# Patient Record
Sex: MALE | Race: BLACK OR AFRICAN AMERICAN | Employment: OTHER | URBAN - METROPOLITAN AREA
[De-identification: names, ages, dates, MRNs, and addresses within clinical notes are randomized per-mention and may not be internally consistent; named-entity substitution may affect disease eponyms.]

---

## 2017-01-05 ENCOUNTER — HOME HEALTH ADMISSION (OUTPATIENT)
Dept: HOME HEALTH SERVICES | Facility: HOME HEALTH | Age: 72
End: 2017-01-05
Payer: MEDICARE

## 2017-01-13 ENCOUNTER — HOME CARE VISIT (OUTPATIENT)
Dept: SCHEDULING | Facility: HOME HEALTH | Age: 72
End: 2017-01-13
Payer: MEDICARE

## 2017-01-13 PROCEDURE — 400013 HH SOC

## 2017-01-13 PROCEDURE — 3331090001 HH PPS REVENUE CREDIT

## 2017-01-13 PROCEDURE — G0299 HHS/HOSPICE OF RN EA 15 MIN: HCPCS

## 2017-01-13 PROCEDURE — 3331090002 HH PPS REVENUE DEBIT

## 2017-01-14 PROCEDURE — 3331090002 HH PPS REVENUE DEBIT

## 2017-01-14 PROCEDURE — 3331090001 HH PPS REVENUE CREDIT

## 2017-01-15 VITALS
SYSTOLIC BLOOD PRESSURE: 130 MMHG | HEART RATE: 54 BPM | DIASTOLIC BLOOD PRESSURE: 62 MMHG | RESPIRATION RATE: 20 BRPM | TEMPERATURE: 98.2 F | OXYGEN SATURATION: 99 %

## 2017-01-15 PROCEDURE — 3331090002 HH PPS REVENUE DEBIT

## 2017-01-15 PROCEDURE — 3331090001 HH PPS REVENUE CREDIT

## 2017-01-16 ENCOUNTER — HOME CARE VISIT (OUTPATIENT)
Dept: SCHEDULING | Facility: HOME HEALTH | Age: 72
End: 2017-01-16
Payer: MEDICARE

## 2017-01-16 PROCEDURE — 3331090001 HH PPS REVENUE CREDIT

## 2017-01-16 PROCEDURE — G0151 HHCP-SERV OF PT,EA 15 MIN: HCPCS

## 2017-01-16 PROCEDURE — 3331090002 HH PPS REVENUE DEBIT

## 2017-01-17 ENCOUNTER — HOME CARE VISIT (OUTPATIENT)
Dept: SCHEDULING | Facility: HOME HEALTH | Age: 72
End: 2017-01-17
Payer: MEDICARE

## 2017-01-17 VITALS
HEART RATE: 67 BPM | DIASTOLIC BLOOD PRESSURE: 74 MMHG | TEMPERATURE: 97.1 F | SYSTOLIC BLOOD PRESSURE: 138 MMHG | OXYGEN SATURATION: 94 % | RESPIRATION RATE: 16 BRPM

## 2017-01-17 VITALS
RESPIRATION RATE: 18 BRPM | SYSTOLIC BLOOD PRESSURE: 140 MMHG | TEMPERATURE: 98.1 F | HEART RATE: 54 BPM | DIASTOLIC BLOOD PRESSURE: 78 MMHG | OXYGEN SATURATION: 99 %

## 2017-01-17 PROCEDURE — G0299 HHS/HOSPICE OF RN EA 15 MIN: HCPCS

## 2017-01-17 PROCEDURE — 3331090002 HH PPS REVENUE DEBIT

## 2017-01-17 PROCEDURE — 3331090001 HH PPS REVENUE CREDIT

## 2017-01-18 PROCEDURE — 3331090001 HH PPS REVENUE CREDIT

## 2017-01-18 PROCEDURE — 3331090002 HH PPS REVENUE DEBIT

## 2017-01-19 PROCEDURE — 3331090001 HH PPS REVENUE CREDIT

## 2017-01-19 PROCEDURE — 3331090002 HH PPS REVENUE DEBIT

## 2017-01-20 PROCEDURE — 3331090002 HH PPS REVENUE DEBIT

## 2017-01-20 PROCEDURE — 3331090001 HH PPS REVENUE CREDIT

## 2017-01-21 PROCEDURE — 3331090001 HH PPS REVENUE CREDIT

## 2017-01-21 PROCEDURE — 3331090002 HH PPS REVENUE DEBIT

## 2017-01-22 PROCEDURE — 3331090002 HH PPS REVENUE DEBIT

## 2017-01-22 PROCEDURE — 3331090001 HH PPS REVENUE CREDIT

## 2017-01-23 PROCEDURE — 3331090002 HH PPS REVENUE DEBIT

## 2017-01-23 PROCEDURE — 3331090001 HH PPS REVENUE CREDIT

## 2017-01-24 ENCOUNTER — HOME CARE VISIT (OUTPATIENT)
Dept: SCHEDULING | Facility: HOME HEALTH | Age: 72
End: 2017-01-24
Payer: MEDICARE

## 2017-01-24 VITALS
SYSTOLIC BLOOD PRESSURE: 130 MMHG | HEART RATE: 68 BPM | OXYGEN SATURATION: 91 % | RESPIRATION RATE: 16 BRPM | DIASTOLIC BLOOD PRESSURE: 90 MMHG

## 2017-01-24 PROCEDURE — G0152 HHCP-SERV OF OT,EA 15 MIN: HCPCS

## 2017-01-24 PROCEDURE — 3331090001 HH PPS REVENUE CREDIT

## 2017-01-24 PROCEDURE — 3331090002 HH PPS REVENUE DEBIT

## 2017-01-25 ENCOUNTER — HOME CARE VISIT (OUTPATIENT)
Dept: SCHEDULING | Facility: HOME HEALTH | Age: 72
End: 2017-01-25
Payer: MEDICARE

## 2017-01-25 PROCEDURE — 3331090003 HH PPS REVENUE ADJ

## 2017-01-25 PROCEDURE — 3331090001 HH PPS REVENUE CREDIT

## 2017-01-25 PROCEDURE — 3331090002 HH PPS REVENUE DEBIT

## 2017-01-25 PROCEDURE — G0299 HHS/HOSPICE OF RN EA 15 MIN: HCPCS

## 2017-01-26 VITALS
OXYGEN SATURATION: 95 % | RESPIRATION RATE: 18 BRPM | DIASTOLIC BLOOD PRESSURE: 64 MMHG | SYSTOLIC BLOOD PRESSURE: 136 MMHG | TEMPERATURE: 97.5 F | HEART RATE: 56 BPM

## 2017-04-16 ENCOUNTER — APPOINTMENT (OUTPATIENT)
Dept: GENERAL RADIOLOGY | Age: 72
DRG: 871 | End: 2017-04-16
Attending: EMERGENCY MEDICINE
Payer: MEDICARE

## 2017-04-16 ENCOUNTER — HOSPITAL ENCOUNTER (INPATIENT)
Age: 72
LOS: 6 days | Discharge: NURSING, REHAB OR LONG-TERM CARE | DRG: 871 | End: 2017-04-22
Attending: EMERGENCY MEDICINE | Admitting: INTERNAL MEDICINE
Payer: MEDICARE

## 2017-04-16 DIAGNOSIS — T68.XXXA HYPOTHERMIA, INITIAL ENCOUNTER: ICD-10-CM

## 2017-04-16 DIAGNOSIS — D64.9 ANEMIA, UNSPECIFIED TYPE: ICD-10-CM

## 2017-04-16 DIAGNOSIS — K27.4 GASTROINTESTINAL HEMORRHAGE ASSOCIATED WITH PEPTIC ULCER: ICD-10-CM

## 2017-04-16 DIAGNOSIS — E87.0 HYPERNATREMIA: ICD-10-CM

## 2017-04-16 DIAGNOSIS — N17.9 ACUTE RENAL FAILURE, UNSPECIFIED ACUTE RENAL FAILURE TYPE (HCC): ICD-10-CM

## 2017-04-16 DIAGNOSIS — F10.27 DEMENTIA ASSOCIATED WITH ALCOHOLISM WITHOUT BEHAVIORAL DISTURBANCE (HCC): ICD-10-CM

## 2017-04-16 DIAGNOSIS — D62 ACUTE BLOOD LOSS ANEMIA: ICD-10-CM

## 2017-04-16 DIAGNOSIS — R41.82 ALTERED MENTAL STATUS, UNSPECIFIED ALTERED MENTAL STATUS TYPE: Primary | ICD-10-CM

## 2017-04-16 PROBLEM — A41.9 SEVERE SEPSIS(995.92): Status: ACTIVE | Noted: 2017-04-16

## 2017-04-16 PROBLEM — R65.20 SEVERE SEPSIS(995.92): Status: ACTIVE | Noted: 2017-04-16

## 2017-04-16 LAB
ALBUMIN SERPL BCP-MCNC: 2.7 G/DL (ref 3.2–4.6)
ALBUMIN/GLOB SERPL: 0.5 {RATIO} (ref 1.2–3.5)
ALP SERPL-CCNC: 116 U/L (ref 50–136)
ALT SERPL-CCNC: 54 U/L (ref 12–65)
AMMONIA PLAS-SCNC: 46 UMOL/L (ref 11–32)
ANION GAP BLD CALC-SCNC: 10 MMOL/L (ref 7–16)
ANION GAP BLD CALC-SCNC: 11 MMOL/L (ref 7–16)
ANION GAP BLD CALC-SCNC: 14 MMOL/L (ref 7–16)
APPEARANCE UR: ABNORMAL
AST SERPL W P-5'-P-CCNC: 216 U/L (ref 15–37)
ATRIAL RATE: 70 BPM
BACTERIA SPEC CULT: NORMAL
BACTERIA URNS QL MICRO: 0 /HPF
BASOPHILS # BLD AUTO: 0 K/UL (ref 0–0.2)
BASOPHILS # BLD: 0 % (ref 0–2)
BILIRUB SERPL-MCNC: 0.4 MG/DL (ref 0.2–1.1)
BILIRUB UR QL: ABNORMAL
BUN SERPL-MCNC: 68 MG/DL (ref 8–23)
BUN SERPL-MCNC: 85 MG/DL (ref 8–23)
BUN SERPL-MCNC: 89 MG/DL (ref 8–23)
CALCIUM SERPL-MCNC: 7.1 MG/DL (ref 8.3–10.4)
CALCIUM SERPL-MCNC: 7.6 MG/DL (ref 8.3–10.4)
CALCIUM SERPL-MCNC: 8.4 MG/DL (ref 8.3–10.4)
CALCULATED P AXIS, ECG09: 76 DEGREES
CALCULATED R AXIS, ECG10: 81 DEGREES
CALCULATED T AXIS, ECG11: 40 DEGREES
CASTS URNS QL MICRO: ABNORMAL /LPF
CHLORIDE SERPL-SCNC: 139 MMOL/L (ref 98–107)
CHLORIDE SERPL-SCNC: 140 MMOL/L (ref 98–107)
CHLORIDE SERPL-SCNC: 142 MMOL/L (ref 98–107)
CK SERPL-CCNC: 6966 U/L (ref 21–215)
CO2 SERPL-SCNC: 16 MMOL/L (ref 21–32)
CO2 SERPL-SCNC: 18 MMOL/L (ref 21–32)
CO2 SERPL-SCNC: 20 MMOL/L (ref 21–32)
COLOR UR: YELLOW
CREAT SERPL-MCNC: 3.14 MG/DL (ref 0.8–1.5)
CREAT SERPL-MCNC: 3.77 MG/DL (ref 0.8–1.5)
CREAT SERPL-MCNC: 4.31 MG/DL (ref 0.8–1.5)
DIAGNOSIS, 93000: NORMAL
DIFFERENTIAL METHOD BLD: ABNORMAL
EOSINOPHIL # BLD: 0.1 K/UL (ref 0–0.8)
EOSINOPHIL NFR BLD: 1 % (ref 0.5–7.8)
EPI CELLS #/AREA URNS HPF: ABNORMAL /HPF
ERYTHROCYTE [DISTWIDTH] IN BLOOD BY AUTOMATED COUNT: 21.6 % (ref 11.9–14.6)
ETHANOL SERPL-MCNC: <3 MG/DL
GLOBULIN SER CALC-MCNC: 5 G/DL (ref 2.3–3.5)
GLUCOSE SERPL-MCNC: 103 MG/DL (ref 65–100)
GLUCOSE SERPL-MCNC: 122 MG/DL (ref 65–100)
GLUCOSE SERPL-MCNC: 98 MG/DL (ref 65–100)
GLUCOSE UR STRIP.AUTO-MCNC: NEGATIVE MG/DL
HCT VFR BLD AUTO: 15.6 % (ref 41.1–50.3)
HGB BLD-MCNC: 4.5 G/DL (ref 13.6–17.2)
HGB UR QL STRIP: ABNORMAL
IMM GRANULOCYTES # BLD: 0 K/UL (ref 0–0.5)
IMM GRANULOCYTES NFR BLD AUTO: 0.4 % (ref 0–5)
INR PPP: 1.1 (ref 0.9–1.2)
KETONES UR QL STRIP.AUTO: NEGATIVE MG/DL
LEUKOCYTE ESTERASE UR QL STRIP.AUTO: NEGATIVE
LYMPHOCYTES # BLD AUTO: 13 % (ref 13–44)
LYMPHOCYTES # BLD: 1.3 K/UL (ref 0.5–4.6)
MAGNESIUM SERPL-MCNC: 3.3 MG/DL (ref 1.8–2.4)
MCH RBC QN AUTO: 25.1 PG (ref 26.1–32.9)
MCHC RBC AUTO-ENTMCNC: 28.8 G/DL (ref 31.4–35)
MCV RBC AUTO: 87.2 FL (ref 79.6–97.8)
MONOCYTES # BLD: 0.7 K/UL (ref 0.1–1.3)
MONOCYTES NFR BLD AUTO: 7 % (ref 4–12)
NEUTS SEG # BLD: 7.6 K/UL (ref 1.7–8.2)
NEUTS SEG NFR BLD AUTO: 79 % (ref 43–78)
NITRITE UR QL STRIP.AUTO: NEGATIVE
P-R INTERVAL, ECG05: 154 MS
PH UR STRIP: 5 [PH] (ref 5–9)
PLATELET # BLD AUTO: 300 K/UL (ref 150–450)
PMV BLD AUTO: 11.6 FL (ref 10.8–14.1)
POTASSIUM SERPL-SCNC: 4.7 MMOL/L (ref 3.5–5.1)
POTASSIUM SERPL-SCNC: 5 MMOL/L (ref 3.5–5.1)
POTASSIUM SERPL-SCNC: 5.2 MMOL/L (ref 3.5–5.1)
PROCALCITONIN SERPL-MCNC: 0.3 NG/ML
PROT SERPL-MCNC: 7.7 G/DL (ref 6.3–8.2)
PROT UR STRIP-MCNC: ABNORMAL MG/DL
PROTHROMBIN TIME: 11.7 SEC (ref 9.6–12)
Q-T INTERVAL, ECG07: 478 MS
QRS DURATION, ECG06: 84 MS
QTC CALCULATION (BEZET), ECG08: 516 MS
RBC # BLD AUTO: 1.79 M/UL (ref 4.23–5.67)
RBC #/AREA URNS HPF: ABNORMAL /HPF
SERVICE CMNT-IMP: NORMAL
SODIUM SERPL-SCNC: 168 MMOL/L (ref 136–145)
SODIUM SERPL-SCNC: 170 MMOL/L (ref 136–145)
SODIUM SERPL-SCNC: 172 MMOL/L (ref 136–145)
SP GR UR REFRACTOMETRY: 1.02 (ref 1–1.02)
TROPONIN I SERPL-MCNC: 0.06 NG/ML (ref 0.02–0.05)
TROPONIN I SERPL-MCNC: 0.07 NG/ML (ref 0.02–0.05)
TSH SERPL DL<=0.005 MIU/L-ACNC: 0.35 UIU/ML (ref 0.36–3.74)
UROBILINOGEN UR QL STRIP.AUTO: 0.2 EU/DL (ref 0.2–1)
VALPROATE SERPL-MCNC: 9 UG/ML (ref 50–100)
VENTRICULAR RATE, ECG03: 70 BPM
WBC # BLD AUTO: 9.7 K/UL (ref 4.3–11.1)
WBC URNS QL MICRO: ABNORMAL /HPF

## 2017-04-16 PROCEDURE — 96361 HYDRATE IV INFUSION ADD-ON: CPT | Performed by: EMERGENCY MEDICINE

## 2017-04-16 PROCEDURE — 99223 1ST HOSP IP/OBS HIGH 75: CPT | Performed by: INTERNAL MEDICINE

## 2017-04-16 PROCEDURE — 96375 TX/PRO/DX INJ NEW DRUG ADDON: CPT | Performed by: EMERGENCY MEDICINE

## 2017-04-16 PROCEDURE — 74011000258 HC RX REV CODE- 258: Performed by: INTERNAL MEDICINE

## 2017-04-16 PROCEDURE — 96368 THER/DIAG CONCURRENT INF: CPT | Performed by: EMERGENCY MEDICINE

## 2017-04-16 PROCEDURE — 80074 ACUTE HEPATITIS PANEL: CPT | Performed by: INTERNAL MEDICINE

## 2017-04-16 PROCEDURE — 86923 COMPATIBILITY TEST ELECTRIC: CPT | Performed by: EMERGENCY MEDICINE

## 2017-04-16 PROCEDURE — 74011250636 HC RX REV CODE- 250/636: Performed by: EMERGENCY MEDICINE

## 2017-04-16 PROCEDURE — 84484 ASSAY OF TROPONIN QUANT: CPT | Performed by: INTERNAL MEDICINE

## 2017-04-16 PROCEDURE — 87040 BLOOD CULTURE FOR BACTERIA: CPT | Performed by: EMERGENCY MEDICINE

## 2017-04-16 PROCEDURE — 82140 ASSAY OF AMMONIA: CPT | Performed by: INTERNAL MEDICINE

## 2017-04-16 PROCEDURE — 77030032490 HC SLV COMPR SCD KNE COVD -B

## 2017-04-16 PROCEDURE — 80048 BASIC METABOLIC PNL TOTAL CA: CPT | Performed by: INTERNAL MEDICINE

## 2017-04-16 PROCEDURE — 74011250636 HC RX REV CODE- 250/636: Performed by: INTERNAL MEDICINE

## 2017-04-16 PROCEDURE — 99284 EMERGENCY DEPT VISIT MOD MDM: CPT | Performed by: EMERGENCY MEDICINE

## 2017-04-16 PROCEDURE — 30233N1 TRANSFUSION OF NONAUTOLOGOUS RED BLOOD CELLS INTO PERIPHERAL VEIN, PERCUTANEOUS APPROACH: ICD-10-PCS | Performed by: INTERNAL MEDICINE

## 2017-04-16 PROCEDURE — 74011000258 HC RX REV CODE- 258: Performed by: EMERGENCY MEDICINE

## 2017-04-16 PROCEDURE — 80307 DRUG TEST PRSMV CHEM ANLYZR: CPT | Performed by: INTERNAL MEDICINE

## 2017-04-16 PROCEDURE — 77030019605

## 2017-04-16 PROCEDURE — 71010 XR CHEST PORT: CPT

## 2017-04-16 PROCEDURE — 84145 PROCALCITONIN (PCT): CPT | Performed by: EMERGENCY MEDICINE

## 2017-04-16 PROCEDURE — 87641 MR-STAPH DNA AMP PROBE: CPT | Performed by: INTERNAL MEDICINE

## 2017-04-16 PROCEDURE — 96366 THER/PROPH/DIAG IV INF ADDON: CPT | Performed by: EMERGENCY MEDICINE

## 2017-04-16 PROCEDURE — 85610 PROTHROMBIN TIME: CPT | Performed by: NURSE PRACTITIONER

## 2017-04-16 PROCEDURE — 86900 BLOOD TYPING SEROLOGIC ABO: CPT | Performed by: EMERGENCY MEDICINE

## 2017-04-16 PROCEDURE — 96365 THER/PROPH/DIAG IV INF INIT: CPT | Performed by: EMERGENCY MEDICINE

## 2017-04-16 PROCEDURE — 82550 ASSAY OF CK (CPK): CPT | Performed by: NURSE PRACTITIONER

## 2017-04-16 PROCEDURE — 36415 COLL VENOUS BLD VENIPUNCTURE: CPT | Performed by: INTERNAL MEDICINE

## 2017-04-16 PROCEDURE — 81001 URINALYSIS AUTO W/SCOPE: CPT | Performed by: EMERGENCY MEDICINE

## 2017-04-16 PROCEDURE — 65620000000 HC RM CCU GENERAL

## 2017-04-16 PROCEDURE — 84443 ASSAY THYROID STIM HORMONE: CPT | Performed by: EMERGENCY MEDICINE

## 2017-04-16 PROCEDURE — BT40ZZZ ULTRASONOGRAPHY OF BLADDER: ICD-10-PCS | Performed by: INTERNAL MEDICINE

## 2017-04-16 PROCEDURE — 80164 ASSAY DIPROPYLACETIC ACD TOT: CPT | Performed by: EMERGENCY MEDICINE

## 2017-04-16 PROCEDURE — C9113 INJ PANTOPRAZOLE SODIUM, VIA: HCPCS | Performed by: INTERNAL MEDICINE

## 2017-04-16 PROCEDURE — 93005 ELECTROCARDIOGRAM TRACING: CPT | Performed by: EMERGENCY MEDICINE

## 2017-04-16 PROCEDURE — 83735 ASSAY OF MAGNESIUM: CPT | Performed by: INTERNAL MEDICINE

## 2017-04-16 PROCEDURE — P9016 RBC LEUKOCYTES REDUCED: HCPCS | Performed by: EMERGENCY MEDICINE

## 2017-04-16 PROCEDURE — 77030013131 HC IV BLD ST ICUM -A

## 2017-04-16 PROCEDURE — 80053 COMPREHEN METABOLIC PANEL: CPT | Performed by: EMERGENCY MEDICINE

## 2017-04-16 PROCEDURE — 85025 COMPLETE CBC W/AUTO DIFF WBC: CPT | Performed by: EMERGENCY MEDICINE

## 2017-04-16 PROCEDURE — 36430 TRANSFUSION BLD/BLD COMPNT: CPT

## 2017-04-16 PROCEDURE — 87086 URINE CULTURE/COLONY COUNT: CPT | Performed by: INTERNAL MEDICINE

## 2017-04-16 RX ORDER — SODIUM CHLORIDE 9 MG/ML
250 INJECTION, SOLUTION INTRAVENOUS AS NEEDED
Status: DISCONTINUED | OUTPATIENT
Start: 2017-04-16 | End: 2017-04-16

## 2017-04-16 RX ORDER — SODIUM CHLORIDE 9 MG/ML
250 INJECTION, SOLUTION INTRAVENOUS AS NEEDED
Status: DISCONTINUED | OUTPATIENT
Start: 2017-04-16 | End: 2017-04-22 | Stop reason: HOSPADM

## 2017-04-16 RX ORDER — SODIUM CHLORIDE 0.9 % (FLUSH) 0.9 %
5-10 SYRINGE (ML) INJECTION AS NEEDED
Status: DISCONTINUED | OUTPATIENT
Start: 2017-04-16 | End: 2017-04-22 | Stop reason: HOSPADM

## 2017-04-16 RX ORDER — DEXTROSE MONOHYDRATE 50 MG/ML
125 INJECTION, SOLUTION INTRAVENOUS CONTINUOUS
Status: DISCONTINUED | OUTPATIENT
Start: 2017-04-16 | End: 2017-04-22 | Stop reason: HOSPADM

## 2017-04-16 RX ORDER — SODIUM CHLORIDE 0.9 % (FLUSH) 0.9 %
5-10 SYRINGE (ML) INJECTION EVERY 8 HOURS
Status: DISCONTINUED | OUTPATIENT
Start: 2017-04-16 | End: 2017-04-22 | Stop reason: HOSPADM

## 2017-04-16 RX ORDER — HYDROMORPHONE HYDROCHLORIDE 1 MG/ML
0.5 INJECTION, SOLUTION INTRAMUSCULAR; INTRAVENOUS; SUBCUTANEOUS
Status: DISCONTINUED | OUTPATIENT
Start: 2017-04-16 | End: 2017-04-22 | Stop reason: HOSPADM

## 2017-04-16 RX ORDER — ONDANSETRON 2 MG/ML
4 INJECTION INTRAMUSCULAR; INTRAVENOUS
Status: DISCONTINUED | OUTPATIENT
Start: 2017-04-16 | End: 2017-04-22 | Stop reason: HOSPADM

## 2017-04-16 RX ADMIN — DEXTROSE MONOHYDRATE 150 ML/HR: 5 INJECTION, SOLUTION INTRAVENOUS at 16:30

## 2017-04-16 RX ADMIN — Medication 10 ML: at 22:00

## 2017-04-16 RX ADMIN — LEVETIRACETAM 500 MG: 100 INJECTION, SOLUTION INTRAVENOUS at 17:54

## 2017-04-16 RX ADMIN — SODIUM CHLORIDE 80 MG: 9 INJECTION, SOLUTION INTRAVENOUS at 17:52

## 2017-04-16 RX ADMIN — SODIUM CHLORIDE 80 MG: 900 INJECTION, SOLUTION INTRAVENOUS at 19:19

## 2017-04-16 RX ADMIN — HYDROMORPHONE HYDROCHLORIDE 0.5 MG: 1 INJECTION, SOLUTION INTRAMUSCULAR; INTRAVENOUS; SUBCUTANEOUS at 15:14

## 2017-04-16 RX ADMIN — SODIUM CHLORIDE 1000 ML: 900 INJECTION, SOLUTION INTRAVENOUS at 11:23

## 2017-04-16 RX ADMIN — VANCOMYCIN HYDROCHLORIDE 1000 MG: 1 INJECTION, POWDER, LYOPHILIZED, FOR SOLUTION INTRAVENOUS at 11:23

## 2017-04-16 RX ADMIN — Medication 10 ML: at 16:30

## 2017-04-16 RX ADMIN — PIPERACILLIN SODIUM,TAZOBACTAM SODIUM 4.5 G: 4; .5 INJECTION, POWDER, FOR SOLUTION INTRAVENOUS at 11:44

## 2017-04-16 NOTE — ED PROVIDER NOTES
HPI Comments: Family reports to EMS the patient has had increased confusion and weakness with some altered mental status over the past 3 days. His history of GI bleeding and has been having dark stools. States last time he was having confusion like this he was also having a lower GI bleed. EGD performed 11/28/16 revealing gastric and duodenal AVMs which were bicapped/endoclipped. Patient is a 70 y.o. male presenting with altered mental status. The history is provided by the EMS personnel. No  was used. Altered mental status    This is a new problem. The current episode started more than 2 days ago. The problem has been gradually worsening. Associated symptoms include confusion, unresponsiveness and weakness. Risk factors include dementia. His past medical history is significant for seizures, hypertension and COPD. Past Medical History:   Diagnosis Date    Alcohol abuse     QUIT IN 2014. LONG USE    Anemia     GI BLEED AND CHRONIC, BASELINE 9.  11/25/16:  HGB: 7.6    Angiodysplasia of stomach 2016    and duodenum    Bladder incontinence 05/28/2014    ALSO BOWEL    Chronic airway obstruction, not elsewhere classified ([address] 5/28/2014    CKD (chronic kidney disease)     COPD (chronic obstructive pulmonary disease) ([address]     Dementia 5/28/2014    Hyperlipidemia 5/28/2014    Hypernatremia 11/25/2016 11/25/16:  177, 11/18/16:  80    Hypertension     Hypothyroid     Seizure ([address] 2010    NO GRAND MAL. ON DILANTIN THE ENTIRE TIME    Tobacco abuse 5/28/2014    Tobacco abuse     ENTIRE ADULT LIFE. QUIT IN 2014, SNEAKS WHEN POSSIBLE       Past Surgical History:   Procedure Laterality Date    HX COLONOSCOPY  APPROX 2005    UNKNOWN EGD    HX ENDOSCOPY  2016    bicap and clip         No family history on file.     Social History     Social History    Marital status:      Spouse name: N/A    Number of children: N/A    Years of education: N/A     Occupational History  Not on file. Social History Main Topics    Smoking status: Former Smoker     Packs/day: 0.50     Years: 50.00     Types: Cigarettes    Smokeless tobacco: Never Used      Comment: QUIT IN 2014, SNEAKS WHEN POSSIBLE    Alcohol use No      Comment: HEAVILY IN PAST YEARS, NONE SINCE 2014    Drug use: No    Sexual activity: Not on file     Other Topics Concern    Not on file     Social History Narrative    11/25/16:  PATIENT IS , LIVES WITH WIFE AND ISABEL ROSA. DAUGHTER IS ALSO NEARBY. ALLERGIES: Review of patient's allergies indicates no known allergies. Review of Systems   Unable to perform ROS: Mental status change   Neurological: Positive for weakness. Psychiatric/Behavioral: Positive for confusion. There were no vitals filed for this visit. Physical Exam   Constitutional: He appears well-developed and well-nourished. He appears lethargic. HENT:   Head: Normocephalic and atraumatic. Eyes: Conjunctivae are normal. Pupils are equal, round, and reactive to light. Neck: Normal range of motion. Neck supple. Cardiovascular: Normal rate and regular rhythm. Pulmonary/Chest: Effort normal and breath sounds normal. He has no wheezes. Abdominal: Soft. Bowel sounds are normal. There is no tenderness. Genitourinary: Rectal exam shows guaiac positive stool. Musculoskeletal: He exhibits no edema or tenderness. Neurological: He appears lethargic. He is disoriented. Skin: Skin is dry. Cool to touch     Nursing note and vitals reviewed. MDM  Number of Diagnoses or Management Options  Diagnosis management comments: Pt is hypothermic. Has lower GI bleed with anemia and renal failure and hypernatremia.  Will admit to hospitalist.        Amount and/or Complexity of Data Reviewed  Clinical lab tests: ordered and reviewed  Tests in the radiology section of CPT®: ordered and reviewed  Tests in the medicine section of CPT®: ordered and reviewed    Patient Progress  Patient progress: stable    ED Course       Procedures         EKG: normal sinus rhythm, nonspecific ST and T waves changes. Rtae 70. Results Include:    Recent Results (from the past 24 hour(s))   METABOLIC PANEL, COMPREHENSIVE    Collection Time: 04/16/17 11:25 AM   Result Value Ref Range    Sodium 170 (HH) 136 - 145 mmol/L    Potassium 5.0 3.5 - 5.1 mmol/L    Chloride 140 (H) 98 - 107 mmol/L    CO2 16 (L) 21 - 32 mmol/L    Anion gap 14 7 - 16 mmol/L    Glucose 122 (H) 65 - 100 mg/dL    BUN 89 (H) 8 - 23 MG/DL    Creatinine 4.31 (H) 0.8 - 1.5 MG/DL    GFR est AA 18 (L) >60 ml/min/1.73m2    GFR est non-AA 15 (L) >60 ml/min/1.73m2    Calcium 8.4 8.3 - 10.4 MG/DL    Bilirubin, total 0.4 0.2 - 1.1 MG/DL    ALT (SGPT) 54 12 - 65 U/L    AST (SGOT) 216 (H) 15 - 37 U/L    Alk. phosphatase 116 50 - 136 U/L    Protein, total 7.7 6.3 - 8.2 g/dL    Albumin 2.7 (L) 3.2 - 4.6 g/dL    Globulin 5.0 (H) 2.3 - 3.5 g/dL    A-G Ratio 0.5 (L) 1.2 - 3.5     CBC WITH AUTOMATED DIFF    Collection Time: 04/16/17 11:25 AM   Result Value Ref Range    WBC 9.7 4.3 - 11.1 K/uL    RBC 1.79 (L) 4.23 - 5.67 M/uL    HGB 4.5 (LL) 13.6 - 17.2 g/dL    HCT 15.6 (LL) 41.1 - 50.3 %    MCV 87.2 79.6 - 97.8 FL    MCH 25.1 (L) 26.1 - 32.9 PG    MCHC 28.8 (L) 31.4 - 35.0 g/dL    RDW 21.6 (H) 11.9 - 14.6 %    PLATELET 099 654 - 232 K/uL    MPV 11.6 10.8 - 14.1 FL    DF AUTOMATED      NEUTROPHILS 79 (H) 43 - 78 %    LYMPHOCYTES 13 13 - 44 %    MONOCYTES 7 4.0 - 12.0 %    EOSINOPHILS 1 0.5 - 7.8 %    BASOPHILS 0 0.0 - 2.0 %    IMMATURE GRANULOCYTES 0.4 0.0 - 5.0 %    ABS. NEUTROPHILS 7.6 1.7 - 8.2 K/UL    ABS. LYMPHOCYTES 1.3 0.5 - 4.6 K/UL    ABS. MONOCYTES 0.7 0.1 - 1.3 K/UL    ABS. EOSINOPHILS 0.1 0.0 - 0.8 K/UL    ABS. BASOPHILS 0.0 0.0 - 0.2 K/UL    ABS. IMM.  GRANS. 0.0 0.0 - 0.5 K/UL   TYPE & SCREEN    Collection Time: 04/16/17 11:25 AM   Result Value Ref Range    Crossmatch Expiration 04/19/2017     ABO/Rh(D) O POSITIVE     Antibody screen NEG     Unit number [id number]     Blood component type RC LR AS5     Unit division 00     Status of unit ALLOCATED     Crossmatch result Compatible     Unit number [id number]     Blood component type  LR AS5     Unit division 00     Status of unit ALLOCATED     Crossmatch result Compatible    VALPROIC ACID    Collection Time: 04/16/17 11:25 AM   Result Value Ref Range    VALPROIC ACID 9 (L) 50 - 100 ug/mL

## 2017-04-16 NOTE — H&P
HOSPITALIST H&P/CONSULT  NAME:  Arun Jovel   Age:  70 y.o.  :   1945   MRN:   [id number]  PCP: Cathryn Brown MD  Consulting MD:  Treatment Team: Attending Provider: Marty Motta MD; Primary Nurse: Shannen Morillo; Hospitalist: Mariel Hernandez MD  HPI:   Patient brought in with AMS unable to give h/istory. History obtained by refiew of medical records. Complete ROS done and is as stated in HPI or otherwise negative  Past Medical History:   Diagnosis Date    Alcohol abuse     QUIT IN . LONG USE    Anemia     GI BLEED AND CHRONIC, BASELINE 9.  16:  HGB: 7.6    Angiodysplasia of stomach     and duodenum    Bladder incontinence 2014    ALSO BOWEL    Chronic airway obstruction, not elsewhere classified ([facility] 2014    CKD (chronic kidney disease)     COPD (chronic obstructive pulmonary disease) ([facility]     Dementia 2014    Hyperlipidemia 2014    Hypernatremia 2016:  177, 16:  80    Hypertension     Hypothyroid     Seizure ([address]     NO GRAND MAL. ON DILANTIN THE ENTIRE TIME    Tobacco abuse 2014    Tobacco abuse     ENTIRE ADULT LIFE. QUIT IN , SNEAKS WHEN POSSIBLE      Past Surgical History:   Procedure Laterality Date    HX COLONOSCOPY  APPROX     UNKNOWN EGD    HX ENDOSCOPY  2016    bicap and clip      Prior to Admission Medications   Prescriptions Last Dose Informant Patient Reported? Taking? amLODIPine (NORVASC) 10 mg tablet   No No   Sig: Take 1 Tab by mouth daily. atorvastatin (LIPITOR) 20 mg tablet   No No   Sig: Take 1 Tab by mouth daily. divalproex DR (DEPAKOTE) 250 mg tablet   No No   Sig: Take 1 Tab by mouth nightly. ferrous sulfate 220 mg (44 mg iron)/5 mL solution   No No   Sig: Take 5 mL by mouth two (2) times a day. folic acid (FOLVITE) 1 mg tablet   No No   Sig: Take 1 Tab by mouth daily.    levETIRAcetam (KEPPRA) 500 mg tablet   No No   Sig: Take 1 Tab by mouth two (2) times a day. levothyroxine (SYNTHROID) 125 mcg tablet   No No   Sig: Take 1 Tab by mouth Daily (before breakfast). losartan (COZAAR) 100 mg tablet   No No   Sig: Take 1 Tab by mouth daily. omeprazole (PRILOSEC) 20 mg capsule   No No   Sig: Take 1 Cap by mouth two (2) times a day. tamsulosin (FLOMAX) 0.4 mg capsule   No No   Sig: Take 1 Cap by mouth nightly. thiamine (B-1) 100 mg tablet   No No   Sig: Take 1 Tab by mouth daily. Facility-Administered Medications: None     No Known Allergies   Social History   Substance Use Topics    Smoking status: Former Smoker     Packs/day: 0.50     Years: 50.00     Types: Cigarettes    Smokeless tobacco: Never Used      Comment: QUIT IN , SNEAKS WHEN POSSIBLE    Alcohol use No      Comment: HEAVILY IN PAST YEARS, NONE SINCE       No family history on file. Objective:     Visit Vitals    /54    Pulse 76    Temp 95 °F (35 °C)    Resp 16    Ht 5' 8\" (1.727 m)    Wt 56.7 kg (125 lb)    SpO2 100%    BMI 19.01 kg/m2      Temp (24hrs), Av.9 °F (34.4 °C), Min:92.9 °F (33.8 °C), Max:95 °F (35 °C)    Oxygen Therapy  O2 Sat (%): 100 % (17 1320)  O2 Device: Room air (17 1131)  Physical Exam:  General:    Lethargic, Black male, no distress, appears stated age. Head:   Normocephalic, without obvious abnormality, atraumatic. Nose:  Nares normal. No drainage or sinus tenderness. Lungs:   Clear to auscultation bilaterally. No Wheezing or Rhonchi. No rales. Heart:   Regular rate and rhythm,  no murmur, rub or gallop. Abdomen:   Soft, non-tender. Not distended. Bowel sounds normal.   Extremities: No cyanosis. No edema. No clubbing  Skin:     Texture, turgor normal. No rashes or lesions.   Not Jaundiced  Neurologic: Alert and oriented x 3, no focal deficits   Data Review:   Recent Results (from the past 24 hour(s))   PROCALCITONIN    Collection Time: 17 11:25 AM   Result Value Ref Range    Procalcitonin 0.3 ng/mL METABOLIC PANEL, COMPREHENSIVE    Collection Time: 04/16/17 11:25 AM   Result Value Ref Range    Sodium 170 (HH) 136 - 145 mmol/L    Potassium 5.0 3.5 - 5.1 mmol/L    Chloride 140 (H) 98 - 107 mmol/L    CO2 16 (L) 21 - 32 mmol/L    Anion gap 14 7 - 16 mmol/L    Glucose 122 (H) 65 - 100 mg/dL    BUN 89 (H) 8 - 23 MG/DL    Creatinine 4.31 (H) 0.8 - 1.5 MG/DL    GFR est AA 18 (L) >60 ml/min/1.73m2    GFR est non-AA 15 (L) >60 ml/min/1.73m2    Calcium 8.4 8.3 - 10.4 MG/DL    Bilirubin, total 0.4 0.2 - 1.1 MG/DL    ALT (SGPT) 54 12 - 65 U/L    AST (SGOT) 216 (H) 15 - 37 U/L    Alk. phosphatase 116 50 - 136 U/L    Protein, total 7.7 6.3 - 8.2 g/dL    Albumin 2.7 (L) 3.2 - 4.6 g/dL    Globulin 5.0 (H) 2.3 - 3.5 g/dL    A-G Ratio 0.5 (L) 1.2 - 3.5     CBC WITH AUTOMATED DIFF    Collection Time: 04/16/17 11:25 AM   Result Value Ref Range    WBC 9.7 4.3 - 11.1 K/uL    RBC 1.79 (L) 4.23 - 5.67 M/uL    HGB 4.5 (LL) 13.6 - 17.2 g/dL    HCT 15.6 (LL) 41.1 - 50.3 %    MCV 87.2 79.6 - 97.8 FL    MCH 25.1 (L) 26.1 - 32.9 PG    MCHC 28.8 (L) 31.4 - 35.0 g/dL    RDW 21.6 (H) 11.9 - 14.6 %    PLATELET 075 990 - 404 K/uL    MPV 11.6 10.8 - 14.1 FL    DF AUTOMATED      NEUTROPHILS 79 (H) 43 - 78 %    LYMPHOCYTES 13 13 - 44 %    MONOCYTES 7 4.0 - 12.0 %    EOSINOPHILS 1 0.5 - 7.8 %    BASOPHILS 0 0.0 - 2.0 %    IMMATURE GRANULOCYTES 0.4 0.0 - 5.0 %    ABS. NEUTROPHILS 7.6 1.7 - 8.2 K/UL    ABS. LYMPHOCYTES 1.3 0.5 - 4.6 K/UL    ABS. MONOCYTES 0.7 0.1 - 1.3 K/UL    ABS. EOSINOPHILS 0.1 0.0 - 0.8 K/UL    ABS. BASOPHILS 0.0 0.0 - 0.2 K/UL    ABS. IMM.  GRANS. 0.0 0.0 - 0.5 K/UL   TYPE & SCREEN    Collection Time: 04/16/17 11:25 AM   Result Value Ref Range    Crossmatch Expiration 04/19/2017     ABO/Rh(D) O POSITIVE     Antibody screen NEG     Unit number [id number]     Blood component type [facility] AS5     Unit division 00     Status of unit ISSUED     Crossmatch result Compatible     Unit number [id number]     Blood component type [facility] AS5     Unit division 00     Status of unit ALLOCATED     Crossmatch result Compatible    TSH 3RD GENERATION    Collection Time: 04/16/17 11:25 AM   Result Value Ref Range    TSH 0.352 (L) 0.358 - 3.740 uIU/mL   VALPROIC ACID    Collection Time: 04/16/17 11:25 AM   Result Value Ref Range    VALPROIC ACID 9 (L) 50 - 100 ug/mL     Imaging /Procedures /Studies     Assessment and Plan: Active Hospital Problems    Diagnosis Date Noted    Severe sepsis ([facility]  Blood culture x2  Urine for UA and c&s  Vancomycin and zosyn   04/16/2017    Acute blood loss anemia  ---- stool for hemoccult x3  --- iron, TIBC, % sats ,Ferritin and reticulocyte count  --- transfuse 4 units of PRBC  --- H/h q6h   04/16/2017    Hypothermia  --- warming blanket/ bare hugger  --- tectal probe  Warmed iv fluid  --- Free T4  --- iv synthroid 1/2  Usual oral dose   04/16/2017    Acute renal failure (ARF) ([facility] [address]  Urine for Na, Creat, FENA  1/2 NS @ 100ml/h  BMP in am   04/16/2017    Dementia   11/25/2016    Anemia 11/25/2016     ADM 11/25/16:  HGB: 7.6. BASELINE 9      Hypernatremia  --- BMP q6h   11/25/2016    GI bleed  --- protonix 80mg iv push then 8 mg/hr  --- GI consult  --- if continue to bleed then would start treatment for esophageal varices   06/14/2014    History of alcohol abuse 06/13/2014     FORMERLY DRANK  1-2 12oz.  Beers daily    NONE 2014         PLAN  ·  CT scan of the head  · Ammonia lerel  ·     Code Status: full    Anticipated discharge: 4 days    Signed By: Rigo Briscoe MD     April 16, 2017

## 2017-04-16 NOTE — PROGRESS NOTES
TRANSFER - IN REPORT:    Verbal report received from [facility]) on Heena Berg  being received from ED(unit) for routine progression of care      Report consisted of patients Situation, Background, Assessment and   Recommendations(SBAR). Information from the following report(s) SBAR, Kardex, ED Summary, Intake/Output, MAR, Recent Results, Med Rec Status and Cardiac Rhythm SR was reviewed with the receiving nurse. Opportunity for questions and clarification was provided. Assessment completed upon patients arrival to unit and care assumed.

## 2017-04-16 NOTE — CONSULTS
CONSULT NOTE    Eladio Cornejo    4/16/2017    Date of Admission:  4/16/2017    The patient's chart is reviewed and the patient is discussed with the staff. Subjective:     Patient is a 70 y.o.  male seen and evaluated at the request of Dr. Osorio Johnson. The patient is unable to provide any history so all information is gathered from chart review. According to ER records the patient has had several days of confusion at home associated with very dark stools. He apparently has a history of GI AVM's in the past. Per the ER physician note the last time he had confusion like this was in the setting of GI bleed. He was noted to have a hgb of 4.5 and a Na of 170. The hospitalists have admitted him to the ICU and [facility]. The patient is on room air and hemodynamically stable but confused and non-verbal. He has already received 2u PRBC. His last known hgb was 9.8 and Na was 142 in January of this year. Review of Systems  Review of systems not obtained due to patient factors.     Patient Active Problem List   Diagnosis Code    Dementia F03.90    Tobacco abuse Z72.0    Chronic airway obstruction, not elsewhere classified J44.9    Weight loss R63.4    Hyperlipidemia E78.5    Bladder incontinence R32    HTN (hypertension) I10    COPD (chronic obstructive pulmonary disease) with emphysema (HCC) J43.9    GIB (gastrointestinal bleeding) K92.2    Seizure disorder ([facility] [address] G40.909    DEVORA (acute kidney injury) ([facility] [address] N17.9    Hypernatremia E87.0    Microcytic hypochromic anemia D50.9    History of alcohol abuse Z87.898    GI bleed K92.2    Anemia D64.9    AVM (arteriovenous malformation) of small bowel, acquired with hemorrhage (HCC) K55.8    Benign neoplasm of colon D12.6    Dementia associated with alcoholism without behavioral disturbance (HCC) F10.97    Acquired hypothyroidism E03.9    Abdominal pain R10.9    Anorexia R63.0    Debility R53.81    Acute encephalopathy G93.40    Lethargy R53.83    Acute on chronic renal failure (HCC) N17.9, N18.9    Failure to thrive in adult R62.7    Protein-calorie malnutrition, severe ([facility]) E43    Seizure ([facility]) R56.9    Acute metabolic encephalopathy S63.10    Urinary tract infection associated with indwelling urethral catheter ([facility]) T83.511A, N39.0    Bradycardia R00.1    Severe sepsis ([facility]) A41.9, R65.20    Acute blood loss anemia D62    Hypothermia T68. Maxwell Mikel    Acute renal failure (ARF) ([facility]) N17.9       Prior to Admission Medications   Prescriptions Last Dose Informant Patient Reported? Taking? amLODIPine (NORVASC) 10 mg tablet   No No   Sig: Take 1 Tab by mouth daily. atorvastatin (LIPITOR) 20 mg tablet   No No   Sig: Take 1 Tab by mouth daily. divalproex DR (DEPAKOTE) 250 mg tablet   No No   Sig: Take 1 Tab by mouth nightly. ferrous sulfate 220 mg (44 mg iron)/5 mL solution   No No   Sig: Take 5 mL by mouth two (2) times a day. folic acid (FOLVITE) 1 mg tablet   No No   Sig: Take 1 Tab by mouth daily. levETIRAcetam (KEPPRA) 500 mg tablet   No No   Sig: Take 1 Tab by mouth two (2) times a day. levothyroxine (SYNTHROID) 125 mcg tablet   No No   Sig: Take 1 Tab by mouth Daily (before breakfast). losartan (COZAAR) 100 mg tablet   No No   Sig: Take 1 Tab by mouth daily. omeprazole (PRILOSEC) 20 mg capsule   No No   Sig: Take 1 Cap by mouth two (2) times a day. tamsulosin (FLOMAX) 0.4 mg capsule   No No   Sig: Take 1 Cap by mouth nightly. thiamine (B-1) 100 mg tablet   No No   Sig: Take 1 Tab by mouth daily. Facility-Administered Medications: None       Past Medical History:   Diagnosis Date    Alcohol abuse     QUIT IN 2014.  LONG USE    Anemia     GI BLEED AND CHRONIC, BASELINE 9.  11/25/16:  HGB: 7.6    Angiodysplasia of stomach 2016    and duodenum    Bladder incontinence 05/28/2014    ALSO BOWEL    Chronic airway obstruction, not elsewhere classified ([facility] 5/28/2014    CKD (chronic kidney disease)     COPD (chronic obstructive pulmonary disease) ([facility]     Dementia 5/28/2014    Hyperlipidemia 5/28/2014    Hypernatremia 11/25/2016 11/25/16:  177, 11/18/16:  80    Hypertension     Hypothyroid     Seizure ([facility] 2010    NO GRAND MAL. ON DILANTIN THE ENTIRE TIME    Tobacco abuse 5/28/2014    Tobacco abuse     ENTIRE ADULT LIFE. QUIT IN 2014, SNEAKS WHEN POSSIBLE     Past Surgical History:   Procedure Laterality Date    HX COLONOSCOPY  APPROX 2005    UNKNOWN EGD    HX ENDOSCOPY  2016    bicap and clip     Social History     Social History    Marital status:      Spouse name: N/A    Number of children: N/A    Years of education: N/A     Occupational History    Not on file. Social History Main Topics    Smoking status: Former Smoker     Packs/day: 0.50     Years: 50.00     Types: Cigarettes    Smokeless tobacco: Never Used      Comment: QUIT IN 2014, SNEAKS WHEN POSSIBLE    Alcohol use No      Comment: HEAVILY IN PAST YEARS, NONE SINCE 2014    Drug use: No    Sexual activity: Not on file     Other Topics Concern    Not on file     Social History Narrative    11/25/16:  PATIENT IS , LIVES WITH WIFE AND ISABEL ROSA. DAUGHTER IS ALSO NEARBY. No family history on file.   No Known Allergies    Current Facility-Administered Medications   Medication Dose Route Frequency    sodium chloride (NS) flush 5-10 mL  5-10 mL IntraVENous PRN    levETIRAcetam (KEPPRA) 500 mg in 0.9% sodium chloride IVPB  500 mg IntraVENous Q12H    [START ON 4/21/2017] levothyroxine (SYNTHROID) injection 50 mcg  50 mcg IntraVENous Q48H    [START ON 0/51/5506] folic acid (FOLVITE) 1 mg, thiamine (B-1) 100 mg in 0.9% sodium chloride 50 mL ivpb   IntraVENous DAILY    sodium chloride (NS) flush 5-10 mL  5-10 mL IntraVENous Q8H    sodium chloride (NS) flush 5-10 mL  5-10 mL IntraVENous PRN    ondansetron (ZOFRAN) injection 4 mg  4 mg IntraVENous Q4H PRN    HYDROmorphone (PF) (DILAUDID) injection 0.5 mg  0.5 mg IntraVENous Q4H PRN    pantoprazole (PROTONIX) 80 mg in 0.9% sodium chloride 100 mL infusion  80 mg IntraVENous ONCE    pantoprazole (PROTONIX) 80 mg in 0.9% sodium chloride 250 mL infusion  80 mg IntraVENous CONTINUOUS    dextrose 5% infusion  150 mL/hr IntraVENous CONTINUOUS       Objective:     Vitals:    04/16/17 1459 04/16/17 1500 04/16/17 1515 04/16/17 1556   BP: 131/64 128/58 128/58    Pulse: 78 79 77    Resp: 16  16    Temp: 97.3 °F (36.3 °C)  97.6 °F (36.4 °C) 96 °F (35.6 °C)   SpO2: 100% 100% 100%    Weight:    126 lb 15.8 oz (57.6 kg)   Height:    5' 8\" (1.727 m)       PHYSICAL EXAM     Constitutional:  the patient is well developed and in no acute distress  EENMT:  Sclera clear, pupils equal, oral mucosa moist  Respiratory: CTA B, no w/r/r  Cardiovascular:  RRR without M,G,R  Gastrointestinal: soft and non-tender; with positive bowel sounds. Musculoskeletal: warm without cyanosis. There is no lower leg edema. Skin:  no jaundice or rashes, no wounds   Neurologic: no gross neuro deficits     Psychiatric:  alert and nods to some questions. Chest X-ray:    4/16/17  IMPRESSION: No active disease      Recent Labs      04/16/17   1125   WBC  9.7   HGB  4.5*   HCT  15.6*   PLT  300     Recent Labs      04/16/17   1125   NA  170*   K  5.0   CL  140*   GLU  122*   CO2  16*   BUN  89*   CREA  4.31*   CA  8.4   ALB  2.7*   TBILI  0.4   ALT  54   SGOT  216*     No results for input(s): PH, PCO2, PO2, HCO3 in the last 72 hours. No results for input(s): LCAD, LAC in the last 72 hours. Assessment:  (Medical Decision Making)     Hospital Problems  Date Reviewed: 2/10/2017          Codes Class Noted POA    * (Principal)Acute blood loss anemia ICD-10-CM: D62  ICD-9-CM: 285.1  4/16/2017 Yes        Hypothermia ICD-10-CM: T68. Serge Mouse  ICD-9-CM: 991.6  4/16/2017 Yes        Acute renal failure (ARF) (HCC) ICD-10-CM: N17.9  ICD-9-CM: 584.9 4/16/2017 Yes        Dementia (Chronic) ICD-10-CM: F03.90  ICD-9-CM: 294.20  11/25/2016 Yes        Hypernatremia (Chronic) ICD-10-CM: E87.0  ICD-9-CM: 276.0  11/25/2016 Yes        Anemia (Chronic) ICD-10-CM: D64.9  ICD-9-CM: 285.9  11/25/2016 Yes    Overview Signed 11/25/2016  4:12 PM by Wendy Menard NP     ADM 11/25/16:  HGB: 7.6. BASELINE 9             GI bleed ICD-10-CM: K92.2  ICD-9-CM: 578.9  6/14/2014 Yes        History of alcohol abuse (Chronic) ICD-10-CM: Y10.172  ICD-9-CM: 305.03  6/13/2014 Yes    Overview Addendum 11/25/2016  4:10 PM by Wendy Menard NP     FORMERLY DRANK  1-2 12oz. Beers daily    NONE 2014                 Patient with confusion found to have severe blood loss anemia from likely GI bleed, hyponatremia and DEVORA. Possibility of sepsis raised, however no suspicion for infection. All of his findings can be explained by his anemia and Na. Plan:  (Medical Decision Making)     --Will d/c abx.   -consult nephrology to manage DEVORA and Na  -consult GI for GI bleed  -on IV ppi  -npo  -repeat BMP now, check mag. Will start D5W and check BMP every 4 hours.   -hgb every 6 hours  -scd's. More than 50% of the time documented was spent in face-to-face contact with the patient and in the care of the patient on the floor/unit where the patient is located. Thank you very much for this referral.  We appreciate the opportunity to participate in this patient's care. Will follow along with above stated plan.     Lana Baker MD

## 2017-04-16 NOTE — CONSULTS
[facility] Consult Note       Primary GI Physician: Dr. Kina Benitez    Referring Physician:  Dr.D. Lew    Consult Date:  4/16/2017    Admit Date:  4/16/2017    Chief Complaint:  GIB    Subjective:     History of Present Illness:  Patient is a 70 y.o. male with PMH of (but not limited to) chronic anemia on iron therapy, CKD, dementia, HLD, HTN, Hypothyroid, seizure history, AVM with recurrent bleeding, COPD, and history of ETOH abuse formerly, who is seen in consultation at the request of Dr. Kaleb Adrian for evaluation of GIB. Mr. 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 He was admitted to [facility] for altered mental status. There is no family present, so the history was obtained from the chart. He was apparently having a some \"dark stools\" over the last couple of days. He takes iron regularly. On evaluation in the ED, he had a heme positive stool. He has no active melena or hematemesis. His Hgb on admission 4.5 (9.8 1/2017). His BUN is elevated at 89(28), creat 4.31 (1.69), (14) ALT 54(7) Tbili 0.4(0.2) (116). His sodium is elevated at 170. He is receiving his first unit of PRBC, PPI drip has been started and blood cultures are pending. CT of his head is pending. EGD with pediatric colonoscope to Jejunum 12/8/16 by Dr. Madeline Temple revealed 2mm nodule at GE junctions, clip along proximal greater curvature, Few AVM, not actively bleeding in the duodenum. Treated with APC; Multiple AVMs throughout the jejunum, not actively bleeding. Treated with APC. Terminated early due to poor O2 saturation and possible aspiration.    EGD by Dr. Donovan Foote on 11/28/16 revealed gastric AVM on lesser curvature with bicap and endoclip, duodenal bulb AVM  Colonoscopy on 6/17/14 by Dr. Samuel Iqbal revealed 3mm sigmoid polyp, 4mm pedunculated polyp in the descending colon and nonbleeding cecal AVM    PMH:  Past Medical History:   Diagnosis Date    Alcohol abuse     QUIT IN 2014. LONG USE    Anemia     GI BLEED AND CHRONIC, BASELINE 9.  11/25/16:  HGB: 7.6    Angiodysplasia of stomach 2016    and duodenum    Bladder incontinence 05/28/2014    ALSO BOWEL    Chronic airway obstruction, not elsewhere classified ([facility] 5/28/2014    CKD (chronic kidney disease)     [facility]     Dementia 5/28/2014    Hyperlipidemia 5/28/2014    Hypernatremia 11/25/2016 11/25/16:  177, 11/18/16:  80    Hypertension     Hypothyroid     Seizure ([facility] 2010    NO GRAND MAL. ON DILANTIN THE ENTIRE TIME    Tobacco abuse 5/28/2014    Tobacco abuse     ENTIRE ADULT LIFE. QUIT IN 2014, SNEAKS WHEN POSSIBLE       PSH:  Past Surgical History:   Procedure Laterality Date    HX COLONOSCOPY  APPROX 2005    UNKNOWN EGD    HX ENDOSCOPY  2016    bicap and clip       Allergies:  No Known Allergies    Home Medications:  Prior to Admission medications    Medication Sig Start Date End Date Taking? Authorizing Provider   ferrous sulfate 220 mg (44 mg iron)/5 mL solution Take 5 mL by mouth two (2) times a day. 2/10/17   Yolie Burns MD   divalproex DR (DEPAKOTE) 250 mg tablet Take 1 Tab by mouth nightly. 2/10/17   Yolie Burns MD   omeprazole (PRILOSEC) 20 mg capsule Take 1 Cap by mouth two (2) times a day. 1/20/17   Yolie Burns MD   atorvastatin (LIPITOR) 20 mg tablet Take 1 Tab by mouth daily. 1/20/17   Yolie Burns MD   folic acid (FOLVITE) 1 mg tablet Take 1 Tab by mouth daily. 1/20/17   Yolie Burns MD   levETIRAcetam (KEPPRA) 500 mg tablet Take 1 Tab by mouth two (2) times a day. 1/20/17   Yolie Burns MD   tamsulosin (FLOMAX) 0.4 mg capsule Take 1 Cap by mouth nightly. 1/20/17   Yolie Burns MD   levothyroxine (SYNTHROID) 125 mcg tablet Take 1 Tab by mouth Daily (before breakfast).  1/20/17   Yolie Burns MD   losartan (COZAAR) 100 mg tablet Take 1 Tab by mouth daily. 1/20/17   Erin Cerna MD   amLODIPine (NORVASC) 10 mg tablet Take 1 Tab by mouth daily. 1/20/17   Erin Cerna MD   thiamine (B-1) 100 mg tablet Take 1 Tab by mouth daily. 11/18/16   Erin Cerna MD       Hospital Medications:  Current Facility-Administered Medications   Medication Dose Route Frequency    sodium chloride (NS) flush 5-10 mL  5-10 mL IntraVENous PRN    levETIRAcetam (KEPPRA) 500 mg in 0.9% sodium chloride IVPB  500 mg IntraVENous Q12H    levothyroxine (SYNTHROID) injection 50 mcg  50 mcg IntraVENous Q48H    [START ON 9/65/4860] folic acid (FOLVITE) 1 mg, thiamine (B-1) 100 mg in 0.9% sodium chloride 50 mL ivpb   IntraVENous DAILY    sodium chloride (NS) flush 5-10 mL  5-10 mL IntraVENous Q8H    sodium chloride (NS) flush 5-10 mL  5-10 mL IntraVENous PRN    ondansetron (ZOFRAN) injection 4 mg  4 mg IntraVENous Q4H PRN    HYDROmorphone (PF) (DILAUDID) injection 0.5 mg  0.5 mg IntraVENous Q4H PRN    pantoprazole (PROTONIX) 80 mg in 0.9% sodium chloride 100 mL infusion  80 mg IntraVENous ONCE    pantoprazole (PROTONIX) 80 mg in 0.9% sodium chloride 250 mL infusion  80 mg IntraVENous CONTINUOUS       Social History:  Social History   Substance Use Topics    Smoking status: Former Smoker     Packs/day: 0.50     Years: 50.00     Types: Cigarettes    Smokeless tobacco: Never Used      Comment: QUIT IN 2014, SNEAKS WHEN POSSIBLE    Alcohol use No      Comment: HEAVILY IN PAST YEARS, NONE SINCE 2014         Family History:  No family history on file. Review of Systems:  A detailed 10 system ROS is obtained, with pertinent positives as listed above. All others are negative.     Diet:  NPO    Objective:     Physical Exam:  Vitals:  Visit Vitals    /58  Comment: 4 hour transfusion vitals    Pulse 77  Comment: 4 hour transfusion vitals    Temp 96 °F (35.6 °C)    Resp 16  Comment: 4 hour transfusion vitals    Ht 5' 8\" (1.727 m)    Wt 57.6 kg (126 lb 15.8 oz)    SpO2 100%    BMI 19.31 kg/m2     Gen:  Pt is alert, cooperative, no acute distress ALERT, BUT CONFUSED. Skin:  Extremities and face reveal no rashes. HEENT: Sclerae anicteric. Extra-occular muscles are intact. No oral ulcers. No abnormal pigmentation of the lips. The neck is supple. Cardiovascular: Regular rate and rhythm. No murmurs, gallops, or rubs. Respiratory:  Comfortable breathing with no accessory muscle use. Clear breath sounds anteriorly with no wheezes, rales, or rhonchi. GI:  Abdomen nondistended, soft, and nontender. Normal active bowel sounds. No enlargement of the liver or spleen. No masses palpable. Rectal:  Deferred  Musculoskeletal:  No pitting edema of the lower legs. Neurological:  Gross memory appears intact. Patient is alert and oriented. Psychiatric:  Mood appears appropriate with judgement intact. Lymphatic:  No cervical or supraclavicular adenopathy. Laboratory:    Recent Labs      04/16/17   1125   WBC  9.7   HGB  4.5*   HCT  15.6*   PLT  300   MCV  87.2   NA  170*   K  5.0   CL  140*   CO2  16*   BUN  89*   CREA  4.31*   CA  8.4   GLU  122*   AP  116   SGOT  216*   ALT  54   TBILI  0.4   ALB  2.7*   TP  7.7          Assessment:     Principal Problem:    Severe sepsis (HCC) (4/16/2017)    Active Problems:    Dementia (11/25/2016)      Hypernatremia (11/25/2016)      History of alcohol abuse (6/13/2014)      Overview: FORMERLY DRANK  1-2 12oz. Beers daily            NONE 2014      GI bleed (6/14/2014)      Anemia (11/25/2016)      Overview: ADM 11/25/16:  HGB: 7.6.   BASELINE 9      Acute blood loss anemia (4/16/2017)      Hypothermia (4/16/2017)      Acute renal failure (ARF) ([facility] (4/16/2017)    70 y.o. male with PMH of (but not limited to) chronic anemia on iron therapy, CKD, dementia, HLD, HTN, Hypothyroid, seizure history, AVM with recurrent bleeding, COPD, and history of ETOH abuse formerly, who is seen in consultation at the request of Dr. Kaleb Adrian for evaluation of GIB. Mr. 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 Most recent EGD with pediatric colonoscope to Jejunum 12/8/16 by Dr. Madeline Temple revealed 2mm nodule at GE junctions, clip along proximal greater curvature, Few AVM, not actively bleeding in the duodenum. Treated with APC; Multiple AVMs throughout the jejunum, not actively bleeding. Treated with APC. Terminated early due to poor O2 saturation and possible aspiration. On evaluation in the ED, he had a heme positive stool. He has no active melena or hematemesis. His Hgb on admission 4.5 (9.8 1/2017). His BUN is elevated at 89(28), creat 4.31 (1.69), (14) ALT 54(7) Tbili 0.4(0.2) (116). His sodium is elevated at 170. He is receiving his first unit of PRBC and PPI drip has been started. His drop in Hgb and heme positive stool are likely recurrent AVMs. Plan:     -Agree with PPI drip  -Agree with serial H&H and transfuse as needed  -Hepatitis panel  -ETOH level  -PT INR  -US of the liver  -Pt would benefit from EGD with push enteroscopy, but will wait until his Hgb is stable and his sodium is normal.   -Ultimately, will need hematology consult in the future to discuss possible Thalidomide for recurrent AVMs    Eladio Dewey. [address]  [facility]      Patient is seen and examined in collaboration with Dr. Madeline Temple. Assessment and plan as per Dr. Madeline Temple.

## 2017-04-16 NOTE — IP AVS SNAPSHOT
Olena Bello 
 
 
 [address] [facility] [address] [facility] 
[phone] Patient: Ritu Larsen MRN: [id number] :1945 You are allergic to the following No active allergies Immunizations Administered for This Admission Name Date  
 TB Skin Test (PPD) Intradermal 2017 Recent Documentation Height Weight BMI Smoking Status 1.727 m 59.6 kg 19.98 kg/m2 Former Smoker Unresulted Labs Order Current Status COLLECTION COMMENT Collected (17 0800) Emergency Contacts Name Discharge Info Relation Home Work Mobile Charlette Fothergill  Child [2] [phone] Ellie Grider [22] [phone] About your hospitalization You were admitted on:  2017 You last received care in the:  [facility] 6 MED SURG You were discharged on:  2017 Unit phone number:  [phone] Why you were hospitalized Your primary diagnosis was:  Acute Blood Loss Anemia Your diagnoses also included:  Hypothermia, Acute Renal Failure (Arf) (Hcc), Dementia, History Of Alcohol Abuse, Anemia, Hypernatremia, Gi Bleed, Rhabdomyolysis Providers Seen During Your Hospitalizations Provider Role Specialty Primary office phone Clovis Portillo MD Attending Provider Emergency Medicine [phone] Rigo Briscoe MD Attending Provider Internal Medicine [phone] Your Primary Care Physician (PCP) Primary Care Physician Office Phone Office Fax 032 Viviana Neal 224 [phone] Follow-up Information Follow up With Details Comments Contact Info [facility] On 2017 at 9:00 [address] [place] [facility] [address] Ket[address] Dunn 151 [id number] [phone] Julianne Mc MD   [facility] Internal Medicine [address] [place] [address] 
[phone] Current Discharge Medication List  
  
START taking these medications Dose & Instructions Dispensing Information Comments Morning Noon Evening Bedtime  
 acetaminophen 325 mg tablet Commonly known as:  TYLENOL Your last dose was: Your next dose is:    
   
   
 Dose:  500 mg Take 1.5 Tabs by mouth every six (6) hours as needed for Pain. Quantity:  30 Tab Refills:  0  
     
   
   
   
  
 pantoprazole 40 mg tablet Commonly known as:  PROTONIX Your last dose was: Your next dose is:    
   
   
 Dose:  40 mg Take 1 Tab by mouth two (2) times a day. Quantity:  60 Tab Refills:  0 CONTINUE these medications which have CHANGED Dose & Instructions Dispensing Information Comments Morning Noon Evening Bedtime  
 levothyroxine 75 mcg tablet Commonly known as:  SYNTHROID What changed:   
- medication strength 
- how much to take Your last dose was: Your next dose is:    
   
   
 Dose:  75 mcg Take 1 Tab by mouth Daily (before breakfast). Quantity:  30 Tab Refills:  0 CONTINUE these medications which have NOT CHANGED Dose & Instructions Dispensing Information Comments Morning Noon Evening Bedtime  
 ferrous sulfate 220 mg (44 mg iron)/5 mL solution Your last dose was: Your next dose is:    
   
   
 Dose:  220 mg Take 5 mL by mouth two (2) times a day. Quantity:  300 mL Refills:  5  
     
   
   
   
  
 folic acid 1 mg tablet Commonly known as:  [employer] Your last dose was: Your next dose is:    
   
   
 Dose:  1 mg Take 1 Tab by mouth daily. Quantity:  30 Tab Refills:  1  
     
   
   
   
  
 levETIRAcetam 500 mg tablet Commonly known as:  KEPPRA Your last dose was: Your next dose is:    
   
   
 Dose:  500 mg Take 1 Tab by mouth two (2) times a day. Quantity:  60 Tab Refills:  0 tamsulosin 0.4 mg capsule Commonly known as:  FLOMAX Your last dose was: Your next dose is:    
   
   
 Dose:  0.4 mg Take 1 Cap by mouth nightly. Quantity:  90 Cap Refills:  1  
     
   
   
   
  
 thiamine 100 mg tablet Commonly known as:  B-1 Your last dose was: Your next dose is:    
   
   
 Dose:  100 mg Take 1 Tab by mouth daily. Quantity:  90 Tab Refills:  1 STOP taking these medications   
 amLODIPine 10 mg tablet Commonly known as:  NORVASC  
   
  
 atorvastatin 20 mg tablet Commonly known as:  LIPITOR  
   
  
 divalproex  mg tablet Commonly known as:  DEPAKOTE  
   
  
 losartan 100 mg tablet Commonly known as:  COZAAR  
   
  
 omeprazole 20 mg capsule Commonly known as:  PRILOSEC Where to Get Your Medications Information on where to get these meds will be given to you by the nurse or doctor. ! Ask your nurse or doctor about these medications  
  acetaminophen 536 mg tablet  
 folic acid 1 mg tablet  
 levETIRAcetam 500 mg tablet  
 levothyroxine 75 mcg tablet  
 pantoprazole 40 mg tablet Discharge Instructions None Discharge Orders Procedure Order Date Status Priority Quantity Spec Type Associated Dx METABOLIC PANEL, BASIC 10/18/39 1156 Future Routine 1 Blood Comments:  Do labs next week 4/25 CBC WITH AUTOMATED DIFF 04/22/17 1156 Future Routine 1 Blood Comments:  Do it on 4/25 enercastChandler Announcement We are excited to announce that we are making your provider's discharge notes available to you in [employer]Stream. You will see these notes when they are completed and signed by the physician that discharged you from your recent hospital stay.   If you have any questions or concerns about any information you see in gloStream, please call the Health Information Department where you were seen or reach out to your Primary Care Provider for more information about your plan of care. Introducing [facility]! Diane Spencer introduces [employer] patient portal. Now you can access parts of your medical record, email your doctor's office, and request medication refills online. 1. In your internet browser, go to [email or web address] 2. Click on the First Time User? Click Here link in the Sign In box. You will see the New Member Sign Up page. 3. Enter your [employer] Access Code exactly as it appears below. You will not need to use this code after youve completed the sign-up process. If you do not sign up before the expiration date, you must request a new code. · [employer] Access Code: [email or web address] Expires: 7/15/2017 11:17 AM 
 
4. Enter the last four digits of your Social Security Number (xxxx) and Date of Birth (mm/dd/yyyy) as indicated and click Submit. You will be taken to the next sign-up page. 5. Create a [employer] ID. This will be your [employer] login ID and cannot be changed, so think of one that is secure and easy to remember. 6. Create a [employer] password. You can change your password at any time. 7. Enter your Password Reset Question and Answer. This can be used at a later time if you forget your password. 8. Enter your e-mail address. You will receive e-mail notification when new information is available in [address] 9. Click Sign Up. You can now view and download portions of your medical record. 10. Click the Download Summary menu link to download a portable copy of your medical information. If you have questions, please visit the Frequently Asked Questions section of the [employer] website. Remember, [employer] is NOT to be used for urgent needs. For medical emergencies, dial 911. Now available from your iPhone and Android! General Information Please provide this summary of care documentation to your next provider.  
  
  
    
    
 Patient Signature: ____________________________________________________________ Date:  ____________________________________________________________  
  
Arna Star Provider Signature:  ____________________________________________________________ Date:  ____________________________________________________________

## 2017-04-16 NOTE — ED TRIAGE NOTES
Pt arrives via EMS from home, family states that he has had ^ confusion over the past 2-3 days, states he is having extremely dark stools, has hx of this.

## 2017-04-17 ENCOUNTER — APPOINTMENT (OUTPATIENT)
Dept: ULTRASOUND IMAGING | Age: 72
DRG: 871 | End: 2017-04-17
Attending: NURSE PRACTITIONER
Payer: MEDICARE

## 2017-04-17 PROBLEM — M62.82 RHABDOMYOLYSIS: Status: ACTIVE | Noted: 2017-04-17

## 2017-04-17 LAB
ABO + RH BLD: NORMAL
ANION GAP BLD CALC-SCNC: 10 MMOL/L (ref 7–16)
ANION GAP BLD CALC-SCNC: 10 MMOL/L (ref 7–16)
ANION GAP BLD CALC-SCNC: 7 MMOL/L (ref 7–16)
ANION GAP BLD CALC-SCNC: 9 MMOL/L (ref 7–16)
BLD PROD TYP BPU: NORMAL
BLOOD GROUP ANTIBODIES SERPL: NORMAL
BPU ID: NORMAL
BUN SERPL-MCNC: 37 MG/DL (ref 8–23)
BUN SERPL-MCNC: 43 MG/DL (ref 8–23)
BUN SERPL-MCNC: 50 MG/DL (ref 8–23)
BUN SERPL-MCNC: 62 MG/DL (ref 8–23)
CALCIUM SERPL-MCNC: 7.4 MG/DL (ref 8.3–10.4)
CALCIUM SERPL-MCNC: 7.5 MG/DL (ref 8.3–10.4)
CALCIUM SERPL-MCNC: 7.5 MG/DL (ref 8.3–10.4)
CALCIUM SERPL-MCNC: 7.7 MG/DL (ref 8.3–10.4)
CHLORIDE SERPL-SCNC: 130 MMOL/L (ref 98–107)
CHLORIDE SERPL-SCNC: 132 MMOL/L (ref 98–107)
CHLORIDE SERPL-SCNC: 135 MMOL/L (ref 98–107)
CHLORIDE SERPL-SCNC: 137 MMOL/L (ref 98–107)
CK SERPL-CCNC: 4183 U/L (ref 21–215)
CO2 SERPL-SCNC: 19 MMOL/L (ref 21–32)
CO2 SERPL-SCNC: 20 MMOL/L (ref 21–32)
CO2 SERPL-SCNC: 20 MMOL/L (ref 21–32)
CO2 SERPL-SCNC: 21 MMOL/L (ref 21–32)
CREAT SERPL-MCNC: 2.14 MG/DL (ref 0.8–1.5)
CREAT SERPL-MCNC: 2.22 MG/DL (ref 0.8–1.5)
CREAT SERPL-MCNC: 2.47 MG/DL (ref 0.8–1.5)
CREAT SERPL-MCNC: 2.77 MG/DL (ref 0.8–1.5)
CROSSMATCH RESULT,%XM: NORMAL
GLUCOSE SERPL-MCNC: 86 MG/DL (ref 65–100)
GLUCOSE SERPL-MCNC: 87 MG/DL (ref 65–100)
GLUCOSE SERPL-MCNC: 92 MG/DL (ref 65–100)
GLUCOSE SERPL-MCNC: 95 MG/DL (ref 65–100)
HCT VFR BLD AUTO: 31.5 % (ref 41.1–50.3)
HEMOCCULT STL QL: POSITIVE
HGB BLD-MCNC: 10.2 G/DL (ref 13.6–17.2)
HGB BLD-MCNC: 11 G/DL (ref 13.6–17.2)
HGB BLD-MCNC: 9.7 G/DL (ref 13.6–17.2)
HGB BLD-MCNC: 9.7 G/DL (ref 13.6–17.2)
HGB BLD-MCNC: 9.8 G/DL (ref 13.6–17.2)
POTASSIUM SERPL-SCNC: 4.1 MMOL/L (ref 3.5–5.1)
POTASSIUM SERPL-SCNC: 4.2 MMOL/L (ref 3.5–5.1)
POTASSIUM SERPL-SCNC: 4.3 MMOL/L (ref 3.5–5.1)
POTASSIUM SERPL-SCNC: 4.4 MMOL/L (ref 3.5–5.1)
SODIUM SERPL-SCNC: 159 MMOL/L (ref 136–145)
SODIUM SERPL-SCNC: 161 MMOL/L (ref 136–145)
SODIUM SERPL-SCNC: 163 MMOL/L (ref 136–145)
SODIUM SERPL-SCNC: 167 MMOL/L (ref 136–145)
SPECIMEN EXP DATE BLD: NORMAL
STATUS OF UNIT,%ST: NORMAL
T4 FREE SERPL-MCNC: 1.2 NG/DL (ref 0.78–1.46)
UNIT DIVISION, %UDIV: 0

## 2017-04-17 PROCEDURE — 76705 ECHO EXAM OF ABDOMEN: CPT

## 2017-04-17 PROCEDURE — 84439 ASSAY OF FREE THYROXINE: CPT | Performed by: INTERNAL MEDICINE

## 2017-04-17 PROCEDURE — 82272 OCCULT BLD FECES 1-3 TESTS: CPT | Performed by: INTERNAL MEDICINE

## 2017-04-17 PROCEDURE — 99232 SBSQ HOSP IP/OBS MODERATE 35: CPT | Performed by: INTERNAL MEDICINE

## 2017-04-17 PROCEDURE — 82550 ASSAY OF CK (CPK): CPT | Performed by: INTERNAL MEDICINE

## 2017-04-17 PROCEDURE — 74011250636 HC RX REV CODE- 250/636: Performed by: INTERNAL MEDICINE

## 2017-04-17 PROCEDURE — 74011000250 HC RX REV CODE- 250: Performed by: INTERNAL MEDICINE

## 2017-04-17 PROCEDURE — 65620000000 HC RM CCU GENERAL

## 2017-04-17 PROCEDURE — 74011000258 HC RX REV CODE- 258: Performed by: INTERNAL MEDICINE

## 2017-04-17 PROCEDURE — 80048 BASIC METABOLIC PNL TOTAL CA: CPT | Performed by: INTERNAL MEDICINE

## 2017-04-17 PROCEDURE — 85018 HEMOGLOBIN: CPT | Performed by: INTERNAL MEDICINE

## 2017-04-17 PROCEDURE — 36415 COLL VENOUS BLD VENIPUNCTURE: CPT | Performed by: INTERNAL MEDICINE

## 2017-04-17 PROCEDURE — 85014 HEMATOCRIT: CPT | Performed by: INTERNAL MEDICINE

## 2017-04-17 PROCEDURE — C9113 INJ PANTOPRAZOLE SODIUM, VIA: HCPCS | Performed by: INTERNAL MEDICINE

## 2017-04-17 RX ADMIN — Medication 10 ML: at 14:42

## 2017-04-17 RX ADMIN — LEVETIRACETAM 500 MG: 100 INJECTION, SOLUTION INTRAVENOUS at 03:37

## 2017-04-17 RX ADMIN — LEVETIRACETAM 500 MG: 100 INJECTION, SOLUTION INTRAVENOUS at 15:03

## 2017-04-17 RX ADMIN — SODIUM CHLORIDE 80 MG: 900 INJECTION, SOLUTION INTRAVENOUS at 04:58

## 2017-04-17 RX ADMIN — Medication 10 ML: at 06:00

## 2017-04-17 RX ADMIN — SODIUM CHLORIDE 80 MG: 900 INJECTION, SOLUTION INTRAVENOUS at 16:39

## 2017-04-17 RX ADMIN — DEXTROSE MONOHYDRATE 150 ML/HR: 5 INJECTION, SOLUTION INTRAVENOUS at 08:40

## 2017-04-17 RX ADMIN — DEXTROSE MONOHYDRATE 150 ML/HR: 5 INJECTION, SOLUTION INTRAVENOUS at 16:16

## 2017-04-17 RX ADMIN — DEXTROSE MONOHYDRATE 150 ML/HR: 5 INJECTION, SOLUTION INTRAVENOUS at 22:53

## 2017-04-17 RX ADMIN — FOLIC ACID: 5 INJECTION, SOLUTION INTRAMUSCULAR; INTRAVENOUS; SUBCUTANEOUS at 10:04

## 2017-04-17 RX ADMIN — Medication 10 ML: at 22:07

## 2017-04-17 NOTE — PROGRESS NOTES
Spiritual Care Assessment/Progress Notes    Daniela Webber [id number]  [id number]    1945  70 y.o.  male    Patient Telephone Number: [phone] (home)   Orthodoxy Affiliation: Roman Catholic   Language: English   Extended Emergency Contact Information  Primary Emergency Contact: [address] [place] [address] Phone: [phone]  Mobile Phone: [phone]  Relation: Child  Secondary Emergency Contact: [address] Phone: [phone]  Relation: Son   Patient Active Problem List    Diagnosis Date Noted    Rhabdomyolysis 04/17/2017    Severe sepsis ([address] 04/16/2017    Acute blood loss anemia 04/16/2017    Hypothermia 04/16/2017    Acute renal failure (ARF) ([address] 04/16/2017    Bradycardia 12/13/2016    Urinary tract infection associated with indwelling urethral catheter ([address] 12/12/2016    Seizure ([address] 12/04/2016    Acute metabolic encephalopathy 28/37/5999    Dementia 11/25/2016    Tobacco abuse 11/25/2016    Bladder incontinence 11/25/2016    Seizure disorder ([address] 11/25/2016    Hypernatremia 11/25/2016    Anemia 11/25/2016    Abdominal pain 11/25/2016    Anorexia 11/25/2016    Debility 11/25/2016    Acute encephalopathy 11/25/2016    Lethargy 11/25/2016    Acute on chronic renal failure ([address] 11/25/2016    Failure to thrive in adult 11/25/2016    Protein-calorie malnutrition, severe ([address] 11/25/2016    Dementia associated with alcoholism without behavioral disturbance ([address] 05/17/2016    Acquired hypothyroidism 05/17/2016    AVM (arteriovenous malformation) of small bowel, acquired with hemorrhage ([address] 06/15/2014    Benign neoplasm of colon 06/15/2014    GI bleed 06/14/2014    GIB (gastrointestinal bleeding) 06/13/2014    DEVORA (acute kidney injury) ([address] 06/13/2014    Microcytic hypochromic anemia 06/13/2014    History of alcohol abuse 06/13/2014    Weight loss 06/02/2014    HTN (hypertension) 05/30/2014    COPD (chronic obstructive pulmonary disease) with emphysema ([facility] 05/30/2014    Chronic airway obstruction, not elsewhere classified 05/28/2014    Hyperlipidemia 05/28/2014        Date: 4/17/2017       Level of Confucianist/Spiritual Activity:  []         Involved in rocio tradition/spiritual practice    []         Not involved in rocio tradition/spiritual practice  []         Spiritually oriented    []         Claims no spiritual orientation    []         seeking spiritual identity  []         Feels alienated from Denominational practice/tradition  []         Feels angry about Denominational practice/tradition  []         Spirituality/Denominational tradition is a resource for coping at this time.   [x]         Not able to assess due to medical condition    Services Provided Today:  []         crisis intervention    []         reading Scriptures  [x]         spiritual assessment    []         prayer  []         empathic listening/emotional support  []         rites and rituals (cite in comments)  []         life review     []         Denominational support  []         theological development   []         advocacy  []         ethical dialog     []         blessing  []         bereavement support    []         support to family  []         anticipatory grief support   []         help with AMD  []         spiritual guidance    []         meditation      Spiritual Care Needs  []         Emotional Support  []         Spiritual/Confucianist Care  []         Loss/Adjustment  []         Advocacy/Referral                /Ethics  [x]         No needs expressed at               this time  []         Other: (note in               comments)  [address]  []         Follow up visits with               pt/family  []         Provide materials  []         Schedule sacraments  []         Contact Community               Clergy  [x]         Follow up as needed  []         Other: (note in               comments)     Comments: patient resting so assessment completed per conference with patient's RN and a review of the patient's chart  Patient's preferred Quaker tradition is Methodism  Two children are listed as emergency contacts for the patient but chart notes also mention a wife with whom the patient lives  Pastoral care remains available if desired and/or requested by patient or patient's family    Ramakrishna Cleveland.  Jay Leon

## 2017-04-17 NOTE — PROGRESS NOTES
Bladder scan preformed and it showed 0 ml. Pt had a large incontinence of urine. Pt bathed and is sleeping.

## 2017-04-17 NOTE — INTERDISCIPLINARY ROUNDS
Interdisciplinary team rounds were held 4/17/2017 with the following team members:Nursing, Nurse Practitioner, Nutrition, Pastoral Care, Pharmacy, Physician, Respiratory Therapy and Clinical Coordinator. Plan of care discussed. See clinical pathway and/or care plan for interventions and desired outcomes.

## 2017-04-17 NOTE — PROGRESS NOTES
Bedside and Verbal shift change report given to Drew Butler RN (oncoming nurse) by Ninfa Cross RN (offgoing nurse).  Report included the following information SBAR, Kardex, ED Summary, Intake/Output, MAR, Recent Results, Med Rec Status and Cardiac Rhythm SR.

## 2017-04-17 NOTE — CONSULTS
[address]       Name:  Chandrika Arriola   MR#:  [id number]   :  1945   Account #:  [de-identified]   Date of Adm:  2017       REASON FOR CONSULTATION: Acute kidney injury. HISTORY: This patient is a 19-year-old male with history of   alcoholism, dementia, COPD, mild chronic kidney disease stage   III, who was brought by his family here because of altered   mental status and dark stool. According to the son, the patient   has been confused for the last 2-3 days. His stool has been   darker. Here in the ER, the patient was found to have a blood   pressure in the 95 to 138 varying. He was very confused, and labs   showing creatinine at 4.3 with BUN 89, bicarbonate 19, sodium   170, and very low hemoglobin at 4.5. The patient has Mccurdy   catheter in and out and drawn only 12 mL of urine. His   hemoglobin was 9.8, that was in 2017, and his creatinine   was 1.69 at that time, and prior to that, it was 1.31 in   2016. The patient has a history of GI bleed believed to be   due to an AV malformation, treated in the past. The patient also   has history of alcoholism. According to son, the patient   continued to make urine until early this morning, and he has   been eating and drinking until yesterday. The urinalysis showed trace protein but no RBC and no WBC.  His   specific gravity at 1.023.    REVIEW OF SYSTEMS   Further review of systems could not be obtained because patient has   altered mental status, but no respiratory problems, and   according to the son, there is no complaint of abdominal pain,   fever, chills or difficulty passing urine or gross hematuria in   the recent past.    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    MEDICATIONS PRIOR TO THIS ADMISSION   1. Norvasc 10 mg a day. 2. Lipitor 20 mg a day. 4. Ferrous sulfate 220 mg/ 5 mL twice a day. 5. Folic acid 1 mg a day. 6. Keppra 500 mg twice a day. 7. Synthroid 125 mcg once a day. 8. Cozaar 100 mg a day. 9. Prilosec 20 mg twice a day. 10. Flomax 0.4 mg once a day. 11. Thiamine 1 tablet a day 100 mg. SOCIAL HISTORY: The patient has alcohol and tobacco abuse. PHYSICAL EXAMINATION   VITAL SIGNS: Temperature is 97.8, heart rate 80, respiratory   rate 15, blood pressure currently is 109/52. GENERAL: The patient is very confused and slightly agitated. He   does not answer to my questions clearly. NECK: No lymph nodes or thyromegaly. LUNGS: There is poor thoracic inspiration. I do not hear any   crackles or wheezing. HEART: Regular rhythm, systolic murmur 2/6 at left sternal   border. No gallop, no rub. ABDOMEN: Soft. There is no rebound, no organomegaly, no mass. EXTREMITIES: There is no edema. IMPRESSION    1. Acute kidney injury with hyperkalemia and maybe acidosis,   most likely related to volume depletion with low hemoglobin   also. Gastrointestinal bleed. 2. Hypernatremia. 3. Gastrointestinal bleed with acute drop of hemoglobin. 4. Altered mental status. RECOMMENDATION: We will continue with IV hydration with free   water.         MD FARAZ Dai / Rich Cedeno   D:  04/16/2017   17:47   T:  04/16/2017   18:34   Job #:  [id number]

## 2017-04-17 NOTE — PROGRESS NOTES
Renal Progress Note    Admission Date: 4/16/2017   Subjective:      awake    Objective:     Physical Exam:    Patient Vitals for the past 8 hrs:   BP Temp Pulse Resp SpO2 Weight   04/17/17 0448 - - - - - 61.5 kg (135 lb 9.3 oz)   04/17/17 0440 145/78 - 77 12 95 % -   04/17/17 0410 (!) 147/102 - 72 11 100 % -   04/17/17 0340 150/77 - 77 11 100 % -   04/17/17 0310 153/81 98.2 °F (36.8 °C) 81 19 97 % -   04/17/17 0240 133/75 - 80 14 91 % -   04/17/17 0210 150/76 - 78 12 100 % -      Gen: comfortable , NAD  HEENT: dry membranes  CV: S1, S2  Lungs: Clear bilaterally  Extem: no edema     Current Facility-Administered Medications   Medication Dose Route Frequency    sodium chloride (NS) flush 5-10 mL  5-10 mL IntraVENous PRN    levETIRAcetam (KEPPRA) 500 mg in 0.9% sodium chloride IVPB  500 mg IntraVENous Z25V    folic acid (FOLVITE) 1 mg, thiamine (B-1) 100 mg in 0.9% sodium chloride 50 mL ivpb   IntraVENous DAILY    sodium chloride (NS) flush 5-10 mL  5-10 mL IntraVENous Q8H    sodium chloride (NS) flush 5-10 mL  5-10 mL IntraVENous PRN    ondansetron (ZOFRAN) injection 4 mg  4 mg IntraVENous Q4H PRN    HYDROmorphone (PF) (DILAUDID) injection 0.5 mg  0.5 mg IntraVENous Q4H PRN    pantoprazole (PROTONIX) 80 mg in 0.9% sodium chloride 250 mL infusion  80 mg IntraVENous CONTINUOUS    dextrose 5% infusion  150 mL/hr IntraVENous CONTINUOUS    0.9% sodium chloride infusion 250 mL  250 mL IntraVENous PRN    0.9% sodium chloride infusion 250 mL  250 mL IntraVENous PRN            Data Review:     LABS:   Recent Results (from the past 12 hour(s))   TROPONIN I    Collection Time: 04/16/17 10:36 PM   Result Value Ref Range    Troponin-I, Qt. 0.07 (H) 0.02 - 5.71 NG/ML   METABOLIC PANEL, BASIC    Collection Time: 04/16/17 10:36 PM   Result Value Ref Range    Sodium 168 (HH) 136 - 145 mmol/L    Potassium 4.7 3.5 - 5.1 mmol/L    Chloride 139 (H) 98 - 107 mmol/L    CO2 18 (L) 21 - 32 mmol/L    Anion gap 11 7 - 16 mmol/L Glucose 103 (H) 65 - 100 mg/dL    BUN 68 (H) 8 - 23 MG/DL    Creatinine 3.14 (H) 0.8 - 1.5 MG/DL    GFR est AA 25 (L) >60 ml/min/1.73m2    GFR est non-AA 21 (L) >60 ml/min/1.73m2    Calcium 7.1 (L) 8.3 - 10.4 MG/DL   HGB & HCT    Collection Time: 04/17/17 12:55 AM   Result Value Ref Range    HGB 10.2 (L) 13.6 - 17.2 g/dL    HCT 31.5 (L) 41.1 - 11.8 %   METABOLIC PANEL, BASIC    Collection Time: 04/17/17  4:15 AM   Result Value Ref Range    Sodium 167 (HH) 136 - 145 mmol/L    Potassium 4.4 3.5 - 5.1 mmol/L    Chloride 137 (H) 98 - 107 mmol/L    CO2 20 (L) 21 - 32 mmol/L    Anion gap 10 7 - 16 mmol/L    Glucose 95 65 - 100 mg/dL    BUN 62 (H) 8 - 23 MG/DL    Creatinine 2.77 (H) 0.8 - 1.5 MG/DL    GFR est AA 29 (L) >60 ml/min/1.73m2    GFR est non-AA 24 (L) >60 ml/min/1.73m2    Calcium 7.5 (L) 8.3 - 10.4 MG/DL   HEMOGLOBIN    Collection Time: 04/17/17  4:15 AM   Result Value Ref Range    HGB 9.8 (L) 13.6 - 17.2 g/dL         Plan:     Principal Problem:    Acute blood loss anemia (4/16/2017)    Active Problems:    Dementia (11/25/2016)      Hypernatremia (11/25/2016)      History of alcohol abuse (6/13/2014)      Overview: FORMERLY DRANK  1-2 12oz. Beers daily            NONE 2014      GI bleed (6/14/2014)      Anemia (11/25/2016)      Overview: ADM 11/25/16:  HGB: 7.6. BASELINE 9      Hypothermia (4/16/2017)      Acute renal failure (ARF) ([address] (4/16/2017)      Rhabdomyolysis (4/17/2017)      Acute Kidney Injury with profound anemia/ rhabdo/volume depletion    Got 4 units of blood for volume resuscitation. Now on d5W and hyponatremia is improving.

## 2017-04-17 NOTE — PROGRESS NOTES
Hospitalist Progress Note    Patient: Isabel Quinn MRN: [id number]  SSN: [id number]    YOB: 1945  Age: 70 y.o. Sex: male      Admit Date: 4/16/2017    LOS: 1 day     Subjective:     70year old AAM with a PMH of chronic MEGAN, CKD, dementia, HLD, HTN, hypothyroidism, seizures, COPD, and h/o GI bleeds from AVMs presented to the ER with dark stools and metabolic encephalopathy. He was found to have hypothermia, profound anemia with Hgb 4.5, Sodium 170, Rhabdo with CK 6,966, and DEVORA on CKD. He was admitted to the ICU and transfused 4U PRBC, started on D5W, and started on IV Levothyroxine. 4/17 - This morning the patient is confused, but much improved from yesterday per nursing. He has no acute complaints, but is still drowsy. Review of systems negative except stated above. Objective:     Vitals:    04/17/17 0340 04/17/17 0410 04/17/17 0440 04/17/17 0448   BP: 150/77 (!) 147/102 145/78    Pulse: 77 72 77    Resp: 11 11 12    Temp:       SpO2: 100% 100% 95%    Weight:    61.5 kg (135 lb 9.3 oz)   Height:            Intake and Output: Not accurate    Physical Exam:   GENERAL: drowsy, cooperative, no distress, appears stated age  EYE: conjunctivae/corneas clear. PERRL. THROAT & NECK: normal and no erythema or exudates noted. LUNG: clear to auscultation bilaterally  HEART: regular rate and rhythm, S1S2, no murmur, no JVD  ABDOMEN: soft, non-tender, non-distended. Bowel sounds normal.   EXTREMITIES:  No edema, 2+ pedal/radial pulses bilaterally  SKIN: no rash or abnormalities  NEUROLOGIC: Drowsy, Ox1 (person). Cranial nerves 2-12 grossly intact.     Lab/Data Review:  BMP:   Lab Results   Component Value Date/Time     (HH) 04/17/2017 04:15 AM    K 4.4 04/17/2017 04:15 AM     (H) 04/17/2017 04:15 AM    CO2 20 (L) 04/17/2017 04:15 AM    AGAP 10 04/17/2017 04:15 AM    GLU 95 04/17/2017 04:15 AM    BUN 62 (H) 04/17/2017 04:15 AM    CREA 2.77 (H) 04/17/2017 04:15 AM    GFRAA 29 (L) 04/17/2017 04:15 AM    GFRNA 24 (L) 04/17/2017 04:15 AM     CBC:   Lab Results   Component Value Date/Time    WBC 9.7 04/16/2017 11:25 AM    HGB 9.8 (L) 04/17/2017 04:15 AM    HCT 31.5 (L) 04/17/2017 12:55 AM     04/16/2017 11:25 AM     All Cardiac Markers in the last 24 hours:   Lab Results   Component Value Date/Time    CPK 6966 (H) 04/16/2017 04:45 PM    TROIQ 0.07 (H) 04/16/2017 10:36 PM    TROIQ 0.06 (H) 04/16/2017 11:25 AM     Recent Glucose Results:   Lab Results   Component Value Date/Time    GLU 95 04/17/2017 04:15 AM     (H) 04/16/2017 10:36 PM    GLU 98 04/16/2017 04:45 PM     COAGS:   Lab Results   Component Value Date/Time    PTP 11.7 04/16/2017 06:34 PM    INR 1.1 04/16/2017 06:34 PM        Imaging:      Assessment:     Principal Problem:    Acute blood loss anemia (4/16/2017)    Active Problems:    Dementia (11/25/2016)      Hypernatremia (11/25/2016)      History of alcohol abuse (6/13/2014)      Overview: FORMERLY DRANK  1-2 12oz. Beers daily            NONE 2014      GI bleed (6/14/2014)      Anemia (11/25/2016)      Overview: ADM 11/25/16:  HGB: 7.6. BASELINE 9      Hypothermia (4/16/2017)      Acute renal failure (ARF) (HCC) (4/16/2017)      Rhabdomyolysis (4/17/2017)        Plan:     - Continue D5W - Sodium 170 --> 167  - Check CK now, then daily until rhabdo resolves  - Stop IV Levothyroxine, check Free T4  - Creatinine improving  - Hgb stable - no GI intervention at this time  - Hypothermia resolved - off Sommerdenise Pedro  - Repeat Liver Profile. Await abdominal U/S results.   - May need NGT/TF soon if doesn't wake up wake up to eat  - Appreciate ICU, GI, and nephrology's input and assistance    Signed By: Amaya Martinez DO     April 17, 2017

## 2017-04-17 NOTE — PROGRESS NOTES
Bedside and Verbal shift change report given to Susan GARCIA (oncoming nurse) by Leonides Morocho (offgoing nurse). Report included the following information SBAR, Kardex, ED Summary, Intake/Output, MAR, Recent Results, Med Rec Status and Cardiac Rhythm SR 80's.

## 2017-04-17 NOTE — PROGRESS NOTES
Critical Care Daily Progress Note: 4/17/2017    Rema Ledezma   Admission Date: 4/16/2017         The patient's chart is reviewed and the patient is discussed with the staff. Patient is a 70 y.o.  male seen and evaluated at the request of Dr. Mack Fuentes. The patient is unable to provide any history so all information is gathered from chart review. According to ER records the patient has had several days of confusion at home associated with very dark stools. He apparently has a history of GI AVM's in the past. Per the ER physician note the last time he had confusion like this was in the setting of GI bleed. PMH: COPD, alcoholism, dementia, mild CKD Stage III, chronic MEGAN, seizures  He was noted to have a hgb of 4.5 and a Na of 170. The hospitalists have admitted him to the ICU and pulmonary is consulted via [facility] protocol. The patient is on room air and hemodynamically stable but confused and non-verbal. He has already received 2u PRBC. His last known hgb was 9.8 and Na was 142 in January of this year. PRBC x 4 units         Subjective:     Comfortable on RA. Got 4 U PRBC yesterday. Awakens but not very interactive and in no distress.     Current Facility-Administered Medications   Medication Dose Route Frequency    sodium chloride (NS) flush 5-10 mL  5-10 mL IntraVENous PRN    levETIRAcetam (KEPPRA) 500 mg in 0.9% sodium chloride IVPB  500 mg IntraVENous D65N    folic acid (FOLVITE) 1 mg, thiamine (B-1) 100 mg in 0.9% sodium chloride 50 mL ivpb   IntraVENous DAILY    sodium chloride (NS) flush 5-10 mL  5-10 mL IntraVENous Q8H    sodium chloride (NS) flush 5-10 mL  5-10 mL IntraVENous PRN    ondansetron (ZOFRAN) injection 4 mg  4 mg IntraVENous Q4H PRN    HYDROmorphone (PF) (DILAUDID) injection 0.5 mg  0.5 mg IntraVENous Q4H PRN    pantoprazole (PROTONIX) 80 mg in 0.9% sodium chloride 250 mL infusion  80 mg IntraVENous CONTINUOUS    dextrose 5% infusion  150 mL/hr IntraVENous CONTINUOUS    0.9% sodium chloride infusion 250 mL  250 mL IntraVENous PRN    0.9% sodium chloride infusion 250 mL  250 mL IntraVENous PRN       Review of Systems    Unobtainable due to patient status. Objective:     Vitals:    04/17/17 0811 04/17/17 0841 04/17/17 0859 04/17/17 0959   BP: 108/57 127/58 122/54 120/82   Pulse: 72 68 65 72   Resp: 13 11 14 15   Temp:       SpO2: 100% (!) 71% 100% 100%   Weight:       Height:           Intake and Output:   04/15 1901 - 04/17 0700  In: 2834.1 [I.V.:2208.3]  Out: -        Physical Exam:          Constitutional:  the patient is well developed and in no acute distress  EENMT:  Sclera clear, pupils equal, oral mucosa moist  Respiratory: clear  Cardiovascular:  RRR without M,G,R  Gastrointestinal: soft and non-tender; with positive bowel sounds. Musculoskeletal: warm without cyanosis. There is no lower leg edema. Skin:  no jaundice or rashes  Neurologic: no gross neuro deficits     Psychiatric:  Awakens but not interactive    LINES:  Peripherals     DRIPS:   Dextrose 5%, protonix gtt    CXR:       LAB  No results for input(s): GLUCPOC in the last 72 hours.     No lab exists for component: [place]   Recent Labs      04/17/17   1035  04/17/17   0415  04/17/17   0055  04/16/17   1834  04/16/17   1125   WBC   --    --    --    --   9.7   HGB  9.7*  9.8*  10.2*   --   4.5*   HCT   --    --   31.5*   --   15.6*   PLT   --    --    --    --   300   INR   --    --    --   1.1   --      Recent Labs      04/17/17   1035  04/17/17   0415  04/16/17   2236  04/16/17   1645  04/16/17   1125   NA  163*  167*  168*  172*  170*   K  4.3  4.4  4.7  5.2*  5.0   CL  135*  137*  139*  142*  140*   CO2  21  20*  18*  20*  16*   GLU  87  95  103*  98  122*   BUN  50*  62*  68*  85*  89*   CREA  2.47*  2.77*  3.14*  3.77*  4.31*   MG   --    --    --   3.3*   --    CA  7.5*  7.5*  7.1*  7.6*  8.4   TROIQ   --    --   0.07*   --   0.06*   ALB   --    --    --    --   2.7* TBILI   --    --    --    --   0.4   ALT   --    --    --    --   54   SGOT   --    --    --    --   216*     No results for input(s): PH, PCO2, PO2, HCO3 in the last 72 hours. No results for input(s): LCAD, LAC in the last 72 hours. Assessment:  (Medical Decision Making)     Hospital Problems  Date Reviewed: 2/10/2017          Codes Class Noted POA    Rhabdomyolysis ICD-10-CM: M62.82  ICD-9-CM: 728.88  4/17/2017 Yes        * (Principal)Acute blood loss anemia ICD-10-CM: D62  ICD-9-CM: 285.1  4/16/2017 Yes    Better    Hypothermia ICD-10-CM: T68. XXXA  ICD-9-CM: 991.6  4/16/2017 Yes        Acute renal failure (ARF) ([facility] [address] ICD-10-CM: N17.9  ICD-9-CM: 584.9  4/16/2017 Yes    Cr down    Dementia (Chronic) ICD-10-CM: F03.90  ICD-9-CM: 294.20  11/25/2016 Yes    Advanced    Hypernatremia (Chronic) ICD-10-CM: E87.0  ICD-9-CM: 276.0  11/25/2016 Yes    Slow improvement with free water. Anemia (Chronic) ICD-10-CM: D64.9  ICD-9-CM: 285.9  11/25/2016 Yes    Overview Signed 11/25/2016  4:12 PM by Dominique Martínez NP     ADM 11/25/16:  HGB: 7.6. BASELINE 9             GI bleed ICD-10-CM: K92.2  ICD-9-CM: 578.9  6/14/2014 Yes    Per GI    History of alcohol abuse (Chronic) ICD-10-CM: R74.241  ICD-9-CM: 305.03  6/13/2014 Yes    Overview Addendum 11/25/2016  4:10 PM by Dominique Martínez NP     FORMERLY DRANK  1-2 12oz. Beers daily    NONE 2014                   Plan:  (Medical Decision Making)     --Nephrology following, continue IV hydration and free water. Creatinine 2.47 today   --GI consulted  --Continue D5, Na down to 163 from 172    Will sign off. Call if needed. More than 50% of the time documented was spent in face-to-face contact with the patient and in the care of the patient on the floor/unit where the patient is located.     Zuleyka Shell MD

## 2017-04-17 NOTE — PROGRESS NOTES
GI DAILY PROGRESS NOTE    Admit Date:  4/16/2017    Today's Date:  4/17/2017    CC:  Chronic GIB; AVMs    Subjective:     Patient somnolent, confused. Spoke to [place]. He had two \"smears\" of dark stool last night and one \"smear\" of dark stool today. Hgb 9.7 after 4 units PRBCs for hgb of 4.5. Medications:   Current Facility-Administered Medications   Medication Dose Route Frequency    sodium chloride (NS) flush 5-10 mL  5-10 mL IntraVENous PRN    levETIRAcetam (KEPPRA) 500 mg in 0.9% sodium chloride IVPB  500 mg IntraVENous P24W    folic acid (FOLVITE) 1 mg, thiamine (B-1) 100 mg in 0.9% sodium chloride 50 mL ivpb   IntraVENous DAILY    sodium chloride (NS) flush 5-10 mL  5-10 mL IntraVENous Q8H    sodium chloride (NS) flush 5-10 mL  5-10 mL IntraVENous PRN    ondansetron (ZOFRAN) injection 4 mg  4 mg IntraVENous Q4H PRN    HYDROmorphone (PF) (DILAUDID) injection 0.5 mg  0.5 mg IntraVENous Q4H PRN    pantoprazole (PROTONIX) 80 mg in 0.9% sodium chloride 250 mL infusion  80 mg IntraVENous CONTINUOUS    dextrose 5% infusion  150 mL/hr IntraVENous CONTINUOUS    0.9% sodium chloride infusion 250 mL  250 mL IntraVENous PRN    0.9% sodium chloride infusion 250 mL  250 mL IntraVENous PRN       Review of Systems:  Unobtainable given mental status    Diet:  npo    Objective:   Vitals:  Visit Vitals    /88 (BP 1 Location: Right arm)    Pulse 71    Temp 97.6 °F (36.4 °C)    Resp 14    Ht 5' 8\" (1.727 m)    Wt 61.5 kg (135 lb 9.3 oz)    SpO2 100%    BMI 20.62 kg/m2     Intake/Output:     04/15 1901 - 04/17 0700  In: 2834.1 [I.V.:2208.3]  Out: -   Exam:  General appearance: somnolent; opens eyes to voice  Lungs: clear to auscultation bilaterally anteriorly  Heart: regular rate and rhythm  Abdomen: soft, non-tender.  Bowel sounds normal. No masses, no organomegaly  Extremities: extremities normal, atraumatic, no cyanosis or edema      Data Review (Labs):    Recent Labs      04/17/17   1035  04/17/17 1072  04/17/17   0055  04/16/17   2236  04/16/17   1834  04/16/17   1645  04/16/17   1125   WBC   --    --    --    --    --    --   9.7   HGB  9.7*  9.8*  10.2*   --    --    --   4.5*   HCT   --    --   31.5*   --    --    --   15.6*   PLT   --    --    --    --    --    --   300   MCV   --    --    --    --    --    --   87.2   NA  163*  167*   --   168*   --   172*  170*   K  4.3  4.4   --   4.7   --   5.2*  5.0   CL  135*  137*   --   139*   --   142*  140*   CO2  21  20*   --   18*   --   20*  16*   BUN  50*  62*   --   68*   --   85*  89*   CREA  2.47*  2.77*   --   3.14*   --   3.77*  4.31*   CA  7.5*  7.5*   --   7.1*   --   7.6*  8.4   MG   --    --    --    --    --   3.3*   --    GLU  87  95   --   103*   --   98  122*   AP   --    --    --    --    --    --   116   SGOT   --    --    --    --    --    --   216*   ALT   --    --    --    --    --    --   54   TBILI   --    --    --    --    --    --   0.4   ALB   --    --    --    --    --    --   2.7*   TP   --    --    --    --    --    --   7.7   PTP   --    --    --    --   11.7   --    --    INR   --    --    --    --   1.1   --    --        Assessment:     Principal Problem:    Acute blood loss anemia (4/16/2017)    Active Problems:    Dementia (11/25/2016)      Hypernatremia (11/25/2016)      History of alcohol abuse (6/13/2014)      Overview: FORMERLY DRANK  1-2 12oz. Beers daily            NONE 2014      GI bleed (6/14/2014)      Anemia (11/25/2016)      Overview: ADM 11/25/16:  HGB: 7.6.   BASELINE 9      Hypothermia (4/16/2017)      Acute renal failure (ARF) (HCC) (4/16/2017)      Rhabdomyolysis (4/17/2017)      70 y.o. male with PMH of (but not limited to) chronic anemia on iron therapy, CKD, dementia, HLD, HTN, Hypothyroid, seizure history, AVM with recurrent bleeding, COPD, and history of ETOH abuse formerly, who is admitted with severe anemia with hgb 4.5 (9.8 in 1/2017), heme positive stool, rhabdomyolysis, hypernatremia (Na 170 on admission). Most recent EGD with pediatric colonoscope to Jejunum 12/8/16 by Dr. Melanie Montes revealed 2mm nodule at GE junctions, clip along proximal greater curvature, Few AVM, not actively bleeding in the duodenum. Treated with APC; Multiple AVMs throughout the jejunum, not actively bleeding. Treated with APC. Terminated early due to poor O2 saturation and possible aspiration. Hgb up to 9.7 s/p 4 units PRBCs this admission. Chronic recurrent anemia likely secondary to chronic, intermittent oozing from AVMs. Plan:   1) continue conservative management. Monitoring hgb, PRBCs prn, empiric PPI  2) may consider thalidamide    Patient is seen and examined in collaboration with Dr. Naye Patel. Assessment and plan as per Dr. Nitza Uribe.   GILBERT Ovalles

## 2017-04-17 NOTE — PROGRESS NOTES
Bedside and Verbal shift change report given to Linden Bingham RN (oncoming nurse) by Julian Garcia RN (offgoing nurse).  Report included the following information SBAR, Kardex, Intake/Output, MAR, Recent Results, Med Rec Status and Cardiac Rhythm SR.

## 2017-04-18 LAB
ANION GAP BLD CALC-SCNC: 13 MMOL/L (ref 7–16)
ANION GAP BLD CALC-SCNC: 8 MMOL/L (ref 7–16)
ANION GAP BLD CALC-SCNC: 9 MMOL/L (ref 7–16)
ANION GAP BLD CALC-SCNC: 9 MMOL/L (ref 7–16)
BUN SERPL-MCNC: 21 MG/DL (ref 8–23)
BUN SERPL-MCNC: 24 MG/DL (ref 8–23)
BUN SERPL-MCNC: 28 MG/DL (ref 8–23)
BUN SERPL-MCNC: 30 MG/DL (ref 8–23)
CALCIUM SERPL-MCNC: 7 MG/DL (ref 8.3–10.4)
CALCIUM SERPL-MCNC: 7.5 MG/DL (ref 8.3–10.4)
CALCIUM SERPL-MCNC: 7.5 MG/DL (ref 8.3–10.4)
CALCIUM SERPL-MCNC: 7.6 MG/DL (ref 8.3–10.4)
CHLORIDE SERPL-SCNC: 114 MMOL/L (ref 98–107)
CHLORIDE SERPL-SCNC: 123 MMOL/L (ref 98–107)
CHLORIDE SERPL-SCNC: 125 MMOL/L (ref 98–107)
CHLORIDE SERPL-SCNC: 126 MMOL/L (ref 98–107)
CK SERPL-CCNC: 2491 U/L (ref 21–215)
CO2 SERPL-SCNC: 16 MMOL/L (ref 21–32)
CO2 SERPL-SCNC: 19 MMOL/L (ref 21–32)
CO2 SERPL-SCNC: 20 MMOL/L (ref 21–32)
CO2 SERPL-SCNC: 21 MMOL/L (ref 21–32)
CREAT SERPL-MCNC: 1.74 MG/DL (ref 0.8–1.5)
CREAT SERPL-MCNC: 1.78 MG/DL (ref 0.8–1.5)
CREAT SERPL-MCNC: 1.88 MG/DL (ref 0.8–1.5)
CREAT SERPL-MCNC: 1.98 MG/DL (ref 0.8–1.5)
ERYTHROCYTE [DISTWIDTH] IN BLOOD BY AUTOMATED COUNT: 18 % (ref 11.9–14.6)
ERYTHROCYTE [DISTWIDTH] IN BLOOD BY AUTOMATED COUNT: 18 % (ref 11.9–14.6)
GLUCOSE BLD STRIP.AUTO-MCNC: 28 MG/DL (ref 65–100)
GLUCOSE BLD STRIP.AUTO-MCNC: 74 MG/DL (ref 65–100)
GLUCOSE SERPL-MCNC: 106 MG/DL (ref 65–100)
GLUCOSE SERPL-MCNC: 428 MG/DL (ref 65–100)
GLUCOSE SERPL-MCNC: 61 MG/DL (ref 65–100)
GLUCOSE SERPL-MCNC: 94 MG/DL (ref 65–100)
HAV IGM SERPL QL IA: NEGATIVE
HBV CORE IGM SERPL QL IA: NEGATIVE
HBV SURFACE AG SERPL QL IA: NEGATIVE
HCT VFR BLD AUTO: 30.1 % (ref 41.1–50.3)
HCT VFR BLD AUTO: 30.7 % (ref 41.1–50.3)
HCV AB S/CO SERPL IA: 0.2 S/CO RATIO (ref 0–0.9)
HGB BLD-MCNC: 9.5 G/DL (ref 13.6–17.2)
HGB BLD-MCNC: 9.8 G/DL (ref 13.6–17.2)
HGB BLD-MCNC: 9.9 G/DL (ref 13.6–17.2)
MCH RBC QN AUTO: 26.5 PG (ref 26.1–32.9)
MCH RBC QN AUTO: 26.7 PG (ref 26.1–32.9)
MCHC RBC AUTO-ENTMCNC: 31.6 G/DL (ref 31.4–35)
MCHC RBC AUTO-ENTMCNC: 32.2 G/DL (ref 31.4–35)
MCV RBC AUTO: 82.7 FL (ref 79.6–97.8)
MCV RBC AUTO: 84.1 FL (ref 79.6–97.8)
PLATELET # BLD AUTO: 193 K/UL (ref 150–450)
PLATELET # BLD AUTO: 237 K/UL (ref 150–450)
PMV BLD AUTO: 11.5 FL (ref 10.8–14.1)
PMV BLD AUTO: 11.9 FL (ref 10.8–14.1)
POTASSIUM SERPL-SCNC: 3.8 MMOL/L (ref 3.5–5.1)
POTASSIUM SERPL-SCNC: 4.1 MMOL/L (ref 3.5–5.1)
POTASSIUM SERPL-SCNC: 4.2 MMOL/L (ref 3.5–5.1)
POTASSIUM SERPL-SCNC: 4.2 MMOL/L (ref 3.5–5.1)
RBC # BLD AUTO: 3.58 M/UL (ref 4.23–5.67)
RBC # BLD AUTO: 3.71 M/UL (ref 4.23–5.67)
SODIUM SERPL-SCNC: 143 MMOL/L (ref 136–145)
SODIUM SERPL-SCNC: 153 MMOL/L (ref 136–145)
SODIUM SERPL-SCNC: 153 MMOL/L (ref 136–145)
SODIUM SERPL-SCNC: 154 MMOL/L (ref 136–145)
WBC # BLD AUTO: 6 K/UL (ref 4.3–11.1)
WBC # BLD AUTO: 6.1 K/UL (ref 4.3–11.1)

## 2017-04-18 PROCEDURE — 36415 COLL VENOUS BLD VENIPUNCTURE: CPT | Performed by: INTERNAL MEDICINE

## 2017-04-18 PROCEDURE — 74011250636 HC RX REV CODE- 250/636: Performed by: INTERNAL MEDICINE

## 2017-04-18 PROCEDURE — 74011000258 HC RX REV CODE- 258: Performed by: INTERNAL MEDICINE

## 2017-04-18 PROCEDURE — 82962 GLUCOSE BLOOD TEST: CPT

## 2017-04-18 PROCEDURE — C9113 INJ PANTOPRAZOLE SODIUM, VIA: HCPCS | Performed by: INTERNAL MEDICINE

## 2017-04-18 PROCEDURE — 82550 ASSAY OF CK (CPK): CPT | Performed by: INTERNAL MEDICINE

## 2017-04-18 PROCEDURE — 92610 EVALUATE SWALLOWING FUNCTION: CPT

## 2017-04-18 PROCEDURE — 85018 HEMOGLOBIN: CPT | Performed by: INTERNAL MEDICINE

## 2017-04-18 PROCEDURE — 80048 BASIC METABOLIC PNL TOTAL CA: CPT | Performed by: INTERNAL MEDICINE

## 2017-04-18 PROCEDURE — 65270000029 HC RM PRIVATE

## 2017-04-18 PROCEDURE — 74011000250 HC RX REV CODE- 250: Performed by: INTERNAL MEDICINE

## 2017-04-18 PROCEDURE — 85027 COMPLETE CBC AUTOMATED: CPT | Performed by: INTERNAL MEDICINE

## 2017-04-18 RX ORDER — FOLIC ACID 1 MG/1
1 TABLET ORAL DAILY
Status: DISCONTINUED | OUTPATIENT
Start: 2017-04-18 | End: 2017-04-22 | Stop reason: HOSPADM

## 2017-04-18 RX ORDER — LEVOTHYROXINE SODIUM 75 UG/1
75 TABLET ORAL
Status: DISCONTINUED | OUTPATIENT
Start: 2017-04-19 | End: 2017-04-22 | Stop reason: HOSPADM

## 2017-04-18 RX ADMIN — LEVETIRACETAM 500 MG: 100 INJECTION, SOLUTION INTRAVENOUS at 04:13

## 2017-04-18 RX ADMIN — DEXTROSE MONOHYDRATE 125 ML/HR: 5 INJECTION, SOLUTION INTRAVENOUS at 12:23

## 2017-04-18 RX ADMIN — Medication 10 ML: at 23:07

## 2017-04-18 RX ADMIN — HYDROMORPHONE HYDROCHLORIDE 0.5 MG: 1 INJECTION, SOLUTION INTRAMUSCULAR; INTRAVENOUS; SUBCUTANEOUS at 08:35

## 2017-04-18 RX ADMIN — SODIUM CHLORIDE 80 MG: 900 INJECTION, SOLUTION INTRAVENOUS at 14:05

## 2017-04-18 RX ADMIN — SODIUM CHLORIDE 80 MG: 900 INJECTION, SOLUTION INTRAVENOUS at 02:56

## 2017-04-18 RX ADMIN — LEVETIRACETAM 500 MG: 100 INJECTION, SOLUTION INTRAVENOUS at 15:11

## 2017-04-18 RX ADMIN — HYDROMORPHONE HYDROCHLORIDE 0.5 MG: 1 INJECTION, SOLUTION INTRAMUSCULAR; INTRAVENOUS; SUBCUTANEOUS at 17:47

## 2017-04-18 RX ADMIN — FOLIC ACID: 5 INJECTION, SOLUTION INTRAMUSCULAR; INTRAVENOUS; SUBCUTANEOUS at 10:00

## 2017-04-18 RX ADMIN — Medication 10 ML: at 15:12

## 2017-04-18 RX ADMIN — DEXTROSE MONOHYDRATE 150 ML/HR: 5 INJECTION, SOLUTION INTRAVENOUS at 05:48

## 2017-04-18 RX ADMIN — Medication 10 ML: at 08:36

## 2017-04-18 NOTE — PROGRESS NOTES
Pt. Ordered folic acid po. Pharmacy made the piggyback and sent it. Will give IV today and give po after swallow evaluation has been completed.

## 2017-04-18 NOTE — PROGRESS NOTES
[facility] progress note    Follow-Up on: 4/18/2017     Patient seen and examined. He is obtunded. ROS:  Unable due to AMS    Exam:  Vitals:    04/18/17 0559 04/18/17 0659 04/18/17 0759 04/18/17 0859   BP: 132/60 154/69 140/63 104/59   Pulse: 63 (!) 58 (!) 57 (!) 58   Resp: 12 13 20 (!) 6   Temp:   97.5 °F (36.4 °C)    SpO2: 100% 100% 100% 100%   Weight:       Height:             Intake/Output Summary (Last 24 hours) at 04/18/17 1049  Last data filed at 04/18/17 0507   Gross per 24 hour   Intake          3838.08 ml   Output                0 ml   Net          3838.08 ml       GEN - in no distress, obtunded  CV - S1, S2, no rub  Lung - clear bilaterally  Abd - soft, nontender  Ext - no edema    Recent Labs      04/18/17   0957  04/18/17   0225  04/17/17   2039   04/17/17   0055  04/16/17   1125   WBC  6.1   --    --    --    --   9.7   HGB  9.5*  9.8*  9.7*   < >  10.2*  4.5*   HCT  30.1*   --    --    --   31.5*  15.6*   PLT  193   --    --    --    --   300    < > = values in this interval not displayed. Recent Labs      04/18/17   0957  04/18/17   0410  04/18/17   0225   04/16/17   1645   NA  153*  154*  143   < >  172*   K  3.8  4.2  4.1   < >  5.2*   CL  125*  126*  114*   < >  142*   CO2  20*  19*  16*   < >  20*   BUN  24*  30*  28*   < >  85*   CREA  1.78*  1.88*  1.98*   < >  3.77*   CA  7.6*  7.5*  7.0*   < >  7.6*   GLU  94  106*  428*   < >  98   MG   --    --    --    --   3.3*    < > = values in this interval not displayed. Assessment / Plan:    1. Hypernatremia - remains on D5W - stable    2. DEVORA on CKD Stage III - near baseline    3. AMS - multifactorial    4. Anemia s/p PRBC    Maintain D5W.

## 2017-04-18 NOTE — PROGRESS NOTES
Spoke with Maynor Persaud via telephone to make her aware that patient is being moved to [id number]. Message left for daughter Jetty Loss as well.

## 2017-04-18 NOTE — PROGRESS NOTES
TRANSFER - OUT REPORT:    Verbal report given to John Denise RN on Raquel Pi  being transferred to 6th floor for routine progression of care       Report consisted of patients Situation, Background, Assessment and   Recommendations(SBAR). Information from the following report(s) SBAR, Kardex, Intake/Output, MAR, Recent Results and Med Rec Status was reviewed with the receiving nurse. Lines:   Peripheral IV 04/17/17 Right Hand (Active)   Site Assessment Clean, dry, & intact 4/17/2017  7:34 PM   Phlebitis Assessment 0 4/17/2017  7:34 PM   Infiltration Assessment 0 4/17/2017  7:34 PM   Dressing Status Clean, dry, & intact 4/17/2017  7:34 PM   Dressing Type Tape;Transparent 4/17/2017  7:34 PM   Hub Color/Line Status Blue;Patent; Flushed; Infusing 4/17/2017  7:34 PM   Alcohol Cap Used No 4/17/2017  7:34 PM       Peripheral IV 04/18/17 Left Forearm (Active)   Site Assessment Clean, dry, & intact 4/18/2017  2:00 AM   Phlebitis Assessment 0 4/18/2017  2:00 AM   Infiltration Assessment 0 4/18/2017  2:00 AM   Dressing Status New 4/18/2017  2:00 AM   Dressing Type Tape;Transparent 4/18/2017  2:00 AM   Hub Color/Line Status Blue;Patent; Flushed; Infusing 4/18/2017  2:00 AM   Alcohol Cap Used No 4/18/2017  2:00 AM        Opportunity for questions and clarification was provided.       Patient transported with:   [employer]

## 2017-04-18 NOTE — PROGRESS NOTES
Bedside shift change report given to Arpita (oncoming nurse) by Bashir Moeller (offgoing nurse). Report included the following information SBAR, Kardex, Intake/Output, MAR, Recent Results, Med Rec Status and Cardiac Rhythm NSR.

## 2017-04-18 NOTE — PROGRESS NOTES
TRANSFER - IN REPORT:    Verbal report received from [address] [facility], [facility]  being received from ICU for routine progression of care      Report consisted of patients Situation, Background, Assessment and   Recommendations(SBAR). Information from the following report(s) SBAR was reviewed with the receiving nurse. Opportunity for questions and clarification was provided. Assessment completed upon patients arrival to unit and care assumed.

## 2017-04-18 NOTE — PROGRESS NOTES
GI DAILY PROGRESS NOTE    Admit Date:  4/16/2017    Today's Date:  4/18/2017    CC:  Chronic GIB; AVMs    Subjective:     Patient somnolent, unresponsive. No BM recorded in last day. Hgb 9.8 after 4 units PRBCs (for presenting hgb of 4.5). No report of vomiting. Medications:   Current Facility-Administered Medications   Medication Dose Route Frequency    folic acid (FOLVITE) tablet 1 mg  1 mg Oral DAILY    sodium chloride (NS) flush 5-10 mL  5-10 mL IntraVENous PRN    levETIRAcetam (KEPPRA) 500 mg in 0.9% sodium chloride IVPB  500 mg IntraVENous Q12H    sodium chloride (NS) flush 5-10 mL  5-10 mL IntraVENous Q8H    sodium chloride (NS) flush 5-10 mL  5-10 mL IntraVENous PRN    ondansetron (ZOFRAN) injection 4 mg  4 mg IntraVENous Q4H PRN    HYDROmorphone (PF) (DILAUDID) injection 0.5 mg  0.5 mg IntraVENous Q4H PRN    pantoprazole (PROTONIX) 80 mg in 0.9% sodium chloride 250 mL infusion  80 mg IntraVENous CONTINUOUS    dextrose 5% infusion  125 mL/hr IntraVENous CONTINUOUS    0.9% sodium chloride infusion 250 mL  250 mL IntraVENous PRN    0.9% sodium chloride infusion 250 mL  250 mL IntraVENous PRN       Review of Systems:  Unobtainable given mental status    Diet:  npo    Objective:   Vitals:  Visit Vitals    /59    Pulse (!) 58    Temp 97.5 °F (36.4 °C)    Resp (!) 6    Ht 5' 8\" (1.727 m)    Wt 59.6 kg (131 lb 6.3 oz)    SpO2 100%    BMI 19.98 kg/m2     Intake/Output:     04/16 1901 - 04/18 0700  In: 5601.4 [I.V.:5601.4]  Out: -   Exam:  General appearance: somnolent; unresponsive  Lungs: clear to auscultation bilaterally anteriorly  Heart: regular rate and rhythm  Abdomen: soft, non-tender.  Bowel sounds normal. No masses, no organomegaly  Extremities: extremities normal, atraumatic, no cyanosis or edema      Data Review (Labs):    Recent Labs      04/18/17   0410  04/18/17   0225  04/17/17 2040 04/17/17 2039 04/17/17   1550  04/17/17   1035  04/17/17   0415  04/17/17   0055 04/16/17   2236  04/16/17   1834  04/16/17   1645  04/16/17   1125   WBC   --    --    --    --    --    --    --    --    --    --    --   9.7   HGB   --   9.8*   --   9.7*  11.0*  9.7*  9.8*  10.2*   --    --    --   4.5*   HCT   --    --    --    --    --    --    --   31.5*   --    --    --   15.6*   PLT   --    --    --    --    --    --    --    --    --    --    --   300   MCV   --    --    --    --    --    --    --    --    --    --    --   87.2   NA  154*  143  159*   --   161*  163*  167*   --   168*   --   172*  170*   K  4.2  4.1  4.1   --   4.2  4.3  4.4   --   4.7   --   5.2*  5.0   CL  126*  114*  130*   --   132*  135*  137*   --   139*   --   142*  140*   CO2  19*  16*  20*   --   19*  21  20*   --   18*   --   20*  16*   BUN  30*  28*  37*   --   43*  50*  62*   --   68*   --   85*  89*   CREA  1.88*  1.98*  2.14*   --   2.22*  2.47*  2.77*   --   3.14*   --   3.77*  4.31*   CA  7.5*  7.0*  7.4*   --   7.7*  7.5*  7.5*   --   7.1*   --   7.6*  8.4   MG   --    --    --    --    --    --    --    --    --    --   3.3*   --    GLU  106*  428*  92   --   86  87  95   --   103*   --   98  122*   AP   --    --    --    --    --    --    --    --    --    --    --   116   SGOT   --    --    --    --    --    --    --    --    --    --    --   216*   ALT   --    --    --    --    --    --    --    --    --    --    --   54   TBILI   --    --    --    --    --    --    --    --    --    --    --   0.4   ALB   --    --    --    --    --    --    --    --    --    --    --   2.7*   TP   --    --    --    --    --    --    --    --    --    --    --   7.7   PTP   --    --    --    --    --    --    --    --    --   11.7   --    --    INR   --    --    --    --    --    --    --    --    --   1.1   --    --        Assessment:     Principal Problem:    Acute blood loss anemia (4/16/2017)    Active Problems:    Dementia (11/25/2016)      Hypernatremia (11/25/2016)      History of alcohol abuse (6/13/2014) Overview: FORMERLY DRANK  1-2 12oz. Beers daily            NONE 2014      GI bleed (6/14/2014)      Anemia (11/25/2016)      Overview: ADM 11/25/16:  HGB: 7.6. BASELINE 9      Hypothermia (4/16/2017)      Acute renal failure (ARF) (HCC) (4/16/2017)      Rhabdomyolysis (4/17/2017)      70 y.o. male with PMH of (but not limited to) chronic anemia on iron therapy, CKD, dementia, HLD, HTN, Hypothyroid, seizure history, AVM with recurrent bleeding, COPD, and history of ETOH abuse formerly, who is admitted with severe anemia with hgb 4.5 (9.8 in 1/2017), heme positive stool, rhabdomyolysis, hypernatremia (Na 170 on admission). Most recent EGD with pediatric colonoscope to Jejunum 12/8/16 by Dr. Mikal Nath revealed 2mm nodule at GE junctions, clip along proximal greater curvature, Few AVM, not actively bleeding in the duodenum. Treated with APC; Multiple AVMs throughout the jejunum, not actively bleeding. Treated with APC. Terminated early due to poor O2 saturation and possible aspiration. Hgb up to 9.8 s/p 4 units PRBCs this admission. Chronic recurrent anemia likely secondary to chronic, intermittent oozing from AVMs. Plan:   1) continue conservative management. Monitoring hgb, PRBCs prn, empiric PPI  2) he is not a candidate for thalidomide given his noncompliance  3) we will sign off. Please call with questions/concerns    Patient is seen and examined in collaboration with Dr. Beryl Fisher. Assessment and plan as per Dr. Yasmany Garg. GILBERT Reynolds    I have seen and examined. No significant active bleeding at this time. Hb stable after transfusion. Likely a poor candidate for further endoscopic procedures unless emergency.

## 2017-04-18 NOTE — PROGRESS NOTES
Interdisciplinary team rounds were held 4/18/2017 with the following team members:Care Management, Nursing, Nutrition, Palliative Care, Pastoral Care, Pharmacy, Physician, Respiratory Therapy, Wound Care and Clinical Coordinator. Plan of care discussed. See clinical pathway and/or care plan for interventions and desired outcomes.

## 2017-04-18 NOTE — PROGRESS NOTES
Dual skin assessment complete with this nurse and JOSE fiore. Skin intact. Pt drowsy but arousable. No family at bedside.

## 2017-04-18 NOTE — PROGRESS NOTES
Hospitalist Progress Note    Patient: Jaye Nielsen MRN: [id number]  SSN: [id number]    YOB: 1945  Age: 70 y.o. Sex: male      Admit Date: 4/16/2017    LOS: 2 days     Subjective:     Seen at bedside. Mental status has improved. Will start him on diet. Will request speech and swallow to see him. Objective:     Vitals:    04/18/17 0259 04/18/17 0359 04/18/17 0459 04/18/17 0559   BP: 159/75 152/71 157/55 132/60   Pulse: 66 72 65 63   Resp: 20 14 13 12   Temp:       SpO2: 100% 100% 100% 100%   Weight:       Height:            Intake and Output: Not accurate    Physical Exam:   GENERAL: alert   EYE: conjunctivae/corneas clear. PERRL. THROAT & NECK: normal and no erythema or exudates noted. LUNG: clear to auscultation bilaterally  HEART: regular rate and rhythm, S1S2, no murmur, no JVD  ABDOMEN: soft, non-tender, non-distended. Bowel sounds normal.   EXTREMITIES:  No edema, 2+ pedal/radial pulses bilaterally  SKIN: no rash or abnormalities  NEUROLOGIC: Drowsy, Ox1 (person). Cranial nerves 2-12 grossly intact.     Lab/Data Review:  BMP:   Lab Results   Component Value Date/Time     (H) 04/18/2017 04:10 AM    K 4.2 04/18/2017 04:10 AM     (H) 04/18/2017 04:10 AM    CO2 19 (L) 04/18/2017 04:10 AM    AGAP 9 04/18/2017 04:10 AM     (H) 04/18/2017 04:10 AM    BUN 30 (H) 04/18/2017 04:10 AM    CREA 1.88 (H) 04/18/2017 04:10 AM    GFRAA 46 (L) 04/18/2017 04:10 AM    GFRNA 38 (L) 04/18/2017 04:10 AM     CBC:   Lab Results   Component Value Date/Time    HGB 9.8 (L) 04/18/2017 02:25 AM     All Cardiac Markers in the last 24 hours:   Lab Results   Component Value Date/Time    CPK 2491 (H) 04/18/2017 02:25 AM    CPK 4183 (H) 04/17/2017 10:35 AM     Recent Glucose Results:   Lab Results   Component Value Date/Time     (H) 04/18/2017 04:10 AM     (H) 04/18/2017 02:25 AM    GLU 92 04/17/2017 08:40 PM     COAGS:   No results found for: APTT, PTP, INR Imaging:      Assessment:     Principal Problem:    Acute blood loss anemia (4/16/2017)    Active Problems:    Dementia (11/25/2016)      Hypernatremia (11/25/2016)      History of alcohol abuse (6/13/2014)      Overview: FORMERLY DRANK  1-2 12oz. Beers daily            NONE 2014      GI bleed (6/14/2014)      Anemia (11/25/2016)      Overview: ADM 11/25/16:  HGB: 7.6.   BASELINE 9      Hypothermia (4/16/2017)      Acute renal failure (ARF) ([facility] [address] (4/16/2017)      Rhabdomyolysis (4/17/2017)        Plan:     - Continue D5W - Sodium level improving   - Check CK on a daily basis   - TSH was low, will reduce synthroid to 75 mcg per day   - Creatinine improving  - Hgb stable - no GI intervention at this time  - Hypothermia resolved - off [address]  Transferred to the medical floor today     Signed By: Osei Jacobson MD     April 18, 2017

## 2017-04-18 NOTE — PROGRESS NOTES
STG: Pt will tolerate clear liquids with no straws without signs/sx of aspiration with 100% accuracy  STG: Pt will participate with solid trials once appropriate for diet advancement from clear liquids  LTG: Pt will tolerate the least restrictive diet at discharge without respiratory compromise      Speech language pathology: bedside swallow note: Initial Assessment    NAME/AGE/GENDER: Rema Ledezma is a 70 y.o. male  DATE: 4/18/2017  PRIMARY DIAGNOSIS: Severe sepsis ([facility] [address]       ICD-10: Treatment Diagnosis: dysphagia; oropharyngeal R13.12  INTERDISCIPLINARY COLLABORATION: Registered Nurse  PRECAUTIONS/ALLERGIES: Review of patient's allergies indicates no known allergies. ASSESSMENT:Based on the objective data described below, Mr. Prasanna Musa presents with mild dysphagia. Patient has dementia and is unable to answer orientation questions. He is alert most of the session. No overt signs/sx of aspiration with 8oz of thin liquids from the cup or a container of jello. Prolonged oral prep and delayed AP transit with the jello but with adequate oral clearance. One cough elicited after drinking serial swallows of thin liquids via the straw only . The patient had a modified barium swallow study (MBS) completed in Dec 2016 with the following results: no laryngeal penetration or aspiration observed with any consistency assessed. Swallows of thin liquids by cup and straw triggered at pyriform sinuses but with no pharyngeal residue after swallow. Swallows of pudding triggered at valleculae with no pharyngeal residue after swallow. Slow oral prep time for pudding and therefore masticated solids deferred. Recommend clear liquids via single cup sip with 1:1 assistance. Patient will likely require pureed textures once appropriate for advancement. Will follow for diet tolerance. Patient will benefit from skilled intervention to address the below impairments. ?????? ? ? This section established at most recent assessment??????????  PROBLEM LIST (Impairments causing functional limitations):  1. dysphagia  REHABILITATION POTENTIAL FOR STATED GOALS: Good  PLAN OF CARE:   Patient will benefit from skilled intervention to address the following impairments. RECOMMENDATIONS AND PLANNED INTERVENTIONS (Benefits and precautions of therapy have been discussed with the patient.):  Liquids:  regular thin by cup sip  MEDICATIONS:  · Crushed in puree  COMPENSATORY STRATEGIES/MODIFICATIONS INCLUDING:  · Small sips and bites  OTHER RECOMMENDATIONS (including follow up treatment recommendations):   · diet tolerance  RECOMMENDED DIET MODIFICATIONS DISCUSSED WITH:  · Nursing  · Patient  FREQUENCY/DURATION: Continue to follow patient 2x a week or until short term goals are met to address above goals. RECOMMENDED REHABILITATION/EQUIPMENT: (at time of discharge pending progress):   None. SUBJECTIVE:   Confused but cooperative. History of Present Injury/Illness: Mr. Medhat Moulton  has a past medical history of Alcohol abuse; Anemia; Angiodysplasia of stomach (2016); Bladder incontinence (05/28/2014); Chronic airway obstruction, not elsewhere classified ([facility] [address] (5/28/2014); CKD (chronic kidney disease); COPD (chronic obstructive pulmonary disease) ([facility] [address] Dementia (5/28/2014); Hyperlipidemia (5/28/2014); Hypernatremia (11/25/2016); Hypertension; Hypothyroid; Seizure ([facility] [address] (2010); Tobacco abuse (5/28/2014); and Tobacco abuse. He also  has a past surgical history that includes colonoscopy (APPROX 2005) and endoscopy (2016). Present Symptoms:  Cough with serial swallows by straw  Pain Intensity 1: 0  Pain Location 1: Back  Pain Orientation 1: Lower  Pain Intervention(s) 1: Medication (see MAR)  Current Medications:   No current facility-administered medications on file prior to encounter.       Current Outpatient Prescriptions on File Prior to Encounter   Medication Sig Dispense Refill    ferrous sulfate 220 mg (44 mg iron)/5 mL solution Take 5 mL by mouth two (2) times a day. 300 mL 5    divalproex DR (DEPAKOTE) 250 mg tablet Take 1 Tab by mouth nightly. 90 Tab 2    omeprazole (PRILOSEC) 20 mg capsule Take 1 Cap by mouth two (2) times a day. 90 Cap 1    atorvastatin (LIPITOR) 20 mg tablet Take 1 Tab by mouth daily. 90 Tab 1    folic acid (FOLVITE) 1 mg tablet Take 1 Tab by mouth daily. 90 Tab 1    levETIRAcetam (KEPPRA) 500 mg tablet Take 1 Tab by mouth two (2) times a day. 180 Tab 1    tamsulosin (FLOMAX) 0.4 mg capsule Take 1 Cap by mouth nightly. 90 Cap 1    levothyroxine (SYNTHROID) 125 mcg tablet Take 1 Tab by mouth Daily (before breakfast). 90 Tab 1    losartan (COZAAR) 100 mg tablet Take 1 Tab by mouth daily. 90 Tab 1    amLODIPine (NORVASC) 10 mg tablet Take 1 Tab by mouth daily. 90 Tab 1    thiamine (B-1) 100 mg tablet Take 1 Tab by mouth daily. 90 Tab 1     Current Dietary Status:  Clear liquids nectar thick      Social History/Home Situation: home with family  Home Environment: Private residence  # Steps to Enter: 3  One/Two Story Residence: One story  Living Alone: No  Support Systems: Child(lissette)  Patient Expects to be Discharged to[de-identified] Private residence  Current DME Used/Available at Home: Shower chair, derrick Upton  OBJECTIVE:   Respiratory Status:        CXR Results: negative  MRI/CT Results: n/a  Oral Motor Structure/Speech:  Oral-Motor Structure/Motor Speech  Labial: Decreased seal  Dentition: Edentulous  Oral Hygiene: fair  Lingual: Decreased rate    Cognitive and Communication Status:  Neurologic State: Alert  Orientation Level: Unable to verbalize  Cognition: Decreased command following  Perception: Appears intact  Perseveration: Verbal cues provided  Safety/Judgement: Decreased awareness of environment;Decreased insight into deficits    BEDSIDE SWALLOW EVALUATION  Oral Assessment:  Oral Assessment  Labial: Decreased seal  Dentition: Edentulous  Oral Hygiene: fair  Lingual: Decreased rate  P.O.  Trials:  Patient Position: upright in bed    The patient was given tsp to straw amounts of the following:   Consistency Presented: Thin liquid  How Presented: Self-fed/presented;Cup/sip;Spoon;Straw    ORAL PHASE:     Bolus Formation/Control: No impairment  Propulsion: Delayed (# of seconds)     Oral Residue: None    PHARYNGEAL PHASE:  Initiation of Swallow: Delayed (# of seconds)  Laryngeal Elevation: Functional  Aspiration Signs/Symptoms: Strong cough  Vocal Quality: Low volume          OTHER OBSERVATIONS:  Rate/bite size: WNL   Endurance:  Questionable     Tool Used: Dysphagia Outcome and Severity Scale (NABILA)    Score Comments   Normal Diet  [] 7 With no strategies or extra time needed   Functional Swallow  [] 6 May have mild oral or pharyngeal delay       Mild Dysphagia    [x] 5 Which may require one diet consistency restricted (those who demonstrate penetration which is entirely cleared on MBS would be included)   Mild-Moderate Dysphagia  [] 4 With 1-2 diet consistencies restricted       Moderate Dysphagia  [] 3 With 2 or more diet consistencies restricted       Moderately Severe Dysphagia  [] 2 With partial PO strategies (trials with ST only)       Severe Dysphagia  [] 1 With inability to tolerate any PO safely          Score:  Initial: 5 Most Recent: X (Date: -- )   Interpretation of Tool: The Dysphagia Outcome and Severity Scale (NABILA) is a simple, easy-to-use, 7-point scale developed to systematically rate the functional severity of dysphagia based on objective assessment and make recommendations for diet level, independence level, and type of nutrition. Score 7 6 5 4 3 2 1   Modifier CH CI CJ CK CL CM CN   ?  Swallowing:     - CURRENT STATUS: CJ - 20%-39% impaired, limited or restricted    - GOAL STATUS:  CJ - 20%-39% impaired, limited or restricted    - D/C STATUS:  CJ - 20%-39% impaired, limited or restricted  Payor: Violetta Cover / Plan: [address] [place] [address] HMO / Product Type: Managed Care Medicare /     TREATMENT: (In addition to Assessment/Re-Assessment sessions the following treatments were rendered)  Assessment/Reassessment only, no treatment provided today    ORAL MOTOR  EXERCISES:                                                                                                                                                                      LARYNGEAL / PHARYNGEAL EXERCISES:                                                                                                                                     __________________________________________________________________________________________________  Safety:   After treatment position/precautions:  · RN notified  · Upright in Bed  Progression/Medical Necessity:   · Skilled intervention continues to be required due to patient still consuming a modified diet. Compliance with Program/Exercises: Will assess as treatment progresses. Reason for Continuation of Services/Other Comments:  · Patient continues to require skilled intervention due to patient unable to attend/participate in therapy as expected. Recommendations/Intent for next treatment session: \"Treatment next visit will focus on diet tolerance\".     Total Treatment Duration:  Time In: 1125  Time Out: [address] [facility],[address] [place], [place]

## 2017-04-19 LAB
ANION GAP BLD CALC-SCNC: 11 MMOL/L (ref 7–16)
BACTERIA SPEC CULT: NORMAL
BUN SERPL-MCNC: 20 MG/DL (ref 8–23)
CALCIUM SERPL-MCNC: 8 MG/DL (ref 8.3–10.4)
CHLORIDE SERPL-SCNC: 124 MMOL/L (ref 98–107)
CK SERPL-CCNC: 1669 U/L (ref 21–215)
CO2 SERPL-SCNC: 19 MMOL/L (ref 21–32)
CREAT SERPL-MCNC: 1.67 MG/DL (ref 0.8–1.5)
ERYTHROCYTE [DISTWIDTH] IN BLOOD BY AUTOMATED COUNT: 17.8 % (ref 11.9–14.6)
GLUCOSE SERPL-MCNC: 58 MG/DL (ref 65–100)
HCT VFR BLD AUTO: 31.5 % (ref 41.1–50.3)
HGB BLD-MCNC: 10.3 G/DL (ref 13.6–17.2)
MCH RBC QN AUTO: 27 PG (ref 26.1–32.9)
MCHC RBC AUTO-ENTMCNC: 32.7 G/DL (ref 31.4–35)
MCV RBC AUTO: 82.5 FL (ref 79.6–97.8)
PLATELET # BLD AUTO: 217 K/UL (ref 150–450)
PMV BLD AUTO: 11.5 FL (ref 10.8–14.1)
POTASSIUM SERPL-SCNC: 4.1 MMOL/L (ref 3.5–5.1)
RBC # BLD AUTO: 3.82 M/UL (ref 4.23–5.67)
SERVICE CMNT-IMP: NORMAL
SODIUM SERPL-SCNC: 154 MMOL/L (ref 136–145)
WBC # BLD AUTO: 7.7 K/UL (ref 4.3–11.1)

## 2017-04-19 PROCEDURE — 74011000302 HC RX REV CODE- 302: Performed by: INTERNAL MEDICINE

## 2017-04-19 PROCEDURE — 85027 COMPLETE CBC AUTOMATED: CPT | Performed by: INTERNAL MEDICINE

## 2017-04-19 PROCEDURE — 36415 COLL VENOUS BLD VENIPUNCTURE: CPT | Performed by: INTERNAL MEDICINE

## 2017-04-19 PROCEDURE — 82550 ASSAY OF CK (CPK): CPT | Performed by: INTERNAL MEDICINE

## 2017-04-19 PROCEDURE — 74011250637 HC RX REV CODE- 250/637: Performed by: INTERNAL MEDICINE

## 2017-04-19 PROCEDURE — 74011250636 HC RX REV CODE- 250/636: Performed by: INTERNAL MEDICINE

## 2017-04-19 PROCEDURE — 97535 SELF CARE MNGMENT TRAINING: CPT

## 2017-04-19 PROCEDURE — 86580 TB INTRADERMAL TEST: CPT | Performed by: INTERNAL MEDICINE

## 2017-04-19 PROCEDURE — 74011000258 HC RX REV CODE- 258: Performed by: INTERNAL MEDICINE

## 2017-04-19 PROCEDURE — C9113 INJ PANTOPRAZOLE SODIUM, VIA: HCPCS | Performed by: INTERNAL MEDICINE

## 2017-04-19 PROCEDURE — 80048 BASIC METABOLIC PNL TOTAL CA: CPT | Performed by: INTERNAL MEDICINE

## 2017-04-19 PROCEDURE — 97162 PT EVAL MOD COMPLEX 30 MIN: CPT

## 2017-04-19 PROCEDURE — 65270000029 HC RM PRIVATE

## 2017-04-19 PROCEDURE — 97166 OT EVAL MOD COMPLEX 45 MIN: CPT

## 2017-04-19 PROCEDURE — 92526 ORAL FUNCTION THERAPY: CPT

## 2017-04-19 RX ORDER — LEVETIRACETAM 500 MG/1
500 TABLET ORAL 2 TIMES DAILY
Status: DISCONTINUED | OUTPATIENT
Start: 2017-04-19 | End: 2017-04-22 | Stop reason: HOSPADM

## 2017-04-19 RX ORDER — PANTOPRAZOLE SODIUM 40 MG/1
40 TABLET, DELAYED RELEASE ORAL
Status: DISCONTINUED | OUTPATIENT
Start: 2017-04-20 | End: 2017-04-22 | Stop reason: HOSPADM

## 2017-04-19 RX ADMIN — LEVETIRACETAM 500 MG: 500 TABLET, FILM COATED ORAL at 18:00

## 2017-04-19 RX ADMIN — Medication 10 ML: at 06:00

## 2017-04-19 RX ADMIN — SODIUM CHLORIDE 80 MG: 900 INJECTION, SOLUTION INTRAVENOUS at 00:14

## 2017-04-19 RX ADMIN — TUBERCULIN PURIFIED PROTEIN DERIVATIVE 5 UNITS: 5 INJECTION, SOLUTION INTRADERMAL at 16:00

## 2017-04-19 RX ADMIN — DEXTROSE MONOHYDRATE 125 ML/HR: 5 INJECTION, SOLUTION INTRAVENOUS at 03:53

## 2017-04-19 RX ADMIN — FOLIC ACID 1 MG: 1 TABLET ORAL at 08:54

## 2017-04-19 RX ADMIN — LEVETIRACETAM 500 MG: 100 INJECTION, SOLUTION INTRAVENOUS at 03:02

## 2017-04-19 RX ADMIN — Medication 10 ML: at 22:00

## 2017-04-19 RX ADMIN — SODIUM CHLORIDE 80 MG: 900 INJECTION, SOLUTION INTRAVENOUS at 10:10

## 2017-04-19 NOTE — PROGRESS NOTES
Problem: Self Care Deficits Care Plan (Adult)  Goal: *Acute Goals and Plan of Care (Insert Text)  1. Patient will complete lower body bathing and dressing with minimal assistance and adaptive equipment as needed. 2. Patient will complete toileting with supervision. 3. Patient will tolerate 23 minutes of OT treatment with less than 3 rest breaks to increase activity tolerance for ADLs. 4. Patient will complete functional transfers with SBA and adaptive equipment as needed. Timeframe: 7 visits     Comments:       OCCUPATIONAL THERAPY: Initial Assessment and AM 4/19/2017  INPATIENT: Hospital Day: 4  Payor: Mary Beyertana / Plan: [address] [place] [address] HMO / Product Type: Managed Care Medicare /      NAME/AGE/GENDER: Ritu Larsen is a 70 y.o. male        PRIMARY DIAGNOSIS:  Severe sepsis ([facility] Acute blood loss anemia Acute blood loss anemia        ICD-10: Treatment Diagnosis:        · Pain in Left Shoulder (M25.512)  · Stiffness of Left Shoulder, Not elsewhere classified (M25.612)  · Pain in Right Shoulder (M25.511)  · Stiffness of Right Shoulder, Not elsewhere classified (M25.611)  · Generalized Muscle Weakness (M62.81)  · Other lack of cordination (R27.8)  · Repeated Falls (R29.6)  · History of falling (Z91.81)   Precautions/Allergies:        fall risk Review of patient's allergies indicates no known allergies. ASSESSMENT:      Mr. Allie Houser presents to hospital for above. Upon arrival of OT, pt was supine in bed with daughter, who is an RN, at bedside. He was in restraints and daughter states he had managed to pull B IVs loose. RN notified. Daughter reports that he requires assistance with ADLs at home, stating \"it just depends on the day. \" She also reports that he has had repetitive falls within the last 6 months. Today, he was AO to person and place; disoriented to situation and time. He perseverated over the dining menu throughout session. He sat EOB with CGA for safety and impulsivity.  He stood with RW and CGA but became impulsive and tried moving towards the restroom without walker and began urinating on the floor. The bedside commode was placed underneath him and he requires min/mod A to safely get onto commode, requiring maximal verbal and tactile cues to stay seated on bedside commode. He completed toilet to bed transfer with min A and was left in supine with restraints replaced, bed rails x4, posey on, and daughter at bedside. Mr. Rosa Hanna presents with impulsivity, decreased cognition, safety awareness, insight to deficits, awareness of need for assistance, strength, balance, ADL performance, and functional transfers and would benefit from continued skilled OT to improve safety and independence with ADLs. Will follow. This section established at most recent assessment   PROBLEM LIST (Impairments causing functional limitations):  1. Decreased Strength  2. Decreased ADL/Functional Activities  3. Decreased Transfer Abilities  4. Decreased Ambulation Ability/Technique  5. Decreased Balance  6. Increased Pain  7. Decreased Activity Tolerance  8. Decreased Flexibility/Joint Mobility  9. Decreased Knowledge of Precautions  10. Decreased [place] with Home Exercise Program  11. Decreased Cognition    INTERVENTIONS PLANNED: (Benefits and precautions of occupational therapy have been discussed with the patient.)  1. Activities of daily living training  2. Adaptive equipment training  3. Cognitive training  4. Group therapy  5. Theraputic activity  6. Theraputic exercise      TREATMENT PLAN: Frequency/Duration: Follow patient 3x/week to address above goals. Rehabilitation Potential For Stated Goals: FAIR      RECOMMENDED REHABILITATION/EQUIPMENT: (at time of discharge pending progress): Continue Skilled Therapy.                       OCCUPATIONAL PROFILE AND HISTORY:   History of Present Injury/Illness (Reason for Referral):  See H&P  Past Medical History/Comorbidities:   Mr. Rosa Hanna  has a past medical history of Alcohol abuse; Anemia; Angiodysplasia of stomach (2016); Bladder incontinence (05/28/2014); Chronic airway obstruction, not elsewhere classified ([facility] [address] (5/28/2014); CKD (chronic kidney disease); COPD [facility]ch[facility] obstructive [facility] disease) ([facility] Dementia (5/28/2014); Hyperlipidemia (5/28/2014); Hypernatremia (11/25/2016); Hypertension; Hypothyroid; Seizure ([facility] (2010); Tobacco abuse (5/28/2014); and Tobacco abuse. Mr. Errol Carlos  has a past surgical history that includes colonoscopy (APPROX 2005) and endoscopy (2016). Social History/Living Environment:   Home Environment: Private residence  # Steps to Enter: 3 (3 on back; 10 on front)  Rails to Enter: Yes  [employer] : Right  One/Two Story Residence: One story  Living Alone: No  Support Systems: Child(lissette), Spouse/Significant Other/Partner  Patient Expects to be Discharged to[de-identified] Unknown  Current DME Used/Available at Home: Shower chair, Cane, straight, Walker, rolling  Prior Level of Function/Work/Activity:  Lives in [place] [facility] [place] with wife. 10 KYM on front. 3 KYM on back. Requires assistance for ADLs. Doesn't drive. Personal Factors:          Sex:  male        Age:  70 y.o. Overall Behavior:  Confused; agitated; impulsive    Number of Personal Factors/Comorbidities that affect the Plan of Care: Expanded review of therapy/medical records (1-2):  MODERATE COMPLEXITY   ASSESSMENT OF OCCUPATIONAL PERFORMANCE[de-identified]   Activities of Daily Living:           Basic ADLs (From Assessment) Complex ADLs (From Assessment)   Basic ADL  Feeding: Setup  Oral Facial Hygiene/Grooming: Minimum assistance  Bathing: Maximum assistance  Upper Body Dressing: Minimum assistance  Lower Body Dressing: Maximum assistance  Toileting: Minimum assistance Instrumental ADL  Meal Preparation: Total assistance  Homemaking:  Total assistance  Medication Management: Total assistance  Financial Management: Total assistance   Grooming/Bathing/Dressing Activities of Daily Living Cognitive Retraining  Safety/Judgement: Decreased awareness of environment;Decreased awareness of need for assistance;Decreased awareness of need for safety;Decreased insight into deficits; Fall prevention;Home safety                 Functional Transfers  Toilet Transfer : Minimum assistance; Moderate assistance     Bed/Mat Mobility  Supine to Sit: Contact guard assistance  Sit to Supine: Contact guard assistance  Sit to Stand: Contact guard assistance  Scooting: Contact guard assistance          Most Recent Physical Functioning:   Gross Assessment:  AROM: Generally decreased, functional (B shoulders)  Strength: Generally decreased, functional (B  strength)               Posture:     Balance:  Sitting: Impaired  Sitting - Static: Good (unsupported)  Sitting - Dynamic: Prop sitting  Standing: Impaired  Standing - Static: Fair  Standing - Dynamic : Fair Bed Mobility:  Supine to Sit: Contact guard assistance  Sit to Supine: Contact guard assistance  Scooting: Contact guard assistance  Wheelchair Mobility:     Transfers:  Sit to Stand: Contact guard assistance  Stand to Sit: Contact guard assistance                    Patient Vitals for the past 6 hrs:       BP BP Patient Position SpO2 Pulse   04/19/17 0724 130/69 At rest 100 % 62        Mental Status  Neurologic State: Alert  Orientation Level: Oriented to person, Oriented to place, Disoriented to situation, Disoriented to time  Cognition: Impulsive, Decreased command following, Poor safety awareness, Impaired decision making, Decreased attention/concentration  Perception: Appears intact  Perseveration: Perseverates during conversation  Safety/Judgement: Decreased awareness of environment, Decreased awareness of need for assistance, Decreased awareness of need for safety, Decreased insight into deficits, Fall prevention, Home safety                               Physical Skills Involved:  1. Range of Motion  2. Balance  3. Mobility  4. Strength  5. Endurance  6.  Fine or Gross Motor Coordination  7. Dexterity Cognitive Skills Affected (resulting in the inability to perform in a timely and safe manner):  1. Attending  2. Perceiving  3. Thinking  4. Understanding  5. Problem Solving  6. Mental Sequencing  7. Learning  8. Remembering Psychosocial Skills Affected:  1. Interpersonal Interactions  2. Habits  3. Routines and Behaviors   Number of elements that affect the Plan of Care: 5+:  HIGH COMPLEXITY   CLINICAL DECISION MAKIN[address] [place] [address] AM-PAC 6 Clicks   Basic Mobility Inpatient Short Form  How much help from another person does the patient currently need. .. Total A Lot A Little None   1. Putting on and taking off regular lower body clothing?   [ ] 1   [X] 2   [ ] 3   [ ] 4   2. Bathing (including washing, rinsing, drying)? [ ] 1   [X] 2   [ ] 3   [ ] 4   3. Toileting, which includes using toilet, bedpan or urinal?   [ ] 1   [X] 2   [ ] 3   [ ] 4   4. Putting on and taking off regular upper body clothing?   [ ] 1   [ ] 2   [X] 3   [ ] 4   5. Taking care of personal grooming such as brushing teeth? [ ] 1   [ ] 2   [X] 3   [ ] 4   6. Eating meals? [ ] 1   [ ] 2   [X] 3   [ ] 4   © , Trustees of [address] [place] [address], under license to [employer]. All rights reserved    Score:  Initial: 15 Most Recent: X (Date: -- )     Interpretation of Tool:  Represents activities that are increasingly more difficult (i.e. Bed mobility, Transfers, Gait).        Score 24 23 22-20 19-15 14-10 9-7 6       Modifier CH CI CJ CK CL CM CN         · Self Care:               - CURRENT STATUS:    CK - 40%-59% impaired, limited or restricted               - GOAL STATUS:           CJ - 20%-39% impaired, limited or restricted               - D/C STATUS:                       ---------------To be determined---------------  Payor: Juan Daniel Valderrama / Plan: [address] [place] [address] HMO / Product Type: Managed Care Medicare /       Medical Necessity:     · Patient demonstrates fair rehab potential due to higher previous functional level. Reason for Services/Other Comments:  · Patient continues to require skilled intervention due to patient unable to attend/participate in therapy as expected. Use of outcome tool(s) and clinical judgement create a POC that gives a: MODERATE COMPLEXITY             TREATMENT:   (In addition to Assessment/Re-Assessment sessions the following treatments were rendered)      Pre-treatment Symptoms/Complaints:  B shoulder pain at times  Pain: Initial:   Pain Intensity 1: 0  Post Session:  same      Self Care: (10): Procedure(s) (per grid) utilized to improve and/or restore self-care/home management as related to toileting. Required moderate verbal and tactile cueing to facilitate activities of daily living skills and compensatory activities. Assessment/Reassessment only, no treatment provided today     Braces/Orthotics/Lines/Etc:   · IV  · O2 Device: Room air  Treatment/Session Assessment:    · Response to Treatment:  Impulsive and poor safety awareness   · Interdisciplinary Collaboration:  · Occupational Therapist  · Registered Nurse  · Certified Nursing Assistant/Patient Care Technician  · After treatment position/precautions:  · Supine in bed  · Bed alarm/tab alert on  · Bed/Chair-wheels locked  · Bed in low position  · Caregiver at bedside  · Call light within reach  · RN notified  · Restraints  · Compliance with Program/Exercises: Will assess as treatment progresses. · Recommendations/Intent for next treatment session: \"Next visit will focus on advancements to more challenging activities and reduction in assistance provided\".   Total Treatment Duration:  OT Patient Time In/Time Out  Time In: 0910  Time Out: Annika Nichols 136

## 2017-04-19 NOTE — PROGRESS NOTES
Renal Progress Note    Admission Date: 4/16/2017   Subjective:      confused    Objective:     Physical Exam:    Patient Vitals for the past 8 hrs:   BP Temp Pulse Resp SpO2   04/19/17 1129 137/69 - 61 16 99 %   04/19/17 0724 130/69 96.9 °F (36.1 °C) 62 16 100 %        HEENT: Dry membranes  CV: S1, S2  Lungs: Clear bilaterally  Extem: no edema      Current Facility-Administered Medications   Medication Dose Route Frequency    levETIRAcetam (KEPPRA) tablet 500 mg  500 mg Oral BID    [START ON 4/20/2017] pantoprazole (PROTONIX) tablet 40 mg  40 mg Oral ACB    folic acid (FOLVITE) tablet 1 mg  1 mg Oral DAILY    levothyroxine (SYNTHROID) tablet 75 mcg  75 mcg Oral ACB    sodium chloride (NS) flush 5-10 mL  5-10 mL IntraVENous PRN    sodium chloride (NS) flush 5-10 mL  5-10 mL IntraVENous Q8H    sodium chloride (NS) flush 5-10 mL  5-10 mL IntraVENous PRN    ondansetron (ZOFRAN) injection 4 mg  4 mg IntraVENous Q4H PRN    HYDROmorphone (PF) (DILAUDID) injection 0.5 mg  0.5 mg IntraVENous Q4H PRN    dextrose 5% infusion  125 mL/hr IntraVENous CONTINUOUS    0.9% sodium chloride infusion 250 mL  250 mL IntraVENous PRN    0.9% sodium chloride infusion 250 mL  250 mL IntraVENous PRN            Data Review:     LABS:   Recent Results (from the past 12 hour(s))   CK    Collection Time: 04/19/17  5:25 AM   Result Value Ref Range    CK 1669 (H) 21 - 215 U/L   CBC W/O DIFF    Collection Time: 04/19/17  5:25 AM   Result Value Ref Range    WBC 7.7 4.3 - 11.1 K/uL    RBC 3.82 (L) 4.23 - 5.67 M/uL    HGB 10.3 (L) 13.6 - 17.2 g/dL    HCT 31.5 (L) 41.1 - 50.3 %    MCV 82.5 79.6 - 97.8 FL    MCH 27.0 26.1 - 32.9 PG    MCHC 32.7 31.4 - 35.0 g/dL    RDW 17.8 (H) 11.9 - 14.6 %    PLATELET 005 332 - 193 K/uL    MPV 11.5 10.8 - 64.4 FL   METABOLIC PANEL, BASIC    Collection Time: 04/19/17  5:25 AM   Result Value Ref Range    Sodium 154 (H) 136 - 145 mmol/L    Potassium 4.1 3.5 - 5.1 mmol/L    Chloride 124 (H) 98 - 107 mmol/L CO2 19 (L) 21 - 32 mmol/L    Anion gap 11 7 - 16 mmol/L    Glucose 58 (L) 65 - 100 mg/dL    BUN 20 8 - 23 MG/DL    Creatinine 1.67 (H) 0.8 - 1.5 MG/DL    GFR est AA 52 (L) >60 ml/min/1.73m2    GFR est non-AA 43 (L) >60 ml/min/1.73m2    Calcium 8.0 (L) 8.3 - 10.4 MG/DL         Plan:     Principal Problem:    Acute blood loss anemia (4/16/2017)    Active Problems:    Dementia (11/25/2016)      Hypernatremia (11/25/2016)      History of alcohol abuse (6/13/2014)      Overview: FORMERLY DRANK  1-2 12oz. Beers daily            NONE 2014      GI bleed (6/14/2014)      Anemia (11/25/2016)      Overview: ADM 11/25/16:  HGB: 7.6. BASELINE 9      Hypothermia (4/16/2017)      Acute renal failure (ARF) ([facility] [address] (4/16/2017)      Rhabdomyolysis (4/17/2017)    1. Hypernatremia - remains on D5W, but currently lost his IV. Confused and pulled it out - stable. Continue when reestablished. Also encourage intake     2. DEVORA on CKD Stage III - near baseline     3.  AMS - multifactorial

## 2017-04-19 NOTE — PROGRESS NOTES
Date of Outreach Update:  Sahara Yi was seen and assessed. Patient is restrained, but able to move arms freely. 2 plus radial pulses noted. Patioent is on room air. Upon obtaining oxygen saturation, fingertips are noted to appear cold and cyanotic. Lungs are clear and oxygen saturation is 100% on earlobes. VSS per chart. Discussed patient's assessment and history with primary RN, Sumeet Harris. Will continue to monitor and see upcoming Hgb. MEWS Score: 1 (04/19/17 0724)  Vitals:    04/18/17 1953 04/19/17 0041 04/19/17 0407 04/19/17 0724   BP: 120/60 142/73 132/64 130/69   Pulse: 65 66 74 62   Resp: 17 18 16 16   Temp: 98.2 °F (36.8 °C) 97.8 °F (36.6 °C) 97.5 °F (36.4 °C) 96.9 °F (36.1 °C)   SpO2: 93% 95%  100%   Weight:       Height:             Pain Assessment  Patient denies pain and nonverbal score is zero. Previous Outreach assessment has been reviewed. Changes have been noted above. Will continue to follow up per outreach protocol.     Signed By:   García Olivier RN    April 19, 2017 8:25 AM

## 2017-04-19 NOTE — PROGRESS NOTES
Pt is very agitated, pulling at IVs, trying to crawl out of bed head first. I have stayed at bedside to keep pt safe. Pt is asking for a knife, thinks he is drinking beer. Lap belt applied for safety but not effective. Called MD to get an order for restraints: order received to apply soft wrist restraints.

## 2017-04-19 NOTE — PROGRESS NOTES
Problem: Mobility Impaired (Adult and Pediatric)  Goal: *Acute Goals and Plan of Care (Insert Text)  STG:  (1.)Mr. Luis Manuel Fernandez will move from supine to sit and sit to supine , scoot up and down and roll side to side with MODIFIED INDEPENDENCE within 3 day(s). (2.)Mr. Luis Manuel Fernandez will transfer from bed to chair and chair to bed with CONTACT GUARD ASSIST using the least restrictive device within 3 day(s). (3.)Mr. Luis Manuel Fernandez will ambulate with CONTACT GUARD ASSIST for 120 feet with the least restrictive device within 3 day(s). LTG:  (1.)Mr. Luis Manuel Fernandez will move from supine to sit and sit to supine , scoot up and down and roll side to side in bed with INDEPENDENT within 7 day(s). (2.)Mr. Luis Manuel Fernandez will transfer from bed to chair and chair to bed with MODIFIED INDEPENDENCE using the least restrictive device within 7 day(s). (3.)Mr. Luis Manuel Fernandez will ambulate with MODIFIED INDEPENDENCE for >150 feet with the least restrictive device within 7 day(s). ________________________________________________________________________________________________      PHYSICAL THERAPY: INITIAL ASSESSMENT, TREATMENT DAY: DAY OF ASSESSMENT, PM 4/19/2017  INPATIENT: Hospital Day: 4  Payor: Juan Daniel Valderrama / Plan: [address] [place] [address] HMO / Product Type: Managed Care Medicare /      NAME/AGE/GENDER: Aubrey Davis is a 70 y.o. male        PRIMARY DIAGNOSIS: Severe sepsis ([address] Acute blood loss anemia Acute blood loss anemia        ICD-10: Treatment Diagnosis:       · Generalized Muscle Weakness (M62.81)  · Difficulty in walking, Not elsewhere classified (R26.2)  · Other abnormalities of gait and mobility (R26.89)   Precaution/Allergies:  Review of patient's allergies indicates no known allergies. ASSESSMENT:      Mr. Luis Manuel Fernandez is a 70year old AAM with an admitting diagnosis of Acute blood loss anemia secondary to a GI bleed along with AMS. He presents today supine in bed with bilateral wrist restraints.   He is pleasant, restless and confused in conversation, but can follow basic commands and answers some questions appropriately. He perseverates on \"shots\" he is being given and on things that makes no real sense. No family was present during the PT assessment to validate any if the information he provided. The patient states he lives in a 4 room house that is 1 level with a couple of steps to enter. He says he lives with his wife. He was oriented only to his name this afternoon. His bed mobility is at [facility] once the wrist restraints are removed. His sitting balance is at supervision with verbal cues to no stand up on his own. Sit to stand requires CGA to minimal assist secondary to his impulsive manner and unpredictability. He ambulated with the r/walker with minimal to occasional moderate assist to manage his standing balance and impulsiveness and help guide the r/walker. He is at risk for falling since he is capable with his general mobility but no real regard for his need for assistance or safety awareness. He performed BLE AROM strength exercises in sitting and supine. I am uncertain of his prior level of function but he would benefit from continued PT to work on safety with mobility and steadiness with standing activities and gait. This section established at most recent assessment   PROBLEM LIST (Impairments causing functional limitations):  1. Decreased Strength  2. Decreased Transfer Abilities  3. Decreased Ambulation Ability/Technique  4. Decreased Balance  5. Decreased Cognition    INTERVENTIONS PLANNED: (Benefits and precautions of physical therapy have been discussed with the patient.)  1. Bed Mobility  2. Gait Training  3. Therapeutic Activites  4. Therapeutic Exercise/Strengthening  5.  Transfer Training      TREATMENT PLAN: Frequency/Duration: 3 times a week for duration of hospital stay  Rehabilitation Potential For Stated Goals: GOOD      RECOMMENDED REHABILITATION/EQUIPMENT: (at time of discharge pending progress): Continue Skilled Therapy and None. HISTORY:   History of Present Injury/Illness (Reason for Referral):  Patient is a 70 y.o.  male seen and evaluated at the request of Dr. Cata Fernandez. The patient is unable to provide any history so all information is gathered from chart review. According to ER records the patient has had several days of confusion at home associated with very dark stools. He apparently has a history of GI AVM's in the past. Per the ER physician note the last time he had confusion like this was in the setting of GI bleed. PMH: COPD, alcoholism, dementia, mild CKD Stage III, chronic MEGAN, seizures  He was noted to have a hgb of 4.5 and a Na of 170. The hospitalists have admitted him to the ICU and [facility]. The patient is on room air and hemodynamically stable but confused and non-verbal. He has already received 2u PRBC. His last known hgb was 9.8 and Na was 142 in January of this year. Past Medical History/Comorbidities:   Mr. Modesta Cameron  has a past medical history of Alcohol abuse; Anemia; Angiodysplasia of stomach (2016); Bladder incontinence (05/28/2014); Chronic airway obstruction, not elsewhere classified ([address] (5/28/2014); CKD (chronic kidney disease); COPD (chronic obstructive pulmonary disease) ([address] Dementia (5/28/2014); Hyperlipidemia (5/28/2014); Hypernatremia (11/25/2016); Hypertension; Hypothyroid; Seizure ([address] (2010); Tobacco abuse (5/28/2014); and Tobacco abuse. Mr. Modesta Cameron  has a past surgical history that includes colonoscopy (APPROX 2005) and endoscopy (2016).   Social History/Living Environment:   Home Environment: Private residence  # Steps to Enter: 3  Rails to Enter: Yes  Hand Rails : Right  One/Two Story Residence: One story  Living Alone: No  Support Systems: Child(lissette), Spouse/Significant Other/Partner  Patient Expects to be Discharged to[de-identified] Unknown  Current DME Used/Available at Home: Cane, straight, Walker, rolling, Shower chair  Prior Level of Function/Work/Activity:  Patient reports he was ambulating independently without an assistive device at his home prior to coming into the hospital.        Number of Personal Factors/Comorbidities that affect the Plan of Care: 1-2: MODERATE COMPLEXITY   EXAMINATION:   Most Recent Physical Functioning:   Gross Assessment:  AROM: Generally decreased, functional  PROM: Within functional limits  Strength: Generally decreased, functional  Coordination: Generally decreased, functional  Tone: Normal  Sensation: Intact               Posture:  Posture (WDL): Within defined limits  Balance:  Sitting: Impaired  Sitting - Static: Good (unsupported)  Sitting - Dynamic: Prop sitting  Standing: Impaired  Standing - Static: Fair  Standing - Dynamic : Fair Bed Mobility:  Supine to Sit: Contact guard assistance  Sit to Supine: Contact guard assistance  Scooting: Contact guard assistance  Wheelchair Mobility:     Transfers:  Sit to Stand: Contact guard assistance  Stand to Sit: Contact guard assistance  Bed to Chair: Minimum assistance  Gait:     Base of Support: Narrowed  Speed/Alejandra: Fluctuations  Step Length: Left shortened;Right shortened  Gait Abnormalities: Trunk sway increased;Decreased step clearance;Scissoring  Distance (ft): 25 Feet (ft)  Assistive Device: Walker, rolling  Ambulation - Level of Assistance: Minimal assistance; Moderate assistance  Interventions: Manual cues; Safety awareness training;Verbal cues       Body Structures Involved:  1. Metabolic  2. Endocrine  3. Cognitive Body Functions Affected:  1. Mental  2. Movement Related  3. Metobolic/Endocrine Activities and Participation Affected:  1. Learning and Applying Knowledge  2. General Tasks and Demands  3. Communication  4. Mobility  5.  Self Care   Number of elements that affect the Plan of Care: 3: MODERATE COMPLEXITY   CLINICAL PRESENTATION:   Presentation: Evolving clinical presentation with changing clinical characteristics: MODERATE COMPLEXITY   CLINICAL DECISION MAKIN[address] [place] [id number] AM-PAC 6 Clicks   Basic Mobility Inpatient Short Form  How much difficulty does the patient currently have. .. Unable A Lot A Little None   1. Turning over in bed (including adjusting bedclothes, sheets and blankets)? [ ] 1   [ ] 2   [X] 3   [ ] 4   2. Sitting down on and standing up from a chair with arms ( e.g., wheelchair, bedside commode, etc.)   [ ] 1   [ ] 2   [X] 3   [ ] 4   3. Moving from lying on back to sitting on the side of the bed? [ ] 1   [ ] 2   [X] 3   [ ] 4   How much help from another person does the patient currently need. .. Total A Lot A Little None   4. Moving to and from a bed to a chair (including a wheelchair)? [ ] 1   [ ] 2   [X] 3   [ ] 4   5. Need to walk in hospital room? [ ] 1   [X] 2   [ ] 3   [ ] 4   6. Climbing 3-5 steps with a railing? [ ] 1   [X] 2   [ ] 3   [ ] 4   © , Trustees of [address] [place] [address], under license to [employer]. All rights reserved    Score:  Initial: 16 Most Recent: X (Date: -- )     Interpretation of Tool:  Represents activities that are increasingly more difficult (i.e. Bed mobility, Transfers, Gait). Score 24 23 22-20 19-15 14-10 9-7 6       Modifier CH CI CJ CK CL CM CN         · Mobility - Walking and Moving Around:               - CURRENT STATUS:    CK - 40%-59% impaired, limited or restricted               - GOAL STATUS:           CJ - 20%-39% impaired, limited or restricted               - D/C STATUS:                       ---------------To be determined---------------  Payor: Myke Reyes / Plan: [address] [place] [address] HMO / Product Type: Managed Care Medicare /       Medical Necessity:     · Patient is expected to demonstrate progress in strength, balance and functional technique to increase independence with standing activities and gait and improve safety during standing activities and gait.   Reason for Services/Other Comments:  · Patient continues to require skilled intervention due to medical complications. Use of outcome tool(s) and clinical judgement create a POC that gives a: Questionable prediction of patient's progress: MODERATE COMPLEXITY                 TREATMENT:   (In addition to Assessment/Re-Assessment sessions the following treatments were rendered)   Pre-treatment Symptoms/Complaints:  Patient had no specific complaints of pain or discomfort. Pain: Initial:   Pain Intensity 1: 0  Post Session:  0/10      Assessment/Reassessment only, no treatment provided today     Braces/Orthotics/Lines/Etc:   · O2 Device: Room air  Treatment/Session Assessment:    · Response to Treatment:  Patient stated he was glad he got up but then he perseverates on other topics  · Interdisciplinary Collaboration:  · Physical Therapist  · Registered Nurse  · After treatment position/precautions:  · Supine in bed  · Bed alarm/tab alert on  · Bed/Chair-wheels locked  · Bed in low position  · Call light within reach  · RN notified  · Restraints  · bed alarm on  · Compliance with Program/Exercises: Will assess as treatment progresses. · Recommendations/Intent for next treatment session: \"Next visit will focus on reduction in assistance provided\".   Total Treatment Duration:  PT Patient Time In/Time Out  Time In: 1230  Time Out: [address] [facility] Kimberlee Tony

## 2017-04-19 NOTE — PROGRESS NOTES
STG: Pt will tolerate mechanical soft/thin liquids/no straws without signs/sx of aspiration with 100% accuracy (Goal updated 4/19/2017)  STG: Pt will participate with solid trials once appropriate for diet advancement from clear liquids  (Goal met 4/19/2017)  LTG: Pt will tolerate the least restrictive diet at discharge without respiratory compromise      Speech language pathology: bedside swallow note: Daily Note 1    NAME/AGE/GENDER: Brandee Waite is a 70 y.o. male  DATE: 4/19/2017  PRIMARY DIAGNOSIS: Severe sepsis ([facility]       ICD-10: Treatment Diagnosis: dysphagia; oropharyngeal R13.12  INTERDISCIPLINARY COLLABORATION: Registered Nurse  PRECAUTIONS/ALLERGIES: Review of patient's allergies indicates no known allergies. ASSESSMENT:Based on the objective data described below, Mr. Medhat Moulton presents with mild dysphagia. Spoke with nurse and Dr. Lamin Fox regarding current restrictions to clear liquids. Ok to advance per Dr. Lamin Fox as GI has signed off per their note. Patient tolerated thin liquids via cup sip and successive cup sip without difficulty. Straw not attempted due to prior history of risk for aspiration. Tolerated applesauce with adequate oral clearance, no overt s/sx. Patient tolerated mixed consistency fruit and cracker with mildly increased mastication time but adequate oral clearance. Given poor dental status, would recommend mechanical soft. Recommend mechanical soft/thin liquids via single cup sip with 1:1 assistance, only when alert. Will follow for diet tolerance. Patient will benefit from skilled intervention to address the below impairments. ?????? ? ? This section established at most recent assessment??????????  PROBLEM LIST (Impairments causing functional limitations):  1. dysphagia  REHABILITATION POTENTIAL FOR STATED GOALS: Good  PLAN OF CARE:   Patient will benefit from skilled intervention to address the following impairments.   RECOMMENDATIONS AND PLANNED INTERVENTIONS (Benefits and precautions of therapy have been discussed with the patient.):  Liquids:  regular thin by cup sip  Diet: Mechanical soft  MEDICATIONS:  · Crushed in puree  COMPENSATORY STRATEGIES/MODIFICATIONS INCLUDING:  · Small sips and bites  OTHER RECOMMENDATIONS (including follow up treatment recommendations):   · diet tolerance  RECOMMENDED DIET MODIFICATIONS DISCUSSED WITH:  · Nursing  · Patient  FREQUENCY/DURATION: Continue to follow patient 2x a week or until short term goals are met to address above goals. RECOMMENDED REHABILITATION/EQUIPMENT: (at time of discharge pending progress):   None. SUBJECTIVE:   Intermittent agitation, attempting to get out of bed. In bilateral wrist restraints. History of Present Injury/Illness: Mr. Carter Costa  has a past medical history of Alcohol abuse; Anemia; Angiodysplasia of stomach (2016); Bladder incontinence (05/28/2014); Chronic airway obstruction, not elsewhere classified ([facility] (5/28/2014); CKD (chronic kidney disease); COPD (chronic obstructive pulmonary disease) ([facility] Dementia (5/28/2014); Hyperlipidemia (5/28/2014); Hypernatremia (11/25/2016); Hypertension; Hypothyroid; Seizure ([facility] (2010); Tobacco abuse (5/28/2014); and Tobacco abuse. He also  has a past surgical history that includes colonoscopy (APPROX 2005) and endoscopy (2016). Present Symptoms:    Pain Intensity 1: 0  Pain Location 1: Back  Pain Orientation 1: Lower  Pain Intervention(s) 1: Medication (see MAR)  Current Medications:   No current facility-administered medications on file prior to encounter. Current Outpatient Prescriptions on File Prior to Encounter   Medication Sig Dispense Refill    ferrous sulfate 220 mg (44 mg iron)/5 mL solution Take 5 mL by mouth two (2) times a day. 300 mL 5    divalproex DR (DEPAKOTE) 250 mg tablet Take 1 Tab by mouth nightly. 90 Tab 2    omeprazole (PRILOSEC) 20 mg capsule Take 1 Cap by mouth two (2) times a day.  90 Cap 1    atorvastatin (LIPITOR) 20 mg tablet Take 1 Tab by mouth daily. 90 Tab 1    folic acid (FOLVITE) 1 mg tablet Take 1 Tab by mouth daily. 90 Tab 1    levETIRAcetam (KEPPRA) 500 mg tablet Take 1 Tab by mouth two (2) times a day. 180 Tab 1    tamsulosin (FLOMAX) 0.4 mg capsule Take 1 Cap by mouth nightly. 90 Cap 1    levothyroxine (SYNTHROID) 125 mcg tablet Take 1 Tab by mouth Daily (before breakfast). 90 Tab 1    losartan (COZAAR) 100 mg tablet Take 1 Tab by mouth daily. 90 Tab 1    amLODIPine (NORVASC) 10 mg tablet Take 1 Tab by mouth daily. 90 Tab 1    thiamine (B-1) 100 mg tablet Take 1 Tab by mouth daily. 90 Tab 1     Current Dietary Status:  Clear liquids      Social History/Home Situation: home with family  Home Environment: Private residence  # Steps to Enter: 3  Rails to Enter: Yes  Hand Rails : Right  One/Two Story Residence: One story  Living Alone: No  Support Systems: Child(lissette), Spouse/Significant Other/Partner  Patient Expects to be Discharged to[de-identified] Unknown  Current DME Used/Available at Home: Cane, straight, Walker, rolling, Shower chair  OBJECTIVE:   Respiratory Status:        CXR Results: negative  MRI/CT Results: n/a  Oral Motor Structure/Speech:  Oral-Motor Structure/Motor Speech  Labial: Decreased seal  Dentition: Limited, Natural  Oral Hygiene: fair  Lingual: Decreased rate    Cognitive and Communication Status:  Neurologic State: Alert  Orientation Level: Oriented to person  Cognition: Decreased command following;Decreased attention/concentration; Impulsive  Perception: Appears intact  Perseveration: Perseverates during conversation  Safety/Judgement: Decreased awareness of need for safety;Decreased insight into deficits; Decreased awareness of need for assistance;Decreased awareness of environment    BEDSIDE SWALLOW EVALUATION  Oral Assessment:  Oral Assessment  Labial: Decreased seal  Dentition: Limited;Natural  Oral Hygiene: fair  Lingual: Decreased rate  P.O.  Trials:  Patient Position: upright in bed    The patient was given tsp/sip amounts of the following:   Consistency Presented: Thin liquid;Puree; Solid;Mixed consistency; Ice chips  How Presented: Self-fed/presented;SLP-fed/presented;Cup/sip;Spoon    ORAL PHASE:  Bolus Acceptance: No impairment  Bolus Formation/Control: Impaired  Propulsion: Delayed (# of seconds)  Type of Impairment: Delayed;Mastication  Oral Residue: None    PHARYNGEAL PHASE:  Initiation of Swallow: Delayed (# of seconds)  Laryngeal Elevation: Functional  Aspiration Signs/Symptoms: None  Vocal Quality: No impairment          OTHER OBSERVATIONS:  Rate/bite size: WNL   Endurance: WNL     Tool Used: Dysphagia Outcome and Severity Scale (NABILA)    Score Comments   Normal Diet  [] 7 With no strategies or extra time needed   Functional Swallow  [] 6 May have mild oral or pharyngeal delay       Mild Dysphagia    [x] 5 Which may require one diet consistency restricted (those who demonstrate penetration which is entirely cleared on MBS would be included)   Mild-Moderate Dysphagia  [] 4 With 1-2 diet consistencies restricted       Moderate Dysphagia  [] 3 With 2 or more diet consistencies restricted       Moderately Severe Dysphagia  [] 2 With partial PO strategies (trials with ST only)       Severe Dysphagia  [] 1 With inability to tolerate any PO safely          Score:  Initial: 5 Most Recent: X (Date: -- )   Interpretation of Tool: The Dysphagia Outcome and Severity Scale (NABILA) is a simple, easy-to-use, 7-point scale developed to systematically rate the functional severity of dysphagia based on objective assessment and make recommendations for diet level, independence level, and type of nutrition. Score 7 6 5 4 3 2 1   Modifier CH CI CJ CK CL CM CN   ?  Swallowing:     - CURRENT STATUS: CJ - 20%-39% impaired, limited or restricted    - GOAL STATUS:  CJ - 20%-39% impaired, limited or restricted    - D/C STATUS:  ---------------To be determined---------------  Payor: Keenan Dixon / Plan: [address] [place] [address] HMO / Product Type: Managed Care Medicare /     TREATMENT:    (In addition to Assessment/Re-Assessment sessions the following treatments were rendered)  Assessment/Reassessment only, no treatment provided today    ORAL MOTOR  EXERCISES:                                                                                                                                                                      LARYNGEAL / PHARYNGEAL EXERCISES:                                                                                                                                     __________________________________________________________________________________________________  Safety:   After treatment position/precautions:  · RN notified  · Upright in Bed  Progression/Medical Necessity:   · Skilled intervention continues to be required due to patient still consuming a modified diet. Compliance with Program/Exercises: Will assess as treatment progresses. Reason for Continuation of Services/Other Comments:  · Patient continues to require skilled intervention due to patient unable to attend/participate in therapy as expected. Recommendations/Intent for next treatment session: \"Treatment next visit will focus on diet tolerance\". Total Treatment Duration:  Time In: 1401  Time Out: [address]  MS Dheeraj, CCC-SLP

## 2017-04-19 NOTE — INTERDISCIPLINARY ROUNDS
Interdisciplinary team rounds were held 4/19/2017 with the following team members:Care Management, Nursing, Pastoral Care, Pharmacy and Physician. Plan of care discussed. See clinical pathway and/or care plan for interventions and desired outcomes.

## 2017-04-19 NOTE — PROGRESS NOTES
Date of Outreach Update:  Rema Ledezma was seen and assessed. Restraints remain in place. Patient remains confused. Fingers continue to appear cyanotic, but oxygen saturation 100%. MEWS Score: 1 (04/19/17 0724)  Vitals:    04/19/17 0407 04/19/17 0724 04/19/17 1129 04/19/17 1425   BP: 132/64 130/69 137/69 130/68   Pulse: 74 62 61 75   Resp: 16 16 16 16   Temp: 97.5 °F (36.4 °C) 96.9 °F (36.1 °C)     SpO2:  100% 99% 98%   Weight:       Height:             Pain Assessment  Pain free; verbal and nonverbal.    Previous Outreach assessment has been reviewed. There have been no significant clinical changes since the completion of the last dated Outreach assessment. Will continue to follow up per outreach protocol.     Signed By:   Puja Grossman RN    April 19, 2017 2:40 PM

## 2017-04-19 NOTE — PROGRESS NOTES
SW met with patient to initiate DC planning. Pt is in restraints today. Pt is pleasant- oriented to person only. Pt is able to tell me his  and Address and stated he lives w his wife. Pt stated he does drive and uses a cane at home. No family in room. SW called daughter/ Margaret Perez [phone] who stated that pt does live w spouse/ Eulalio Corley. Daughter stated that pt does not drive and does not use a walker or cane at home - though he has a cane available. Calvin Doe was here earlier and stated that her mother will be up later today. Calvin Doe is requesting STR referral be made to [facility] on [address]. SW verified earlier that pt was at [facility] earlier this year and discharged home in January. [facility]. SW will follow. Request for PPD to be placed today. Plan- STR end of week/ beginning of next week if medically stable.

## 2017-04-20 LAB
ANION GAP BLD CALC-SCNC: 10 MMOL/L (ref 7–16)
BUN SERPL-MCNC: 18 MG/DL (ref 8–23)
CALCIUM SERPL-MCNC: 8.3 MG/DL (ref 8.3–10.4)
CHLORIDE SERPL-SCNC: 120 MMOL/L (ref 98–107)
CK SERPL-CCNC: 853 U/L (ref 21–215)
CO2 SERPL-SCNC: 21 MMOL/L (ref 21–32)
CREAT SERPL-MCNC: 1.41 MG/DL (ref 0.8–1.5)
ERYTHROCYTE [DISTWIDTH] IN BLOOD BY AUTOMATED COUNT: 17.2 % (ref 11.9–14.6)
GLUCOSE BLD STRIP.AUTO-MCNC: 98 MG/DL (ref 65–100)
GLUCOSE SERPL-MCNC: 70 MG/DL (ref 65–100)
HCT VFR BLD AUTO: 29.6 % (ref 41.1–50.3)
HGB BLD-MCNC: 9.6 G/DL (ref 13.6–17.2)
MCH RBC QN AUTO: 26.6 PG (ref 26.1–32.9)
MCHC RBC AUTO-ENTMCNC: 32.4 G/DL (ref 31.4–35)
MCV RBC AUTO: 82 FL (ref 79.6–97.8)
PLATELET # BLD AUTO: 208 K/UL (ref 150–450)
PMV BLD AUTO: 10.9 FL (ref 10.8–14.1)
POTASSIUM SERPL-SCNC: 3.7 MMOL/L (ref 3.5–5.1)
RBC # BLD AUTO: 3.61 M/UL (ref 4.23–5.67)
SODIUM SERPL-SCNC: 151 MMOL/L (ref 136–145)
WBC # BLD AUTO: 8.9 K/UL (ref 4.3–11.1)

## 2017-04-20 PROCEDURE — 74011250637 HC RX REV CODE- 250/637: Performed by: INTERNAL MEDICINE

## 2017-04-20 PROCEDURE — 82962 GLUCOSE BLOOD TEST: CPT

## 2017-04-20 PROCEDURE — 97530 THERAPEUTIC ACTIVITIES: CPT

## 2017-04-20 PROCEDURE — 82550 ASSAY OF CK (CPK): CPT | Performed by: INTERNAL MEDICINE

## 2017-04-20 PROCEDURE — 65270000029 HC RM PRIVATE

## 2017-04-20 PROCEDURE — 36415 COLL VENOUS BLD VENIPUNCTURE: CPT | Performed by: INTERNAL MEDICINE

## 2017-04-20 PROCEDURE — 85027 COMPLETE CBC AUTOMATED: CPT | Performed by: INTERNAL MEDICINE

## 2017-04-20 PROCEDURE — 92526 ORAL FUNCTION THERAPY: CPT

## 2017-04-20 PROCEDURE — 80048 BASIC METABOLIC PNL TOTAL CA: CPT | Performed by: INTERNAL MEDICINE

## 2017-04-20 RX ADMIN — PANTOPRAZOLE SODIUM 40 MG: 40 TABLET, DELAYED RELEASE ORAL at 09:53

## 2017-04-20 RX ADMIN — Medication 10 ML: at 14:00

## 2017-04-20 RX ADMIN — LEVETIRACETAM 500 MG: 500 TABLET, FILM COATED ORAL at 09:53

## 2017-04-20 RX ADMIN — FOLIC ACID 1 MG: 1 TABLET ORAL at 09:53

## 2017-04-20 RX ADMIN — LEVOTHYROXINE SODIUM 75 MCG: 75 TABLET ORAL at 09:53

## 2017-04-20 RX ADMIN — LEVETIRACETAM 500 MG: 500 TABLET, FILM COATED ORAL at 17:29

## 2017-04-20 RX ADMIN — Medication 10 ML: at 06:00

## 2017-04-20 RX ADMIN — Medication 10 ML: at 22:05

## 2017-04-20 NOTE — PROGRESS NOTES
Accepted to [address] [place] [address] for rehab. Pre-cert started with [employer]. Patient will be ready Friday or Sat depending on BS and NA/ labs. Jose Tapia

## 2017-04-20 NOTE — PROGRESS NOTES
[facility] progress note    Follow-Up on: 4/20/2017     Patient seen and examined. Clinically more alert. Eating and drinking lunch. ROS:  Denies pain and SOB/CP    Exam:  Vitals:    04/19/17 2324 04/20/17 0523 04/20/17 0659 04/20/17 1048   BP: 118/73 143/68 135/75 112/68   Pulse: 67 62 64 64   Resp: 18 17 16 16   Temp: 98.5 °F (36.9 °C) 98.1 °F (36.7 °C) 97.4 °F (36.3 °C) 97.7 °F (36.5 °C)   SpO2: 96% 94% 100% 100%   Weight:       Height:             Intake/Output Summary (Last 24 hours) at 04/20/17 1304  Last data filed at 04/20/17 0920   Gross per 24 hour   Intake              420 ml   Output                0 ml   Net              420 ml       GEN - in no distress, more alert  CV - S1, S2, no rub  Lung - clear bilaterally  Abd - soft, nontender  Ext - no edema    Recent Labs      04/20/17   0535  04/19/17   0525  04/18/17 2032   WBC  8.9  7.7  6.0   HGB  9.6*  10.3*  9.9*   HCT  29.6*  31.5*  30.7*   PLT  208  217  237        Recent Labs      04/20/17   0535  04/19/17   0525  04/18/17 2032   NA  151*  154*  153*   K  3.7  4.1  4.2   CL  120*  124*  123*   CO2  21  19*  21   BUN  18  20  21   CREA  1.41  1.67*  1.74*   CA  8.3  8.0*  7.5*   GLU  70  58*  61*       Assessment / Plan:    1. Hypernatremia - stable. Encourage po free water. 2.  CKD Stage III - near baseline    3. AMS - multifactorial and much better    4. Anemia s/p PRBC    Encourage po free water intake. Encephalopathy has improved tremendously.

## 2017-04-20 NOTE — PROGRESS NOTES
Problem: Mobility Impaired (Adult and Pediatric)  Goal: *Acute Goals and Plan of Care (Insert Text)  STG:  (1.)Mr. Coleen Miles will move from supine to sit and sit to supine , scoot up and down and roll side to side with MODIFIED INDEPENDENCE within 3 day(s). (2.)Mr. Coleen Miles will transfer from bed to chair and chair to bed with CONTACT GUARD ASSIST using the least restrictive device within 3 day(s). (3.)Mr. Coleen Miles will ambulate with CONTACT GUARD ASSIST for 120 feet with the least restrictive device within 3 day(s). LTG:  (1.)Mr. Coleen Miles will move from supine to sit and sit to supine , scoot up and down and roll side to side in bed with INDEPENDENT within 7 day(s). (2.)Mr. Coleen Miles will transfer from bed to chair and chair to bed with MODIFIED INDEPENDENCE using the least restrictive device within 7 day(s). (3.)Mr. Coleen Miles will ambulate with MODIFIED INDEPENDENCE for >150 feet with the least restrictive device within 7 day(s). ________________________________________________________________________________________________      PHYSICAL THERAPY: Daily Note, Treatment Day: 1st and AM 4/20/2017  INPATIENT: Hospital Day: 5  Payor: Jovita Starr / Plan: [address] [place] [address] HMO / Product Type: Managed Care Medicare /      NAME/AGE/GENDER: Jaswinder Cortes is a 70 y.o. male        PRIMARY DIAGNOSIS: Severe sepsis ([address] Acute blood loss anemia Acute blood loss anemia        ICD-10: Treatment Diagnosis:       · Generalized Muscle Weakness (M62.81)  · Difficulty in walking, Not elsewhere classified (R26.2)  · Other abnormalities of gait and mobility (R26.89)   Precaution/Allergies:  Review of patient's allergies indicates no known allergies. ASSESSMENT:      Mr. Coleen Miles is a 70year old AAM with an admitting diagnosis of Acute blood loss anemia secondary to a GI bleed along with AMS.   Sitting up in bed upon arrival with L wrist restraint and attempting to eat breakfast. Oriented to person and place and able to follow 1 step commands only. Strong lean to L affecting his ability to eat. Assisted pt to upright position with MOD A to improve feeding. Eating with pincer grasp and provided utensils which he used with set up assist and verbal cueing. Sat to EOB with CGA to finish eating. Stood from EOB with MIN A to RW (gait belt). Demonstrated posterior lean; required frequent verbal cues to maintain upright posture with forward weight shift and use RW for support. Seems less impulsive but is very distracted and perseverates on breakfast. Begins to fatigue and returns to sitting despite verbal/tactile cues to stand. Provided seated rest break. Returned to stand with MOD A to RW and took one step towards [facility] with decreased use of RW. Returned to supine with CGA. Scooted up in bed with SBA. Positioned comfortably, needs in reach, restraints reapplied, and Posey on. Limited progress today d/t cognition and attention. Will continue POC. This section established at most recent assessment   PROBLEM LIST (Impairments causing functional limitations):  1. Decreased Strength  2. Decreased Transfer Abilities  3. Decreased Ambulation Ability/Technique  4. Decreased Balance  5. Decreased Cognition    INTERVENTIONS PLANNED: (Benefits and precautions of physical therapy have been discussed with the patient.)  1. Bed Mobility  2. Gait Training  3. Therapeutic Activites  4. Therapeutic Exercise/Strengthening  5. Transfer Training      TREATMENT PLAN: Frequency/Duration: 3 times a week for duration of hospital stay  Rehabilitation Potential For Stated Goals: GOOD      RECOMMENDED REHABILITATION/EQUIPMENT: (at time of discharge pending progress): Continue Skilled Therapy and None. HISTORY:   History of Present Injury/Illness (Reason for Referral):  Patient is a 70 y.o.  male seen and evaluated at the request of Dr. Catie Williamson.  The patient is unable to provide any history so all information is gathered from chart review. According to ER records the patient has had several days of confusion at home associated with very dark stools. He apparently has a history of GI AVM's in the past. Per the ER physician note the last time he had confusion like this was in the setting of GI bleed. PMH: COPD, alcoholism, dementia, mild CKD Stage III, chronic MEGAN, seizures  He was noted to have a hgb of 4.5 and a Na of 170. The hospitalists have admitted him to the ICU and [facility]. The patient is on room air and hemodynamically stable but confused and non-verbal. He has already received 2u PRBC. His last known hgb was 9.8 and Na was 142 in January of this year. Past Medical History/Comorbidities:   Mr. Prasanna Muas  has a past medical history of Alcohol abuse; Anemia; Angiodysplasia of stomach (2016); Bladder incontinence (05/28/2014); Chronic airway obstruction, not elsewhere classified ([address] (5/28/2014); CKD (chronic kidney disease); COPD (chronic obstructive pulmonary disease) ([address] Dementia (5/28/2014); Hyperlipidemia (5/28/2014); Hypernatremia (11/25/2016); Hypertension; Hypothyroid; Seizure ([address] (2010); Tobacco abuse (5/28/2014); and Tobacco abuse. Mr. Prasanna Musa  has a past surgical history that includes colonoscopy (APPROX 2005) and endoscopy (2016).   Social History/Living Environment:   Home Environment: Private residence  # Steps to Enter: 3  Rails to Enter: Yes  Hand Rails : Right  One/Two Story Residence: One story  Living Alone: No  Support Systems: Child(lissette), Spouse/Significant Other/Partner  Patient Expects to be Discharged to[de-identified] Unknown  Current DME Used/Available at Home: Cane, straight, Walker, rolling, Shower chair  Prior Level of Function/Work/Activity:  Patient reports he was ambulating independently without an assistive device at his home prior to coming into the hospital.        Number of Personal Factors/Comorbidities that affect the Plan of Care: 1-2: MODERATE COMPLEXITY   EXAMINATION:   Most Recent Physical Functioning:   Gross Assessment:                  Posture:     Balance:  Sitting: Impaired  Sitting - Static: Good (unsupported)  Sitting - Dynamic: Fair (occasional)  Standing: Impaired  Standing - Static: Poor  Standing - Dynamic : Poor Bed Mobility:  Rolling: Contact guard assistance  Supine to Sit: Contact guard assistance  Sit to Supine: Contact guard assistance  Scooting: Minimum assistance  Level of Assistance: Minimum assistance  Interventions: Tactile cues; Verbal cues  Wheelchair Mobility:     Transfers:  Sit to Stand: Minimum assistance; Moderate assistance  Stand to Sit: Contact guard assistance  Level of Assistance: Moderate assistance  Interventions: Tactile cues; Verbal cues  Duration: 30 Minutes  Gait:             Body Structures Involved:  1. Metabolic  2. Endocrine  3. Cognitive Body Functions Affected:  1. Mental  2. Movement Related  3. Metobolic/Endocrine Activities and Participation Affected:  1. Learning and Applying Knowledge  2. General Tasks and Demands  3. Communication  4. Mobility  5. Self Care   Number of elements that affect the Plan of Care: 3: MODERATE COMPLEXITY   CLINICAL PRESENTATION:   Presentation: Evolving clinical presentation with changing clinical characteristics: MODERATE COMPLEXITY   CLINICAL DECISION MAKIN [facility] Short Form  How much difficulty does the patient currently have. .. Unable A Lot A Little None   1. Turning over in bed (including adjusting bedclothes, sheets and blankets)? [ ] 1   [ ] 2   [X] 3   [ ] 4   2. Sitting down on and standing up from a chair with arms ( e.g., wheelchair, bedside commode, etc.)   [ ] 1   [ ] 2   [X] 3   [ ] 4   3. Moving from lying on back to sitting on the side of the bed? [ ] 1   [ ] 2   [X] 3   [ ] 4   How much help from another person does the patient currently need. .. Total A Lot A Little None   4. Moving to and from a bed to a chair (including a wheelchair)? [ ] 1   [ ] 2   [X] 3   [ ] 4   5. Need to walk in hospital room? [ ] 1   [X] 2   [ ] 3   [ ] 4   6. Climbing 3-5 steps with a railing? [ ] 1   [X] 2   [ ] 3   [ ] 4   © 2007, Trustees of [address] [place] [address] [address] [id number], under license to [employer]. All rights reserved    Score:  Initial: 16 Most Recent: X (Date: -- )     Interpretation of Tool:  Represents activities that are increasingly more difficult (i.e. Bed mobility, Transfers, Gait). Score 24 23 22-20 19-15 14-10 9-7 6       Modifier CH CI CJ CK CL CM CN         · Mobility - Walking and Moving Around:               - CURRENT STATUS:    CK - 40%-59% impaired, limited or restricted               - GOAL STATUS:           CJ - 20%-39% impaired, limited or restricted               - D/C STATUS:                       ---------------To be determined---------------  Payor: Fazal Cisneros / Plan: [address] [place] [address] HMO / Product Type: Managed Care Medicare /       Medical Necessity:     · Patient is expected to demonstrate progress in strength, balance and functional technique to increase independence with standing activities and gait and improve safety during standing activities and gait. Reason for Services/Other Comments:  · Patient continues to require skilled intervention due to medical complications. Use of outcome tool(s) and clinical judgement create a POC that gives a: Questionable prediction of patient's progress: MODERATE COMPLEXITY                 TREATMENT:   (In addition to Assessment/Re-Assessment sessions the following treatments were rendered)   Pre-treatment Symptoms/Complaints:  Patient had no specific complaints of pain or discomfort.   Pain: Initial:   Pain Intensity 1: 0  Post Session:  0/10      Therapeutic Activity: (  30 Minutes ):  Therapeutic activities including Bed transfers, Chair transfers, ADLs, Ambulation on level ground and sitting/standing balance to improve mobility, strength, balance and activity tolerance. Required moderate   verbal/tactile cues to promote static and dynamic balance in standing. Braces/Orthotics/Lines/Etc:   · O2 Device: Room air  Treatment/Session Assessment:    · Response to Treatment:  States he wants to get up but perseverates on food/eating  · Interdisciplinary Collaboration:  · Physical Therapist  · Registered Nurse  · After treatment position/precautions:  · Supine in bed, Bed alarm/tab alert on, Bed/Chair-wheels locked, Bed in low position, Call light within reach, RN notified, Restraints and bed alarm on  · Compliance with Program/Exercises: Will assess as treatment progresses. · Recommendations/Intent for next treatment session: \"Next visit will focus on reduction in assistance provided\".   Total Treatment Duration:  PT Patient Time In/Time Out  Time In: 0914  Time Out: [facility]

## 2017-04-20 NOTE — PROGRESS NOTES
Hospitalist Progress Note    Patient: Aubrey Davis MRN: [id number]  SSN: [id number]    YOB: 1945  Age: 70 y.o. Sex: male      Admit Date: 4/16/2017    LOS: 4 days     Subjective:     Seen at bedside. Mental status is much better today. Has cyanosis of the fingertips. Raynaud's? Will increase room temp and will try to cover his hands with gloves     Objective:     Vitals:    04/19/17 2324 04/20/17 0523 04/20/17 0659 04/20/17 1048   BP: 118/73 143/68 135/75 112/68   Pulse: 67 62 64 64   Resp: 18 17 16 16   Temp: 98.5 °F (36.9 °C) 98.1 °F (36.7 °C) 97.4 °F (36.3 °C) 97.7 °F (36.5 °C)   SpO2: 96% 94% 100% 100%   Weight:       Height:            Intake and Output: Not accurate    Physical Exam:   GENERAL: alert   EYE: conjunctivae/corneas clear. PERRL. THROAT & NECK: normal and no erythema or exudates noted. LUNG: clear to auscultation bilaterally  HEART: regular rate and rhythm, S1S2, no murmur, no JVD  ABDOMEN: soft, non-tender, non-distended. Bowel sounds normal.   EXTREMITIES:  No edema, 2+ pedal/radial pulses bilaterally  SKIN: no rash or abnormalities  NEUROLOGIC: Drowsy, Ox1 (person). Cranial nerves 2-12 grossly intact.     Lab/Data Review:  BMP:   Lab Results   Component Value Date/Time     (H) 04/20/2017 05:35 AM    K 3.7 04/20/2017 05:35 AM     (H) 04/20/2017 05:35 AM    CO2 21 04/20/2017 05:35 AM    AGAP 10 04/20/2017 05:35 AM    GLU 70 04/20/2017 05:35 AM    BUN 18 04/20/2017 05:35 AM    CREA 1.41 04/20/2017 05:35 AM    GFRAA >60 04/20/2017 05:35 AM    GFRNA 53 (L) 04/20/2017 05:35 AM     CBC:   Lab Results   Component Value Date/Time    WBC 8.9 04/20/2017 05:35 AM    HGB 9.6 (L) 04/20/2017 05:35 AM    HCT 29.6 (L) 04/20/2017 05:35 AM     04/20/2017 05:35 AM     All Cardiac Markers in the last 24 hours:   Lab Results   Component Value Date/Time     (H) 04/20/2017 05:35 AM     Recent Glucose Results:   Lab Results   Component Value Date/Time    GLU 70 04/20/2017 05:35 AM     COAGS:   No results found for: APTT, PTP, INR     Imaging:      Assessment:     Principal Problem:    Acute blood loss anemia (4/16/2017)    Active Problems:    Dementia (11/25/2016)      Hypernatremia (11/25/2016)      History of alcohol abuse (6/13/2014)      Overview: FORMERLY DRANK  1-2 12oz. Beers daily            NONE 2014      GI bleed (6/14/2014)      Anemia (11/25/2016)      Overview: ADM 11/25/16:  HGB: 7.6.   BASELINE 9      Hypothermia (4/16/2017)      Acute renal failure (ARF) ([facility] [address] (4/16/2017)      Rhabdomyolysis (4/17/2017)        Plan:     - Continue D5W - Sodium level improving, patient is drinking a good amount of free water   - Check CK on a daily basis, improving   - TSH was low,  reduced synthroid to 75 mcg per day   - Creatinine improving  - Hgb stable - no GI intervention at this time  -possible raynaud's syndrome: increase room temp, will try to get wool gloves for him       Signed By: Alissa Soriano MD     April 20, 2017

## 2017-04-20 NOTE — PROGRESS NOTES
STG: Pt will tolerate mechanical soft/thin liquids/no straws without signs/sx of aspiration with 100% accuracy (Goal updated 4/19/2017)  STG: Pt will participate with solid trials once appropriate for diet advancement from clear liquids  (Goal met 4/19/2017)  LTG: Pt will tolerate the least restrictive diet at discharge without respiratory compromise      Speech language pathology: bedside swallow note: Daily Note 2    NAME/AGE/GENDER: Deonna Ann is a 70 y.o. male  DATE: 4/20/2017  PRIMARY DIAGNOSIS: Severe sepsis ([address]       ICD-10: Treatment Diagnosis: dysphagia; oropharyngeal R13.12  INTERDISCIPLINARY COLLABORATION: Registered Nurse  PRECAUTIONS/ALLERGIES: Review of patient's allergies indicates no known allergies. ASSESSMENT:Based on the objective data described below, Mr. Maura Sabillon presents with mild dysphagia. Found eating lunch. Tolerating well until taking a bite of crumbly cake, without any whipped topping or strawberries. Patient with strong coughing episode, taking a few minutes to effectively clear. Tolerated cake once mashed with whipped topping and strawberries without difficulty. Tolerated thin liquids without overt s/sx. Recommend mechanical soft/thin liquids via single cup sip with 1:1 assistance, only when alert. No dry/crumbly cakes/breads. Will follow for diet tolerance. Patient will benefit from skilled intervention to address the below impairments. ?????? ? ? This section established at most recent assessment??????????  PROBLEM LIST (Impairments causing functional limitations):  1. dysphagia  REHABILITATION POTENTIAL FOR STATED GOALS: Good  PLAN OF CARE:   Patient will benefit from skilled intervention to address the following impairments.   RECOMMENDATIONS AND PLANNED INTERVENTIONS (Benefits and precautions of therapy have been discussed with the patient.):  Liquids:  regular thin by cup sip  Diet: Mechanical soft, no dry/crumbly cakes/breads  MEDICATIONS:  · Crushed in puree  COMPENSATORY STRATEGIES/MODIFICATIONS INCLUDING:  · Small sips and bites  OTHER RECOMMENDATIONS (including follow up treatment recommendations):   · diet tolerance  RECOMMENDED DIET MODIFICATIONS DISCUSSED WITH:  · Nursing  · Patient  FREQUENCY/DURATION: Continue to follow patient 2x a week or until short term goals are met to address above goals. RECOMMENDED REHABILITATION/EQUIPMENT: (at time of discharge pending progress):   None. SUBJECTIVE:   More calm, eating noon meal.  History of Present Injury/Illness: Mr. Chandrika Ventura  has a past medical history of Alcohol abuse; Anemia; Angiodysplasia of stomach (2016); Bladder incontinence (05/28/2014); Chronic airway obstruction, not elsewhere classified ([facility] [address] (5/28/2014); CKD (chronic kidney disease); COPD [facility]ch[facility] obstructive pulmonary disease) ([facility] Dementia (5/28/2014); Hyperlipidemia (5/28/2014); Hypernatremia (11/25/2016); Hypertension; Hypothyroid; Seizure ([facility] [address] (2010); Tobacco abuse (5/28/2014); and Tobacco abuse. He also  has a past surgical history that includes colonoscopy (APPROX 2005) and endoscopy (2016). Present Symptoms:    Pain Intensity 1: 0  Pain Location 1: Back  Pain Orientation 1: Lower  Pain Intervention(s) 1: Medication (see MAR)  Current Medications:   No current facility-administered medications on file prior to encounter. Current Outpatient Prescriptions on File Prior to Encounter   Medication Sig Dispense Refill    ferrous sulfate 220 mg (44 mg iron)/5 mL solution Take 5 mL by mouth two (2) times a day. 300 mL 5    divalproex DR (DEPAKOTE) 250 mg tablet Take 1 Tab by mouth nightly. 90 Tab 2    omeprazole (PRILOSEC) 20 mg capsule Take 1 Cap by mouth two (2) times a day. 90 Cap 1    atorvastatin (LIPITOR) 20 mg tablet Take 1 Tab by mouth daily. 90 Tab 1    folic acid (FOLVITE) 1 mg tablet Take 1 Tab by mouth daily. 90 Tab 1    levETIRAcetam (KEPPRA) 500 mg tablet Take 1 Tab by mouth two (2) times a day.  180 Tab 1    tamsulosin (FLOMAX) 0.4 mg capsule Take 1 Cap by mouth nightly. 90 Cap 1    levothyroxine (SYNTHROID) 125 mcg tablet Take 1 Tab by mouth Daily (before breakfast). 90 Tab 1    losartan (COZAAR) 100 mg tablet Take 1 Tab by mouth daily. 90 Tab 1    amLODIPine (NORVASC) 10 mg tablet Take 1 Tab by mouth daily. 90 Tab 1    thiamine (B-1) 100 mg tablet Take 1 Tab by mouth daily. 90 Tab 1     Current Dietary Status:  Mechanical soft/thin liquids/no straws      Social History/Home Situation: home with family  Home Environment: Private residence  # Steps to Enter: 3  Rails to Enter: Yes  Hand Rails : Right  One/Two Story Residence: One story  Living Alone: No  Support Systems: Child(lissette), Spouse/Significant Other/Partner  Patient Expects to be Discharged to[de-identified] Unknown  Current DME Used/Available at Home: Cane, straight, Walker, rolling, Shower chair  OBJECTIVE:   Respiratory Status:        CXR Results: negative  MRI/CT Results: n/a  Oral Motor Structure/Speech:  Oral-Motor Structure/Motor Speech  Labial: No impairment  Dentition: Limited, Natural  Oral Hygiene: good  Lingual: No impairment    Cognitive and Communication Status:  Neurologic State: Alert  Orientation Level: Oriented to person  Cognition: Decreased attention/concentration;Decreased command following  Perception: Appears intact  Perseveration: Perseverates during conversation  Safety/Judgement: Decreased insight into deficits; Decreased awareness of need for safety;Decreased awareness of need for assistance;Decreased awareness of environment    BEDSIDE SWALLOW EVALUATION  Oral Assessment:  Oral Assessment  Labial: No impairment  Dentition: Limited;Natural  Oral Hygiene: good  Lingual: No impairment  P.O. Trials:  Patient Position: upright in bed    The patient was given tsp/sip amounts of the following:   Consistency Presented: Thin liquid; Solid;Puree;Mixed consistency; Ice chips  How Presented: Self-fed/presented;SLP-fed/presented;Cup/sip;Spoon    ORAL PHASE:  Bolus Acceptance: No impairment  Bolus Formation/Control: Impaired  Propulsion: Delayed (# of seconds); Discoordination  Type of Impairment: Delayed;Mastication  Oral Residue: None    PHARYNGEAL PHASE:  Initiation of Swallow: Delayed (# of seconds)  Laryngeal Elevation: Functional  Aspiration Signs/Symptoms: Strong cough  Vocal Quality: No impairment          OTHER OBSERVATIONS:  Rate/bite size: WNL   Endurance: WNL     Tool Used: Dysphagia Outcome and Severity Scale (NABILA)    Score Comments   Normal Diet  [] 7 With no strategies or extra time needed   Functional Swallow  [] 6 May have mild oral or pharyngeal delay       Mild Dysphagia    [x] 5 Which may require one diet consistency restricted (those who demonstrate penetration which is entirely cleared on MBS would be included)   Mild-Moderate Dysphagia  [] 4 With 1-2 diet consistencies restricted       Moderate Dysphagia  [] 3 With 2 or more diet consistencies restricted       Moderately Severe Dysphagia  [] 2 With partial PO strategies (trials with ST only)       Severe Dysphagia  [] 1 With inability to tolerate any PO safely          Score:  Initial: 5 Most Recent: X (Date: -- )   Interpretation of Tool: The Dysphagia Outcome and Severity Scale (NABILA) is a simple, easy-to-use, 7-point scale developed to systematically rate the functional severity of dysphagia based on objective assessment and make recommendations for diet level, independence level, and type of nutrition. Score 7 6 5 4 3 2 1   Modifier CH CI CJ CK CL CM CN   ?  Swallowing:     - CURRENT STATUS: CJ - 20%-39% impaired, limited or restricted    - GOAL STATUS:  CJ - 20%-39% impaired, limited or restricted    - D/C STATUS:  ---------------To be determined---------------  Payor: Keenan Dixon / Plan: [address] [place] [address] HMO / Product Type: Managed Care Medicare /     TREATMENT:    (In addition to Assessment/Re-Assessment sessions the following treatments were rendered)  Dysphagia Activities: Activities/Procedures listed utilized to improve progress in swallow strength, swallow timeliness, swallow function, diet tolerance and swallow safety. Required minimal cueing to improve swallow safety and decrease aspiration risk. ORAL MOTOR  EXERCISES:                                                                                                                                                                      LARYNGEAL / PHARYNGEAL EXERCISES:                                                                                                                                     __________________________________________________________________________________________________  Safety:   After treatment position/precautions:  · RN notified  · Upright in Bed  Progression/Medical Necessity:   · Skilled intervention continues to be required due to patient still consuming a modified diet. Compliance with Program/Exercises: Will assess as treatment progresses. Reason for Continuation of Services/Other Comments:  · Patient continues to require skilled intervention due to patient unable to attend/participate in therapy as expected. Recommendations/Intent for next treatment session: \"Treatment next visit will focus on diet tolerance\". Total Treatment Duration:  Time In: 1207  Time Out: Uri Esqueda MS, CCC-SLP

## 2017-04-21 LAB
ANION GAP BLD CALC-SCNC: 10 MMOL/L (ref 7–16)
BACTERIA SPEC CULT: NORMAL
BACTERIA SPEC CULT: NORMAL
BUN SERPL-MCNC: 17 MG/DL (ref 8–23)
CALCIUM SERPL-MCNC: 7.7 MG/DL (ref 8.3–10.4)
CHLORIDE SERPL-SCNC: 119 MMOL/L (ref 98–107)
CK SERPL-CCNC: 426 U/L (ref 21–215)
CK SERPL-CCNC: 476 U/L (ref 21–215)
CO2 SERPL-SCNC: 21 MMOL/L (ref 21–32)
CREAT SERPL-MCNC: 1.57 MG/DL (ref 0.8–1.5)
ERYTHROCYTE [DISTWIDTH] IN BLOOD BY AUTOMATED COUNT: 17.6 % (ref 11.9–14.6)
GLUCOSE SERPL-MCNC: 68 MG/DL (ref 65–100)
HCT VFR BLD AUTO: 29.3 % (ref 41.1–50.3)
HGB BLD-MCNC: 9.7 G/DL (ref 13.6–17.2)
MCH RBC QN AUTO: 26.9 PG (ref 26.1–32.9)
MCHC RBC AUTO-ENTMCNC: 33.1 G/DL (ref 31.4–35)
MCV RBC AUTO: 81.2 FL (ref 79.6–97.8)
MM INDURATION POC: 0 MM (ref 0–5)
PLATELET # BLD AUTO: 221 K/UL (ref 150–450)
PMV BLD AUTO: 11.1 FL (ref 10.8–14.1)
POTASSIUM SERPL-SCNC: 4.2 MMOL/L (ref 3.5–5.1)
PPD POC: 0 NEGATIVE
RBC # BLD AUTO: 3.61 M/UL (ref 4.23–5.67)
SERVICE CMNT-IMP: NORMAL
SERVICE CMNT-IMP: NORMAL
SODIUM SERPL-SCNC: 150 MMOL/L (ref 136–145)
WBC # BLD AUTO: 5.7 K/UL (ref 4.3–11.1)

## 2017-04-21 PROCEDURE — 80048 BASIC METABOLIC PNL TOTAL CA: CPT | Performed by: INTERNAL MEDICINE

## 2017-04-21 PROCEDURE — 74011250637 HC RX REV CODE- 250/637: Performed by: INTERNAL MEDICINE

## 2017-04-21 PROCEDURE — 97530 THERAPEUTIC ACTIVITIES: CPT

## 2017-04-21 PROCEDURE — 85027 COMPLETE CBC AUTOMATED: CPT | Performed by: INTERNAL MEDICINE

## 2017-04-21 PROCEDURE — 65270000029 HC RM PRIVATE

## 2017-04-21 PROCEDURE — 36415 COLL VENOUS BLD VENIPUNCTURE: CPT | Performed by: INTERNAL MEDICINE

## 2017-04-21 PROCEDURE — 82550 ASSAY OF CK (CPK): CPT | Performed by: INTERNAL MEDICINE

## 2017-04-21 RX ADMIN — Medication 10 ML: at 21:22

## 2017-04-21 RX ADMIN — LEVOTHYROXINE SODIUM 75 MCG: 75 TABLET ORAL at 05:46

## 2017-04-21 RX ADMIN — Medication 10 ML: at 14:00

## 2017-04-21 RX ADMIN — PANTOPRAZOLE SODIUM 40 MG: 40 TABLET, DELAYED RELEASE ORAL at 05:46

## 2017-04-21 RX ADMIN — FOLIC ACID 1 MG: 1 TABLET ORAL at 08:51

## 2017-04-21 RX ADMIN — LEVETIRACETAM 500 MG: 500 TABLET, FILM COATED ORAL at 17:50

## 2017-04-21 RX ADMIN — LEVETIRACETAM 500 MG: 500 TABLET, FILM COATED ORAL at 08:51

## 2017-04-21 RX ADMIN — Medication 10 ML: at 05:52

## 2017-04-21 NOTE — PROGRESS NOTES
Problem: Mobility Impaired (Adult and Pediatric)  Goal: *Acute Goals and Plan of Care (Insert Text)  STG:  (1.)Mr. Maura Sabillon will move from supine to sit and sit to supine , scoot up and down and roll side to side with MODIFIED INDEPENDENCE within 3 day(s). (2.)Mr. Maura Sabillon will transfer from bed to chair and chair to bed with CONTACT GUARD ASSIST using the least restrictive device within 3 day(s). (3.)Mr. Maura Sabillon will ambulate with CONTACT GUARD ASSIST for 120 feet with the least restrictive device within 3 day(s). LTG:  (1.)Mr. Maura Sabillon will move from supine to sit and sit to supine , scoot up and down and roll side to side in bed with INDEPENDENT within 7 day(s). (2.)Mr. Maura Sabillon will transfer from bed to chair and chair to bed with MODIFIED INDEPENDENCE using the least restrictive device within 7 day(s). (3.)Mr. Maura Sabillon will ambulate with MODIFIED INDEPENDENCE for >150 feet with the least restrictive device within 7 day(s). ________________________________________________________________________________________________      PHYSICAL THERAPY: Daily Note, Treatment Day: 2nd and PM 4/21/2017  INPATIENT: Hospital Day: 6  Payor: Ilia Harrington / Plan: [address] [place] [address] HMO / Product Type: Managed Care Medicare /      NAME/AGE/GENDER: Deonna Ann is a 70 y.o. male        PRIMARY DIAGNOSIS: Severe sepsis ([facility] Acute blood loss anemia Acute blood loss anemia        ICD-10: Treatment Diagnosis:       · Generalized Muscle Weakness (M62.81)  · Difficulty in walking, Not elsewhere classified (R26.2)  · Other abnormalities of gait and mobility (R26.89)   Precaution/Allergies:  Review of patient's allergies indicates no known allergies. ASSESSMENT:      Mr. Maura Sabillon presents in supine without complaints. Alert but confused. Found with soaked brief and gown so worked on bed mobility and seated/standing balance during functional tasks/ADLs.  Pt with slightly improved seated balance (dynamic) and improved standing balance however continues to require constant support in standing due to unsteady gait and impaired balance/safety awareness. Able to take steps over to chair with min-mod handheld assist then with much encouragement pt ambulate ~140 ft in hallway with RW and min-mod assist for safety. Demonstrates significant forward flexed posture and stands much too far from walker. Somewhat scissoring gait pattern noted at times. Returned to room and back to supine for safety due to altered mental status; left with posey alarm on and needs in reach. Good progress today with mobility, gait, and activity tolerance however continues to be very unsafe and unsteady with standing. Will continue with plan of care. This section established at most recent assessment   PROBLEM LIST (Impairments causing functional limitations):  1. Decreased Strength  2. Decreased Transfer Abilities  3. Decreased Ambulation Ability/Technique  4. Decreased Balance  5. Decreased Cognition    INTERVENTIONS PLANNED: (Benefits and precautions of physical therapy have been discussed with the patient.)  1. Bed Mobility  2. Gait Training  3. Therapeutic Activites  4. Therapeutic Exercise/Strengthening  5. Transfer Training      TREATMENT PLAN: Frequency/Duration: 3 times a week for duration of hospital stay  Rehabilitation Potential For Stated Goals: GOOD      RECOMMENDED REHABILITATION/EQUIPMENT: (at time of discharge pending progress): Continue Skilled Therapy and None. HISTORY:   History of Present Injury/Illness (Reason for Referral):  Patient is a 70 y.o.  male seen and evaluated at the request of Dr. David Frausto. The patient is unable to provide any history so all information is gathered from chart review. According to ER records the patient has had several days of confusion at home associated with very dark stools.  He apparently has a history of GI AVM's in the past. Per the ER physician note the last time he had confusion like this was in the setting of GI bleed. PMH: COPD, alcoholism, dementia, mild CKD Stage III, chronic MEGAN, seizures  He was noted to have a hgb of 4.5 and a Na of 170. The hospitalists have admitted him to the ICU and pulmonary is consulted via [facility] protocol. The patient is on room air and hemodynamically stable but confused and non-verbal. He has already received 2u PRBC. His last known hgb was 9.8 and Na was 142 in January of this year. Past Medical History/Comorbidities:   Mr. Modesta Cameron  has a past medical history of Alcohol abuse; Anemia; Angiodysplasia of stomach (2016); Bladder incontinence (05/28/2014); Chronic airway obstruction, not elsewhere classified ([facility] (5/28/2014); CKD (chronic kidney disease); COPD (chronic obstructive pulmonary disease) ([facility] [address] Dementia (5/28/2014); Hyperlipidemia (5/28/2014); Hypernatremia (11/25/2016); Hypertension; Hypothyroid; Seizure ([facility] [address] (2010); Tobacco abuse (5/28/2014); and Tobacco abuse. Mr. Modesta Cameron  has a past surgical history that includes colonoscopy (APPROX 2005) and endoscopy (2016).   Social History/Living Environment:   Home Environment: Private residence  # Steps to Enter: 3  Rails to Enter: Yes  Hand Rails : Right  One/Two Story Residence: One story  Living Alone: No  Support Systems: Child(lissette), Spouse/Significant Other/Partner  Patient Expects to be Discharged to[de-identified] Unknown  Current DME Used/Available at Home: [employer], straight, Walker, rolling, Shower chair  Prior Level of Function/Work/Activity:  Patient reports he was ambulating independently without an assistive device at his home prior to coming into the hospital.        Number of Personal Factors/Comorbidities that affect the Plan of Care: 1-2: MODERATE COMPLEXITY   EXAMINATION:   Most Recent Physical Functioning:   Gross Assessment:                  Posture:     Balance:  Sitting: Impaired  Sitting - Static: Fair (occasional)  Sitting - Dynamic: Fair (occasional)  Standing: Impaired  Standing - Static: Poor  Standing - Dynamic : Poor Bed Mobility:  Rolling: Contact guard assistance  Supine to Sit: Contact guard assistance  Sit to Supine: Contact guard assistance  Wheelchair Mobility:     Transfers:  Sit to Stand: Minimum assistance  Stand to Sit: Minimum assistance  Bed to Chair: Minimum assistance; Moderate assistance  Interventions: Verbal cues; Visual cues; Safety awareness training; Tactile cues;Manual cues  Duration: 16 Minutes  Gait:     Base of Support: Center of gravity altered  Speed/Alejandra: Accelerated; Fluctuations  Step Length: Left shortened;Right shortened  Gait Abnormalities: Trunk sway increased;Decreased step clearance;Shuffling gait; Path deviations  Distance (ft): 140 Feet (ft)  Assistive Device: Walker, rolling;Gait belt  Ambulation - Level of Assistance: Minimal assistance; Moderate assistance  Interventions: Verbal cues; Visual/Demos; Safety awareness training; Tactile cues;Manual cues       Body Structures Involved:  1. Metabolic  2. Endocrine  3. Cognitive Body Functions Affected:  1. Mental  2. Movement Related  3. Metobolic/Endocrine Activities and Participation Affected:  1. Learning and Applying Knowledge  2. General Tasks and Demands  3. Communication  4. Mobility  5. Self Care   Number of elements that affect the Plan of Care: 3: MODERATE COMPLEXITY   CLINICAL PRESENTATION:   Presentation: Evolving clinical presentation with changing clinical characteristics: MODERATE COMPLEXITY   CLINICAL DECISION MAKIN [facility] Mobility Inpatient Short Form  How much difficulty does the patient currently have. .. Unable A Lot A Little None   1. Turning over in bed (including adjusting bedclothes, sheets and blankets)? [ ] 1   [ ] 2   [X] 3   [ ] 4   2. Sitting down on and standing up from a chair with arms ( e.g., wheelchair, bedside commode, etc.)   [ ] 1   [ ] 2   [X] 3   [ ] 4   3.   Moving from lying on back to sitting on the side of the bed? [ ] 1   [ ] 2   [X] 3   [ ] 4   How much help from another person does the patient currently need. .. Total A Lot A Little None   4. Moving to and from a bed to a chair (including a wheelchair)? [ ] 1   [ ] 2   [X] 3   [ ] 4   5. Need to walk in hospital room? [ ] 1   [X] 2   [ ] 3   [ ] 4   6. Climbing 3-5 steps with a railing? [ ] 1   [X] 2   [ ] 3   [ ] 4   © 2007, Trustees of [address] [place] [address] [address] [id number], under license to [employer]. All rights reserved    Score:  Initial: 16 Most Recent: X (Date: -- )     Interpretation of Tool:  Represents activities that are increasingly more difficult (i.e. Bed mobility, Transfers, Gait). Score 24 23 22-20 19-15 14-10 9-7 6       Modifier CH CI CJ CK CL CM CN         · Mobility - Walking and Moving Around:               - CURRENT STATUS:    CK - 40%-59% impaired, limited or restricted               - GOAL STATUS:           CJ - 20%-39% impaired, limited or restricted               - D/C STATUS:                       ---------------To be determined---------------  Payor: Shabana Frazier / Plan: [address] [place] [address] HMO / Product Type: Managed Care Medicare /       Medical Necessity:     · Patient is expected to demonstrate progress in strength, balance and functional technique to increase independence with standing activities and gait and improve safety during standing activities and gait. Reason for Services/Other Comments:  · Patient continues to require skilled intervention due to medical complications.    Use of outcome tool(s) and clinical judgement create a POC that gives a: Questionable prediction of patient's progress: MODERATE COMPLEXITY                 TREATMENT:   (In addition to Assessment/Re-Assessment sessions the following treatments were rendered)   Pre-treatment Symptoms/Complaints:  \"I guess I'll walk\"  Pain: Initial:   Pain Intensity 1: 0  Post Session:  0/10 no complaints or pain      Therapeutic Activity: (  16 Minutes ):  Therapeutic activities including Bed mobility, Chair transfers, seated and standing balance during functional activities/ADLs, and Ambulation on level ground to improve mobility, strength, balance, coordination and activity tolerance. Required minimal to moderate Verbal cues; Visual/Demos; Safety awareness training; Tactile cues;Manual cues to promote static and dynamic balance in standing and promote motor control of bilateral, upper extremity(s), lower extremity(s). Braces/Orthotics/Lines/Etc:   · O2 Device: Room air  Treatment/Session Assessment:    · Response to Treatment:  Much improved mobility/activity tolerance but still unsafe and unsteady  · Interdisciplinary Collaboration:  · Physical Therapist, Registered Nurse and Certified Nursing Assistant/Patient Care Technician  · After treatment position/precautions:  · Supine in bed, Bed alarm/tab alert on, Bed/Chair-wheels locked, Bed in low position, Call light within reach and RN notified  · Compliance with Program/Exercises: Will assess as treatment progresses. · Recommendations/Intent for next treatment session: \"Next visit will focus on reduction in assistance provided\".   Total Treatment Duration:  PT Patient Time In/Time Out  Time In: 1019  Time Out: 3330 Harlan Hays DPT

## 2017-04-21 NOTE — PROGRESS NOTES
Renal Progress Note    Admission Date: 4/16/2017   Subjective:        No complaints  Objective:     Physical Exam:    Patient Vitals for the past 8 hrs:   BP Temp Pulse Resp SpO2   04/21/17 0711 126/63 98.6 °F (37 °C) 75 16 95 %   04/21/17 0314 159/75 98.1 °F (36.7 °C) 75 16 99 %      Gen: comfortable , NAD  HEENT: Dry membranes  CV: S1, S2  Lungs: Clear bilaterally  Extem: no edema      Current Facility-Administered Medications   Medication Dose Route Frequency    levETIRAcetam (KEPPRA) tablet 500 mg  500 mg Oral BID    pantoprazole (PROTONIX) tablet 40 mg  40 mg Oral ACB    folic acid (FOLVITE) tablet 1 mg  1 mg Oral DAILY    levothyroxine (SYNTHROID) tablet 75 mcg  75 mcg Oral ACB    sodium chloride (NS) flush 5-10 mL  5-10 mL IntraVENous PRN    sodium chloride (NS) flush 5-10 mL  5-10 mL IntraVENous Q8H    sodium chloride (NS) flush 5-10 mL  5-10 mL IntraVENous PRN    ondansetron (ZOFRAN) injection 4 mg  4 mg IntraVENous Q4H PRN    HYDROmorphone (PF) (DILAUDID) injection 0.5 mg  0.5 mg IntraVENous Q4H PRN    dextrose 5% infusion  125 mL/hr IntraVENous CONTINUOUS    0.9% sodium chloride infusion 250 mL  250 mL IntraVENous PRN    0.9% sodium chloride infusion 250 mL  250 mL IntraVENous PRN            Data Review:     LABS:   Recent Results (from the past 12 hour(s))   CK    Collection Time: 04/21/17  5:58 AM   Result Value Ref Range     (H) 21 - 215 U/L   CBC W/O DIFF    Collection Time: 04/21/17  5:58 AM   Result Value Ref Range    WBC 5.7 4.3 - 11.1 K/uL    RBC 3.61 (L) 4.23 - 5.67 M/uL    HGB 9.7 (L) 13.6 - 17.2 g/dL    HCT 29.3 (L) 41.1 - 50.3 %    MCV 81.2 79.6 - 97.8 FL    MCH 26.9 26.1 - 32.9 PG    MCHC 33.1 31.4 - 35.0 g/dL    RDW 17.6 (H) 11.9 - 14.6 %    PLATELET 751 981 - 635 K/uL    MPV 11.1 10.8 - 65.0 FL   METABOLIC PANEL, BASIC    Collection Time: 04/21/17  5:58 AM   Result Value Ref Range    Sodium 150 (H) 136 - 145 mmol/L    Potassium 4.2 3.5 - 5.1 mmol/L    Chloride 119 (H) 98 - 107 mmol/L    CO2 21 21 - 32 mmol/L    Anion gap 10 7 - 16 mmol/L    Glucose 68 65 - 100 mg/dL    BUN 17 8 - 23 MG/DL    Creatinine 1.57 (H) 0.8 - 1.5 MG/DL    GFR est AA 56 (L) >60 ml/min/1.73m2    GFR est non-AA 47 (L) >60 ml/min/1.73m2    Calcium 7.7 (L) 8.3 - 10.4 MG/DL         Plan:     Principal Problem:    Acute blood loss anemia (4/16/2017)    Active Problems:    Dementia (11/25/2016)      Hypernatremia (11/25/2016)      History of alcohol abuse (6/13/2014)      Overview: FORMERLY DRANK  1-2 12oz. Beers daily            NONE 2014      GI bleed (6/14/2014)      Anemia (11/25/2016)      Overview: ADM 11/25/16:  HGB: 7.6. BASELINE 9      Hypothermia (4/16/2017)      Acute renal failure (ARF) ([facility] (4/16/2017)      Rhabdomyolysis (4/17/2017)      1. Hypernatremia - stable. Encourage po free water. Was eating and drinking as I walked in the room     2. CKD Stage III - near baseline    Planning discharge. Will be available.  If has free access to water, his hypernatremia will improve.

## 2017-04-21 NOTE — PROGRESS NOTES
Hospitalist Progress Note    Patient: Miguel Lua MRN: [id number]  SSN: [id number]    YOB: 1945  Age: 70 y.o. Sex: male      Admit Date: 4/16/2017    LOS: 5 days     Subjective:     Seen at bedside. Mental status is much better today. Na level still above 150. Patient encouraged to drink more water. Objective:     Vitals:    04/20/17 2336 04/21/17 0314 04/21/17 0711 04/21/17 1018   BP: 118/73 159/75 126/63 111/64   Pulse: 66 75 75 69   Resp: 16 16 16 16   Temp: 98 °F (36.7 °C) 98.1 °F (36.7 °C) 98.6 °F (37 °C) 98.6 °F (37 °C)   SpO2:  99% 95% 93%   Weight:       Height:            Intake and Output: Not accurate    Physical Exam:   GENERAL: alert   EYE: conjunctivae/corneas clear. PERRL. THROAT & NECK: normal and no erythema or exudates noted. LUNG: clear to auscultation bilaterally  HEART: regular rate and rhythm, S1S2, no murmur, no JVD  ABDOMEN: soft, non-tender, non-distended. Bowel sounds normal.   EXTREMITIES:  No edema, 2+ pedal/radial pulses bilaterally  SKIN: no rash or abnormalities  NEUROLOGIC: Drowsy, Ox1 (person). Cranial nerves 2-12 grossly intact.     Lab/Data Review:  BMP:   Lab Results   Component Value Date/Time     (H) 04/21/2017 05:58 AM    K 4.2 04/21/2017 05:58 AM     (H) 04/21/2017 05:58 AM    CO2 21 04/21/2017 05:58 AM    AGAP 10 04/21/2017 05:58 AM    GLU 68 04/21/2017 05:58 AM    BUN 17 04/21/2017 05:58 AM    CREA 1.57 (H) 04/21/2017 05:58 AM    GFRAA 56 (L) 04/21/2017 05:58 AM    GFRNA 47 (L) 04/21/2017 05:58 AM     CBC:   Lab Results   Component Value Date/Time    WBC 5.7 04/21/2017 05:58 AM    HGB 9.7 (L) 04/21/2017 05:58 AM    HCT 29.3 (L) 04/21/2017 05:58 AM     04/21/2017 05:58 AM     All Cardiac Markers in the last 24 hours:   Lab Results   Component Value Date/Time     (H) 04/21/2017 05:58 AM     Recent Glucose Results:   Lab Results   Component Value Date/Time    GLU 68 04/21/2017 05:58 AM     COAGS:   No results found for: APTT, PTP, INR     Imaging:      Assessment:     Principal Problem:    Acute blood loss anemia (4/16/2017)    Active Problems:    Dementia (11/25/2016)      Hypernatremia (11/25/2016)      History of alcohol abuse (6/13/2014)      Overview: FORMERLY DRANK  1-2 12oz. Beers daily            NONE 2014      GI bleed (6/14/2014)      Anemia (11/25/2016)      Overview: ADM 11/25/16:  HGB: 7.6. BASELINE 9      Hypothermia (4/16/2017)      Acute renal failure (ARF) (HCC) (4/16/2017)      Rhabdomyolysis (4/17/2017)        Plan:     - Continue D5W - Sodium level improving, patient is drinking a good amount of free water   - Check CK on a daily basis, improving   - TSH was low,  reduced synthroid to 75 mcg per day   - Creatinine improving  - Hgb stable - no GI intervention at this time  -possible raynaud's syndrome: increased room temp.  Slightly better today       Signed By: Marlene Walters MD     April 21, 2017

## 2017-04-21 NOTE — PROGRESS NOTES
OT Note:    OT attempted to see patient this afternoon for therapy. Pt was asleep in room with wife present. Pt worked with PT earlier this afternoon. OT will re-attempt to see patient at a later date/time.     Thanks,  Taylor Bianchi

## 2017-04-22 VITALS
HEART RATE: 68 BPM | OXYGEN SATURATION: 93 % | WEIGHT: 131.39 LBS | SYSTOLIC BLOOD PRESSURE: 137 MMHG | DIASTOLIC BLOOD PRESSURE: 58 MMHG | TEMPERATURE: 98.1 F | HEIGHT: 68 IN | BODY MASS INDEX: 19.91 KG/M2 | RESPIRATION RATE: 18 BRPM

## 2017-04-22 LAB
ANION GAP BLD CALC-SCNC: 10 MMOL/L (ref 7–16)
BUN SERPL-MCNC: 18 MG/DL (ref 8–23)
CALCIUM SERPL-MCNC: 7.8 MG/DL (ref 8.3–10.4)
CHLORIDE SERPL-SCNC: 115 MMOL/L (ref 98–107)
CK SERPL-CCNC: 344 U/L (ref 21–215)
CO2 SERPL-SCNC: 22 MMOL/L (ref 21–32)
CREAT SERPL-MCNC: 1.59 MG/DL (ref 0.8–1.5)
GLUCOSE BLD STRIP.AUTO-MCNC: 75 MG/DL (ref 65–100)
GLUCOSE BLD STRIP.AUTO-MCNC: 97 MG/DL (ref 65–100)
GLUCOSE SERPL-MCNC: 68 MG/DL (ref 65–100)
MM INDURATION POC: 0 MM (ref 0–5)
POTASSIUM SERPL-SCNC: 4 MMOL/L (ref 3.5–5.1)
PPD POC: NORMAL NEGATIVE
SODIUM SERPL-SCNC: 147 MMOL/L (ref 136–145)

## 2017-04-22 PROCEDURE — 82550 ASSAY OF CK (CPK): CPT | Performed by: INTERNAL MEDICINE

## 2017-04-22 PROCEDURE — 36415 COLL VENOUS BLD VENIPUNCTURE: CPT | Performed by: INTERNAL MEDICINE

## 2017-04-22 PROCEDURE — 82962 GLUCOSE BLOOD TEST: CPT

## 2017-04-22 PROCEDURE — 74011250637 HC RX REV CODE- 250/637: Performed by: INTERNAL MEDICINE

## 2017-04-22 PROCEDURE — 80048 BASIC METABOLIC PNL TOTAL CA: CPT | Performed by: INTERNAL MEDICINE

## 2017-04-22 RX ORDER — PANTOPRAZOLE SODIUM 40 MG/1
40 TABLET, DELAYED RELEASE ORAL 2 TIMES DAILY
Qty: 60 TAB | Refills: 0 | Status: SHIPPED | OUTPATIENT
Start: 2017-04-22 | End: 2017-06-02 | Stop reason: SDUPTHER

## 2017-04-22 RX ORDER — ACETAMINOPHEN 325 MG/1
500 TABLET ORAL
Qty: 30 TAB | Refills: 0 | Status: SHIPPED | OUTPATIENT
Start: 2017-04-22 | End: 2017-06-02

## 2017-04-22 RX ORDER — LEVOTHYROXINE SODIUM 75 UG/1
75 TABLET ORAL
Qty: 30 TAB | Refills: 0 | Status: SHIPPED | OUTPATIENT
Start: 2017-04-22 | End: 2017-06-02 | Stop reason: DRUGHIGH

## 2017-04-22 RX ORDER — FOLIC ACID 1 MG/1
1 TABLET ORAL DAILY
Qty: 30 TAB | Refills: 1 | Status: SHIPPED | OUTPATIENT
Start: 2017-04-22 | End: 2017-06-02 | Stop reason: SDUPTHER

## 2017-04-22 RX ORDER — LEVETIRACETAM 500 MG/1
500 TABLET ORAL 2 TIMES DAILY
Qty: 60 TAB | Refills: 0 | Status: SHIPPED | OUTPATIENT
Start: 2017-04-22 | End: 2017-06-02 | Stop reason: SDUPTHER

## 2017-04-22 RX ADMIN — FOLIC ACID 1 MG: 1 TABLET ORAL at 08:07

## 2017-04-22 RX ADMIN — PANTOPRAZOLE SODIUM 40 MG: 40 TABLET, DELAYED RELEASE ORAL at 05:37

## 2017-04-22 RX ADMIN — Medication 10 ML: at 05:31

## 2017-04-22 RX ADMIN — LEVETIRACETAM 500 MG: 500 TABLET, FILM COATED ORAL at 08:07

## 2017-04-22 RX ADMIN — LEVOTHYROXINE SODIUM 75 MCG: 75 TABLET ORAL at 05:37

## 2017-04-22 NOTE — DISCHARGE SUMMARY
Discharge Summary     Patient: Yee Malcolm MRN: [id number]  SSN: [id number]    YOB: 1945  Age: 70 y.o. Sex: male       Admit Date: 4/16/2017    Discharge Date: 4/22/2017      Admission Diagnoses: Severe sepsis [facility])    Discharge Diagnoses:   Problem List as of 4/22/2017  Date Reviewed: 2/10/2017          Codes Class Noted - Resolved    Rhabdomyolysis ICD-10-CM: M62.82  ICD-9-CM: 728.88  4/17/2017 - Present        Severe sepsis ([facility] ICD-10-CM: A41.9, R65.20  ICD-9-CM: 995.92  4/16/2017 - Present        * (Principal)Acute blood loss anemia ICD-10-CM: D62  ICD-9-CM: 285.1  4/16/2017 - Present        Hypothermia ICD-10-CM: T68. Oakman Lin  ICD-9-CM: 991.6  4/16/2017 - Present        Acute renal failure (ARF) ([facility] ICD-10-CM: N17.9  ICD-9-CM: 584.9  4/16/2017 - Present        Bradycardia ICD-10-CM: R00.1  ICD-9-CM: 427.89  12/13/2016 - Present        Urinary tract infection associated with indwelling urethral catheter ([facility] ICD-10-CM: T83.511A, N39.0  ICD-9-CM: 996.64, 599.0  12/12/2016 - Present        Seizure ([facility] ICD-10-CM: R56.9  ICD-9-CM: 780.39  12/4/2016 - Present        Acute metabolic encephalopathy NNQ-04-FU: G93.41  ICD-9-CM: 348.31  12/4/2016 - Present        Dementia (Chronic) ICD-10-CM: F03.90  ICD-9-CM: 294.20  11/25/2016 - Present        Tobacco abuse (Chronic) ICD-10-CM: Z72.0  ICD-9-CM: 305.1  11/25/2016 - Present        Bladder incontinence (Chronic) ICD-10-CM: R32  ICD-9-CM: 788.30  11/25/2016 - Present    Overview Signed 11/25/2016  4:10 PM by Danita Oconnell NP     AND BOWEL             Seizure disorder ([facility] (Chronic) ICD-10-CM: G40.909  ICD-9-CM: 345.90  11/25/2016 - Present    Overview Addendum 11/25/2016  4:08 PM by Danita Oconnell NP     PETIT VS FOCAL.  NO GRAND MAL    On Phenytoin for past 4-5 yrs, since intial dx ~2009             Hypernatremia (Chronic) ICD-10-CM: E87.0  ICD-9-CM: 276.0  11/25/2016 - Present        Anemia (Chronic) ICD-10-CM: D64.9  ICD-9-CM: 285.9  11/25/2016 - Present    Overview Signed 11/25/2016  4:12 PM by Francia Fortune NP     ADM 11/25/16:  HGB: 7.6. BASELINE 9             Abdominal pain ICD-10-CM: R10.9  ICD-9-CM: 789.00  11/25/2016 - Present        Anorexia ICD-10-CM: R63.0  ICD-9-CM: 783.0  11/25/2016 - Present        Debility ICD-10-CM: R53.81  ICD-9-CM: 799.3  11/25/2016 - Present        Acute encephalopathy ICD-10-CM: G93.40  ICD-9-CM: 348.30  11/25/2016 - Present        Lethargy ICD-10-CM: R53.83  ICD-9-CM: 780.79  11/25/2016 - Present        Acute on chronic renal failure (HCC) (Chronic) ICD-10-CM: N17.9, N18.9  ICD-9-CM: 584.9, 585.9  11/25/2016 - Present        Failure to thrive in adult ICD-10-CM: R62.7  ICD-9-CM: 783.7  11/25/2016 - Present        Protein-calorie malnutrition, severe ([facility] ICD-10-CM: E43  ICD-9-CM: 302  11/25/2016 - Present        Dementia associated with alcoholism without behavioral disturbance ([facility] ICD-10-CM: F10.97  ICD-9-CM: 294.20, 291.2  5/17/2016 - Present        Acquired hypothyroidism ICD-10-CM: E03.9  ICD-9-CM: 244.9  5/17/2016 - Present        AVM (arteriovenous malformation) of small bowel, acquired with hemorrhage (HCC) (Chronic) ICD-10-CM: K55.8  ICD-9-CM: 569.85  6/15/2014 - Present        Benign neoplasm of colon ICD-10-CM: D12.6  ICD-9-CM: 211.3  6/15/2014 - Present        GI bleed ICD-10-CM: K92.2  ICD-9-CM: 578.9  6/14/2014 - Present        GIB (gastrointestinal bleeding) ICD-10-CM: K92.2  ICD-9-CM: 578.9  6/13/2014 - Present        DEVORA (acute kidney injury) ([facility] ICD-10-CM: N17.9  ICD-9-CM: 584.9  6/13/2014 - Present        Microcytic hypochromic anemia ICD-10-CM: D50.9  ICD-9-CM: 280.9  6/13/2014 - Present        History of alcohol abuse (Chronic) ICD-10-CM: P04.408  ICD-9-CM: 305.03  6/13/2014 - Present    Overview Addendum 11/25/2016  4:10 PM by Francia Fortune NP     FORMERLY DRANK  1-2 12oz.  Beers daily    NONE 2014             Weight loss (Chronic) ICD-10-CM: R63.4  ICD-9-CM: 783.21  6/2/2014 - Present    Overview Signed 11/25/2016  4:07 PM by Benson Marsh NP     UNINTENTIONAL             HTN (hypertension) (Chronic) ICD-10-CM: I10  ICD-9-CM: 401.9  5/30/2014 - Present        COPD (chronic obstructive pulmonary disease) with emphysema (HCC) (Chronic) ICD-10-CM: J43.9  ICD-9-CM: 492.8  5/30/2014 - Present        Chronic airway obstruction, not elsewhere classified ICD-10-CM: J44.9  ICD-9-CM: 624  5/28/2014 - Present        Hyperlipidemia ICD-10-CM: E78.5  ICD-9-CM: 272.4  5/28/2014 - Present               Discharge Condition: [facility] Course:   70year old AAM with a PMH of chronic iron deficiency anemai, CKD, dementia, HLD, HTN, hypothyroidism, seizures, COPD, and h/o GI bleeds from AVMs presented to the ER  On 4/16 with dark stools and metabolic encephalopathy. He was found to have hypothermia, profound anemia with Hgb 4.5, Sodium 170, Rhabdo with CK 6,966, and DEVORA on CKD. He was admitted to the ICU and transfused 4U PRBC, started on D5W, and initially started on IV Levothyroxine. He was considered to have severe sepsis at admission and was treated with IV Vancomycin and zosyn. He was seen by GI. He had a recent EGD with pediatric colonoscope to jejunum on 12/8 by Dr Myron Lynch, that procedure on December revealed 2mm nodule at GE junctions, clip along proximal greater curvature, Few AVM, not actively bleeding in the duodenum. Treated with APC; Multiple AVMs throughout the jejunum, not actively  bleeding. Treated with APC at that time and procedure was terminated early due to poor O2 saturation and possible aspiration. THis time GI preferred to manage him conservatively, his Hg went up to 9.5-9.7 after being transfused and remained stable. No EGD was performed during this admission. His Hypernatremia  Improved and his Na level is down to 147 today. His Rhabdo also improved and CK today was only 344. His mental status also improved, but patient has a chronic dementia.  His cultures remained negative and antibiotics were stopped. He was seen by PT, he is regaining his strength but remains very week and needs constant supervision and help. He developed what seems to be Raynaud's during this admission with blue fingers, this improved after the temp of his room was increased. He is being discharged today. We would advise his Hgb level and BMP  to be checked again by the beginning of the next week. His BP has remained stable during this admission and he is not being discharged on ANY BP meds, if his BP starts to increase, some of his previous meds can be resumed progressively. Due to recent rhabdo, we would not advise the use of statins, at least for now. He is being discharged on keppra for his seizure disorder. Valproic acid has not been used during this admission.      GENERAL: alert   EYE: conjunctivae/corneas clear. PERRL. THROAT & NECK: normal and no erythema or exudates noted. LUNG: clear to auscultation bilaterally  HEART: regular rate and rhythm, S1S2, no murmur, no JVD  ABDOMEN: soft, non-tender, non-distended. Bowel sounds normal.   EXTREMITIES: No edema, 2+ pedal/radial pulses bilaterally  SKIN: no rash or abnormalities  NEUROLOGIC: Drowsy, Ox1 (person). Cranial nerves 2-12 grossly intact. Consults: Gastroenterology    Significant Diagnostic Studies: see hospital course     Disposition: long term care facility    Discharge Medications:   Current Discharge Medication List      START taking these medications    Details   pantoprazole (PROTONIX) 40 mg tablet Take 1 Tab by mouth two (2) times a day. Qty: 60 Tab, Refills: 0      acetaminophen (TYLENOL) 325 mg tablet Take 1.5 Tabs by mouth every six (6) hours as needed for Pain. Qty: 30 Tab, Refills: 0         CONTINUE these medications which have CHANGED    Details   folic acid (FOLVITE) 1 mg tablet Take 1 Tab by mouth daily.   Qty: 30 Tab, Refills: 1      levETIRAcetam (KEPPRA) 500 mg tablet Take 1 Tab by mouth two (2) times a day. Qty: 60 Tab, Refills: 0      levothyroxine (SYNTHROID) 75 mcg tablet Take 1 Tab by mouth Daily (before breakfast). Qty: 30 Tab, Refills: 0         CONTINUE these medications which have NOT CHANGED    Details   ferrous sulfate 220 mg (44 mg iron)/5 mL solution Take 5 mL by mouth two (2) times a day. Qty: 300 mL, Refills: 5    Associated Diagnoses: Microcytic hypochromic anemia      tamsulosin (FLOMAX) 0.4 mg capsule Take 1 Cap by mouth nightly. Qty: 90 Cap, Refills: 1    Associated Diagnoses: Benign non-nodular prostatic hyperplasia without lower urinary tract symptoms      thiamine (B-1) 100 mg tablet Take 1 Tab by mouth daily. Qty: 90 Tab, Refills: 1    Associated Diagnoses: ETOH abuse         STOP taking these medications       divalproex DR (DEPAKOTE) 250 mg tablet Comments:   Reason for Stopping:         omeprazole (PRILOSEC) 20 mg capsule Comments:   Reason for Stopping:         atorvastatin (LIPITOR) 20 mg tablet Comments:   Reason for Stopping:         losartan (COZAAR) 100 mg tablet Comments:   Reason for Stopping:         amLODIPine (NORVASC) 10 mg tablet Comments:   Reason for Stopping:               Activity: Activity as tolerated  Diet: Regular Diet  Wound Care: None needed    Follow-up Appointments   Procedures    FOLLOW UP VISIT Appointment in: One Week pcp     pcp     Standing Status:   Standing     Number of Occurrences:   1     Order Specific Question:   Appointment in     Answer: One Week    FOLLOW UP VISIT Appointment in: Two Weeks gi     gi     Standing Status:   Standing     Number of Occurrences:   1     Standing Expiration Date:   4/23/2017     Order Specific Question:   Appointment in     Answer:    Two Weeks       Signed By: Lester Chaudhary MD     April 22, 2017

## 2017-04-22 NOTE — PROGRESS NOTES
TRANSFER - OUT REPORT:    Verbal report given to Yoselin(name) on Brandee Waite  being transferred to [facility]) for routine progression of care       Report consisted of patients Situation, Background, Assessment and   Recommendations(SBAR). Information from the following report(s) SBAR, Kardex, MAR, Recent Results and Med Rec Status was reviewed with the receiving nurse. Lines:       Opportunity for questions and clarification was provided.       Patient transported with:   Medical transport

## 2017-06-02 PROBLEM — E46 PROTEIN MALNUTRITION (HCC): Status: ACTIVE | Noted: 2017-06-02

## 2017-09-28 ENCOUNTER — HOSPITAL ENCOUNTER (INPATIENT)
Age: 72
LOS: 3 days | Discharge: HOME | DRG: 378 | End: 2017-10-01
Attending: EMERGENCY MEDICINE | Admitting: INTERNAL MEDICINE
Payer: MEDICARE

## 2017-09-28 DIAGNOSIS — N18.9 ACUTE ON CHRONIC RENAL INSUFFICIENCY: ICD-10-CM

## 2017-09-28 DIAGNOSIS — N28.9 ACUTE ON CHRONIC RENAL INSUFFICIENCY: ICD-10-CM

## 2017-09-28 DIAGNOSIS — D64.9 ANEMIA, UNSPECIFIED TYPE: Primary | ICD-10-CM

## 2017-09-28 DIAGNOSIS — E87.0 HYPERNATREMIA: ICD-10-CM

## 2017-09-28 DIAGNOSIS — R19.5 HEME POSITIVE STOOL: ICD-10-CM

## 2017-09-28 LAB
ALBUMIN SERPL-MCNC: 3 G/DL (ref 3.2–4.6)
ALBUMIN/GLOB SERPL: 0.5 {RATIO} (ref 1.2–3.5)
ALP SERPL-CCNC: 130 U/L (ref 50–136)
ALT SERPL-CCNC: 22 U/L (ref 12–65)
ANION GAP SERPL CALC-SCNC: 11 MMOL/L (ref 7–16)
APTT PPP: 27.6 SEC (ref 23.5–31.7)
AST SERPL-CCNC: 24 U/L (ref 15–37)
BASOPHILS # BLD: 0 K/UL (ref 0–0.2)
BASOPHILS NFR BLD: 1 % (ref 0–2)
BILIRUB SERPL-MCNC: 0.3 MG/DL (ref 0.2–1.1)
BUN SERPL-MCNC: 46 MG/DL (ref 8–23)
CALCIUM SERPL-MCNC: 8.2 MG/DL (ref 8.3–10.4)
CHLORIDE SERPL-SCNC: 133 MMOL/L (ref 98–107)
CO2 SERPL-SCNC: 20 MMOL/L (ref 21–32)
CREAT SERPL-MCNC: 2.24 MG/DL (ref 0.8–1.5)
DIFFERENTIAL METHOD BLD: ABNORMAL
EOSINOPHIL # BLD: 0.2 K/UL (ref 0–0.8)
EOSINOPHIL NFR BLD: 4 % (ref 0.5–7.8)
ERYTHROCYTE [DISTWIDTH] IN BLOOD BY AUTOMATED COUNT: 23.2 % (ref 11.9–14.6)
GLOBULIN SER CALC-MCNC: 5.8 G/DL (ref 2.3–3.5)
GLUCOSE SERPL-MCNC: 73 MG/DL (ref 65–100)
HCT VFR BLD AUTO: 23.6 % (ref 41.1–50.3)
HGB BLD-MCNC: 7 G/DL (ref 13.6–17.2)
IMM GRANULOCYTES # BLD: 0 K/UL (ref 0–0.5)
IMM GRANULOCYTES NFR BLD: 0.5 % (ref 0–5)
INR PPP: 1 (ref 0.9–1.2)
LYMPHOCYTES # BLD: 2 K/UL (ref 0.5–4.6)
LYMPHOCYTES NFR BLD: 32 % (ref 13–44)
MCH RBC QN AUTO: 23.8 PG (ref 26.1–32.9)
MCHC RBC AUTO-ENTMCNC: 29.7 G/DL (ref 31.4–35)
MCV RBC AUTO: 80.3 FL (ref 79.6–97.8)
MONOCYTES # BLD: 0.3 K/UL (ref 0.1–1.3)
MONOCYTES NFR BLD: 5 % (ref 4–12)
NEUTS SEG # BLD: 3.6 K/UL (ref 1.7–8.2)
NEUTS SEG NFR BLD: 58 % (ref 43–78)
PLATELET # BLD AUTO: 211 K/UL (ref 150–450)
PMV BLD AUTO: 11.1 FL (ref 10.8–14.1)
POTASSIUM SERPL-SCNC: 4.6 MMOL/L (ref 3.5–5.1)
PROT SERPL-MCNC: 8.8 G/DL (ref 6.3–8.2)
PROTHROMBIN TIME: 10.8 SEC (ref 9.6–12)
RBC # BLD AUTO: 2.94 M/UL (ref 4.23–5.67)
SODIUM SERPL-SCNC: 155 MMOL/L (ref 136–145)
SODIUM SERPL-SCNC: 164 MMOL/L (ref 136–145)
WBC # BLD AUTO: 6.3 K/UL (ref 4.3–11.1)

## 2017-09-28 PROCEDURE — 77030019605

## 2017-09-28 PROCEDURE — 30233N1 TRANSFUSION OF NONAUTOLOGOUS RED BLOOD CELLS INTO PERIPHERAL VEIN, PERCUTANEOUS APPROACH: ICD-10-PCS | Performed by: INTERNAL MEDICINE

## 2017-09-28 PROCEDURE — 77030027138 HC INCENT SPIROMETER -A

## 2017-09-28 PROCEDURE — 74011000250 HC RX REV CODE- 250: Performed by: INTERNAL MEDICINE

## 2017-09-28 PROCEDURE — 86923 COMPATIBILITY TEST ELECTRIC: CPT | Performed by: EMERGENCY MEDICINE

## 2017-09-28 PROCEDURE — 36430 TRANSFUSION BLD/BLD COMPNT: CPT

## 2017-09-28 PROCEDURE — 86900 BLOOD TYPING SEROLOGIC ABO: CPT | Performed by: EMERGENCY MEDICINE

## 2017-09-28 PROCEDURE — 85610 PROTHROMBIN TIME: CPT | Performed by: EMERGENCY MEDICINE

## 2017-09-28 PROCEDURE — 85730 THROMBOPLASTIN TIME PARTIAL: CPT | Performed by: EMERGENCY MEDICINE

## 2017-09-28 PROCEDURE — 74011250636 HC RX REV CODE- 250/636: Performed by: INTERNAL MEDICINE

## 2017-09-28 PROCEDURE — 85025 COMPLETE CBC W/AUTO DIFF WBC: CPT | Performed by: EMERGENCY MEDICINE

## 2017-09-28 PROCEDURE — 36415 COLL VENOUS BLD VENIPUNCTURE: CPT | Performed by: INTERNAL MEDICINE

## 2017-09-28 PROCEDURE — 84295 ASSAY OF SERUM SODIUM: CPT | Performed by: INTERNAL MEDICINE

## 2017-09-28 PROCEDURE — 77030013131 HC IV BLD ST ICUM -A

## 2017-09-28 PROCEDURE — 74011250636 HC RX REV CODE- 250/636: Performed by: EMERGENCY MEDICINE

## 2017-09-28 PROCEDURE — 96360 HYDRATION IV INFUSION INIT: CPT | Performed by: EMERGENCY MEDICINE

## 2017-09-28 PROCEDURE — 74011250637 HC RX REV CODE- 250/637: Performed by: INTERNAL MEDICINE

## 2017-09-28 PROCEDURE — P9016 RBC LEUKOCYTES REDUCED: HCPCS | Performed by: EMERGENCY MEDICINE

## 2017-09-28 PROCEDURE — C9113 INJ PANTOPRAZOLE SODIUM, VIA: HCPCS | Performed by: INTERNAL MEDICINE

## 2017-09-28 PROCEDURE — 99283 EMERGENCY DEPT VISIT LOW MDM: CPT | Performed by: EMERGENCY MEDICINE

## 2017-09-28 PROCEDURE — 80053 COMPREHEN METABOLIC PANEL: CPT | Performed by: EMERGENCY MEDICINE

## 2017-09-28 PROCEDURE — 65270000029 HC RM PRIVATE

## 2017-09-28 RX ORDER — DEXTROSE MONOHYDRATE 50 MG/ML
125 INJECTION, SOLUTION INTRAVENOUS CONTINUOUS
Status: DISCONTINUED | OUTPATIENT
Start: 2017-09-28 | End: 2017-10-01 | Stop reason: HOSPADM

## 2017-09-28 RX ORDER — SODIUM CHLORIDE 0.9 % (FLUSH) 0.9 %
5-10 SYRINGE (ML) INJECTION EVERY 8 HOURS
Status: DISCONTINUED | OUTPATIENT
Start: 2017-09-28 | End: 2017-10-01 | Stop reason: HOSPADM

## 2017-09-28 RX ORDER — ACETAMINOPHEN 325 MG/1
650 TABLET ORAL
Status: DISCONTINUED | OUTPATIENT
Start: 2017-09-28 | End: 2017-10-01 | Stop reason: HOSPADM

## 2017-09-28 RX ORDER — DIPHENHYDRAMINE HCL 25 MG
25 CAPSULE ORAL ONCE
Status: COMPLETED | OUTPATIENT
Start: 2017-09-28 | End: 2017-09-28

## 2017-09-28 RX ORDER — SODIUM CHLORIDE 0.9 % (FLUSH) 0.9 %
5-10 SYRINGE (ML) INJECTION AS NEEDED
Status: DISCONTINUED | OUTPATIENT
Start: 2017-09-28 | End: 2017-10-01 | Stop reason: HOSPADM

## 2017-09-28 RX ORDER — SODIUM CHLORIDE 9 MG/ML
250 INJECTION, SOLUTION INTRAVENOUS AS NEEDED
Status: DISCONTINUED | OUTPATIENT
Start: 2017-09-28 | End: 2017-10-01 | Stop reason: HOSPADM

## 2017-09-28 RX ADMIN — Medication 10 ML: at 16:43

## 2017-09-28 RX ADMIN — ACETAMINOPHEN 650 MG: 325 TABLET ORAL at 18:12

## 2017-09-28 RX ADMIN — SODIUM CHLORIDE 1000 ML: 900 INJECTION, SOLUTION INTRAVENOUS at 13:49

## 2017-09-28 RX ADMIN — SODIUM CHLORIDE 40 MG: 9 INJECTION INTRAMUSCULAR; INTRAVENOUS; SUBCUTANEOUS at 20:58

## 2017-09-28 RX ADMIN — DIPHENHYDRAMINE HYDROCHLORIDE 25 MG: 25 CAPSULE ORAL at 18:12

## 2017-09-28 RX ADMIN — DEXTROSE MONOHYDRATE 75 ML/HR: 5 INJECTION, SOLUTION INTRAVENOUS at 16:43

## 2017-09-28 NOTE — IP AVS SNAPSHOT
Giacomo Pollard 
 
 
 [address] 
[phone] Patient: Celestina Pedro MRN: [id number] :1945 You are allergic to the following No active allergies Immunizations Administered for This Admission Name Date  
 TB Skin Test (PPD) Intradermal  Deferred (),  Deferred (), 2017 Recent Documentation Height Weight BMI Smoking Status 1.727 m 60.3 kg 20.22 kg/m2 Former Smoker Unresulted Labs Order Current Status LEVETIRACETAM (KEPPRA) In process Emergency Contacts Name Discharge Info Relation Home Work Mobile Eladio Reina [2] [phone] Benja Dunlap [22] [phone] About your hospitalization You were admitted on:  2017 You last received care in the:  [facility] 2 SURGICAL You were discharged on:  2017 Unit phone number:  [phone] Why you were hospitalized Your primary diagnosis was:  Not on File Your diagnoses also included:  Gib (Gastrointestinal Bleeding), Acute Hypernatremia, Dementia, Hypernatremia, Avm (Arteriovenous Malformation) Of Small Bowel, Acquired With Hemorrhage (Hcc), Acute On Chronic Renal Failure (Hcc), Seizure Disorder (Hcc) Providers Seen During Your Hospitalizations Provider Role Specialty Primary office phone John Leach MD Attending Provider Emergency Medicine [phone] Mayra Govea MD Attending Provider Internal Medicine [phone] Your Primary Care Physician (PCP) Primary Care Physician Office Phone Office Fax [phone] [facility] Viviana Hearn [facility] [phone] Follow-up Information Follow up With Details Comments Contact Info Shabana Dorsey MD   [address] [address] [phone] Call please make appt with adilson primary care doctor for  19 days from discharge date Your Appointments Wednesday October 11, 2017 10:15 AM EDT  
CONSULT with Chayito Florence MD  
[facility] ([facility]) [address]  
[phone] Current Discharge Medication List  
  
START taking these medications Dose & Instructions Dispensing Information Comments Morning Noon Evening Bedtime  
 ferrous sulfate 325 mg (65 mg iron) tablet Your last dose was: Your next dose is:    
   
   
 Dose:  325 mg Take 1 Tab by mouth two (2) times daily (with meals). Quantity:  60 Tab Refills:  0 CONTINUE these medications which have NOT CHANGED Dose & Instructions Dispensing Information Comments Morning Noon Evening Bedtime  
 amLODIPine 10 mg tablet Commonly known as:  Ashly Mathias Your last dose was: Your next dose is:    
   
   
 Dose:  10 mg Take 1 Tab by mouth daily. Quantity:  90 Tab Refills:  3  
     
   
   
   
  
 divalproex  mg ER tablet Commonly known as:  DEPAKOTE ER Your last dose was: Your next dose is:    
   
   
 Dose:  250 mg Take 1 Tab by mouth nightly. Quantity:  90 Tab Refills:  2  
     
   
   
   
  
 folic acid 1 mg tablet Commonly known as:  [employer] Your last dose was: Your next dose is:    
   
   
 Dose:  1 mg Take 1 Tab by mouth daily. Quantity:  30 Tab Refills:  3  
     
   
   
   
  
 levothyroxine 100 mcg tablet Commonly known as:  SYNTHROID Your last dose was: Your next dose is:    
   
   
 Dose:  100 mcg Take 1 Tab by mouth Daily (before breakfast). Quantity:  90 Tab Refills:  3  
     
   
   
   
  
 losartan 100 mg tablet Commonly known as:  COZAAR Your last dose was: Your next dose is:    
   
   
 Dose:  100 mg Take 1 Tab by mouth daily. Quantity:  90 Tab Refills:  3 mirtazapine 15 mg tablet Commonly known as:  Louana Taj Your last dose was: Your next dose is:    
   
   
 Dose:  15 mg Take 1 Tab by mouth nightly. Quantity:  90 Tab Refills:  2  
     
   
   
   
  
 pantoprazole 40 mg tablet Commonly known as:  PROTONIX Your last dose was: Your next dose is:    
   
   
 Dose:  40 mg Take 1 Tab by mouth two (2) times a day. Quantity:  180 Tab Refills:  1  
     
   
   
   
  
 tamsulosin 0.4 mg capsule Commonly known as:  FLOMAX Your last dose was: Your next dose is:    
   
   
 Dose:  0.4 mg Take 1 Cap by mouth nightly. Quantity:  90 Cap Refills:  1 ASK your doctor about these medications Dose & Instructions Dispensing Information Comments Morning Noon Evening Bedtime  
 levETIRAcetam 500 mg tablet Commonly known as:  KEPPRA Your last dose was: Your next dose is:    
   
   
 Dose:  500 mg Take 1 Tab by mouth two (2) times a day. Quantity:  60 Tab Refills:  0 Where to Get Your Medications Information on where to get these meds will be given to you by the nurse or doctor. ! Ask your nurse or doctor about these medications  
  ferrous sulfate 325 mg (65 mg iron) tablet Discharge Instructions Gastrointestinal Bleeding: Care Instructions Your Care Instructions The digestive or gastrointestinal tract goes from the mouth to the anus. It is often called the GI tract. Bleeding can happen anywhere in the GI tract. It may be caused by an ulcer, an infection, or cancer. It may also be caused by medicines such as aspirin or ibuprofen. Light bleeding may not cause any symptoms at first. But if you continue to bleed for a while, you may feel very weak or tired. Sudden, heavy bleeding means you need to see a doctor right away.  This kind of bleeding can be very dangerous. But it can usually be cured or controlled. The doctor may do some tests to find the cause of your bleeding. Follow-up care is a key part of your treatment and safety. Be sure to make and go to all appointments, and call your doctor if you are having problems. It's also a good idea to know your test results and keep a list of the medicines you take. How can you care for yourself at home? · Be safe with medicines. Take your medicines exactly as prescribed. Call your doctor if you think you are having a problem with your medicine. You will get more details on the specific medicines your doctor prescribes. · Do not take aspirin or other anti-inflammatory medicines, such as naproxen (Aleve) or ibuprofen (Advil, Motrin), without talking to your doctor first. Ask your doctor if it is okay to use acetaminophen (Tylenol). · Do not drink alcohol. · The bleeding may make you lose iron. So it's important to eat foods that have a lot of iron. These include red meat, shellfish, poultry, and eggs. They also include beans, raisins, whole-grain breads, and leafy green vegetables. If you want help planning meals, you can make an appointment with a dietitian. When should you call for help? Call 911 anytime you think you may need emergency care. For example, call if: 
· You have sudden, severe belly pain. · You vomit blood or what looks like coffee grounds. · You passed out (lost consciousness). · Your stools are maroon or very bloody. Call your doctor now or seek immediate medical care if: 
· You are dizzy or lightheaded, or you feel like you may faint. · Your stools are black and look like tar, or they have streaks of blood. · You have belly pain. · You vomit or have nausea. · You have trouble swallowing, or it hurts when you swallow. Watch closely for changes in your health, and be sure to contact your doctor if: 
· You do not get better as expected. Where can you learn more? Go to http://malgorzata-violeta.info/. Enter B371 in the search box to learn more about \"Gastrointestinal Bleeding: Care Instructions. \" Current as of: March 20, 2017 Content Version: 11.3 © 8974-1059 [employer]. Care instructions adapted under license by [employer] (which disclaims liability or warranty for this information). If you have questions about a medical condition or this instruction, always ask your healthcare professional. [address] any warranty or liability for your use of this information. Discharge Orders None [employer] Announcement We are excited to announce that we are making your provider's discharge notes available to you in [employer]. You will see these notes when they are completed and signed by the physician that discharged you from your recent hospital stay. If you have any questions or concerns about any information you see in [employer], please call the Health Information Department where you were seen or reach out to your Primary Care Provider for more information about your plan of care. Introducing [facility]! [employer] introduces [employer] patient portal. Now you can access parts of your medical record, email your doctor's office, and request medication refills online. 1. In your internet browser, go to [email or web address] 2. Click on the First Time User? Click Here link in the Sign In box. You will see the New Member Sign Up page. 3. Enter your [employer] Access Code exactly as it appears below. You will not need to use this code after youve completed the sign-up process. If you do not sign up before the expiration date, you must request a new code. · [employer] Access Code: [email or web address] Expires: 10/22/2017  8:01 AM 
 
4.  Enter the last four digits of your Social Security Number (xxxx) and Date of Birth (mm/dd/yyyy) as indicated and click Submit. You will be taken to the next sign-up page. 5. Create a [employer] ID. This will be your [employer] login ID and cannot be changed, so think of one that is secure and easy to remember. 6. Create a [employer] password. You can change your password at any time. 7. Enter your Password Reset Question and Answer. This can be used at a later time if you forget your password. 8. Enter your e-mail address. You will receive e-mail notification when new information is available in [address] 9. Click Sign Up. You can now view and download portions of your medical record. 10. Click the Download Summary menu link to download a portable copy of your medical information. If you have questions, please visit the Frequently Asked Questions section of the [employer] website. Remember, [employer] is NOT to be used for urgent needs. For medical emergencies, dial 911. Now available from your iPhone and Android! General Information Please provide this summary of care documentation to your next provider. Patient Signature:  ____________________________________________________________ Date:  ____________________________________________________________  
  
[employer] Sport Provider Signature:  ____________________________________________________________ Date:  ____________________________________________________________

## 2017-09-28 NOTE — ED PROVIDER NOTES
HPI Comments: 51-year-old -American male with history of dementia sent from primary care office due to suspected GI bleed. Patient has had a significant drop in hemoglobin over the past few months. Patient is able to report that he has seen blood in his bowel movements. Questionable lower abdominal discomfort although history from the patient is somewhat unreliable. He denies blood thinner use. Patient is a 67 y.o. male presenting with anal bleeding. The history is provided by the patient. Rectal Bleeding    Associated symptoms include abdominal pain. Pertinent negatives include no dysuria, no fever, no nausea, no back pain and no vomiting. Past Medical History:   Diagnosis Date    Alcohol abuse     QUIT IN 2014. LONG USE    Anemia     GI BLEED AND CHRONIC, BASELINE 9.  11/25/16:  HGB: 7.6    Angiodysplasia of stomach 2016    and duodenum    Bladder incontinence 05/28/2014    ALSO BOWEL    Chronic airway obstruction, not elsewhere classified 5/28/2014    CKD (chronic kidney disease)     [facility]ch[facility] obstructive [facility] disease) [facility] [address]     Dementia 5/28/2014    Hyperlipidemia 5/28/2014    Hypernatremia 11/25/2016 11/25/16:  177, 11/18/16:  80    Hypertension     Hypothyroid     Seizure ([facility] [address] 2010    NO GRAND MAL. ON DILANTIN THE ENTIRE TIME    Tobacco abuse 5/28/2014    Tobacco abuse     ENTIRE ADULT LIFE. QUIT IN 2014, SNEAKS WHEN POSSIBLE       Past Surgical History:   Procedure Laterality Date    HX COLONOSCOPY  APPROX 2005    UNKNOWN EGD    HX ENDOSCOPY  2016    bicap and clip         History reviewed. No pertinent family history. Social History     Social History    Marital status:      Spouse name: N/A    Number of children: N/A    Years of education: N/A     Occupational History    Not on file.      Social History Main Topics    Smoking status: Former Smoker     Packs/day: 0.50     Years: 50.00     Types: Cigarettes    Smokeless tobacco: Never Used Comment: QUIT IN 2014, SNEAKS WHEN POSSIBLE    Alcohol use No      Comment: HEAVILY IN PAST YEARS, NONE SINCE 2014    Drug use: No    Sexual activity: Not on file     Other Topics Concern    Not on file     Social History Narrative    11/25/16:  PATIENT IS , LIVES WITH WIFE AND ISABEL ROSA. DAUGHTER IS ALSO NEARBY. ALLERGIES: Review of patient's allergies indicates no known allergies. Review of Systems   Constitutional: Negative for fever. HENT: Negative for congestion. Respiratory: Negative for shortness of breath. Cardiovascular: Negative for chest pain. Gastrointestinal: Positive for abdominal pain and anal bleeding. Negative for nausea and vomiting. Genitourinary: Negative for dysuria. Musculoskeletal: Negative for back pain and neck pain. Skin: Negative for rash. Neurological: Negative for headaches. Vitals:    09/28/17 1254 09/28/17 1347   BP: 128/77    Pulse: 78    Resp: 16    Temp: 98.2 °F (36.8 °C)    SpO2: 100% 100%   Weight: 60.3 kg (133 lb)    Height: 5' 8\" (1.727 m)             Physical Exam   Constitutional: He appears well-developed and well-nourished. No distress. HENT:   Head: Normocephalic and atraumatic. Mouth/Throat: Oropharynx is clear and moist.   Eyes: Conjunctivae are normal. Pupils are equal, round, and reactive to light. Neck: Normal range of motion. Neck supple. Cardiovascular: Normal rate and regular rhythm. No murmur heard. Pulmonary/Chest: Effort normal and breath sounds normal.   Abdominal: Soft. He exhibits no distension. There is no tenderness. Genitourinary:   Genitourinary Comments: Rectal exam reveals no definite source of bleeding. Stool is brown but is heme positive. Neurological: He is alert. Oriented to person only. No cranial nerve deficits. Moves all extremities. Skin: Skin is warm and dry. Psychiatric: He has a normal mood and affect. Nursing note and vitals reviewed.        MDM  Number of Diagnoses or Management Options  Diagnosis management comments: Blood work reveals a hemoglobin 7.0 hematocrit 23.6  With normal white count and platelet count. Basic panel shows acute on chronic renal insufficiency with creatinine at 2.24 and BUN 46. Sodium elevated 164. Patient has been treated with IV fluids. Has been typed and crossed. Will admit to hospitalist for further supportive care and possible GI consultation.        Amount and/or Complexity of Data Reviewed  Clinical lab tests: ordered and reviewed  Discuss the patient with other providers: yes    Risk of Complications, Morbidity, and/or Mortality  Presenting problems: moderate  Diagnostic procedures: moderate  Management options: moderate      ED Course       Procedures

## 2017-09-28 NOTE — PROGRESS NOTES
Problem: Falls - Risk of  Goal: *Absence of Falls  Document Franklin Fall Risk and appropriate interventions in the flowsheet.    Outcome: Progressing Towards Goal  Fall Risk Interventions:  Mobility Interventions: Patient to call before getting OOB     Mentation Interventions: Door open when patient unattended, More frequent rounding, Reorient patient, Toileting rounds     Medication Interventions: Patient to call before getting OOB     Elimination Interventions: Call light in reach, Toileting schedule/hourly rounds

## 2017-09-28 NOTE — PROGRESS NOTES
Primary Nurse Lexis Syed and David Auguste RN performed a dual skin assessment on this patient. Small skin tear on R pinky finger, CDI.  All other skin CDI  Milad score is 19

## 2017-09-28 NOTE — H&P
HOSPITALIST HISTORY AND PHYSICAL  NAME:  Ghazala Davis   Age:  67 y.o.  :   1945   MRN:   [id number]  PCP: Dany Herron MD  Consulting MD:  Treatment Team: Attending Provider: Carola Mendoza MD; Primary Nurse: Felix Bowen    REASON FOR ADMISSION: gib     HPI:   Patient is 70year old AAM with a PMH of chronic iron deficiency anemia, CKD, dementia, HLD, HTN, hypothyroidism, seizures, COPD, and h/o GI bleeds from AVMs presented to the ER today after being sent from his pcp's office with concern for GIB with an acute drop in his hb. Also found to be very hypernatremic. Hospitalist asked to admit. Pt is a poor historian- denies belly pain- unclear if he had bloody stools or not but endorsed to the ER doctor. No family member available at the time of H&P. Hs taken from er doc and his chart. Complete ROS done and is as stated in HPI or otherwise negative  Past Medical History:   Diagnosis Date    Alcohol abuse     QUIT IN . LONG USE    Anemia     GI BLEED AND CHRONIC, BASELINE 9.  16:  HGB: 7.6    Angiodysplasia of stomach     and duodenum    Bladder incontinence 2014    ALSO BOWEL    Chronic airway obstruction, not elsewhere classified 2014    CKD (chronic kidney disease)     [facility]) ([facility] [address]     Dementia 2014    Hyperlipidemia 2014    Hypernatremia 2016:  177, 16:  80    Hypertension     Hypothyroid     Seizure ([address]     NO GRAND MAL. ON DILANTIN THE ENTIRE TIME    Tobacco abuse 2014    Tobacco abuse     ENTIRE ADULT LIFE. QUIT IN , SNEAKS WHEN POSSIBLE      Past Surgical History:   Procedure Laterality Date    HX COLONOSCOPY  APPROX 2005    UNKNOWN EGD    HX ENDOSCOPY  2016    bicap and clip      Prior to Admission Medications   Prescriptions Last Dose Informant Patient Reported? Taking?    amLODIPine (NORVASC) 10 mg tablet   No No   Sig: Take 1 Tab by mouth daily. divalproex ER (DEPAKOTE ER) 250 mg ER tablet   No No   Sig: Take 1 Tab by mouth nightly. folic acid (FOLVITE) 1 mg tablet   No No   Sig: Take 1 Tab by mouth daily. levETIRAcetam (KEPPRA) 500 mg tablet   No No   Sig: Take 1 Tab by mouth two (2) times a day. levothyroxine (SYNTHROID) 100 mcg tablet   No No   Sig: Take 1 Tab by mouth Daily (before breakfast). losartan (COZAAR) 100 mg tablet   No No   Sig: Take 1 Tab by mouth daily. mirtazapine (REMERON) 15 mg tablet   No No   Sig: Take 1 Tab by mouth nightly. pantoprazole (PROTONIX) 40 mg tablet   No No   Sig: Take 1 Tab by mouth two (2) times a day. tamsulosin (FLOMAX) 0.4 mg capsule   No No   Sig: Take 1 Cap by mouth nightly. Facility-Administered Medications: None     No Known Allergies   Social History   Substance Use Topics    Smoking status: Former Smoker     Packs/day: 0.50     Years: 50.00     Types: Cigarettes    Smokeless tobacco: Never Used      Comment: QUIT IN 2014, SNEAKS WHEN POSSIBLE    Alcohol use No      Comment: HEAVILY IN PAST YEARS, NONE SINCE       History reviewed. No pertinent family history. Objective:     Visit Vitals    /77 (BP 1 Location: Right arm, BP Patient Position: At rest)    Pulse 78    Temp 98.2 °F (36.8 °C)    Resp 16    Ht 5' 8\" (1.727 m)    Wt 60.3 kg (133 lb)    SpO2 100%    BMI 20.22 kg/m2      Temp (24hrs), Av.2 °F (36.8 °C), Min:98.2 °F (36.8 °C), Max:98.2 °F (36.8 °C)    Oxygen Therapy  O2 Sat (%): 100 % (17)  O2 Device: Nasal cannula (17)  O2 Flow Rate (L/min): 4 l/min (17)  Physical Exam:  General:    Alert, cooperative, no distress, appears stated age. Head:   Normocephalic, without obvious abnormality, atraumatic. Nose:  Nares normal. No drainage or sinus tenderness. Lungs:   Clear to auscultation bilaterally. No Wheezing or Rhonchi. No rales. Heart:   Regular rate and rhythm,  no murmur, rub or gallop.   Abdomen: Soft, non-tender. Not distended. Bowel sounds normal.   Extremities: No cyanosis. No edema. No clubbing  Skin:     Texture, turgor normal. No rashes or lesions. Not Jaundiced  Neurologic: Alert and oriented x 3, no focal deficits   Data Review: personally by me   Recent Results (from the past 24 hour(s))   AMB POC COMPLETE CBC,AUTOMATED ENTER    Collection Time: 09/28/17 11:19 AM   Result Value Ref Range    WBC (POC) 6.8 4.5 - 10.5 10^3/ul    LYMPHOCYTES (POC) 30.6 20.5 - 51.1 %    MONOCYTES (POC) 7.8 1.7 - 9.3 %    GRANULOCYTES (POC) 61.6 42.2 - 75.2 %    ABS. LYMPHS (POC) 2.0 1.2 - 3.4 10^3/ul    ABS. MONOS (POC) 0.5 0.1 - 0.6 10^3/ul    ABS. GRANS (POC) 4.0 1.4 - 6.5 10^3/ul    RBC (POC) 2.94 (A) 4 - 6 10^6/ul    HGB (POC) 6.9 (A) 11 - 18 g/dL    HCT (POC) 22.9 (A) 35 - 60 %    MCV (POC) 77.9 (A) 80 - 99.9 fL    MCH (POC) 23.5 (A) 27 - 31 pg    MCHC (POC) 30.2 (A) 33 - 37 g/dL    RDW (POC) 23.4 (A) 11.6 - 13.7 %    PLATELET (POC) 543 292 - 450 10^3/ul    MPV (POC) 8.9 7.8 - 11 fL   CBC WITH AUTOMATED DIFF    Collection Time: 09/28/17  1:04 PM   Result Value Ref Range    WBC 6.3 4.3 - 11.1 K/uL    RBC 2.94 (L) 4.23 - 5.67 M/uL    HGB 7.0 (L) 13.6 - 17.2 g/dL    HCT 23.6 (L) 41.1 - 50.3 %    MCV 80.3 79.6 - 97.8 FL    MCH 23.8 (L) 26.1 - 32.9 PG    MCHC 29.7 (L) 31.4 - 35.0 g/dL    RDW 23.2 (H) 11.9 - 14.6 %    PLATELET 676 451 - 024 K/uL    MPV 11.1 10.8 - 14.1 FL    DF AUTOMATED      NEUTROPHILS 58 43 - 78 %    LYMPHOCYTES 32 13 - 44 %    MONOCYTES 5 4.0 - 12.0 %    EOSINOPHILS 4 0.5 - 7.8 %    BASOPHILS 1 0.0 - 2.0 %    IMMATURE GRANULOCYTES 0.5 0.0 - 5.0 %    ABS. NEUTROPHILS 3.6 1.7 - 8.2 K/UL    ABS. LYMPHOCYTES 2.0 0.5 - 4.6 K/UL    ABS. MONOCYTES 0.3 0.1 - 1.3 K/UL    ABS. EOSINOPHILS 0.2 0.0 - 0.8 K/UL    ABS. BASOPHILS 0.0 0.0 - 0.2 K/UL    ABS. IMM.  GRANS. 0.0 0.0 - 0.5 K/UL   METABOLIC PANEL, COMPREHENSIVE    Collection Time: 09/28/17  1:04 PM   Result Value Ref Range    Sodium 164 (HH) 136 - 145 mmol/L    Potassium 4.6 3.5 - 5.1 mmol/L    Chloride 133 (H) 98 - 107 mmol/L    CO2 20 (L) 21 - 32 mmol/L    Anion gap 11 7 - 16 mmol/L    Glucose 73 65 - 100 mg/dL    BUN 46 (H) 8 - 23 MG/DL    Creatinine 2.24 (H) 0.8 - 1.5 MG/DL    GFR est AA 37 (L) >60 ml/min/1.73m2    GFR est non-AA 31 (L) >60 ml/min/1.73m2    Calcium 8.2 (L) 8.3 - 10.4 MG/DL    Bilirubin, total 0.3 0.2 - 1.1 MG/DL    ALT (SGPT) 22 12 - 65 U/L    AST (SGOT) 24 15 - 37 U/L    Alk. phosphatase 130 50 - 136 U/L    Protein, total 8.8 (H) 6.3 - 8.2 g/dL    Albumin 3.0 (L) 3.2 - 4.6 g/dL    Globulin 5.8 (H) 2.3 - 3.5 g/dL    A-G Ratio 0.5 (L) 1.2 - 3.5     TYPE & SCREEN    Collection Time: 09/28/17  1:06 PM   Result Value Ref Range    Crossmatch Expiration 10/01/2017     ABO/Rh(D) [facility] POSITIVE     Antibody screen NEG      Imaging /Procedures /Studies reviewed personally by me  XR Results (most recent):    Results from [place] encounter on 04/16/17   XR CHEST PORT   Narrative Portable AP upright chest dated 4/16/2017 at 1257 hours    CLINICAL INFORMATION: Confusion for 2 days    Comparison chest x-ray 12/8/2016    Heart is within normal limits in size and mediastinum is unremarkable. Lungs are  clear and pulmonary vascularity normal. No pleural effusion. Impression IMPRESSION: No active disease        CT Scan    Results from Hospital Encounter encounter on 12/04/16   CT HEAD WO CONT   Narrative HEAD CT WITHOUT CONTRAST  12/4/2016     HISTORY:   altered mental status  ; unresponsiveness; code stroke; neck rigidity    TECHNIQUE: Noncontrast axial images were obtained through the brain. All CT  scans at this facility used dose modulation, interactive reconstruction and/or  weight based dosing when appropriate to reduce radiation dose to as low as  reasonably achievable.     COMPARISON: November 25, 2016    FINDINGS: There is no acute intracranial hemorrhage, significant mass effect or  CT evidence of acute large artery territorial infarction. Please note that a  hyperacute infarct or small vessel infarct may not be apparent on initial CT  imaging. Areas of low-attenuation throughout the supratentorial white matter are  unchanged and suggest chronic small vessel ischemic disease. 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 The ventricles remain symmetrically enlarged. This  pattern is likely commensurate with atrophy. There is an old left thalamic  lacunar infarct. There is no hydrocephalus , intra-axial mass or abnormal extra-axial fluid  collection. There are no displaced skull fractures. The mastoid air cells and  paranasal sinuses are clear where imaged. Impression IMPRESSION:     1. No acute intracranial hemorrhage. 2. 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 VAS/US Results (most recent):  No results found for this or any previous visit. Assessment and Plan: Active Hospital Problems    Diagnosis Date Noted    Acute hypernatremia 09/28/2017    GIB (gastrointestinal bleeding) 06/13/2014       PLAN  · Hypernatremia- severe- will give appropriate free water per protocol serial NA to avoid over correction  · GIB- transfuse 2units prbcs, monitor H&H , protonix, onsult GI  · Acute on chronic renal failure- ivfls avoid nephrotoxins  · dementia- supportive care. ·  Seizures- continue home meds- per last H&P he was discharged on keppra and not valproate but it is on his home med rec and appears to have been recently filled- will need to get accurate meds from family or pharmacy when available.    · Hypothyroidsim- cont synthroid  · Further workup and mg as his clinical course dictaes    Code Status:   Full  Anticipated discharge:   2-3 days  Signed By: Tony Barnhart MD     September 28, 2017

## 2017-09-28 NOTE — PROGRESS NOTES
Received consent to give patient blood per patients daughter, Alma Hickey, due to patient having Hx of dementia. Mara Gee RN secondary nurse listened to Ralph Hackett state OK to give blood. Will continue to monitor and assess.

## 2017-09-28 NOTE — PROGRESS NOTES
TRANSFER - IN REPORT:    Verbal report received from [facility] RN(name) on Arnie Severino  being received from ED for routine progression of care      Report consisted of patients Situation, Background, Assessment and   Recommendations(SBAR). Information from the following report(s) SBAR, ED Summary and Intake/Output was reviewed with the receiving nurse. Opportunity for questions and clarification was provided. Assessment completed upon patients arrival to unit and care assumed.

## 2017-09-28 NOTE — ED TRIAGE NOTES
Patient brought in via EMS for GI bleed diagnosed at PCP this afternoon. Per office, hemoglobin, 8.9 yesterday, 6.9 today.

## 2017-09-29 ENCOUNTER — APPOINTMENT (OUTPATIENT)
Dept: GENERAL RADIOLOGY | Age: 72
DRG: 378 | End: 2017-09-29
Attending: INTERNAL MEDICINE
Payer: MEDICARE

## 2017-09-29 ENCOUNTER — ANESTHESIA EVENT (OUTPATIENT)
Dept: ENDOSCOPY | Age: 72
DRG: 378 | End: 2017-09-29
Payer: MEDICARE

## 2017-09-29 ENCOUNTER — ANESTHESIA (OUTPATIENT)
Dept: ENDOSCOPY | Age: 72
DRG: 378 | End: 2017-09-29
Payer: MEDICARE

## 2017-09-29 LAB
ABO + RH BLD: NORMAL
ANION GAP SERPL CALC-SCNC: 11 MMOL/L (ref 7–16)
BASOPHILS # BLD: 0 K/UL (ref 0–0.2)
BASOPHILS NFR BLD: 1 % (ref 0–2)
BLD PROD TYP BPU: NORMAL
BLD PROD TYP BPU: NORMAL
BLOOD GROUP ANTIBODIES SERPL: NORMAL
BPU ID: NORMAL
BPU ID: NORMAL
BUN SERPL-MCNC: 32 MG/DL (ref 8–23)
CALCIUM SERPL-MCNC: 8 MG/DL (ref 8.3–10.4)
CHLORIDE SERPL-SCNC: 120 MMOL/L (ref 98–107)
CO2 SERPL-SCNC: 19 MMOL/L (ref 21–32)
CREAT SERPL-MCNC: 1.64 MG/DL (ref 0.8–1.5)
CROSSMATCH RESULT,%XM: NORMAL
CROSSMATCH RESULT,%XM: NORMAL
DIFFERENTIAL METHOD BLD: ABNORMAL
EOSINOPHIL # BLD: 0.2 K/UL (ref 0–0.8)
EOSINOPHIL NFR BLD: 3 % (ref 0.5–7.8)
ERYTHROCYTE [DISTWIDTH] IN BLOOD BY AUTOMATED COUNT: 19.9 % (ref 11.9–14.6)
GLUCOSE SERPL-MCNC: 56 MG/DL (ref 65–100)
HCT VFR BLD AUTO: 31.1 % (ref 41.1–50.3)
HGB BLD-MCNC: 9.9 G/DL (ref 13.6–17.2)
IMM GRANULOCYTES # BLD: 0 K/UL (ref 0–0.5)
IMM GRANULOCYTES NFR BLD: 0.2 % (ref 0–5)
LYMPHOCYTES # BLD: 1.5 K/UL (ref 0.5–4.6)
LYMPHOCYTES NFR BLD: 25 % (ref 13–44)
MCH RBC QN AUTO: 25.3 PG (ref 26.1–32.9)
MCHC RBC AUTO-ENTMCNC: 31.8 G/DL (ref 31.4–35)
MCV RBC AUTO: 79.3 FL (ref 79.6–97.8)
MONOCYTES # BLD: 0.5 K/UL (ref 0.1–1.3)
MONOCYTES NFR BLD: 8 % (ref 4–12)
NEUTS SEG # BLD: 3.9 K/UL (ref 1.7–8.2)
NEUTS SEG NFR BLD: 63 % (ref 43–78)
PLATELET # BLD AUTO: 179 K/UL (ref 150–450)
PMV BLD AUTO: ABNORMAL FL (ref 10.8–14.1)
POTASSIUM SERPL-SCNC: 4.4 MMOL/L (ref 3.5–5.1)
RBC # BLD AUTO: 3.92 M/UL (ref 4.23–5.67)
SODIUM SERPL-SCNC: 149 MMOL/L (ref 136–145)
SODIUM SERPL-SCNC: 149 MMOL/L (ref 136–145)
SODIUM SERPL-SCNC: 150 MMOL/L (ref 136–145)
SPECIMEN EXP DATE BLD: NORMAL
STATUS OF UNIT,%ST: NORMAL
STATUS OF UNIT,%ST: NORMAL
UNIT DIVISION, %UDIV: 0
UNIT DIVISION, %UDIV: 0
VALPROATE SERPL-MCNC: 5 UG/ML (ref 50–100)
WBC # BLD AUTO: 6.1 K/UL (ref 4.3–11.1)

## 2017-09-29 PROCEDURE — 74011250636 HC RX REV CODE- 250/636

## 2017-09-29 PROCEDURE — 74000 XR ABD (KUB): CPT

## 2017-09-29 PROCEDURE — 74011000302 HC RX REV CODE- 302: Performed by: INTERNAL MEDICINE

## 2017-09-29 PROCEDURE — 76040000025: Performed by: INTERNAL MEDICINE

## 2017-09-29 PROCEDURE — C9113 INJ PANTOPRAZOLE SODIUM, VIA: HCPCS | Performed by: INTERNAL MEDICINE

## 2017-09-29 PROCEDURE — 85025 COMPLETE CBC W/AUTO DIFF WBC: CPT | Performed by: INTERNAL MEDICINE

## 2017-09-29 PROCEDURE — 36415 COLL VENOUS BLD VENIPUNCTURE: CPT | Performed by: INTERNAL MEDICINE

## 2017-09-29 PROCEDURE — 65270000029 HC RM PRIVATE

## 2017-09-29 PROCEDURE — 74011250637 HC RX REV CODE- 250/637: Performed by: INTERNAL MEDICINE

## 2017-09-29 PROCEDURE — 86580 TB INTRADERMAL TEST: CPT | Performed by: INTERNAL MEDICINE

## 2017-09-29 PROCEDURE — 74011000250 HC RX REV CODE- 250: Performed by: INTERNAL MEDICINE

## 2017-09-29 PROCEDURE — 76060000032 HC ANESTHESIA 0.5 TO 1 HR: Performed by: INTERNAL MEDICINE

## 2017-09-29 PROCEDURE — 74011250636 HC RX REV CODE- 250/636: Performed by: INTERNAL MEDICINE

## 2017-09-29 PROCEDURE — 77030022875 HC PRB AR PLSM COAG ERBE -C: Performed by: INTERNAL MEDICINE

## 2017-09-29 PROCEDURE — 80164 ASSAY DIPROPYLACETIC ACD TOT: CPT | Performed by: INTERNAL MEDICINE

## 2017-09-29 PROCEDURE — 80048 BASIC METABOLIC PNL TOTAL CA: CPT | Performed by: INTERNAL MEDICINE

## 2017-09-29 PROCEDURE — 77030011640 HC PAD GRND REM COVD -A: Performed by: INTERNAL MEDICINE

## 2017-09-29 PROCEDURE — 0W3P8ZZ CONTROL BLEEDING IN GASTROINTESTINAL TRACT, VIA NATURAL OR ARTIFICIAL OPENING ENDOSCOPIC: ICD-10-PCS | Performed by: INTERNAL MEDICINE

## 2017-09-29 PROCEDURE — 84295 ASSAY OF SERUM SODIUM: CPT | Performed by: INTERNAL MEDICINE

## 2017-09-29 PROCEDURE — 80177 DRUG SCRN QUAN LEVETIRACETAM: CPT | Performed by: INTERNAL MEDICINE

## 2017-09-29 PROCEDURE — 74011000250 HC RX REV CODE- 250

## 2017-09-29 RX ORDER — LEVETIRACETAM 500 MG/1
500 TABLET ORAL 2 TIMES DAILY
Status: DISCONTINUED | OUTPATIENT
Start: 2017-09-29 | End: 2017-10-01 | Stop reason: HOSPADM

## 2017-09-29 RX ORDER — DIVALPROEX SODIUM 250 MG/1
250 TABLET, EXTENDED RELEASE ORAL
Status: DISCONTINUED | OUTPATIENT
Start: 2017-09-29 | End: 2017-10-01 | Stop reason: HOSPADM

## 2017-09-29 RX ORDER — FOLIC ACID 1 MG/1
1 TABLET ORAL DAILY
Status: DISCONTINUED | OUTPATIENT
Start: 2017-09-30 | End: 2017-10-01 | Stop reason: HOSPADM

## 2017-09-29 RX ORDER — AMLODIPINE BESYLATE 10 MG/1
10 TABLET ORAL DAILY
Status: DISCONTINUED | OUTPATIENT
Start: 2017-09-30 | End: 2017-10-01 | Stop reason: HOSPADM

## 2017-09-29 RX ORDER — LEVOTHYROXINE SODIUM 100 UG/1
100 TABLET ORAL
Status: DISCONTINUED | OUTPATIENT
Start: 2017-09-30 | End: 2017-10-01 | Stop reason: HOSPADM

## 2017-09-29 RX ORDER — PROPOFOL 10 MG/ML
INJECTION, EMULSION INTRAVENOUS
Status: DISCONTINUED | OUTPATIENT
Start: 2017-09-29 | End: 2017-09-29 | Stop reason: HOSPADM

## 2017-09-29 RX ORDER — HALOPERIDOL 5 MG/ML
2 INJECTION INTRAMUSCULAR ONCE
Status: COMPLETED | OUTPATIENT
Start: 2017-09-29 | End: 2017-09-29

## 2017-09-29 RX ORDER — MIRTAZAPINE 15 MG/1
15 TABLET, FILM COATED ORAL
Status: DISCONTINUED | OUTPATIENT
Start: 2017-09-29 | End: 2017-09-30

## 2017-09-29 RX ORDER — TAMSULOSIN HYDROCHLORIDE 0.4 MG/1
0.4 CAPSULE ORAL
Status: DISCONTINUED | OUTPATIENT
Start: 2017-09-29 | End: 2017-10-01 | Stop reason: HOSPADM

## 2017-09-29 RX ORDER — SODIUM CHLORIDE, SODIUM LACTATE, POTASSIUM CHLORIDE, CALCIUM CHLORIDE 600; 310; 30; 20 MG/100ML; MG/100ML; MG/100ML; MG/100ML
INJECTION, SOLUTION INTRAVENOUS
Status: DISCONTINUED | OUTPATIENT
Start: 2017-09-29 | End: 2017-09-29 | Stop reason: HOSPADM

## 2017-09-29 RX ADMIN — LEVETIRACETAM 500 MG: 500 TABLET, FILM COATED ORAL at 17:57

## 2017-09-29 RX ADMIN — DEXTROSE MONOHYDRATE 75 ML/HR: 5 INJECTION, SOLUTION INTRAVENOUS at 19:02

## 2017-09-29 RX ADMIN — HALOPERIDOL LACTATE 2 MG: 5 INJECTION, SOLUTION INTRAMUSCULAR at 22:14

## 2017-09-29 RX ADMIN — PROPOFOL 140 MCG/KG/MIN: 10 INJECTION, EMULSION INTRAVENOUS at 11:29

## 2017-09-29 RX ADMIN — TAMSULOSIN HYDROCHLORIDE 0.4 MG: 0.4 CAPSULE ORAL at 22:15

## 2017-09-29 RX ADMIN — Medication 5 ML: at 14:45

## 2017-09-29 RX ADMIN — SODIUM CHLORIDE, SODIUM LACTATE, POTASSIUM CHLORIDE, CALCIUM CHLORIDE: 600; 310; 30; 20 INJECTION, SOLUTION INTRAVENOUS at 11:16

## 2017-09-29 RX ADMIN — LEVETIRACETAM 500 MG: 500 TABLET, FILM COATED ORAL at 14:44

## 2017-09-29 RX ADMIN — MIRTAZAPINE 15 MG: 15 TABLET, FILM COATED ORAL at 22:15

## 2017-09-29 RX ADMIN — SODIUM CHLORIDE 40 MG: 9 INJECTION INTRAMUSCULAR; INTRAVENOUS; SUBCUTANEOUS at 14:44

## 2017-09-29 RX ADMIN — TUBERCULIN PURIFIED PROTEIN DERIVATIVE 5 UNITS: 5 INJECTION, SOLUTION INTRADERMAL at 15:36

## 2017-09-29 RX ADMIN — DIVALPROEX SODIUM 250 MG: 250 TABLET, EXTENDED RELEASE ORAL at 22:15

## 2017-09-29 NOTE — PROGRESS NOTES
TRANSFER - OUT REPORT:    Verbal report given to Homer Clancy on Dann Scott  being transferred to [facility] for routine progression of care       Report consisted of patients Situation, Background, Assessment and   Recommendations(SBAR). Information from the following report(s) Procedure Summary was reviewed with the receiving nurse. Lines:   Peripheral IV 09/29/17 Right Forearm (Active)   Site Assessment Clean, dry, & intact 9/29/2017  7:25 AM   Phlebitis Assessment 0 9/29/2017  7:25 AM   Infiltration Assessment 0 9/29/2017  7:25 AM   Dressing Status Clean, dry, & intact 9/29/2017  7:25 AM   Dressing Type Transparent 9/29/2017  7:25 AM   Hub Color/Line Status Flushed; Infusing 9/29/2017  7:25 AM        Opportunity for questions and clarification was provided.       Patient transported with:

## 2017-09-29 NOTE — PROGRESS NOTES
Received telephone consent from patient's son - Breezy Hardy. States OK to have EGD. Andreina black RN listened to consent. Will continue to monitor.

## 2017-09-29 NOTE — ANESTHESIA POSTPROCEDURE EVALUATION
Post-Anesthesia Evaluation and Assessment    Patient: Vinnie Barraza MRN: [id number]  SSN: [id number]    YOB: 1945  Age: 67 y.o. Sex: male       Cardiovascular Function/Vital Signs  Visit Vitals    /61    Pulse 75    Temp 36 °C (96.8 °F)    Resp 25    Ht 5' 8\" (1.727 m)    Wt 60.3 kg (133 lb)    SpO2 96%    BMI 20.22 kg/m2       Patient is status post total IV anesthesia anesthesia for Procedure(s):  ESOPHAGOGASTRODUODENOSCOPY (EGD) WITH PUSH ENTEROSCOPY  ENDOSCOPIC ARGON PLASMA COAGULATION. Nausea/Vomiting: None    Postoperative hydration reviewed and adequate. Pain:  Pain Scale 1: Numeric (0 - 10) (09/29/17 1225)  Pain Intensity 1: 0 (09/29/17 1225)   Managed    Neurological Status:   Neuro  Neurologic State: Alert;Eyes open spontaneously (09/29/17 0725)  Orientation Level: Oriented to person;Oriented to place; Disoriented to situation;Disoriented to time (09/29/17 0725)  Cognition: Impaired decision making;Memory loss (09/29/17 0725)  Speech: Other (comment) (mumbles) (09/28/17 1545)   At baseline    Mental Status and Level of Consciousness: Arousable    Pulmonary Status:   O2 Device: Room air (09/29/17 1206)   Adequate oxygenation and airway patent    Complications related to anesthesia: None    Post-anesthesia assessment completed.  No concerns    Signed By: Lilli Hidalgo MD     September 29, 2017

## 2017-09-29 NOTE — PROGRESS NOTES
END OF SHIFT NOTE:    INTAKE/OUTPUT  09/28 0701 - 09/29 0700  In: 1141.7 [P.O.:500]  Out: -   Voiding: YES  Catheter: NO  Drain:              Flatus: Patient does have flatus present. Stool:  0 occurrences. Characteristics:  Stool Assessment  Stool Color: Brown  Stool Appearance: Soft  Stool Amount: Medium  Stool Source/Status: Rectum    Emesis: 0 occurrences. Characteristics:        VITAL SIGNS  Patient Vitals for the past 12 hrs:   Temp Pulse Resp BP SpO2   09/29/17 0100 97.9 °F (36.6 °C) 77 16 138/71 100 %   09/29/17 0039 97.9 °F (36.6 °C) 64 16 151/81 100 %   09/28/17 2355 98 °F (36.7 °C) 62 16 150/88 100 %   09/28/17 2226 98 °F (36.7 °C) 66 16 (!) 157/96 100 %   09/28/17 2126 98 °F (36.7 °C) 67 16 (!) 131/91 100 %   09/28/17 2110 97.6 °F (36.4 °C) 64 16 135/77 100 %   09/28/17 2046 97.8 °F (36.6 °C) 75 16 123/70 100 %   09/1945 97.6 °F (36.4 °C) 70 15 137/82 100 %   09/28/17 1845 97.2 °F (36.2 °C) 76 15 142/79 100 %       Pain Assessment  Pain Intensity 1: 0 (09/29/17 0513)        Patient Stated Pain Goal: 0    Ambulating  Yes    Shift report given to oncoming nurse at the bedside.     Anthony Jane RN

## 2017-09-29 NOTE — ANESTHESIA PREPROCEDURE EVALUATION
Anesthetic History   No history of anesthetic complications            Review of Systems / Medical History  Patient summary reviewed and pertinent labs reviewed    Pulmonary    COPD: moderate      Smoker      Comments: occ still smokes cigs   Neuro/Psych     seizures    Dementia    Comments: Seizure years ago, ETOH induced dementia Cardiovascular    Hypertension              Exercise tolerance: <4 METS     GI/Hepatic/Renal         Renal disease: CRI  Liver disease    Comments: Increased LFT's in past, nl coags this admission, CRI; Cr increase since last admission, appears some of the increase is pr-renal, multiple GI AVM's noted in the past Endo/Other      Hypothyroidism  Anemia     Other Findings   Comments: S/p 2 units PRC's since admission         Physical Exam    Airway  Mallampati: II  TM Distance: > 6 cm    Mouth opening: Normal     Cardiovascular    Rhythm: regular           Dental    Dentition: Poor dentition     Pulmonary                 Abdominal  GI exam deferred       Other Findings            Anesthetic Plan    ASA: 4  Anesthesia type: total IV anesthesia            Anesthetic plan and risks discussed with: Patient

## 2017-09-29 NOTE — CONSULTS
[facility] Consult Note       Primary GI Physician: DR. Olivia Villa    Referring Physician:  Dr. Camila Lopes Date:  9/29/2017    Admit Date:  9/28/2017    Chief Complaint:  GIB    Subjective:     History of Present Illness:  Patient is a 67 y.o. male with PMH of (but not limited to) chronic anemia on iron therapy, AVM with chronic recurrent bleeding, CKD, dementia, HLD, HTN, Hypothyroid, seizure history, COPD, and history of ETOH abuse formerly, who is admitted with severe anemia with hgb 6.9 from his PCP office. He is seen in consultation at the request of Dr. Michael Esparza for severe anemia. He is also found to be hypernatremic with Na 166 on admission and 150 now. Pt is a poor historian and family is not in the room. Pt denies abdominal pain or changes in bowel habits. He reports occasional N/v but is unsure if he has seen hematemesis. He denies dark stools. hgb is 9.9 after transfusion of 2 units. Home medications do not include blood thinners. Hemodynamically, pt is stable. Creatinine 2.5 on admission now 1.6.               EGD with Dr Mahin Youssef 11/28/16 that revealed gastric AVM on lesser curvature and duodenal bulb AVM, both were clipped. Colonoscopy on 6/17/14 with Dr Lori Neil that revealed tubular adenoma polyps and a nonbleeding cecal AVM. Recommendation was made for repeat colonoscopy in 6/2017. EGD with pediatric colonoscope to Jejunum 12/8/16 by Dr. Memory Mohs revealed 2mm nodule at GE junctions, clip along proximal greater curvature, Few AVM, not actively bleeding in the duodenum. Treated with APC; Multiple AVMs throughout the jejunum, not actively bleeding. Treated with APC. Terminated early due to poor O2 saturation and possible aspiration. PMH:  Past Medical History:   Diagnosis Date    Alcohol abuse     QUIT IN 2014.  LONG USE    Anemia     GI BLEED AND CHRONIC, BASELINE 9.  11/25/16:  HGB: 7.6    Angiodysplasia of stomach 2016    and duodenum    Bladder incontinence 05/28/2014    ALSO BOWEL    Chronic airway obstruction, not elsewhere classified 5/28/2014    CKD (chronic kidney disease)     [facility]) [facility]     Dementia 5/28/2014    Hyperlipidemia 5/28/2014    Hypernatremia 11/25/2016 11/25/16:  177, 11/18/16:  80    Hypertension     Hypothyroid     Seizure ([facility] 2010    NO GRAND MAL. ON DILANTIN THE ENTIRE TIME    Tobacco abuse 5/28/2014    Tobacco abuse     ENTIRE ADULT LIFE. QUIT IN 2014, SNEAKS WHEN POSSIBLE       PSH:  Past Surgical History:   Procedure Laterality Date    HX COLONOSCOPY  APPROX 2005    UNKNOWN EGD    HX ENDOSCOPY  2016    bicap and clip       Allergies:  No Known Allergies    Home Medications:  Prior to Admission medications    Medication Sig Start Date End Date Taking? Authorizing Provider   pantoprazole (PROTONIX) 40 mg tablet Take 1 Tab by mouth two (2) times a day. 9/27/17  Yes Mickey Burkett MD   tamsulosin (FLOMAX) 0.4 mg capsule Take 1 Cap by mouth nightly. 9/27/17  Yes Mickey Burkett MD   divalproex ER (DEPAKOTE ER) 250 mg ER tablet Take 1 Tab by mouth nightly. 9/27/17  Yes Mickey Burkett MD   mirtazapine (REMERON) 15 mg tablet Take 1 Tab by mouth nightly. 9/27/17  Yes Mickey Burkett MD   levETIRAcetam (KEPPRA) 500 mg tablet Take 1 Tab by mouth two (2) times a day. 6/2/17  Yes Mickey Burkett MD   folic acid (FOLVITE) 1 mg tablet Take 1 Tab by mouth daily. 6/2/17  Yes Mickey Burkett MD   amLODIPine (NORVASC) 10 mg tablet Take 1 Tab by mouth daily. 6/2/17  Yes Mickey Burkett MD   losartan (COZAAR) 100 mg tablet Take 1 Tab by mouth daily. 6/2/17  Yes Mickey Burkett MD   levothyroxine (SYNTHROID) 100 mcg tablet Take 1 Tab by mouth Daily (before breakfast).  6/2/17  Yes Mickey Burkett MD       Hospital Medications:  Current Facility-Administered Medications   Medication Dose Route Frequency    sodium chloride (NS) flush 5-10 mL  5-10 mL IntraVENous Q8H    sodium chloride (NS) flush 5-10 mL  5-10 mL IntraVENous PRN    acetaminophen (TYLENOL) tablet 650 mg  650 mg Oral Q4H PRN    0.9% sodium chloride infusion 250 mL  250 mL IntraVENous PRN    dextrose 5% infusion  75 mL/hr IntraVENous CONTINUOUS    pantoprazole (PROTONIX) 40 mg in sodium chloride 0.9 % 10 mL injection  40 mg IntraVENous Q12H       Social History:  Social History   Substance Use Topics    Smoking status: Former Smoker     Packs/day: 0.50     Years: 50.00     Types: Cigarettes    Smokeless tobacco: Never Used      Comment: QUIT IN 2014, SNEAKS WHEN POSSIBLE    Alcohol use No      Comment: HEAVILY IN PAST YEARS, NONE SINCE 2014       Pt denies any history of drug use, blood transfusions, or tattoos. Family History:  History reviewed. No pertinent family history. Review of Systems:  A detailed 10 system ROS is obtained, with pertinent positives as listed above. All others are negative. Diet:      Objective:     Physical Exam:  Vitals:  Visit Vitals    /85    Pulse 74    Temp 97.8 °F (36.6 °C)    Resp 16    Ht 5' 8\" (1.727 m)    Wt 60.3 kg (133 lb)    SpO2 100%    BMI 20.22 kg/m2     Gen:  Pt is alert, cooperative, no acute distress  Skin:  Extremities and face reveal no rashes. HEENT: Sclerae anicteric. Extra-occular muscles are intact. No oral ulcers. No abnormal pigmentation of the lips. The neck is supple. Cardiovascular: Regular rate and rhythm. No murmurs, gallops, or rubs. Respiratory:  Comfortable breathing with no accessory muscle use. Clear breath sounds anteriorly with no wheezes, rales, or rhonchi. GI:  Abdomen nondistended, soft, and nontender. Normal active bowel sounds. No enlargement of the liver or spleen. No masses palpable. Rectal:  Deferred  Musculoskeletal:  No pitting edema of the lower legs. Neurological:  Pt alert and oriented to person and place.  Seems confused  Psychiatric:  Mood appears appropriate with judgement intact. Laboratory:    Recent Labs      09/29/17   0708  09/28/17   2104  09/28/17   1457  09/28/17   1304  09/27/17   1125   WBC  6.1   --    --   6.3   --    HGB  9.9*   --    --   7.0*   --    HCT  31.1*   --    --   23.6*   --    PLT  179   --    --   211   --    MCV  79.3*   --    --   80.3   --    NA  150*  155*   --   164*  166*   K  4.4   --    --   4.6  4.7   CL  120*   --    --   133*  131*   CO2  19*   --    --   20*  16*   BUN  32*   --    --   46*  43*   CREA  1.64*   --    --   2.24*  2.50*   CA  8.0*   --    --   8.2*  9.0   GLU  56*   --    --   73  83   AP   --    --    --   130  121*   SGOT   --    --    --   24  16   ALT   --    --    --   22  13   TBILI   --    --    --   0.3  0.3   ALB   --    --    --   3.0*  4.0   TP   --    --    --   8.8*  8.7*   PTP   --    --   10.8   --    --    INR   --    --   1.0   --    --    APTT   --    --   27.6   --    --           Assessment:     Active Problems:    GIB (gastrointestinal bleeding) (6/13/2014)      Acute hypernatremia (9/28/2017)      67 y.o. male with PMH of (but not limited to) chronic anemia on iron therapy, AVM with chronic recurrent bleeding, CKD, dementia, HLD, HTN, Hypothyroid, seizure history, COPD, and history of ETOH abuse formerly, who is admitted with severe anemia with hgb 6.9 from his PCP office. He is seen in consultation at the request of Dr. rAchie Dorsey for severe anemia likely related to chronic AVMs seen in the duodenum, jejunum, and cecum historically. He is also found to be hypernatremic with Na 166 on admission and 150 now.  hgb is 9.9 after transfusion of 2 units. Home medications do not include blood thinners. Hemodynamically, pt is stable. Creatinine 2.5 on admission now 1.6. Plan:     - EGD today with possible APC  - NPO  - monitor H/H and transfuse prn. Sudarshan Bai      Patient is seen and examined in collaboration with Dr. Molinda Peabody.   Assessment and plan as per  Brent Andrews.  ATTENDING NOTE:  I have seen and examined the patient along with my NP/PA. The assessment and plan above is my own.       Danna Cabezas MD

## 2017-09-29 NOTE — PROGRESS NOTES
Dr. Sid Rivera notified of BP being 76 systolic, pt being hypotensive. Pt placed in Trendelenburg position and bolus of LR given. Dr. Sid Rivera came. Dr. Jeremy Tesfaye also notified. Both physician's assessed and placed orders.

## 2017-09-29 NOTE — PROGRESS NOTES
Hospitalist Progress Note    2017  Admit Date: 2017 12:46 PM   NAME: Jimi Bello   :  1945   MRN:  [id number]   Attending: Rosmery Plaza MD  PCP:  Renetta Devine MD    SUBJECTIVE:   Patient 70 AAM with pmhx of MEGAN, CKD, dementia, HLP, HTN, hypothyroidism, seizures, copd and hx of GI bleed from AVM's presents to ED from pcp's office for concern of drop in hgb. ED workup notable for Na 166, Hg 6.9 (baseline 9.7). - seen s/p procedure. Denies complaints. Review of Systems negative with exception of pertinent positives noted above  PHYSICAL EXAM     Visit Vitals    /88    Pulse 63    Temp 97.9 °F (36.6 °C)    Resp 14    Ht 5' 8\" (1.727 m)    Wt 60.3 kg (133 lb)    SpO2 97%    BMI 20.22 kg/m2      Temp (24hrs), Av.6 °F (36.4 °C), Min:96.6 °F (35.9 °C), Max:98 °F (36.7 °C)    Oxygen Therapy  O2 Sat (%): 97 % (17 1539)  Pulse via Oximetry: 67 beats per minute (End GI) (17 1320)  O2 Device: Room air (17 1206)  O2 Flow Rate (L/min): 2 l/min (17 1158)    Intake/Output Summary (Last 24 hours) at 17 1741  Last data filed at 17 1539   Gross per 24 hour   Intake           2968.7 ml   Output                0 ml   Net           2968.7 ml      General: Thin appearing   Lungs:  CTA Bilaterally. Heart:  Regular rate and rhythm,  No murmur, rub, or gallop  Abdomen: Soft, Non distended, Non tender, Positive bowel sounds  Extremities: No cyanosis, clubbing or edema  Neurologic:  No focal deficits    ASSESSMENT      Active Hospital Problems    Diagnosis Date Noted    Acute hypernatremia 2017    GIB (gastrointestinal bleeding) 2014     A/P  - GIB - s/p push enteroscopy  with finding of 10 AVM's duodenum/jejunem s/p APC cautery. Continue serial h/h. - Acute hypernatremia - correcting with D5 ivf.   - hx of seizures - cont keppra/depakote. Check drug levels.     DVT Prophylaxis: scds'    Disposition - avelina w/ PT. Concerned he may need STR. CM consulted.      Signed By: Lucho Jonas,      September 29, 2017

## 2017-09-29 NOTE — PROCEDURES
Push Enteroscopy Procedure Note    Indications: Anemia, PMH AVM    Anesthesia/Sedation: MAC IV     Pre-Procedure Physical:    Current Facility-Administered Medications   Medication Dose Route Frequency    sodium chloride (NS) flush 5-10 mL  5-10 mL IntraVENous Q8H    sodium chloride (NS) flush 5-10 mL  5-10 mL IntraVENous PRN    acetaminophen (TYLENOL) tablet 650 mg  650 mg Oral Q4H PRN    0.9% sodium chloride infusion 250 mL  250 mL IntraVENous PRN    dextrose 5% infusion  75 mL/hr IntraVENous CONTINUOUS    pantoprazole (PROTONIX) 40 mg in sodium chloride 0.9 % 10 mL injection  40 mg IntraVENous Q12H     Facility-Administered Medications Ordered in Other Encounters   Medication Dose Route Frequency    propofol (DIPRIVAN) 10 mg/mL injection    CONTINUOUS      Review of patient's allergies indicates no known allergies. Patient Vitals for the past 8 hrs:   BP Temp Pulse Resp SpO2   09/29/17 1001 155/76 - 68 16 97 %   09/29/17 0741 133/85 97.8 °F (36.6 °C) 74 16 100 %       Exam      Airway: clear   Heart: normal S1and S2    Lungs: clear bilateral  Abdomen: soft, nontender, bowel sounds present and normal in all quads   Mental Status: awake, alert and oriented to person, place and time          Procedure Details     Informed consent was obtained for the procedure, including conscious sedation. Risks of pancreatitis, infection, perforation, hemorrhage, adverse drug reaction and aspiration were discussed. The patient was placed in the left lateral decubitus position. Based on the pre-procedure assessment, including review of the patient's medical history, medications, allergies, and review of systems, he had been deemed to be an appropriate candidate for conscious sedation; he was therefore sedated with the medications listed below. He was monitored continuously with ECG tracing, pulse oximetry, blood pressure monitoring, and direct observation.       The peds colonoscope was inserted into the mouth and advanced under direct vision to the second portion of the duodenum. A careful inspection was made as the gastroscope was withdrawn, including a retroflexed view of the proximal stomach; findings and interventions are described below. Appropriate photodocumentation was obtained. Findings:   Esophagus- Normal.  Stomach- Normal.  Duodenum- Ten small nonbleeding AVM's in the duodenum and jejunum were cauterized by APC. Jejunum- Ten small nonbleeding AVM's in the duodenum and jejunum were cauterized by APC. Therapies: APC    Specimens: None    Estimated Blood Loss: 0 cc           Complications:   None; patient tolerated the procedure well. Attending Attestation:  I performed the procedure. Impression:    Ten small nonbleeding AVM's in the duodenum and jejunum were cauterized by APC. Recommendations:  Repeat APC as needed.

## 2017-09-29 NOTE — PROGRESS NOTES
TRANSFER - IN REPORT:    Verbal report received from [facility] (name) on Lisa Lawrence  being received from room 237  (unit) for ordered procedure. Report consisted of patients Situation, Background, Assessment and   Recommendations(SBAR). Information from the following report(s) SBAR was reviewed with the receiving nurse. Opportunity for questions and clarification was provided. Awaiting transport to bring pt to the GI Lab at this time.

## 2017-09-29 NOTE — PROGRESS NOTES
TRANSFER - OUT REPORT:    Verbal report given to Consuelo Buerger RN on [employer]  being transferred to [facility] for ordered procedure       Report consisted of patients Situation, Background, Assessment and   Recommendations(SBAR). Information from the following report(s) SBAR, Kard[employer], [facility] and Recent Results was reviewed with the receiving nurse. Lines:   Peripheral IV 09/29/17 Right Forearm (Active)   Site Assessment Clean, dry, & intact 9/29/2017  7:25 AM   Phlebitis Assessment 0 9/29/2017  7:25 AM   Infiltration Assessment 0 9/29/2017  7:25 AM   Dressing Status Clean, dry, & intact 9/29/2017  7:25 AM   Dressing Type Transparent 9/29/2017  7:25 AM   Hub Color/Line Status Flushed; Infusing 9/29/2017  7:25 AM        Opportunity for questions and clarification was provided.       Patient transported with:   [employer]

## 2017-09-30 LAB
ANION GAP SERPL CALC-SCNC: 10 MMOL/L (ref 7–16)
BUN SERPL-MCNC: 29 MG/DL (ref 8–23)
CALCIUM SERPL-MCNC: 8.2 MG/DL (ref 8.3–10.4)
CHLORIDE SERPL-SCNC: 117 MMOL/L (ref 98–107)
CO2 SERPL-SCNC: 21 MMOL/L (ref 21–32)
CREAT SERPL-MCNC: 1.78 MG/DL (ref 0.8–1.5)
ERYTHROCYTE [DISTWIDTH] IN BLOOD BY AUTOMATED COUNT: 20 % (ref 11.9–14.6)
GLUCOSE SERPL-MCNC: 93 MG/DL (ref 65–100)
HCT VFR BLD AUTO: 29.2 % (ref 41.1–50.3)
HGB BLD-MCNC: 9.5 G/DL (ref 13.6–17.2)
MCH RBC QN AUTO: 25.1 PG (ref 26.1–32.9)
MCHC RBC AUTO-ENTMCNC: 32.5 G/DL (ref 31.4–35)
MCV RBC AUTO: 77.2 FL (ref 79.6–97.8)
MM INDURATION POC: NORMAL MM (ref 0–5)
PLATELET # BLD AUTO: 210 K/UL (ref 150–450)
PMV BLD AUTO: ABNORMAL FL (ref 10.8–14.1)
POTASSIUM SERPL-SCNC: 3.8 MMOL/L (ref 3.5–5.1)
PPD POC: NORMAL NEGATIVE
RBC # BLD AUTO: 3.78 M/UL (ref 4.23–5.67)
SODIUM SERPL-SCNC: 146 MMOL/L (ref 136–145)
SODIUM SERPL-SCNC: 147 MMOL/L (ref 136–145)
SODIUM SERPL-SCNC: 148 MMOL/L (ref 136–145)
WBC # BLD AUTO: 11.1 K/UL (ref 4.3–11.1)

## 2017-09-30 PROCEDURE — 74011250637 HC RX REV CODE- 250/637: Performed by: INTERNAL MEDICINE

## 2017-09-30 PROCEDURE — 74011250636 HC RX REV CODE- 250/636: Performed by: INTERNAL MEDICINE

## 2017-09-30 PROCEDURE — 85027 COMPLETE CBC AUTOMATED: CPT | Performed by: INTERNAL MEDICINE

## 2017-09-30 PROCEDURE — 65270000029 HC RM PRIVATE

## 2017-09-30 PROCEDURE — 77030032490 HC SLV COMPR SCD KNE COVD -B

## 2017-09-30 PROCEDURE — 97162 PT EVAL MOD COMPLEX 30 MIN: CPT

## 2017-09-30 PROCEDURE — 74011000250 HC RX REV CODE- 250: Performed by: INTERNAL MEDICINE

## 2017-09-30 PROCEDURE — 36415 COLL VENOUS BLD VENIPUNCTURE: CPT | Performed by: INTERNAL MEDICINE

## 2017-09-30 PROCEDURE — 80048 BASIC METABOLIC PNL TOTAL CA: CPT | Performed by: INTERNAL MEDICINE

## 2017-09-30 PROCEDURE — 84295 ASSAY OF SERUM SODIUM: CPT | Performed by: INTERNAL MEDICINE

## 2017-09-30 PROCEDURE — C9113 INJ PANTOPRAZOLE SODIUM, VIA: HCPCS | Performed by: INTERNAL MEDICINE

## 2017-09-30 RX ORDER — LANOLIN ALCOHOL/MO/W.PET/CERES
1 CREAM (GRAM) TOPICAL 2 TIMES DAILY WITH MEALS
Status: DISCONTINUED | OUTPATIENT
Start: 2017-09-30 | End: 2017-10-01 | Stop reason: HOSPADM

## 2017-09-30 RX ADMIN — Medication 10 ML: at 13:36

## 2017-09-30 RX ADMIN — LEVETIRACETAM 500 MG: 500 TABLET, FILM COATED ORAL at 17:01

## 2017-09-30 RX ADMIN — FERROUS SULFATE TAB 325 MG (65 MG ELEMENTAL FE) 325 MG: 325 (65 FE) TAB at 16:53

## 2017-09-30 RX ADMIN — FOLIC ACID 1 MG: 1 TABLET ORAL at 08:59

## 2017-09-30 RX ADMIN — DIVALPROEX SODIUM 250 MG: 250 TABLET, EXTENDED RELEASE ORAL at 22:08

## 2017-09-30 RX ADMIN — SODIUM CHLORIDE 40 MG: 9 INJECTION INTRAMUSCULAR; INTRAVENOUS; SUBCUTANEOUS at 22:07

## 2017-09-30 RX ADMIN — LEVOTHYROXINE SODIUM 100 MCG: 100 TABLET ORAL at 08:59

## 2017-09-30 RX ADMIN — SODIUM CHLORIDE 40 MG: 9 INJECTION INTRAMUSCULAR; INTRAVENOUS; SUBCUTANEOUS at 03:05

## 2017-09-30 RX ADMIN — Medication 10 ML: at 03:05

## 2017-09-30 RX ADMIN — Medication 10 ML: at 22:08

## 2017-09-30 RX ADMIN — SODIUM CHLORIDE 40 MG: 9 INJECTION INTRAMUSCULAR; INTRAVENOUS; SUBCUTANEOUS at 08:58

## 2017-09-30 RX ADMIN — LEVETIRACETAM 500 MG: 500 TABLET, FILM COATED ORAL at 08:59

## 2017-09-30 RX ADMIN — DEXTROSE MONOHYDRATE 75 ML/HR: 5 INJECTION, SOLUTION INTRAVENOUS at 13:36

## 2017-09-30 RX ADMIN — AMLODIPINE BESYLATE 10 MG: 10 TABLET ORAL at 08:59

## 2017-09-30 RX ADMIN — DEXTROSE MONOHYDRATE 125 ML/HR: 5 INJECTION, SOLUTION INTRAVENOUS at 22:11

## 2017-09-30 RX ADMIN — TAMSULOSIN HYDROCHLORIDE 0.4 MG: 0.4 CAPSULE ORAL at 22:07

## 2017-09-30 RX ADMIN — Medication 10 ML: at 05:53

## 2017-09-30 NOTE — PROGRESS NOTES
END OF SHIFT NOTE:    INTAKE/OUTPUT  09/29 0701 - 09/30 0700  In: 2149 [P.O.:120; I.V.:2029]  Out: -   Voiding: YES  Catheter: NO  Drain:      INCONTINENT OF BOWEL AND BLADDER        Flatus: Patient does have flatus present. Stool:  1 occurrences. Characteristics:  Stool Assessment  Stool Color: Brown  Stool Appearance: Soft  Stool Amount: Medium  Stool Source/Status: Rectum    Emesis: 0 occurrences. Characteristics:        VITAL SIGNS  Patient Vitals for the past 12 hrs:   Temp Pulse Resp BP SpO2   09/30/17 0305 97.9 °F (36.6 °C) 72 16 143/72 95 %   09/29/17 1858 97.6 °F (36.4 °C) 82 14 132/67 94 %       Pain Assessment  Pain Intensity 1: 0 (09/30/17 0100)        Patient Stated Pain Goal: 0    Ambulating  Yes    Shift report given to oncoming nurse at the bedside.     Vicki Randolph RN

## 2017-09-30 NOTE — PROGRESS NOTES
Hospitalist Progress Note    2017  Admit Date: 2017 12:46 PM   NAME: Ester Plaza   :  1945   MRN:  [id number]   Attending: Radha Rudolph MD  PCP:  Charito Obrien MD    SUBJECTIVE:   Patient 70 AAM with pmhx of MEGAN, CKD, dementia, HLP, HTN, hypothyroidism, seizures, copd and hx of GI bleed from AVM's presents to ED from pcp's office for concern of drop in hgb. ED workup notable for Na 166, Hg 6.9 (baseline 9.7). - seen s/p procedure. Denies complaints.  - no further report of GI bleeding. Pt is very somnolent but does answer \"Im doing ok\". Review of Systems negative with exception of pertinent positives noted above  PHYSICAL EXAM     Visit Vitals    /75    Pulse 67    Temp 98 °F (36.7 °C)    Resp 18    Ht 5' 8\" (1.727 m)    Wt 60.3 kg (133 lb)    SpO2 96%    BMI 20.22 kg/m2      Temp (24hrs), Av.9 °F (36.6 °C), Min:97.6 °F (36.4 °C), Max:98 °F (36.7 °C)    Oxygen Therapy  O2 Sat (%): 96 % (17 0738)  Pulse via Oximetry: 67 beats per minute (End GI) (17 1320)  O2 Device: Room air (17 1858)  O2 Flow Rate (L/min): 2 l/min (17 1158)    Intake/Output Summary (Last 24 hours) at 17 1509  Last data filed at 17 1043   Gross per 24 hour   Intake              387 ml   Output                0 ml   Net              387 ml      General: Thin appearing, lethargic  Lungs:  CTA Bilaterally. Heart:  Regular rate and rhythm,  No murmur, rub, or gallop  Abdomen: Soft, Non distended, Non tender, Positive bowel sounds  Extremities: No cyanosis, clubbing or edema  Neurologic:  No focal deficits    ASSESSMENT      Active Hospital Problems    Diagnosis Date Noted    Acute hypernatremia 2017    Dementia 2016    Hypernatremia 2016    Acute on chronic renal failure ([address] 2016    Seizure disorder ([facility] 2016     PETIT VS FOCAL.  NO GRAND MAL    On Phenytoin for past 4-5 yrs, since intial dx ~  AVM (arteriovenous malformation) of small bowel, acquired with hemorrhage ([facility] 06/15/2014    GIB (gastrointestinal bleeding) 06/13/2014     A/P  - GIB - s/p push enteroscopy 9/29 with finding of 10 AVM's duodenum/jejunem s/p APC cautery. Continue serial h/h. Will start daily iron replacement. - Acute hypernatremia - correcting with D5 ivf. cont  - hx of seizures - cont keppra/depakote. Check drug levels. - Dementia - seemingly advanced. Uncertain of baseline. Tried calling daughter but no answer. Is excessively lethargic today. Will discontinue remeron. Not eating/drinking much. Need to discuss with family how much he has been taking in at home.  Uncertain if he would be a PEG candidate vs maybe more appropriately home hospice    DVT Prophylaxis: scds      Signed By: Honorio Medina DO     September 30, 2017

## 2017-09-30 NOTE — PROGRESS NOTES
GI DAILY PROGRESS NOTE    Admit Date:  9/28/2017    Today's Date:  9/30/2017    CC:  gib s/p EGD with APC of AVM    Subjective:     Patient resting comfortably without reports from staff of GIB    Medications:   Current Facility-Administered Medications   Medication Dose Route Frequency    levETIRAcetam (KEPPRA) tablet 500 mg  500 mg Oral BID    levothyroxine (SYNTHROID) tablet 100 mcg  100 mcg Oral ACB    tamsulosin (FLOMAX) capsule 0.4 mg  0.4 mg Oral QHS    divalproex ER (DEPAKOTE ER) 24 hour tablet 250 mg  250 mg Oral QHS    amLODIPine (NORVASC) tablet 10 mg  10 mg Oral DAILY    folic acid (FOLVITE) tablet 1 mg  1 mg Oral DAILY    mirtazapine (REMERON) tablet 15 mg  15 mg Oral QHS    tuberculin injection 5 Units  5 Units IntraDERMal ONCE    sodium chloride (NS) flush 5-10 mL  5-10 mL IntraVENous Q8H    sodium chloride (NS) flush 5-10 mL  5-10 mL IntraVENous PRN    acetaminophen (TYLENOL) tablet 650 mg  650 mg Oral Q4H PRN    0.9% sodium chloride infusion 250 mL  250 mL IntraVENous PRN    dextrose 5% infusion  75 mL/hr IntraVENous CONTINUOUS    pantoprazole (PROTONIX) 40 mg in sodium chloride 0.9 % 10 mL injection  40 mg IntraVENous Q12H       Review of Systems:  A review of system was obtained, with pertinent positives as listed above. All others are negative. Objective:   Vitals:  Visit Vitals    /75    Pulse 67    Temp 98 °F (36.7 °C)    Resp 18    Ht 5' 8\" (1.727 m)    Wt 60.3 kg (133 lb)    SpO2 96%    BMI 20.22 kg/m2     Intake/Output:     09/28 1901 - 09/30 0700  In: 2790.7 [P.O.:120; I.V.:2029]  Out: -   Exam:  General appearance:asleept.     Data Review (Labs):    Recent Labs      09/30/17   0707  09/29/17   2034  09/29/17   1610  09/29/17   0708  09/28/17   2104  09/28/17   1457  09/28/17   1304  09/27/17   1125   WBC  11.1   --    --   6.1   --    --   6.3   --    HGB  9.5*   --    --   9.9*   --    --   7.0*   --    HCT  29.2*   --    --   31.1*   --    --   23.6*   -- PLT  210   --    --   179   --    --   211   --    MCV  77.2*   --    --   79.3*   --    --   80.3   --    NA  148*  149*  149*  150*  155*   --   164*  166*   K  3.8   --    --   4.4   --    --   4.6  4.7   CL  117*   --    --   120*   --    --   133*  131*   CO2  21   --    --   19*   --    --   20*  16*   BUN  29*   --    --   32*   --    --   46*  43*   CREA  1.78*   --    --   1.64*   --    --   2.24*  2.50*   CA  8.2*   --    --   8.0*   --    --   8.2*  9.0   GLU  93   --    --   56*   --    --   73  83   AP   --    --    --    --    --    --   130  121*   SGOT   --    --    --    --    --    --   24  16   ALT   --    --    --    --    --    --   22  13   TBILI   --    --    --    --    --    --   0.3  0.3   PTP   --    --    --    --    --   10.8   --    --    INR   --    --    --    --    --   1.0   --    --    APTT   --    --    --    --    --   27.6   --    --        Assessment:     Active Problems:    GIB (gastrointestinal bleeding) (6/13/2014) s/p EGD yesterday with Dr [facility] Kayser with APC of AVMs to the jejunum      Acute hypernatremia (9/28/2017)        Plan:     MEGAN needs to be addressed by primary or hematology with enteral or parenteral replacement which should be ONGOING due to likelihood of AVM's beyond our endoscopic reach. PPI for post egd ulcerations for a couple weeks.   F/u with me outpt and we will discuss when to consider repeat colonoscopy for hx of polyps, (last 2014)  Shahla Rodríguez MD

## 2017-09-30 NOTE — PROGRESS NOTES
OT NOTE:    OT order received, chart reviewed. Pt sleeping upon arrival, requests therapist to come back. Encouraged pt, pt remained in fetal position and politely refused. OT will attempt evaluation at a later time/date as schedule permits.  Thank you,    Brianna Schrader MS, OTR/L  9/30/2017

## 2017-09-30 NOTE — PROGRESS NOTES
Patient pulled out IV and is trying to hit at phlebotomist.  New orders received from Dr. Holly Hummel.

## 2017-09-30 NOTE — PROGRESS NOTES
Problem: Mobility Impaired (Adult and Pediatric)  Goal: *Acute Goals and Plan of Care (Insert Text)  STG:  (1.)Mr. Sage Galvin will move from supine to sit and sit to supine , scoot up and down and roll side to side with CONTACT GUARD ASSIST within 3 day(s). (2.)Mr. Sage Galvin will transfer from bed to chair and chair to bed with MODERATE ASSIST using the least restrictive device within 3 day(s). (3.)Mr. Sage Galvin will ambulate with MODERATE ASSIST for 10 feet with the least restrictive device within 3 day(s). (4.)Mr. Sage Galvin will follow commands >/= 50% of the time throughout mobility to improve safety with functional mobility within 3 days. LTG:  (1.)Mr. Sage Galvin will move from supine to sit and sit to supine , scoot up and down and roll side to side in bed with STAND BY ASSIST within 7 day(s). (2.)Mr. Sage Galvin will transfer from bed to chair and chair to bed with CONTACT GUARD ASSIST using the least restrictive device within 7 day(s). (3.)Mr. Sage Galvin will ambulate with CONTACT GUARD ASSIST for 25 feet with the least restrictive device within 7 day(s). (4.)Mr. Sage Galvin will follow commands >/= 75% of the time throughout mobility to improve safety with functional mobility within 7 days.   ________________________________________________________________________________________________      PHYSICAL THERAPY: INITIAL ASSESSMENT, AM 9/30/2017  INPATIENT: Hospital Day: 3  Payor: Miguel Simpson / Plan: [address] [place] [address] HMO / Product Type: [employer] /      NAME/AGE/GENDER: Ghazala Davis is a 67 y.o. male        PRIMARY DIAGNOSIS: Gastrointestinal hemorrhage, unspecified gastrointestinal hemorrhage type [K92.2] <principal problem not specified> <principal problem not specified>  Procedure(s) (LRB):  ESOPHAGOGASTRODUODENOSCOPY (EGD) WITH PUSH ENTEROSCOPY (N/A)  ENDOSCOPIC ARGON PLASMA COAGULATION (N/A)  1 Day Post-Op  ICD-10: Treatment Diagnosis:       · Difficulty in walking, Not elsewhere classified (R26.2)   Precaution/Allergies:  Review of patient's allergies indicates no known allergies. ASSESSMENT:      Mr. Kayla Otero is a 67 y.o. male with GI hemorrhage. Patient very confused on contact, so unable to obtain history. He does report he typically ambulates. He exhibits decreased understanding of his deficits and poor safety awareness. Minimal assistance to transfer to sitting. Moderate assist to stand and significant posterior lean. Patient attempting to step away from bed despite poor standing balance and was able to be redirected. Decreased command following noted throughout session. Required frequent redirection to prevent patient from pulling IV. He returned to supine with minimal assist and fell asleep upon laying down. Unsure of patient's baseline mobility, however he does present at an increased risk of falling. Per chart, he has history of dementia. Jonna Nayak is currently functioning below his baseline and would benefit from skilled PT during acute care stay to maximize safety and independence with functional mobility. This section established at most recent assessment   PROBLEM LIST (Impairments causing functional limitations):  1. Decreased Strength  2. Decreased ADL/Functional Activities  3. Decreased Transfer Abilities  4. Decreased Ambulation Ability/Technique  5. Decreased Balance  6. Decreased Knowledge of Precautions  7. Decreased [place] with Home Exercise Program  8. Decreased Cognition    INTERVENTIONS PLANNED: (Benefits and precautions of physical therapy have been discussed with the patient.)  1. Balance Exercise  2. Bed Mobility  3. Family Education  4. Gait Training  5. Home Exercise Program (HEP)  6. Therapeutic Activites  7. Therapeutic Exercise/Strengthening  8. Transfer Training  9. Patient Education  10.  Group Therapy      TREATMENT PLAN: Frequency/Duration: 3 times a week for duration of hospital stay  Rehabilitation Potential For Stated Goals: GUARDED      RECOMMENDED REHABILITATION/EQUIPMENT: (at time of discharge pending progress): Due to the probability of continued deficits (see above) this patient will likely need continued skilled physical therapy after discharge. Equipment:   · None at this time                   HISTORY:   History of Present Injury/Illness (Reason for Referral):  Per MD Note: \"Patient is 70year old AAM with a PMH of chronic iron deficiency anemia, CKD, dementia, HLD, HTN, hypothyroidism, seizures, COPD, and h/o GI bleeds from AVMs presented to the ER today after being sent from his pcp's office with concern for GIB with an acute drop in his hb. Also found to be very hypernatremic. Hospitalist asked to admit. Pt is a poor historian- denies belly pain- unclear if he had bloody stools or not but endorsed to the ER doctor. No family member available at the time of H&P. Hs taken from er doc and his chart. \"  Past Medical History/Comorbidities:   Mr. Cesar Lamar  has a past medical history of Alcohol abuse; Anemia; Angiodysplasia of stomach (2016); Bladder incontinence (05/28/2014); Chronic airway obstruction, not elsewhere classified (5/28/2014); CKD (chronic kidney disease); COPD (chronic obstructive pulmonary disease) ([facility] [address] Dementia (5/28/2014); Hyperlipidemia (5/28/2014); Hypernatremia (11/25/2016); Hypertension; Hypothyroid; Seizure ([address] (2010); Tobacco abuse (5/28/2014); and Tobacco abuse. Mr. Cesar Lamar  has a past surgical history that includes colonoscopy (APPROX 2005) and endoscopy (2016). Social History/Living Environment:   Home Environment: Private residence  One/Two Story Residence: One story  Living Alone: No  Support Systems: Therapist, Child(lissette)  Patient Expects to be Discharged to[de-identified] Private residence  Current DME Used/Available at Home: Ty Pandey  Prior Level of Function/Work/Activity:  Unknown.       Number of Personal Factors/Comorbidities that affect the Plan of Care: 3+: HIGH COMPLEXITY   EXAMINATION:   Most Recent Physical Functioning:   Gross Assessment:  Strength: Generally decreased, functional  Coordination: Generally decreased, functional               Posture:  Posture (WDL): Exceptions to WDL  Posture Assessment: Forward head, Rounded shoulders  Balance:  Sitting: Impaired  Sitting - Static: Fair (occasional)  Sitting - Dynamic: Poor (constant support)  Standing: Impaired  Standing - Static: Poor Bed Mobility:  Supine to Sit: Minimum assistance  Sit to Supine: Minimum assistance  Scooting: Contact guard assistance  Wheelchair Mobility:     Transfers:  Sit to Stand: Moderate assistance  Stand to Sit: Moderate assistance  Gait:             Body Structures Involved:  1. Digestive Structures  2. Muscles Body Functions Affected:  1. Mental  2. Sensory/Pain  3. Neuromusculoskeletal  4. Movement Related  5. Digestive Activities and Participation Affected:  1. Mobility  2. Self Care  3. Domestic Life  4. Interpersonal Interactions and Relationships  5. Community, Social and [place]   Number of elements that affect the Plan of Care: 4+: HIGH COMPLEXITY   CLINICAL PRESENTATION:   Presentation: Evolving clinical presentation with changing clinical characteristics: MODERATE COMPLEXITY   CLINICAL DECISION MAKIN [facility] Inpatient Short Form  How much difficulty does the patient currently have. .. Unable A Lot A Little None   1. Turning over in bed (including adjusting bedclothes, sheets and blankets)? [ ] 1   [X] 2   [ ] 3   [ ] 4   2. Sitting down on and standing up from a chair with arms ( e.g., wheelchair, bedside commode, etc.)   [ ] 1   [X] 2   [ ] 3   [ ] 4   3. Moving from lying on back to sitting on the side of the bed? [ ] 1   [X] 2   [ ] 3   [ ] 4   How much help from another person does the patient currently need. .. Total A Lot A Little None   4. Moving to and from a bed to a chair (including a wheelchair)? [X] 1   [ ] 2   [ ] 3   [ ] 4   5.   Need to walk in hospital room? [X] 1   [ ] 2   [ ] 3   [ ] 4   6. Climbing 3-5 steps with a railing? [X] 1   [ ] 2   [ ] 3   [ ] 4   © 2007, Trustees of [address] [place] [address] [address] [address], under license to [employer] All rights reserved    Score:  Initial: 9 Most Recent: X (Date: -- )     Interpretation of Tool:  Represents activities that are increasingly more difficult (i.e. Bed mobility, Transfers, Gait). Score 24 23 22-20 19-15 14-10 9-7 6       Modifier CH CI CJ CK CL CM CN         · Mobility - Walking and Moving Around:               - CURRENT STATUS:    CM - 80%-99% impaired, limited or restricted               - GOAL STATUS:           CK - 40%-59% impaired, limited or restricted               - D/C STATUS:                       ---------------To be determined---------------  Payor: [employer] MEDICARE / Plan: [address] [place] [address] HMO / Product Type: Managed Care Medicare /       Medical Necessity:     · Patient demonstrates guarded rehab potential due to higher previous functional level. Reason for Services/Other Comments:  · Patient continues to require modification of therapeutic interventions to increase complexity of exercises. Use of outcome tool(s) and clinical judgement create a POC that gives a: Questionable prediction of patient's progress: MODERATE COMPLEXITY                 TREATMENT:   (In addition to Assessment/Re-Assessment sessions the following treatments were rendered)   Pre-treatment Symptoms/Complaints:  none  Pain: Initial:   Pain Intensity 1: 0  Post Session:  0/10      Assessment/Reassessment only, no treatment provided today     Braces/Orthotics/Lines/Etc:   · IV  · O2 Device: Room air  Treatment/Session Assessment:    · Response to Treatment:  Patient tolerated treatment well.   · Interdisciplinary Collaboration:  · Physical Therapist  · Registered Nurse  · After treatment position/precautions:  · Supine in bed  · Bed alarm/tab alert on  · Bed/Chair-wheels locked  · Bed in low position  · Call light within reach  · RN notified  · Compliance with Program/Exercises: Will assess as treatment progresses. · Recommendations/Intent for next treatment session: \"Next visit will focus on advancements to more challenging activities and reduction in assistance provided\".   Total Treatment Duration:  PT Patient Time In/Time Out  Time In: 1129  Time Out: Harlan Caraballo DPT

## 2017-09-30 NOTE — PROGRESS NOTES
Patient is incontinet of bowel and bladder. Periods of confusion. Alert to person only. Patient has pulled out another peripheral IV. Lying in bed and call light in reach. Nurse at bedside.

## 2017-10-01 VITALS
SYSTOLIC BLOOD PRESSURE: 114 MMHG | OXYGEN SATURATION: 92 % | TEMPERATURE: 99 F | WEIGHT: 133 LBS | HEIGHT: 68 IN | DIASTOLIC BLOOD PRESSURE: 64 MMHG | BODY MASS INDEX: 20.16 KG/M2 | RESPIRATION RATE: 16 BRPM | HEART RATE: 91 BPM

## 2017-10-01 LAB
ANION GAP SERPL CALC-SCNC: 9 MMOL/L (ref 7–16)
BUN SERPL-MCNC: 21 MG/DL (ref 8–23)
CALCIUM SERPL-MCNC: 8.2 MG/DL (ref 8.3–10.4)
CHLORIDE SERPL-SCNC: 113 MMOL/L (ref 98–107)
CO2 SERPL-SCNC: 23 MMOL/L (ref 21–32)
CREAT SERPL-MCNC: 1.55 MG/DL (ref 0.8–1.5)
ERYTHROCYTE [DISTWIDTH] IN BLOOD BY AUTOMATED COUNT: 20.2 % (ref 11.9–14.6)
GLUCOSE SERPL-MCNC: 77 MG/DL (ref 65–100)
HCT VFR BLD AUTO: 29.3 % (ref 41.1–50.3)
HGB BLD-MCNC: 9.4 G/DL (ref 13.6–17.2)
MCH RBC QN AUTO: 24.9 PG (ref 26.1–32.9)
MCHC RBC AUTO-ENTMCNC: 32.1 G/DL (ref 31.4–35)
MCV RBC AUTO: 77.7 FL (ref 79.6–97.8)
MM INDURATION POC: NORMAL MM (ref 0–5)
PLATELET # BLD AUTO: 172 K/UL (ref 150–450)
PMV BLD AUTO: 10.1 FL (ref 10.8–14.1)
POTASSIUM SERPL-SCNC: 3.6 MMOL/L (ref 3.5–5.1)
PPD POC: NORMAL NEGATIVE
RBC # BLD AUTO: 3.77 M/UL (ref 4.23–5.67)
SODIUM SERPL-SCNC: 144 MMOL/L (ref 136–145)
SODIUM SERPL-SCNC: 145 MMOL/L (ref 136–145)
SODIUM SERPL-SCNC: 146 MMOL/L (ref 136–145)
WBC # BLD AUTO: 5.5 K/UL (ref 4.3–11.1)

## 2017-10-01 PROCEDURE — 97166 OT EVAL MOD COMPLEX 45 MIN: CPT

## 2017-10-01 PROCEDURE — 74011250636 HC RX REV CODE- 250/636: Performed by: INTERNAL MEDICINE

## 2017-10-01 PROCEDURE — 84295 ASSAY OF SERUM SODIUM: CPT | Performed by: INTERNAL MEDICINE

## 2017-10-01 PROCEDURE — 74011000250 HC RX REV CODE- 250: Performed by: INTERNAL MEDICINE

## 2017-10-01 PROCEDURE — 36415 COLL VENOUS BLD VENIPUNCTURE: CPT | Performed by: INTERNAL MEDICINE

## 2017-10-01 PROCEDURE — C9113 INJ PANTOPRAZOLE SODIUM, VIA: HCPCS | Performed by: INTERNAL MEDICINE

## 2017-10-01 PROCEDURE — 74011250637 HC RX REV CODE- 250/637: Performed by: INTERNAL MEDICINE

## 2017-10-01 PROCEDURE — 80048 BASIC METABOLIC PNL TOTAL CA: CPT | Performed by: INTERNAL MEDICINE

## 2017-10-01 PROCEDURE — 85027 COMPLETE CBC AUTOMATED: CPT | Performed by: INTERNAL MEDICINE

## 2017-10-01 RX ORDER — LANOLIN ALCOHOL/MO/W.PET/CERES
325 CREAM (GRAM) TOPICAL 2 TIMES DAILY WITH MEALS
Qty: 60 TAB | Refills: 0 | Status: SHIPPED | OUTPATIENT
Start: 2017-10-01 | End: 2018-01-12 | Stop reason: SDUPTHER

## 2017-10-01 RX ADMIN — SODIUM CHLORIDE 40 MG: 9 INJECTION INTRAMUSCULAR; INTRAVENOUS; SUBCUTANEOUS at 10:27

## 2017-10-01 RX ADMIN — Medication 10 ML: at 05:45

## 2017-10-01 RX ADMIN — FERROUS SULFATE TAB 325 MG (65 MG ELEMENTAL FE) 325 MG: 325 (65 FE) TAB at 07:35

## 2017-10-01 RX ADMIN — LEVOTHYROXINE SODIUM 100 MCG: 100 TABLET ORAL at 07:35

## 2017-10-01 RX ADMIN — FOLIC ACID 1 MG: 1 TABLET ORAL at 10:27

## 2017-10-01 RX ADMIN — LEVETIRACETAM 500 MG: 500 TABLET, FILM COATED ORAL at 10:27

## 2017-10-01 RX ADMIN — Medication 10 ML: at 14:25

## 2017-10-01 RX ADMIN — AMLODIPINE BESYLATE 10 MG: 10 TABLET ORAL at 10:27

## 2017-10-01 NOTE — PROGRESS NOTES
END OF SHIFT NOTE:    INTAKE/OUTPUT  09/29 0701 - 09/30 0700  In: 2149 [P.O.:120; I.V.:2029]  Out: -   Voiding: YES  Catheter: NO  Drain:              Flatus: Patient does have flatus present. Stool:  0 occurrences. Characteristics:  Stool Assessment  Stool Color: Brown  Stool Appearance: Soft  Stool Amount: Medium  Stool Source/Status: Rectum    Emesis: 0 occurrences. Characteristics:        VITAL SIGNS  Patient Vitals for the past 12 hrs:   Temp Pulse Resp BP SpO2   09/30/17 1917 97.9 °F (36.6 °C) 78 16 141/71 97 %   09/30/17 1550 98.1 °F (36.7 °C) 64 16 (!) 159/92 96 %       Pain Assessment  Pain Intensity 1: 0 (09/30/17 1917)        Patient Stated Pain Goal: 0    Ambulating  Yes, with unsteady gait, assist x 1    Shift report given to oncoming nurse at the bedside.     Nikita Hardy RN

## 2017-10-01 NOTE — DISCHARGE SUMMARY
Hospitalist Discharge Summary     Patient ID:  Arnie Severino  [id number]  1 y.o.  1945  Admit date: 9/28/2017 12:46 PM  Discharge date and time: 10/1/2017  Attending: Cathlean Kawasaki, MD  PCP:  Teresa Kirby MD  Treatment Team: Attending Provider: Cathlean Kawasaki, MD; Utilization Review: Pineda Feliciano RN; Care Manager: Alee Gonzalez RN    Principal Diagnosis <principal problem not specified>   Active Problems:    Dementia (11/25/2016)      GIB (gastrointestinal bleeding) (6/13/2014)      Seizure disorder ([facility] [address] (11/25/2016)      Overview: PETIT VS FOCAL. NO GRAND MAL            On Phenytoin for past 4-5 yrs, since intial dx ~2009      Hypernatremia (11/25/2016)      AVM (arteriovenous malformation) of small bowel, acquired with hemorrhage ([facility] [address] (6/15/2014)      Acute on chronic renal failure ([facility] [address] (11/25/2016)      Acute hypernatremia (9/28/2017)             Hospital Course:  Please refer to the admission H&P for details of presentation. In summary, patient 70 AAM with pmhx of MEGAN, CKD, dementia, HLP, HTN, hypothyroidism, seizures, copd and hx of GI bleed from AVM's presents to ED from pcp's office for concern of drop in hgb. ED workup notable for Na 166, Hg 6.9 (baseline 9.7). GI consulted. Underwent push enteroscopy 9/29 with finding of 10 AVM's s/p APC cautery. Placed on oral iron supplementation. Hypernatremia corrected with IVF. ON day of discharge pt is much improved - awake, alert but still disoriented at baseline w/ hx of dementia. He is eating and drinking well. Significant Diagnostic Studies:   Final result (Exam End: 9/29/2017 12:40 PM) Open        Study Result      KUB 9/29/2017     INDICATION: Hypotension after EGD     2 views display mild gaseous distention of the small and large bowel. Lung bases  are clear. There is no definite evidence of free air.  There are no abnormal  masses or calcifications and the bony structures are intact.     IMPRESSION  IMPRESSION: Ileus             Labs: Results:       Chemistry Recent Labs      10/01/17   0625  09/30/17   2135  09/30/17   1547  09/30/17 0707 09/29/17   0708 09/28/17   1304   GLU  77   --    --   93   --   56*   --   73   NA  145  144  146*  147*  148*   < >  150*   < >  164*   K  3.6   --    --   3.8   --   4.4   --   4.6   CL  113*   --    --   117*   --   120*   --   133*   CO2  23   --    --   21   --   19*   --   20*   BUN  21   --    --   29*   --   32*   --   46*   CREA  1.55*   --    --   1.78*   --   1.64*   --   2.24*   CA  8.2*   --    --   8.2*   --   8.0*   --   8.2*   AGAP  9   --    --   10   --   11   --   11   AP   --    --    --    --    --    --    --   130   TP   --    --    --    --    --    --    --   8.8*   ALB   --    --    --    --    --    --    --   3.0*   GLOB   --    --    --    --    --    --    --   5.8*   AGRAT   --    --    --    --    --    --    --   0.5*    < > = values in this interval not displayed. CBC w/Diff Recent Labs      10/01/17   0625  09/30/17   0707  09/29/17   0708  09/28/17   1304   WBC  5.5  11.1  6.1  6.3   RBC  3.77*  3.78*  3.92*  2.94*   HGB  9.4*  9.5*  9.9*  7.0*   HCT  29.3*  29.2*  31.1*  23.6*   PLT  172  210  179  211   GRANS   --    --   63  58   LYMPH   --    --   25  32   EOS   --    --   3  4      Cardiac Enzymes No results for input(s): CPK, CKND1, NADIA in the last 72 hours. No lab exists for component: CKRMB, TROIP   Coagulation Recent Labs      09/28/17   1457   PTP  10.8   INR  1.0   APTT  27.6       Lipid Panel Lab Results   Component Value Date/Time    Cholesterol, total 161 09/27/2017 11:25 AM    Cholesterol (POC) 168 05/30/2014 10:00 AM    HDL Cholesterol 47 09/27/2017 11:25 AM    LDL, calculated 101 09/27/2017 11:25 AM    VLDL, calculated 13 09/27/2017 11:25 AM    Triglyceride 64 09/27/2017 11:25 AM    Triglycerides (POC) 46 05/30/2014 10:00 AM      BNP No results for input(s): BNPP in the last 72 hours. Liver Enzymes Recent Labs      09/28/17   1304   TP  8.8*   ALB  3.0*   AP  130   SGOT  24      Thyroid Studies Lab Results   Component Value Date/Time    T4, Total 5.9 11/18/2016 12:12 PM    TSH 0.352 04/16/2017 11:25 AM            Discharge Exam:  Visit Vitals    /65    Pulse 68    Temp 98.1 °F (36.7 °C)    Resp 17    Ht 5' 8\" (1.727 m)    Wt 60.3 kg (133 lb)    SpO2 98%    BMI 20.22 kg/m2     General appearance: alert, cooperative, no distress, appears stated age  Lungs: clear to auscultation bilaterally  Heart: regular rate and rhythm, S1, S2 normal, no murmur, click, rub or gallop  Abdomen: soft, non-tender. Bowel sounds normal. No masses,  no organomegaly  Extremities: no cyanosis or edema  Neurologic: Grossly normal    Disposition: home  Discharge Condition: stable  Patient Instructions:   Current Discharge Medication List      START taking these medications    Details   ferrous sulfate 325 mg (65 mg iron) tablet Take 1 Tab by mouth two (2) times daily (with meals). Qty: 60 Tab, Refills: 0         CONTINUE these medications which have NOT CHANGED    Details   pantoprazole (PROTONIX) 40 mg tablet Take 1 Tab by mouth two (2) times a day. Qty: 180 Tab, Refills: 1    Associated Diagnoses: Gastroesophageal reflux disease without esophagitis      tamsulosin (FLOMAX) 0.4 mg capsule Take 1 Cap by mouth nightly. Qty: 90 Cap, Refills: 1    Associated Diagnoses: Benign non-nodular prostatic hyperplasia without lower urinary tract symptoms      divalproex ER (DEPAKOTE ER) 250 mg ER tablet Take 1 Tab by mouth nightly. Qty: 90 Tab, Refills: 2    Associated Diagnoses: Dementia associated with alcoholism without behavioral disturbance (HCC)      mirtazapine (REMERON) 15 mg tablet Take 1 Tab by mouth nightly. Qty: 90 Tab, Refills: 2    Associated Diagnoses: Primary insomnia      folic acid (FOLVITE) 1 mg tablet Take 1 Tab by mouth daily.   Qty: 30 Tab, Refills: 3      amLODIPine (NORVASC) 10 mg tablet Take 1 Tab by mouth daily. Qty: 90 Tab, Refills: 3    Associated Diagnoses: Essential hypertension      losartan (COZAAR) 100 mg tablet Take 1 Tab by mouth daily. Qty: 90 Tab, Refills: 3    Associated Diagnoses: Essential hypertension      levothyroxine (SYNTHROID) 100 mcg tablet Take 1 Tab by mouth Daily (before breakfast).   Qty: 90 Tab, Refills: 3    Associated Diagnoses: Acquired hypothyroidism             Activity: as tolerated  Diet: GI soft      Follow-up  ·   PCP in 1-2 weeks  Time spent to discharge patient 35 minutes  Signed:  Tracey King DO  10/1/2017  8:20 AM

## 2017-10-01 NOTE — DISCHARGE INSTRUCTIONS
Gastrointestinal Bleeding: Care Instructions  Your Care Instructions    The digestive or gastrointestinal tract goes from the mouth to the anus. It is often called the GI tract. Bleeding can happen anywhere in the GI tract. It may be caused by an ulcer, an infection, or cancer. It may also be caused by medicines such as aspirin or ibuprofen. Light bleeding may not cause any symptoms at first. But if you continue to bleed for a while, you may feel very weak or tired. Sudden, heavy bleeding means you need to see a doctor right away. This kind of bleeding can be very dangerous. But it can usually be cured or controlled. The doctor may do some tests to find the cause of your bleeding. Follow-up care is a key part of your treatment and safety. Be sure to make and go to all appointments, and call your doctor if you are having problems. It's also a good idea to know your test results and keep a list of the medicines you take. How can you care for yourself at home? · Be safe with medicines. Take your medicines exactly as prescribed. Call your doctor if you think you are having a problem with your medicine. You will get more details on the specific medicines your doctor prescribes. · Do not take aspirin or other anti-inflammatory medicines, such as naproxen (Aleve) or ibuprofen (Advil, Motrin), without talking to your doctor first. Ask your doctor if it is okay to use acetaminophen (Tylenol). · Do not drink alcohol. · The bleeding may make you lose iron. So it's important to eat foods that have a lot of iron. These include red meat, shellfish, poultry, and eggs. They also include beans, raisins, whole-grain breads, and leafy green vegetables. If you want help planning meals, you can make an appointment with a dietitian. When should you call for help? Call 911 anytime you think you may need emergency care. For example, call if:  · You have sudden, severe belly pain.   · You vomit blood or what looks like coffee grounds. · You passed out (lost consciousness). · Your stools are maroon or very bloody. Call your doctor now or seek immediate medical care if:  · You are dizzy or lightheaded, or you feel like you may faint. · Your stools are black and look like tar, or they have streaks of blood. · You have belly pain. · You vomit or have nausea. · You have trouble swallowing, or it hurts when you swallow. Watch closely for changes in your health, and be sure to contact your doctor if:  · You do not get better as expected. Where can you learn more? Go to http://malgorazta-violeta.info/. Enter D887 in the search box to learn more about \"Gastrointestinal Bleeding: Care Instructions. \"  Current as of: March 20, 2017  Content Version: 11.3  © 0185-8186 [employer]. Care instructions adapted under license by [employer] (which disclaims liability or warranty for this information). If you have questions about a medical condition or this instruction, always ask your healthcare professional. [place] [address] any warranty or liability for your use of this information.

## 2017-10-01 NOTE — PROGRESS NOTES
Speech Pathology Note:    Spoke with Dr. Vargas Lacy regarding bedside swallow evaluation order given pending discharge to determine need for completion. Per MD, swallowing evaluation no longer indicated secondary to noted improvement in mental status/swallow functions. Will discontinue orders at this time. Thank you for this consult. Lianne Ramsey.  MS Dheeraj, CCC-SLP

## 2017-10-01 NOTE — PROGRESS NOTES
Problem: Self Care Deficits Care Plan (Adult)  Goal: *Acute Goals and Plan of Care (Insert Text)  1. Patient will complete lower body bathing and dressing with minimal assist and adaptive equipment as needed. 2. Patient will complete toileting with minimal assist  3. Patient will tolerate 20 minutes of OT treatment with 1-2 rest breaks to increase activity tolerance for ADLs. 4. Patient will complete functional transfers with minimal assist and adaptive equipment as needed. Timeframe: 7 visits       OCCUPATIONAL THERAPY: Initial Assessment and PM 10/1/2017  INPATIENT: Hospital Day: 4  Payor: Alexandre Bell / Plan: [address] [place] [address] HMO / Product Type: [employer] /      NAME/AGE/GENDER: Favian Martínez is a 67 y.o. male        PRIMARY DIAGNOSIS:  Gastrointestinal hemorrhage, unspecified gastrointestinal hemorrhage type [K92.2] <principal problem not specified> <principal problem not specified>  Procedure(s) (LRB):  ESOPHAGOGASTRODUODENOSCOPY (EGD) WITH PUSH ENTEROSCOPY (N/A)  ENDOSCOPIC ARGON PLASMA COAGULATION (N/A)  2 Days Post-Op  ICD-10: Treatment Diagnosis:        · Generalized Muscle Weakness (M62.81)  · Other lack of cordination (R27.8)   Precautions/Allergies:        FALLS Review of patient's allergies indicates no known allergies. ASSESSMENT:      Mr. Aston Lyles presents with a GI hemorrhage and was seen by OT on hospital day 4. Pt with confusion and difficulty staying on task. Pt with decreased sitting and standing balance and was returned to bed with Quinton alarm for his safety this session. Pt is pleasant and cooperative answering some questions but there are no visitors or family present to verify information. Pt with decreased ADL function and decreased basic ADL mobility, limited at the bed at this time. Recommend further OT. This section established at most recent assessment   PROBLEM LIST (Impairments causing functional limitations):  1.  Decreased Strength  2. Decreased ADL/Functional Activities  3. Decreased Transfer Abilities  4. Decreased Ambulation Ability/Technique  5. Decreased Balance  6. Decreased Activity Tolerance  7. Decreased Pacing Skills  8. Decreased Work Simplification/Energy Conservation Techniques  9. Decreased Flexibility/Joint Mobility  10. Decreased Cognition    INTERVENTIONS PLANNED: (Benefits and precautions of occupational therapy have been discussed with the patient.)  1. Activities of daily living training  2. Adaptive equipment training  3. Balance training  4. Clothing management  5. Cognitive training  6. Donning&doffing training  7. Group therapy  8. Hygiene training  9. Neuromuscular re-eduation  10. Therapeutic activity  11. Therapeutic exercise      TREATMENT PLAN: Frequency/Duration: Follow patient 2 wks to address above goals. Rehabilitation Potential For Stated Goals: FAIR      RECOMMENDED REHABILITATION/EQUIPMENT: (at time of discharge pending progress): Due to the probability of continued deficits (see above) this patient will likely need continued skilled occupational therapy after discharge. Equipment:   · None at this time                      OCCUPATIONAL PROFILE AND HISTORY:   History of Present Injury/Illness (Reason for Referral):  See H & P  Past Medical History/Comorbidities:   Mr. Chai Reynolds  has a past medical history of Alcohol abuse; Anemia; Angiodysplasia of stomach (2016); Bladder incontinence (05/28/2014); Chronic airway obstruction, not elsewhere classified (5/28/2014); CKD (chronic kidney disease); COPD (chronic obstructive pulmonary disease) ([facility] [address] Dementia (5/28/2014); Hyperlipidemia (5/28/2014); Hypernatremia (11/25/2016); Hypertension; Hypothyroid; Seizure ([facility] [address] (2010); Tobacco abuse (5/28/2014); and Tobacco abuse. Mr. Chai Reynolds  has a past surgical history that includes colonoscopy (APPROX 2005) and endoscopy (2016).   Social History/Living Environment:   Home Environment: Private residence  One/Two Story Residence: One story  Living Alone: No  Support Systems: Therapist, Child(lissette)  Patient Expects to be Discharged to[de-identified] Private residence  Current DME Used/Available at Home: Shower chair (hand held shower head)  Tub or Shower Type: Shower  Prior Level of Function/Work/Activity:  Unknown; states that his wife works during day and he is home alone      Number of Personal Factors/Comorbidities that affect the Plan of Care: Expanded review of therapy/medical records (1-2):  MODERATE COMPLEXITY   ASSESSMENT OF OCCUPATIONAL PERFORMANCE[de-identified]   Activities of Daily Living:           Basic ADLs (From Assessment) Complex ADLs (From Assessment)   Basic ADL  Feeding: Minimum assistance, Additional time  Oral Facial Hygiene/Grooming: Minimum assistance, Additional time  Bathing: Moderate assistance, Additional time, Adaptive equipment  Upper Body Dressing: Moderate assistance, Additional time  Lower Body Dressing: Maximum assistance, Additional time  Toileting: Maximum assistance, Additional time (incontinent, wearing brief)     Grooming/Bathing/Dressing Activities of Daily Living     Cognitive Retraining  Safety/Judgement: Fall prevention;Decreased awareness of environment;Decreased awareness of need for assistance;Decreased awareness of need for safety;Decreased insight into deficits                       Bed/Mat Mobility  Sit to Supine: Minimum assistance; Additional time  Sit to Stand: Moderate assistance; Additional time  Bed to Chair:  (returned to bed with Tab alert on for safety)  Scooting: Minimum assistance; Additional time  Cues: Tactile cues provided  Adaptive Equipment: Bed rails          Most Recent Physical Functioning:   Gross Assessment:                  Posture:  Posture (WDL): Exceptions to WDL  Posture Assessment:  Forward head, Rounded shoulders  Balance:  Sitting: Impaired  Sitting - Static: Fair (occasional)  Sitting - Dynamic: Poor (constant support)  Standing: Impaired  Standing - Static: Poor Bed Mobility:  Sit to Supine: Minimum assistance; Additional time  Scooting: Minimum assistance; Additional time  Wheelchair Mobility:     Transfers:  Sit to Stand: Moderate assistance; Additional time  Stand to Sit: Moderate assistance  Bed to Chair:  (returned to bed with Tab alert on for safety)                 Patient Vitals for the past 6 hrs:       BP Pulse   10/01/17 0716 127/75 83        Mental Status  Neurologic State: Alert, Confused  Orientation Level: Oriented to person (oriented to hospital but not which one)  Cognition: Follows commands, Decreased attention/concentration  Perception: Cues to maintain midline in sitting  Perseveration: No perseveration noted  Safety/Judgement: Fall prevention, Decreased awareness of environment, Decreased awareness of need for assistance, Decreased awareness of need for safety, Decreased insight into deficits                               Physical Skills Involved:  1. Range of Motion  2. Balance  3. Strength  4. Activity Tolerance  5. Fine Motor Control  6. Gross Motor Control  7. Pain (Chronic)  8. Edema Cognitive Skills Affected (resulting in the inability to perform in a timely and safe manner):  1. Perception  2. Executive Function  3. Sustained Attention  4. Comprehension Psychosocial Skills Affected:  1. Habits/Routines  2. Environmental Adaptation  3. Self-Awareness   Number of elements that affect the Plan of Care: 3-5:  MODERATE COMPLEXITY   CLINICAL DECISION MAKIN [facility] [address] [id number] AM-PAC 6 Clicks   Daily Activity Inpatient Short Form  How much help from another person does the patient currently need. .. Total A Lot A Little None   1. Putting on and taking off regular lower body clothing?   [ ] 1   [X] 2   [ ] 3   [ ] 4   2. Bathing (including washing, rinsing, drying)? [ ] 1   [X] 2   [ ] 3   [ ] 4   3. Toileting, which includes using toilet, bedpan or urinal?   [ ] 1   [X] 2   [ ] 3   [ ] 4   4. Putting on and taking off regular upper body clothing?    [ ] 1   [ ] 2   [X] 3   [ ] 4   5. Taking care of personal grooming such as brushing teeth? [ ] 1   [ ] 2   [X] 3   [ ] 4   6. Eating meals? [ ] 1   [ ] 2   [X] 3   [ ] 4   © 2007, Trustees of [address] [place] [address] [address] [address], under license to [employer]. All rights reserved    Score:  Initial: 15, completed 10/1/17 Most Recent: X (Date: -- )     Interpretation of Tool:  Represents activities that are increasingly more difficult (i.e. Bed mobility, Transfers, Gait). Score 24 23 22-20 19-15 14-10 9-7 6       Modifier CH CI CJ CK CL CM CN         · Self Care:               - CURRENT STATUS:    CK - 40%-59% impaired, limited or restricted               - GOAL STATUS:           CJ - 20%-39% impaired, limited or restricted               - D/C STATUS:                       ---------------To be determined---------------  Payor: Jayme Coe / Plan: [address] [place] [address] HMO / Product Type: Managed [employer] Medicare /       Medical Necessity:     · Patient is expected to demonstrate progress in strength, range of motion, balance, coordination and functional technique to decrease assistance required with mobility and increase independence with ADLS. Reason for Services/Other Comments:  · Patient continues to require skilled intervention due to inability to care for basic ADLS and basic mobility and balance.    Use of outcome tool(s) and clinical judgement create a POC that gives a: MODERATE COMPLEXITY             TREATMENT:   (In addition to Assessment/Re-Assessment sessions the following treatments were rendered)      Pre-treatment Symptoms/Complaints:    Pain: Initial:   Pain Intensity 1: 0 0 Post Session:  0      Assessment/Reassessment only, no treatment provided today     Braces/Orthotics/Lines/Etc:   · IV  · O2 Device: Room air  Treatment/Session Assessment:    · Response to Treatment:  Interactive but confused  · Interdisciplinary Collaboration:  · Occupational Therapist  · After treatment position/precautions:  · Supine in bed  · Bed alarm/tab alert on  · Bed/Chair-wheels locked  · Bed in low position  · Call light within reach  · Side rails x 3  · Compliance with Program/Exercises: Will assess as treatment progresses. · Recommendations/Intent for next treatment session: \"Next visit will focus on advancements to more challenging activities and reduction in assistance provided\".   Total Treatment Duration:  OT Patient Time In/Time Out  Time In: 0922  Time Out: [address] [facility],

## 2017-10-02 LAB — LEVETIRACETAM SERPL-MCNC: 19.6 UG/ML (ref 10–40)

## 2017-10-02 NOTE — PROGRESS NOTES
Discharge instructions reviewed with pt's son. Pt's son verbalized/ signed understanding/ agreement. Pt taken to discharge area by wheelchair and assisted into son's car safely. Belongings with son.

## 2017-10-26 ENCOUNTER — PATIENT OUTREACH (OUTPATIENT)
Dept: CASE MANAGEMENT | Age: 72
End: 2017-10-26

## 2017-10-26 NOTE — PROGRESS NOTES
10/26/17 spoke with pts daughter. Pt is improving but slowly. Nurse encourage the continued use of walker to help reduce the risk for falls, understanding verbalized. encourage to assess for s/sx of Bleeding and notify dr immediately. No financial assistance needed with meds. will be seeing PCP in Jan. nurse encourage to call PCP with needs or concerns.

## 2017-11-03 ENCOUNTER — PATIENT OUTREACH (OUTPATIENT)
Dept: CASE MANAGEMENT | Age: 72
End: 2017-11-03

## 2017-11-08 ENCOUNTER — PATIENT OUTREACH (OUTPATIENT)
Dept: CASE MANAGEMENT | Age: 72
End: 2017-11-08

## 2017-12-26 ENCOUNTER — APPOINTMENT (OUTPATIENT)
Dept: CT IMAGING | Age: 72
DRG: 640 | End: 2017-12-26
Attending: INTERNAL MEDICINE
Payer: MEDICARE

## 2017-12-26 ENCOUNTER — APPOINTMENT (OUTPATIENT)
Dept: GENERAL RADIOLOGY | Age: 72
DRG: 640 | End: 2017-12-26
Attending: EMERGENCY MEDICINE
Payer: MEDICARE

## 2017-12-26 ENCOUNTER — HOSPITAL ENCOUNTER (INPATIENT)
Age: 72
LOS: 4 days | Discharge: HOME HEALTH | DRG: 640 | End: 2018-01-01
Attending: EMERGENCY MEDICINE | Admitting: INTERNAL MEDICINE
Payer: MEDICARE

## 2017-12-26 DIAGNOSIS — T68.XXXA HYPOTHERMIA, INITIAL ENCOUNTER: ICD-10-CM

## 2017-12-26 DIAGNOSIS — A41.9 SEPSIS, DUE TO UNSPECIFIED ORGANISM: Primary | ICD-10-CM

## 2017-12-26 DIAGNOSIS — G93.40 ACUTE ENCEPHALOPATHY: ICD-10-CM

## 2017-12-26 LAB
ALBUMIN SERPL-MCNC: 3.2 G/DL (ref 3.2–4.6)
ALBUMIN/GLOB SERPL: 0.5 {RATIO} (ref 1.2–3.5)
ALP SERPL-CCNC: 172 U/L (ref 50–136)
ALT SERPL-CCNC: 61 U/L (ref 12–65)
ANION GAP SERPL CALC-SCNC: 9 MMOL/L (ref 7–16)
APPEARANCE UR: CLEAR
AST SERPL-CCNC: 55 U/L (ref 15–37)
ATRIAL RATE: 47 BPM
BACTERIA URNS QL MICRO: 0 /HPF
BASOPHILS # BLD: 0 K/UL (ref 0–0.2)
BASOPHILS NFR BLD: 0 % (ref 0–2)
BILIRUB SERPL-MCNC: 0.3 MG/DL (ref 0.2–1.1)
BILIRUB UR QL: NEGATIVE
BUN SERPL-MCNC: 30 MG/DL (ref 8–23)
CALCIUM SERPL-MCNC: 8.6 MG/DL (ref 8.3–10.4)
CALCULATED R AXIS, ECG10: 84 DEGREES
CALCULATED T AXIS, ECG11: 31 DEGREES
CASTS URNS QL MICRO: 0 /LPF
CHLORIDE SERPL-SCNC: 116 MMOL/L (ref 98–107)
CK SERPL-CCNC: 169 U/L (ref 21–215)
CO2 SERPL-SCNC: 25 MMOL/L (ref 21–32)
COLOR UR: YELLOW
CREAT SERPL-MCNC: 1.51 MG/DL (ref 0.8–1.5)
DIAGNOSIS, 93000: NORMAL
DIFFERENTIAL METHOD BLD: ABNORMAL
EOSINOPHIL # BLD: 0.1 K/UL (ref 0–0.8)
EOSINOPHIL NFR BLD: 2 % (ref 0.5–7.8)
EPI CELLS #/AREA URNS HPF: 0 /HPF
ERYTHROCYTE [DISTWIDTH] IN BLOOD BY AUTOMATED COUNT: 21.9 % (ref 11.9–14.6)
FLUAV AG NPH QL IA: NEGATIVE
FLUBV AG NPH QL IA: NEGATIVE
GLOBULIN SER CALC-MCNC: 6.5 G/DL (ref 2.3–3.5)
GLUCOSE SERPL-MCNC: 111 MG/DL (ref 65–100)
GLUCOSE UR STRIP.AUTO-MCNC: NEGATIVE MG/DL
HCT VFR BLD AUTO: 28.6 % (ref 41.1–50.3)
HGB BLD-MCNC: 9 G/DL (ref 13.6–17.2)
HGB UR QL STRIP: NEGATIVE
IMM GRANULOCYTES # BLD: 0 K/UL (ref 0–0.5)
IMM GRANULOCYTES NFR BLD AUTO: 1 % (ref 0–5)
KETONES UR QL STRIP.AUTO: NEGATIVE MG/DL
LACTATE BLD-SCNC: 2.4 MMOL/L (ref 0.5–1.9)
LEUKOCYTE ESTERASE UR QL STRIP.AUTO: NEGATIVE
LYMPHOCYTES # BLD: 1.8 K/UL (ref 0.5–4.6)
LYMPHOCYTES NFR BLD: 43 % (ref 13–44)
MAGNESIUM SERPL-MCNC: 2.3 MG/DL (ref 1.8–2.4)
MCH RBC QN AUTO: 25.1 PG (ref 26.1–32.9)
MCHC RBC AUTO-ENTMCNC: 31.5 G/DL (ref 31.4–35)
MCV RBC AUTO: 79.9 FL (ref 79.6–97.8)
MONOCYTES # BLD: 0.4 K/UL (ref 0.1–1.3)
MONOCYTES NFR BLD: 11 % (ref 4–12)
NEUTS SEG # BLD: 1.8 K/UL (ref 1.7–8.2)
NEUTS SEG NFR BLD: 43 % (ref 43–78)
NITRITE UR QL STRIP.AUTO: NEGATIVE
PH UR STRIP: 6.5 [PH] (ref 5–9)
PLATELET # BLD AUTO: 326 K/UL (ref 150–450)
PMV BLD AUTO: 10.9 FL (ref 10.8–14.1)
POTASSIUM SERPL-SCNC: 4.7 MMOL/L (ref 3.5–5.1)
PROCALCITONIN SERPL-MCNC: 0.1 NG/ML
PROT SERPL-MCNC: 9.7 G/DL (ref 6.3–8.2)
PROT UR STRIP-MCNC: 100 MG/DL
Q-T INTERVAL, ECG07: 522 MS
QRS DURATION, ECG06: 90 MS
QTC CALCULATION (BEZET), ECG08: 441 MS
RBC # BLD AUTO: 3.58 M/UL (ref 4.23–5.67)
RBC #/AREA URNS HPF: ABNORMAL /HPF
RPR SER QL: NONREACTIVE
SODIUM SERPL-SCNC: 150 MMOL/L (ref 136–145)
SP GR UR REFRACTOMETRY: 1.01 (ref 1–1.02)
TSH SERPL DL<=0.005 MIU/L-ACNC: 4.06 UIU/ML (ref 0.36–3.74)
UROBILINOGEN UR QL STRIP.AUTO: 0.2 EU/DL (ref 0.2–1)
VALPROATE SERPL-MCNC: 20 UG/ML (ref 50–100)
VENTRICULAR RATE, ECG03: 43 BPM
VIT B12 SERPL-MCNC: 810 PG/ML (ref 193–986)
WBC # BLD AUTO: 4.2 K/UL (ref 4.3–11.1)
WBC URNS QL MICRO: 0 /HPF

## 2017-12-26 PROCEDURE — 80053 COMPREHEN METABOLIC PANEL: CPT | Performed by: EMERGENCY MEDICINE

## 2017-12-26 PROCEDURE — 70450 CT HEAD/BRAIN W/O DYE: CPT

## 2017-12-26 PROCEDURE — 84145 PROCALCITONIN (PCT): CPT | Performed by: EMERGENCY MEDICINE

## 2017-12-26 PROCEDURE — 83605 ASSAY OF LACTIC ACID: CPT

## 2017-12-26 PROCEDURE — 87040 BLOOD CULTURE FOR BACTERIA: CPT | Performed by: EMERGENCY MEDICINE

## 2017-12-26 PROCEDURE — 87086 URINE CULTURE/COLONY COUNT: CPT | Performed by: EMERGENCY MEDICINE

## 2017-12-26 PROCEDURE — 74011000258 HC RX REV CODE- 258: Performed by: EMERGENCY MEDICINE

## 2017-12-26 PROCEDURE — 96361 HYDRATE IV INFUSION ADD-ON: CPT | Performed by: EMERGENCY MEDICINE

## 2017-12-26 PROCEDURE — 77030011943

## 2017-12-26 PROCEDURE — 96367 TX/PROPH/DG ADDL SEQ IV INF: CPT | Performed by: EMERGENCY MEDICINE

## 2017-12-26 PROCEDURE — 93005 ELECTROCARDIOGRAM TRACING: CPT | Performed by: EMERGENCY MEDICINE

## 2017-12-26 PROCEDURE — 83735 ASSAY OF MAGNESIUM: CPT | Performed by: EMERGENCY MEDICINE

## 2017-12-26 PROCEDURE — 96365 THER/PROPH/DIAG IV INF INIT: CPT | Performed by: EMERGENCY MEDICINE

## 2017-12-26 PROCEDURE — 51701 INSERT BLADDER CATHETER: CPT | Performed by: EMERGENCY MEDICINE

## 2017-12-26 PROCEDURE — 71010 XR CHEST PORT: CPT

## 2017-12-26 PROCEDURE — 84443 ASSAY THYROID STIM HORMONE: CPT | Performed by: INTERNAL MEDICINE

## 2017-12-26 PROCEDURE — 82607 VITAMIN B-12: CPT | Performed by: INTERNAL MEDICINE

## 2017-12-26 PROCEDURE — 99218 HC RM OBSERVATION: CPT

## 2017-12-26 PROCEDURE — 99285 EMERGENCY DEPT VISIT HI MDM: CPT | Performed by: EMERGENCY MEDICINE

## 2017-12-26 PROCEDURE — 82550 ASSAY OF CK (CPK): CPT | Performed by: EMERGENCY MEDICINE

## 2017-12-26 PROCEDURE — 87804 INFLUENZA ASSAY W/OPTIC: CPT | Performed by: EMERGENCY MEDICINE

## 2017-12-26 PROCEDURE — 86592 SYPHILIS TEST NON-TREP QUAL: CPT | Performed by: INTERNAL MEDICINE

## 2017-12-26 PROCEDURE — 81001 URINALYSIS AUTO W/SCOPE: CPT | Performed by: EMERGENCY MEDICINE

## 2017-12-26 PROCEDURE — 81003 URINALYSIS AUTO W/O SCOPE: CPT | Performed by: EMERGENCY MEDICINE

## 2017-12-26 PROCEDURE — 80164 ASSAY DIPROPYLACETIC ACD TOT: CPT | Performed by: EMERGENCY MEDICINE

## 2017-12-26 PROCEDURE — 74011250636 HC RX REV CODE- 250/636: Performed by: EMERGENCY MEDICINE

## 2017-12-26 PROCEDURE — 85025 COMPLETE CBC W/AUTO DIFF WBC: CPT | Performed by: EMERGENCY MEDICINE

## 2017-12-26 RX ORDER — VANCOMYCIN HYDROCHLORIDE
1250 ONCE
Status: COMPLETED | OUTPATIENT
Start: 2017-12-26 | End: 2017-12-27

## 2017-12-26 RX ADMIN — SODIUM CHLORIDE 1836 ML: 900 INJECTION, SOLUTION INTRAVENOUS at 17:58

## 2017-12-26 RX ADMIN — PIPERACILLIN SODIUM,TAZOBACTAM SODIUM 4.5 G: 4; .5 INJECTION, POWDER, FOR SOLUTION INTRAVENOUS at 17:58

## 2017-12-26 NOTE — H&P
Hospitalist H&P Note     Admit Date:  2017  3:57 PM   Name:  Yesenia Campos   Age:  67 y.o.  :  1945   MRN:  [id number]   PCP:  Barrie Pierce MD  Treatment Team: Attending Provider: Harvis Jeans, MD    HPI:    is a 68 yo male who presented with altered mental status and foul smelling urine on a background of dementia, CKD, COPD, HLD,HTN, hypothyroidism and GI bleed. He has a 1 day history of confusion. His family was called for additional history. He lives with his wife. Yesterday, he was noted be confused. This morning, he slipped and fell on the floor (+ head injury, no LOC). He then got back into the bed. He then started seeing things on the floor that was not there. He was noted to have a fowl smell to his urine. 10 systems reviewed and negative except as noted in HPI. Past Medical History:   Diagnosis Date    Alcohol abuse     QUIT IN . LONG USE    Anemia     GI BLEED AND CHRONIC, BASELINE 9.  16:  HGB: 7.6    Angiodysplasia of stomach     and duodenum    Bladder incontinence 2014    ALSO BOWEL    Chronic airway obstruction, not elsewhere classified 2014    CKD (chronic kidney disease)     COPD (ch[facility] obstructive pulmonary disease) ([facility] [address]     Dementia 2014    Hyperlipidemia 2014    Hypernatremia 2016:  177, 16:  80    Hypertension     Hypothyroid     Seizure ([facility] [address]     NO GRAND MAL. ON DILANTIN THE ENTIRE TIME    Tobacco abuse 2014    Tobacco abuse     ENTIRE ADULT LIFE.   QUIT IN , SNEAKS WHEN POSSIBLE      Past Surgical History:   Procedure Laterality Date    HX COLONOSCOPY  APPROX 2005    UNKNOWN EGD    HX ENDOSCOPY  2016    bicap and clip      No Known Allergies   Social History   Substance Use Topics    Smoking status: Former Smoker     Packs/day: 0.50     Years: 50.00     Types: Cigarettes    Smokeless tobacco: Never Used      Comment: QUIT IN , SNEAKS WHEN POSSIBLE    Alcohol use No      Comment: HEAVILY IN PAST YEARS, NONE SINCE 2014      History reviewed. No pertinent family history. Immunization History   Administered Date(s) Administered    Influenza Vaccine 10/12/2015    Influenza Vaccine (Quad) Mdck Pf 09/27/2017    Influenza Vaccine (Quad) PF 11/18/2016    Pneumococcal Conjugate (PCV-13) 10/11/2017    Pneumococcal Polysaccharide (PPSV-23) 05/31/2016    TB Skin Test (PPD) Intradermal 11/25/2016, 12/05/2016, 04/19/2017, 09/29/2017     PTA Medications:  Prior to Admission Medications   Prescriptions Last Dose Informant Patient Reported? Taking? amLODIPine (NORVASC) 10 mg tablet   No No   Sig: Take 1 Tab by mouth daily. divalproex ER (DEPAKOTE ER) 250 mg ER tablet   No No   Sig: Take 1 Tab by mouth nightly. ferrous sulfate 325 mg (65 mg iron) tablet   No No   Sig: Take 1 Tab by mouth two (2) times daily (with meals). folic acid (FOLVITE) 1 mg tablet   No No   Sig: Take 1 Tab by mouth daily. levETIRAcetam (KEPPRA) 500 mg tablet   No No   Sig: Take 1 Tab by mouth two (2) times a day. levothyroxine (SYNTHROID) 100 mcg tablet   No No   Sig: Take 1 Tab by mouth Daily (before breakfast). losartan (COZAAR) 100 mg tablet   No No   Sig: Take 1 Tab by mouth daily. mirtazapine (REMERON) 15 mg tablet   No No   Sig: Take 1 Tab by mouth nightly. pantoprazole (PROTONIX) 40 mg tablet   No No   Sig: Take 1 Tab by mouth two (2) times a day. tamsulosin (FLOMAX) 0.4 mg capsule   No No   Sig: Take 1 Cap by mouth nightly.       Facility-Administered Medications: None       Review of Systems:  Gen: No weight loss  Eyes: no vision changes  ENT: no sore throat  Resp: No sob or cough  CV: No cp  Abd:  no abd pain, no vomiting or diarrhea  : No incontinence, no hematuria or dysuria, no flank pain  MSK: no leg swelling  Neuro: No HA or dizziness, no weakness, numbness/tingling    Objective:   Patient Vitals for the past 24 hrs:   Pulse Resp BP SpO2 12/26/17 1323 70 18 156/78 99 %     Oxygen Therapy  O2 Sat (%): 99 % (12/26/17 1323)  Pulse via Oximetry: 72 beats per minute (12/26/17 1323)  O2 Device: Room air (12/26/17 1323)  No intake or output data in the 24 hours ending 12/26/17 1738    Physical Exam:  General:    Well nourished. Alert. Eyes:   Normal sclera. Extraocular movements intact. ENT:  Normocephalic, atraumatic. Moist mucous membranes  CV:   RRR. No murmur, rub, or gallop. Lungs:  CTAB. No wheezing, rhonchi, or rales. Abdomen: Soft, nontender, nondistended. Bowel sounds normal.   Extremities: Warm and dry. No cyanosis or edema. Neurologic: CN II-XII grossly intact. Sensation intact. Skin:     No rashes or jaundice. Psych:  Normal mood and affect. I reviewed the labs, imaging, EKGs, telemetry, and other studies done this admission. Data Review:   Recent Results (from the past 24 hour(s))   PROCALCITONIN    Collection Time: 12/26/17  1:24 PM   Result Value Ref Range    Procalcitonin 0.1 ng/mL   MAGNESIUM    Collection Time: 12/26/17  1:24 PM   Result Value Ref Range    Magnesium 2.3 1.8 - 2.4 mg/dL   CBC WITH AUTOMATED DIFF    Collection Time: 12/26/17  1:26 PM   Result Value Ref Range    WBC 4.2 (L) 4.3 - 11.1 K/uL    RBC 3.58 (L) 4.23 - 5.67 M/uL    HGB 9.0 (L) 13.6 - 17.2 g/dL    HCT 28.6 (L) 41.1 - 50.3 %    MCV 79.9 79.6 - 97.8 FL    MCH 25.1 (L) 26.1 - 32.9 PG    MCHC 31.5 31.4 - 35.0 g/dL    RDW 21.9 (H) 11.9 - 14.6 %    PLATELET 204 170 - 076 K/uL    MPV 10.9 10.8 - 14.1 FL    DF AUTOMATED      NEUTROPHILS 43 43 - 78 %    LYMPHOCYTES 43 13 - 44 %    MONOCYTES 11 4.0 - 12.0 %    EOSINOPHILS 2 0.5 - 7.8 %    BASOPHILS 0 0.0 - 2.0 %    IMMATURE GRANULOCYTES 1 0.0 - 5.0 %    ABS. NEUTROPHILS 1.8 1.7 - 8.2 K/UL    ABS. LYMPHOCYTES 1.8 0.5 - 4.6 K/UL    ABS. MONOCYTES 0.4 0.1 - 1.3 K/UL    ABS. EOSINOPHILS 0.1 0.0 - 0.8 K/UL    ABS. BASOPHILS 0.0 0.0 - 0.2 K/UL    ABS. IMM.  GRANS. 0.0 0.0 - 0.5 K/UL   METABOLIC PANEL, COMPREHENSIVE    Collection Time: 12/26/17  1:26 PM   Result Value Ref Range    Sodium 150 (H) 136 - 145 mmol/L    Potassium 4.7 3.5 - 5.1 mmol/L    Chloride 116 (H) 98 - 107 mmol/L    CO2 25 21 - 32 mmol/L    Anion gap 9 7 - 16 mmol/L    Glucose 111 (H) 65 - 100 mg/dL    BUN 30 (H) 8 - 23 MG/DL    Creatinine 1.51 (H) 0.8 - 1.5 MG/DL    GFR est AA 59 (L) >60 ml/min/1.73m2    GFR est non-AA 49 (L) >60 ml/min/1.73m2    Calcium 8.6 8.3 - 10.4 MG/DL    Bilirubin, total 0.3 0.2 - 1.1 MG/DL    ALT (SGPT) 61 12 - 65 U/L    AST (SGOT) 55 (H) 15 - 37 U/L    Alk.  phosphatase 172 (H) 50 - 136 U/L    Protein, total 9.7 (H) 6.3 - 8.2 g/dL    Albumin 3.2 3.2 - 4.6 g/dL    Globulin 6.5 (H) 2.3 - 3.5 g/dL    A-G Ratio 0.5 (L) 1.2 - 3.5     POC LACTIC ACID    Collection Time: 12/26/17  1:28 PM   Result Value Ref Range    Lactic Acid (POC) 2.4 (H) 0.5 - 1.9 mmol/L   URINALYSIS W/ RFLX MICROSCOPIC    Collection Time: 12/26/17  4:50 PM   Result Value Ref Range    Color YELLOW      Appearance CLEAR      Specific gravity 1.014 1.001 - 1.023      pH (UA) 6.5 5.0 - 9.0      Protein 100 (A) NEG mg/dL    Glucose NEGATIVE  mg/dL    Ketone NEGATIVE  NEG mg/dL    Bilirubin NEGATIVE  NEG      Blood NEGATIVE  NEG      Urobilinogen 0.2 0.2 - 1.0 EU/dL    Nitrites NEGATIVE  NEG      Leukocyte Esterase NEGATIVE  NEG      WBC 0 0 /hpf    RBC 0-3 0 /hpf    Epithelial cells 0 0 /hpf    Bacteria 0 0 /hpf    Casts 0 0 /lpf       All Micro Results     Procedure Component Value Units Date/Time    CULTURE, URINE [[id number]] Collected:  12/26/17 1650    Order Status:  Completed Specimen:  Urine from Cath Urine Updated:  12/26/17 1654    INFLUENZA A & B AG (RAPID TEST) [[id number]]     Order Status:  Sent Specimen:  Nasopharyngeal from Nasal washing     CULTURE, BLOOD [[id number]]     Order Status:  Sent Specimen:  Blood from Blood     CULTURE, BLOOD [[id number]]     Order Status:  Sent Specimen:  Blood from Blood           Other Studies:  Xr Chest Port    Result Date: 12/26/2017  PORTABLE CHEST 2 VIEWS HISTORY: Sepsis COMPARISON: April 16, 2017 FINDINGS: EKG leads are present. There is no consolidation, pleural effusions or advanced pulmonary edema. IMPRESSION: No consolidation.       Assessment and Plan:     Hospital Problems as of 12/26/2017  Date Reviewed: 10/11/2017          Codes Class Noted - Resolved POA    Sepsis ([facility] [address] ICD-10-CM: A41.9  ICD-9-CM: 038.9, 995.91  12/26/2017 - Present Unknown              PLAN:  SIRS 2/4  Likely secondary to UTI (+foul smelling odor)  CXR: no pneumonia  Plan  CT head non contrast pending  Influenza A & B  B12, RPR pending  Follow up blood cultures   Follow up urine culture  Continue vancomycin and zosyn    CKD  Cr at baseline  Plan  Repeat AM Chem 7 pending    HTN  Continue home regimen    Seizures  Home regimen: Keppra 500mg BID, depakote 250 mg daily  Plan  Continue home regimen    Hypothyroidism   Home regimen:100 mcg daily  Plan  TSH pending  Continue home regimen    History of GI bleed  Plan  Continue Protonix 40 BID    DVT ppx:    Anticipated DC needs:  < than 2 midnights  Code status:  Full  Estimated LOS:  < than 2 midnights  Risk:  high    Signed:  Everardo Courtney MD

## 2017-12-26 NOTE — ED PROVIDER NOTES
HPI Comments: Presents with complaint of altered mental status. Per EMS family states started yesterday. He has a history of dementia. There is no family here so is unclear exactly what is going on. Patient has no complaints and does not know why he is here. He is unable to tell me where he is he lives with her anything else useful history. Patient is a 67 y.o. male presenting with altered mental status. The history is provided by the patient and the EMS personnel. The history is limited by the condition of the patient and the absence of a caregiver. Altered mental status    This is a new problem. The current episode started yesterday. Associated symptoms include confusion. Mental status baseline is moderate dementia. Risk factors include dementia. His past medical history is significant for dementia. Past Medical History:   Diagnosis Date    Alcohol abuse     QUIT IN 2014. LONG USE    Anemia     GI BLEED AND CHRONIC, BASELINE 9.  11/25/16:  HGB: 7.6    Angiodysplasia of stomach 2016    and duodenum    Bladder incontinence 05/28/2014    ALSO BOWEL    Chronic airway obstruction, not elsewhere classified 5/28/2014    CKD (chronic kidney disease)     COPD (chronic obstructive pulmonary disease) ([address]     Dementia 5/28/2014    Hyperlipidemia 5/28/2014    Hypernatremia 11/25/2016 11/25/16:  177, 11/18/16:  80    Hypertension     Hypothyroid     Seizure ([address] 2010    NO GRAND MAL. ON DILANTIN THE ENTIRE TIME    Tobacco abuse 5/28/2014    Tobacco abuse     ENTIRE ADULT LIFE. QUIT IN 2014, SNEAKS WHEN POSSIBLE       Past Surgical History:   Procedure Laterality Date    HX COLONOSCOPY  APPROX 2005    UNKNOWN EGD    HX ENDOSCOPY  2016    bicap and clip         History reviewed. No pertinent family history.     Social History     Social History    Marital status:      Spouse name: N/A    Number of children: N/A    Years of education: N/A     Occupational History    Not on file.     Social History Main Topics    Smoking status: Former Smoker     Packs/day: 0.50     Years: 50.00     Types: Cigarettes    Smokeless tobacco: Never Used      Comment: QUIT IN 2014, SNEAKS WHEN POSSIBLE    Alcohol use No      Comment: HEAVILY IN PAST YEARS, NONE SINCE 2014    Drug use: No    Sexual activity: Not on file     Other Topics Concern    Not on file     Social History Narrative    11/25/16:  PATIENT IS , LIVES WITH WIFE AND ISABEL ROSA. DAUGHTER IS ALSO NEARBY. ALLERGIES: Review of patient's allergies indicates no known allergies. Review of Systems   Unable to perform ROS: Mental status change   Psychiatric/Behavioral: Positive for confusion. Vitals:    12/26/17 1323   BP: 156/78   Pulse: 70   Resp: 18   SpO2: 99%   Weight: 61.2 kg (135 lb)   Height: 5' 8\" (1.727 m)            Physical Exam   Constitutional: He appears well-developed and well-nourished. He appears listless. He has a sickly appearance. He appears ill. HENT:   Head: Normocephalic and atraumatic. Neck: Normal range of motion. Neck supple. Cardiovascular: Normal rate and regular rhythm. Pulmonary/Chest: Effort normal and breath sounds normal. He has no wheezes. He has no rales. Abdominal: Soft. He exhibits no distension. There is no tenderness. There is no rebound and no guarding. Musculoskeletal: Normal range of motion. He exhibits no edema. Neurological: He appears listless. He is disoriented. Skin: Skin is warm and dry. Nursing note and vitals reviewed. MDM  Number of Diagnoses or Management Options  Acute encephalopathy:   Hypothermia, initial encounter:   Sepsis, due to unspecified organism [facility]):   Diagnosis management comments: Per his previous admissions she is disoriented baseline. Given his hypothermia, apparent increased confusion, low white count and elevated lactic we'll treat him as sepsis without a source.   Discussed with the hospitalist.       Amount and/or Complexity of Data Reviewed  Clinical lab tests: ordered and reviewed  Review and summarize past medical records: yes (Disorientation at baseline per last discharge note)  Discuss the patient with other providers: yes  Independent visualization of images, tracings, or specimens: yes (SR no ST elevation)    Risk of Complications, Morbidity, and/or Mortality  Presenting problems: high  Diagnostic procedures: moderate  Management options: high    Patient Progress  Patient progress: stable    ED Course       Procedures

## 2017-12-26 NOTE — IP AVS SNAPSHOT
[address] 
[phone] Patient: Rolf Stahl MRN: [id number] :1945 About your hospitalization You were admitted on:  2017 You last received care in the:  [facility] You were discharged on:  2018 Why you were hospitalized Your primary diagnosis was:  Acute Metabolic Encephalopathy Your diagnoses also included:  Sepsis (Hcc), Hypernatremia, Dementia, Htn (Hypertension), Anemia Things You Need To Do (next 8 weeks) Schedule an appointment with Nadia Graham MD as soon as possible for a visit in 1 week(s) Phone:  [phone] Where:  [facility] [place] [id number] Discharge Orders None A check mack indicates which time of day the medication should be taken. My Medications TAKE these medications as instructed Instructions Each Dose to Equal  
 Morning Noon Evening Bedtime  
 amLODIPine 10 mg tablet Commonly known as:  Kirti Saravia Your last dose was: Your next dose is: Take 1 Tab by mouth daily. 10 mg  
    
   
   
   
  
 divalproex  mg ER tablet Commonly known as:  DEPAKOTE ER Your last dose was: Your next dose is: Take 1 Tab by mouth nightly. 250 mg  
    
   
   
   
  
 ferrous sulfate 325 mg (65 mg iron) tablet Your last dose was: Your next dose is: Take 1 Tab by mouth two (2) times daily (with meals). 325 mg  
    
   
   
   
  
 folic acid 1 mg tablet Commonly known as:  Google Your last dose was: Your next dose is: Take 1 Tab by mouth daily. 1 mg  
    
   
   
   
  
 levETIRAcetam 500 mg tablet Commonly known as:  KEPPRA Your last dose was: Your next dose is: Take 1 Tab by mouth two (2) times a day.   
 500 mg  
 levothyroxine 100 mcg tablet Commonly known as:  SYNTHROID Your last dose was: Your next dose is: Take 1 Tab by mouth Daily (before breakfast). 100 mcg  
    
   
   
   
  
 losartan 100 mg tablet Commonly known as:  COZAAR Your last dose was: Your next dose is: Take 1 Tab by mouth daily. 100 mg  
    
   
   
   
  
 mirtazapine 15 mg tablet Commonly known as:  Kengisel [place] Your last dose was: Your next dose is: Take 1 Tab by mouth nightly. 15 mg  
    
   
   
   
  
 pantoprazole 40 mg tablet Commonly known as:  PROTONIX Your last dose was: Your next dose is: Take 1 Tab by mouth two (2) times a day. 40 mg  
    
   
   
   
  
 tamsulosin 0.4 mg capsule Commonly known as:  FLOMAX Your last dose was: Your next dose is: Take 1 Cap by mouth nightly. 0.4 mg Discharge Instructions DISCHARGE SUMMARY from Nurse PATIENT INSTRUCTIONS: 
What to do at Home: 
Recommended activity: Activity as tolerated and PT/OT per Home Health. *  Please give a list of your current medications to your Primary Care Provider. *  Please update this list whenever your medications are discontinued, doses are 
    changed, or new medications (including over-the-counter products) are added. *  Please carry medication information at all times in case of emergency situations. These are general instructions for a healthy lifestyle: No smoking/ No tobacco products/ Avoid exposure to second hand smoke Surgeon General's Warning:  Quitting smoking now greatly reduces serious risk to your health. Obesity, smoking, and sedentary lifestyle greatly increases your risk for illness A healthy diet, regular physical exercise & weight monitoring are important for maintaining a healthy lifestyle You may be retaining fluid if you have a history of heart failure or if you experience any of the following symptoms:  Weight gain of 3 pounds or more overnight or 5 pounds in a week, increased swelling in our hands or feet or shortness of breath while lying flat in bed. Please call your doctor as soon as you notice any of these symptoms; do not wait until your next office visit. Recognize signs and symptoms of STROKE: 
 
F-face looks uneven A-arms unable to move or move unevenly S-speech slurred or non-existent T-time-call 911 as soon as signs and symptoms begin-DO NOT go Back to bed or wait to see if you get better-TIME IS BRAIN. Warning Signs of HEART ATTACK Call 911 if you have these symptoms: 
? Chest discomfort. Most heart attacks involve discomfort in the center of the chest that lasts more than a few minutes, or that goes away and comes back. It can feel like uncomfortable pressure, squeezing, fullness, or pain. ? Discomfort in other areas of the upper body. Symptoms can include pain or discomfort in one or both arms, the back, neck, jaw, or stomach. ? Shortness of breath with or without chest discomfort. ? Other signs may include breaking out in a cold sweat, nausea, or lightheadedness. Don't wait more than five minutes to call [address]! Fast action can save your life. Calling 911 is almost always the fastest way to get lifesaving treatment. Emergency Medical Services staff can begin treatment when they arrive  up to an hour sooner than if someone gets to the hospital by car. The discharge information has been reviewed with the daughter. The daughter verbalized understanding. Discharge medications reviewed with the daughter and appropriate educational materials and side effects teaching were provided. Dehydration: Care Instructions Your Care Instructions Dehydration happens when your body loses too much fluid.  This might happen when you do not drink enough water or you lose large amounts of fluids from your body because of diarrhea, vomiting, or sweating. Severe dehydration can be life-threatening. Water and minerals called electrolytes help put your body fluids back in balance. Learn the early signs of fluid loss, and drink more fluids to prevent dehydration. Follow-up care is a key part of your treatment and safety. Be sure to make and go to all appointments, and call your doctor if you are having problems. It's also a good idea to know your test results and keep a list of the medicines you take. How can you care for yourself at home? · To prevent dehydration, drink plenty of fluids, enough so that your urine is light yellow or clear like water. Choose water and other caffeine-free clear liquids until you feel better. If you have kidney, heart, or liver disease and have to limit fluids, talk with your doctor before you increase the amount of fluids you drink. · If you do not feel like eating or drinking, try taking small sips of water, sports drinks, or other rehydration drinks. · Get plenty of rest. 
To prevent dehydration · Add more fluids to your diet and daily routine, unless your doctor has told you not to. · During hot weather, drink more fluids. Drink even more fluids if you exercise a lot. Stay away from drinks with alcohol or caffeine. · Watch for the symptoms of dehydration. These include: ¨ A dry, sticky mouth. ¨ Dark yellow urine, and not much of it. ¨ Dry and sunken eyes. ¨ Feeling very tired. · Learn what problems can lead to dehydration. These include: ¨ Diarrhea, fever, and vomiting. ¨ Any illness with a fever, such as pneumonia or the flu. ¨ Activities that cause heavy sweating, such as endurance races and heavy outdoor work in hot or humid weather. ¨ Alcohol or drug abuse or withdrawal. 
¨ Certain medicines, such as cold and allergy pills (antihistamines), diet pills (diuretics), and laxatives. ¨ Certain diseases, such as diabetes, cancer, and heart or kidney disease. When should you call for help? Call 911 anytime you think you may need emergency care. For example, call if: 
? · You passed out (lost consciousness). ?Call your doctor now or seek immediate medical care if: 
? · You are confused and cannot think clearly. ? · You are dizzy or lightheaded, or you feel like you may faint. ? · You have signs of needing more fluids. You have sunken eyes and a dry mouth, and you pass only a little dark urine. ? · You cannot keep fluids down. ? Watch closely for changes in your health, and be sure to contact your doctor if: 
? · You are not making tears. ? · Your skin is very dry and sags slowly back into place after you pinch it. ? · Your mouth and eyes are very dry. Where can you learn more? Go to http://malgorzata-violeta.info/. Enter Z809 in the search box to learn more about \"Dehydration: Care Instructions. \" Current as of: March 20, 2017 Content Version: 11.4 © 3337-3516 [employer]. Care instructions adapted under license by [employer] (which disclaims liability or warranty for this information). If you have questions about a medical condition or this instruction, always ask your healthcare professional. [address] any warranty or liability for your use of this information. ___________________________________________________________________________________________________________________________________ [employer] Announcement We are excited to announce that we are making your provider's discharge notes available to you in [employer]. You will see these notes when they are completed and signed by the physician that discharged you from your recent hospital stay.   If you have any questions or concerns about any information you see in [employer], please call the Health Information Department where you were seen or reach out to your Primary Care Provider for more information about your plan of care. Unresulted Labs-Please follow up with your PCP about these lab tests Order Current Status LEVETIRACETAM (KEPPRA) In process Providers Seen During Your Hospitalization Provider Specialty Primary office phone Kermit Lopez MD Emergency Medicine [phone] Kiran Hardwick MD Internal Medicine [phone] Your Primary Care Physician (PCP) Primary Care Physician Office Phone Office Fax 611 Viviana Neal 224 [phone] You are allergic to the following No active allergies Recent Documentation Height Weight BMI Smoking Status 1.727 m 61.5 kg 20.6 kg/m2 Former Smoker Emergency Contacts Name Discharge Info Relation Home Work Mobile Kevin Reina [2] [phone] Saurabh Jonas [22] [phone] Patient Belongings The following personal items are in your possession at time of discharge: 
  Dental Appliances: None  Visual Aid: None      Home Medications: None   Jewelry: None  Clothing: Undergarments, Pants, With patient, Footwear    Other Valuables: None Please provide this summary of care documentation to your next provider. Signatures-by signing, you are acknowledging that this After Visit Summary has been reviewed with you and you have received a copy. Patient Signature:  ____________________________________________________________ Date:  ____________________________________________________________  
  
Tracy Woods Provider Signature:  ____________________________________________________________ Date:  ____________________________________________________________

## 2017-12-26 NOTE — IP AVS SNAPSHOT
[address] 
[phone] Patient: Susannah Holbrook MRN: [id number] :1945 My Medications TAKE these medications as instructed Instructions Each Dose to Equal  
 Morning Noon Evening Bedtime  
 amLODIPine 10 mg tablet Commonly known as:  Rubia Slate Your last dose was: Your next dose is: Take 1 Tab by mouth daily. 10 mg  
    
   
   
   
  
 divalproex  mg ER tablet Commonly known as:  DEPAKOTE ER Your last dose was: Your next dose is: Take 1 Tab by mouth nightly. 250 mg  
    
   
   
   
  
 ferrous sulfate 325 mg (65 mg iron) tablet Your last dose was: Your next dose is: Take 1 Tab by mouth two (2) times daily (with meals). 325 mg  
    
   
   
   
  
 folic acid 1 mg tablet Commonly known as:  Google Your last dose was: Your next dose is: Take 1 Tab by mouth daily. 1 mg  
    
   
   
   
  
 levETIRAcetam 500 mg tablet Commonly known as:  KEPPRA Your last dose was: Your next dose is: Take 1 Tab by mouth two (2) times a day. 500 mg  
    
   
   
   
  
 levothyroxine 100 mcg tablet Commonly known as:  SYNTHROID Your last dose was: Your next dose is: Take 1 Tab by mouth Daily (before breakfast). 100 mcg  
    
   
   
   
  
 losartan 100 mg tablet Commonly known as:  COZAAR Your last dose was: Your next dose is: Take 1 Tab by mouth daily. 100 mg  
    
   
   
   
  
 mirtazapine 15 mg tablet Commonly known as:  Hazclary [place] Your last dose was: Your next dose is: Take 1 Tab by mouth nightly. 15 mg  
    
   
   
   
  
 pantoprazole 40 mg tablet Commonly known as:  PROTONIX Your last dose was: Your next dose is: Take 1 Tab by mouth two (2) times a day. 40 mg  
    
   
   
   
  
 tamsulosin 0.4 mg capsule Commonly known as:  FLOMAX Your last dose was: Your next dose is: Take 1 Cap by mouth nightly.   
 0.4 mg

## 2017-12-27 ENCOUNTER — APPOINTMENT (OUTPATIENT)
Dept: MRI IMAGING | Age: 72
DRG: 640 | End: 2017-12-27
Attending: HOSPITALIST
Payer: MEDICARE

## 2017-12-27 LAB
ALBUMIN SERPL-MCNC: 2.6 G/DL (ref 3.2–4.6)
ALBUMIN/GLOB SERPL: 0.5 {RATIO} (ref 1.2–3.5)
ALP SERPL-CCNC: 145 U/L (ref 50–136)
ALT SERPL-CCNC: 38 U/L (ref 12–65)
ANION GAP SERPL CALC-SCNC: 10 MMOL/L (ref 7–16)
ANION GAP SERPL CALC-SCNC: 9 MMOL/L (ref 7–16)
AST SERPL-CCNC: 22 U/L (ref 15–37)
BASOPHILS # BLD: 0 K/UL (ref 0–0.2)
BASOPHILS NFR BLD: 1 % (ref 0–2)
BILIRUB SERPL-MCNC: 0.3 MG/DL (ref 0.2–1.1)
BUN SERPL-MCNC: 27 MG/DL (ref 8–23)
BUN SERPL-MCNC: 29 MG/DL (ref 8–23)
CALCIUM SERPL-MCNC: 8 MG/DL (ref 8.3–10.4)
CALCIUM SERPL-MCNC: 8.2 MG/DL (ref 8.3–10.4)
CHLORIDE SERPL-SCNC: 117 MMOL/L (ref 98–107)
CHLORIDE SERPL-SCNC: 119 MMOL/L (ref 98–107)
CO2 SERPL-SCNC: 22 MMOL/L (ref 21–32)
CO2 SERPL-SCNC: 23 MMOL/L (ref 21–32)
CREAT SERPL-MCNC: 1.58 MG/DL (ref 0.8–1.5)
CREAT SERPL-MCNC: 1.7 MG/DL (ref 0.8–1.5)
DIFFERENTIAL METHOD BLD: ABNORMAL
EOSINOPHIL # BLD: 0.1 K/UL (ref 0–0.8)
EOSINOPHIL NFR BLD: 3 % (ref 0.5–7.8)
ERYTHROCYTE [DISTWIDTH] IN BLOOD BY AUTOMATED COUNT: 21.4 % (ref 11.9–14.6)
GLOBULIN SER CALC-MCNC: 5.2 G/DL (ref 2.3–3.5)
GLUCOSE SERPL-MCNC: 113 MG/DL (ref 65–100)
GLUCOSE SERPL-MCNC: 82 MG/DL (ref 65–100)
HCT VFR BLD AUTO: 26.2 % (ref 41.1–50.3)
HGB BLD-MCNC: 8.2 G/DL (ref 13.6–17.2)
IMM GRANULOCYTES # BLD: 0 K/UL (ref 0–0.5)
IMM GRANULOCYTES NFR BLD AUTO: 0 % (ref 0–5)
LACTATE SERPL-SCNC: 1.6 MMOL/L (ref 0.4–2)
LYMPHOCYTES # BLD: 1.9 K/UL (ref 0.5–4.6)
LYMPHOCYTES NFR BLD: 47 % (ref 13–44)
MCH RBC QN AUTO: 24.8 PG (ref 26.1–32.9)
MCHC RBC AUTO-ENTMCNC: 31.3 G/DL (ref 31.4–35)
MCV RBC AUTO: 79.4 FL (ref 79.6–97.8)
MONOCYTES # BLD: 0.3 K/UL (ref 0.1–1.3)
MONOCYTES NFR BLD: 9 % (ref 4–12)
NEUTS SEG # BLD: 1.6 K/UL (ref 1.7–8.2)
NEUTS SEG NFR BLD: 40 % (ref 43–78)
PLATELET # BLD AUTO: 235 K/UL (ref 150–450)
PMV BLD AUTO: 10.8 FL (ref 10.8–14.1)
POTASSIUM SERPL-SCNC: 4 MMOL/L (ref 3.5–5.1)
POTASSIUM SERPL-SCNC: 4.4 MMOL/L (ref 3.5–5.1)
PROT SERPL-MCNC: 7.8 G/DL (ref 6.3–8.2)
RBC # BLD AUTO: 3.3 M/UL (ref 4.23–5.67)
SODIUM SERPL-SCNC: 149 MMOL/L (ref 136–145)
SODIUM SERPL-SCNC: 151 MMOL/L (ref 136–145)
WBC # BLD AUTO: 3.9 K/UL (ref 4.3–11.1)

## 2017-12-27 PROCEDURE — 85025 COMPLETE CBC W/AUTO DIFF WBC: CPT | Performed by: INTERNAL MEDICINE

## 2017-12-27 PROCEDURE — 80053 COMPREHEN METABOLIC PANEL: CPT | Performed by: INTERNAL MEDICINE

## 2017-12-27 PROCEDURE — 74011000258 HC RX REV CODE- 258: Performed by: EMERGENCY MEDICINE

## 2017-12-27 PROCEDURE — 96367 TX/PROPH/DG ADDL SEQ IV INF: CPT | Performed by: EMERGENCY MEDICINE

## 2017-12-27 PROCEDURE — 83605 ASSAY OF LACTIC ACID: CPT | Performed by: HOSPITALIST

## 2017-12-27 PROCEDURE — 74011250636 HC RX REV CODE- 250/636: Performed by: EMERGENCY MEDICINE

## 2017-12-27 PROCEDURE — 80048 BASIC METABOLIC PNL TOTAL CA: CPT | Performed by: HOSPITALIST

## 2017-12-27 PROCEDURE — 36415 COLL VENOUS BLD VENIPUNCTURE: CPT | Performed by: INTERNAL MEDICINE

## 2017-12-27 PROCEDURE — 97162 PT EVAL MOD COMPLEX 30 MIN: CPT

## 2017-12-27 PROCEDURE — 99218 HC RM OBSERVATION: CPT

## 2017-12-27 PROCEDURE — 74011250637 HC RX REV CODE- 250/637: Performed by: INTERNAL MEDICINE

## 2017-12-27 PROCEDURE — 70551 MRI BRAIN STEM W/O DYE: CPT

## 2017-12-27 PROCEDURE — 74011250636 HC RX REV CODE- 250/636: Performed by: HOSPITALIST

## 2017-12-27 PROCEDURE — 74011250636 HC RX REV CODE- 250/636: Performed by: INTERNAL MEDICINE

## 2017-12-27 RX ORDER — FOLIC ACID 1 MG/1
1 TABLET ORAL DAILY
Status: DISCONTINUED | OUTPATIENT
Start: 2017-12-27 | End: 2018-01-01 | Stop reason: HOSPADM

## 2017-12-27 RX ORDER — MIRTAZAPINE 15 MG/1
15 TABLET, FILM COATED ORAL
Status: DISCONTINUED | OUTPATIENT
Start: 2017-12-27 | End: 2018-01-01 | Stop reason: HOSPADM

## 2017-12-27 RX ORDER — TAMSULOSIN HYDROCHLORIDE 0.4 MG/1
0.4 CAPSULE ORAL
Status: DISCONTINUED | OUTPATIENT
Start: 2017-12-27 | End: 2018-01-01 | Stop reason: HOSPADM

## 2017-12-27 RX ORDER — SODIUM CHLORIDE 0.9 % (FLUSH) 0.9 %
5-10 SYRINGE (ML) INJECTION EVERY 8 HOURS
Status: DISCONTINUED | OUTPATIENT
Start: 2017-12-27 | End: 2018-01-01 | Stop reason: HOSPADM

## 2017-12-27 RX ORDER — SODIUM CHLORIDE 0.9 % (FLUSH) 0.9 %
5-10 SYRINGE (ML) INJECTION AS NEEDED
Status: DISCONTINUED | OUTPATIENT
Start: 2017-12-27 | End: 2018-01-01 | Stop reason: HOSPADM

## 2017-12-27 RX ORDER — DEXTROSE MONOHYDRATE 50 MG/ML
100 INJECTION, SOLUTION INTRAVENOUS CONTINUOUS
Status: DISCONTINUED | OUTPATIENT
Start: 2017-12-27 | End: 2017-12-28

## 2017-12-27 RX ORDER — PANTOPRAZOLE SODIUM 40 MG/1
40 TABLET, DELAYED RELEASE ORAL 2 TIMES DAILY
Status: DISCONTINUED | OUTPATIENT
Start: 2017-12-27 | End: 2018-01-01 | Stop reason: HOSPADM

## 2017-12-27 RX ORDER — DIVALPROEX SODIUM 250 MG/1
250 TABLET, EXTENDED RELEASE ORAL
Status: DISCONTINUED | OUTPATIENT
Start: 2017-12-27 | End: 2017-12-30

## 2017-12-27 RX ORDER — LEVOTHYROXINE SODIUM 100 UG/1
100 TABLET ORAL
Status: DISCONTINUED | OUTPATIENT
Start: 2017-12-27 | End: 2018-01-01 | Stop reason: HOSPADM

## 2017-12-27 RX ORDER — HEPARIN SODIUM 5000 [USP'U]/ML
5000 INJECTION, SOLUTION INTRAVENOUS; SUBCUTANEOUS EVERY 12 HOURS
Status: DISCONTINUED | OUTPATIENT
Start: 2017-12-27 | End: 2018-01-01 | Stop reason: HOSPADM

## 2017-12-27 RX ORDER — LEVETIRACETAM 500 MG/1
500 TABLET ORAL 2 TIMES DAILY
Status: DISCONTINUED | OUTPATIENT
Start: 2017-12-27 | End: 2018-01-01 | Stop reason: HOSPADM

## 2017-12-27 RX ADMIN — PANTOPRAZOLE SODIUM 40 MG: 40 TABLET, DELAYED RELEASE ORAL at 09:30

## 2017-12-27 RX ADMIN — LEVETIRACETAM 500 MG: 500 TABLET ORAL at 17:06

## 2017-12-27 RX ADMIN — HEPARIN SODIUM 5000 UNITS: 5000 INJECTION, SOLUTION INTRAVENOUS; SUBCUTANEOUS at 02:29

## 2017-12-27 RX ADMIN — Medication 10 ML: at 22:20

## 2017-12-27 RX ADMIN — DIVALPROEX SODIUM 250 MG: 250 TABLET, EXTENDED RELEASE ORAL at 02:28

## 2017-12-27 RX ADMIN — Medication 10 ML: at 01:31

## 2017-12-27 RX ADMIN — FOLIC ACID 1 MG: 1 TABLET ORAL at 09:30

## 2017-12-27 RX ADMIN — LEVOTHYROXINE SODIUM 100 MCG: 100 TABLET ORAL at 05:19

## 2017-12-27 RX ADMIN — DEXTROSE MONOHYDRATE 100 ML/HR: 5 INJECTION, SOLUTION INTRAVENOUS at 10:35

## 2017-12-27 RX ADMIN — PIPERACILLIN SODIUM,TAZOBACTAM SODIUM 3.38 G: 3; .375 INJECTION, POWDER, FOR SOLUTION INTRAVENOUS at 10:36

## 2017-12-27 RX ADMIN — HEPARIN SODIUM 5000 UNITS: 5000 INJECTION, SOLUTION INTRAVENOUS; SUBCUTANEOUS at 13:00

## 2017-12-27 RX ADMIN — PIPERACILLIN SODIUM,TAZOBACTAM SODIUM 3.38 G: 3; .375 INJECTION, POWDER, FOR SOLUTION INTRAVENOUS at 02:29

## 2017-12-27 RX ADMIN — TAMSULOSIN HYDROCHLORIDE 0.4 MG: 0.4 CAPSULE ORAL at 22:20

## 2017-12-27 RX ADMIN — MIRTAZAPINE 15 MG: 15 TABLET, FILM COATED ORAL at 22:20

## 2017-12-27 RX ADMIN — VANCOMYCIN HYDROCHLORIDE 1250 MG: 10 INJECTION, POWDER, LYOPHILIZED, FOR SOLUTION INTRAVENOUS at 00:14

## 2017-12-27 RX ADMIN — DIVALPROEX SODIUM 250 MG: 250 TABLET, EXTENDED RELEASE ORAL at 22:20

## 2017-12-27 RX ADMIN — PIPERACILLIN SODIUM,TAZOBACTAM SODIUM 3.38 G: 3; .375 INJECTION, POWDER, FOR SOLUTION INTRAVENOUS at 17:06

## 2017-12-27 RX ADMIN — DEXTROSE MONOHYDRATE 100 ML/HR: 5 INJECTION, SOLUTION INTRAVENOUS at 22:19

## 2017-12-27 RX ADMIN — Medication 5 ML: at 10:39

## 2017-12-27 RX ADMIN — PANTOPRAZOLE SODIUM 40 MG: 40 TABLET, DELAYED RELEASE ORAL at 17:06

## 2017-12-27 RX ADMIN — LEVETIRACETAM 500 MG: 500 TABLET ORAL at 09:30

## 2017-12-27 RX ADMIN — TAMSULOSIN HYDROCHLORIDE 0.4 MG: 0.4 CAPSULE ORAL at 02:28

## 2017-12-27 RX ADMIN — VANCOMYCIN HYDROCHLORIDE 1000 MG: 1 INJECTION, POWDER, LYOPHILIZED, FOR SOLUTION INTRAVENOUS at 23:44

## 2017-12-27 NOTE — PROGRESS NOTES
Progress Note    Patient: Tory Hardwick MRN: [id number]  SSN: [id number]    YOB: 1945  Age: 67 y.o. Sex: male      Admit Date: 12/26/2017    LOS: 0 days     Subjective:     12.27- Pt was seen this AM, seems to be still confused, no weakness, no cough, unable to get much history from patient because of his speech      Objective:     Vitals:    12/27/17 0129 12/27/17 0130 12/27/17 0400 12/27/17 0749   BP:  172/87 171/79 163/77   Pulse:  86 70 (!) 50   Resp:  16 18 16   Temp:  98 °F (36.7 °C) 98.5 °F (36.9 °C) 98.1 °F (36.7 °C)   SpO2:  93% 97% 96%   Weight: 60.4 kg (133 lb 1.6 oz)      Height:            Intake and Output:  Current Shift:    Last three shifts: 12/25 1901 - 12/27 0700  In: 350 [I.V.:350]  Out: -     Physical Exam:     General:                    Well nourished. Alert. but seems confused  Eyes:                                   Normal sclera. Extraocular movements intact. ENT:                                    Normocephalic, atraumatic. Moist mucous membranes  CV:                                      RRR. No murmur, rub, or gallop. Lungs:                       CTAB. No wheezing, rhonchi, or rales. Abdomen:                  Soft, nontender, nondistended. Bowel sounds normal.   Extremities:               Warm and dry. No cyanosis or edema. Neurologic:                Moving his extremities, Confused  Skin:                                    No rashes or jaundice. Psych:                                 Normal mood and affect.     Lab/Data Review:       Recent Results (from the past 24 hour(s))   PROCALCITONIN    Collection Time: 12/26/17  1:24 PM   Result Value Ref Range    Procalcitonin 0.1 ng/mL   MAGNESIUM    Collection Time: 12/26/17  1:24 PM   Result Value Ref Range    Magnesium 2.3 1.8 - 2.4 mg/dL   VALPROIC ACID    Collection Time: 12/26/17  1:24 PM   Result Value Ref Range    Valproic acid 20 (L) 50 - 100 ug/ml   CBC WITH AUTOMATED DIFF    Collection Time: 12/26/17  1:26 PM   Result Value Ref Range    WBC 4.2 (L) 4.3 - 11.1 K/uL    RBC 3.58 (L) 4.23 - 5.67 M/uL    HGB 9.0 (L) 13.6 - 17.2 g/dL    HCT 28.6 (L) 41.1 - 50.3 %    MCV 79.9 79.6 - 97.8 FL    MCH 25.1 (L) 26.1 - 32.9 PG    MCHC 31.5 31.4 - 35.0 g/dL    RDW 21.9 (H) 11.9 - 14.6 %    PLATELET 400 371 - 360 K/uL    MPV 10.9 10.8 - 14.1 FL    DF AUTOMATED      NEUTROPHILS 43 43 - 78 %    LYMPHOCYTES 43 13 - 44 %    MONOCYTES 11 4.0 - 12.0 %    EOSINOPHILS 2 0.5 - 7.8 %    BASOPHILS 0 0.0 - 2.0 %    IMMATURE GRANULOCYTES 1 0.0 - 5.0 %    ABS. NEUTROPHILS 1.8 1.7 - 8.2 K/UL    ABS. LYMPHOCYTES 1.8 0.5 - 4.6 K/UL    ABS. MONOCYTES 0.4 0.1 - 1.3 K/UL    ABS. EOSINOPHILS 0.1 0.0 - 0.8 K/UL    ABS. BASOPHILS 0.0 0.0 - 0.2 K/UL    ABS. IMM. GRANS. 0.0 0.0 - 0.5 K/UL   METABOLIC PANEL, COMPREHENSIVE    Collection Time: 12/26/17  1:26 PM   Result Value Ref Range    Sodium 150 (H) 136 - 145 mmol/L    Potassium 4.7 3.5 - 5.1 mmol/L    Chloride 116 (H) 98 - 107 mmol/L    CO2 25 21 - 32 mmol/L    Anion gap 9 7 - 16 mmol/L    Glucose 111 (H) 65 - 100 mg/dL    BUN 30 (H) 8 - 23 MG/DL    Creatinine 1.51 (H) 0.8 - 1.5 MG/DL    GFR est AA 59 (L) >60 ml/min/1.73m2    GFR est non-AA 49 (L) >60 ml/min/1.73m2    Calcium 8.6 8.3 - 10.4 MG/DL    Bilirubin, total 0.3 0.2 - 1.1 MG/DL    ALT (SGPT) 61 12 - 65 U/L    AST (SGOT) 55 (H) 15 - 37 U/L    Alk.  phosphatase 172 (H) 50 - 136 U/L    Protein, total 9.7 (H) 6.3 - 8.2 g/dL    Albumin 3.2 3.2 - 4.6 g/dL    Globulin 6.5 (H) 2.3 - 3.5 g/dL    A-G Ratio 0.5 (L) 1.2 - 3.5     POC LACTIC ACID    Collection Time: 12/26/17  1:28 PM   Result Value Ref Range    Lactic Acid (POC) 2.4 (H) 0.5 - 1.9 mmol/L   URINALYSIS W/ RFLX MICROSCOPIC    Collection Time: 12/26/17  4:50 PM   Result Value Ref Range    Color YELLOW      Appearance CLEAR      Specific gravity 1.014 1.001 - 1.023      pH (UA) 6.5 5.0 - 9.0      Protein 100 (A) NEG mg/dL    Glucose NEGATIVE  mg/dL    Ketone NEGATIVE  NEG mg/dL Bilirubin NEGATIVE  NEG      Blood NEGATIVE  NEG      Urobilinogen 0.2 0.2 - 1.0 EU/dL    Nitrites NEGATIVE  NEG      Leukocyte Esterase NEGATIVE  NEG      WBC 0 0 /hpf    RBC 0-3 0 /hpf    Epithelial cells 0 0 /hpf    Bacteria 0 0 /hpf    Casts 0 0 /lpf   CULTURE, URINE    Collection Time: 12/26/17  4:50 PM   Result Value Ref Range    Special Requests: NO SPECIAL REQUESTS      Culture result:        NO GROWTH AFTER SHORT PERIOD OF INCUBATION. FURTHER RESULTS TO FOLLOW AFTER OVERNIGHT INCUBATION. EKG, 12 LEAD, INITIAL    Collection Time: 12/26/17  5:19 PM   Result Value Ref Range    Ventricular Rate 43 BPM    Atrial Rate 47 BPM    QRS Duration 90 ms    Q-T Interval 522 ms    QTC Calculation (Bezet) 441 ms    Calculated R Axis 84 degrees    Calculated T Axis 31 degrees    Diagnosis       !! AGE AND GENDER SPECIFIC ECG ANALYSIS !! Sinus bradycardia  Nonspecific ST and T wave abnormality  Abnormal ECG  When compared with ECG of 16-APR-2017 11:16,  Vent.  rate has decreased BY  27 BPM  QT has shortened  Confirmed by ST NICOLA CHICAS MD (), CARLOS LOWRY ([id number]) on 12/26/2017 8:51:32 PM     CULTURE, BLOOD    Collection Time: 12/26/17  5:46 PM   Result Value Ref Range    Special Requests: RIGHT  FOREARM        Culture result: NO GROWTH AFTER 11 HOURS     CULTURE, BLOOD    Collection Time: 12/26/17  5:46 PM   Result Value Ref Range    Special Requests: LEFT  FOREARM        Culture result: NO GROWTH AFTER 11 HOURS     CK    Collection Time: 12/26/17  5:46 PM   Result Value Ref Range     21 - 215 U/L   VITAMIN B12    Collection Time: 12/26/17  5:46 PM   Result Value Ref Range    Vitamin B12 810 193 - 986 pg/mL   RPR    Collection Time: 12/26/17  5:46 PM   Result Value Ref Range    RPR NONREACTIVE NR     TSH 3RD GENERATION    Collection Time: 12/26/17  5:46 PM   Result Value Ref Range    TSH 4.060 (H) 0.358 - 3.740 uIU/mL   INFLUENZA A & B AG (RAPID TEST)    Collection Time: 12/26/17  5:49 PM   Result Value Ref Range Influenza A Ag NEGATIVE  NEG      Influenza B Ag NEGATIVE  NEG     METABOLIC PANEL, COMPREHENSIVE    Collection Time: 12/27/17  5:18 AM   Result Value Ref Range    Sodium 151 (H) 136 - 145 mmol/L    Potassium 4.4 3.5 - 5.1 mmol/L    Chloride 119 (H) 98 - 107 mmol/L    CO2 22 21 - 32 mmol/L    Anion gap 10 7 - 16 mmol/L    Glucose 82 65 - 100 mg/dL    BUN 29 (H) 8 - 23 MG/DL    Creatinine 1.58 (H) 0.8 - 1.5 MG/DL    GFR est AA 56 (L) >60 ml/min/1.73m2    GFR est non-AA 46 (L) >60 ml/min/1.73m2    Calcium 8.2 (L) 8.3 - 10.4 MG/DL    Bilirubin, total 0.3 0.2 - 1.1 MG/DL    ALT (SGPT) 38 12 - 65 U/L    AST (SGOT) 22 15 - 37 U/L    Alk. phosphatase 145 (H) 50 - 136 U/L    Protein, total 7.8 6.3 - 8.2 g/dL    Albumin 2.6 (L) 3.2 - 4.6 g/dL    Globulin 5.2 (H) 2.3 - 3.5 g/dL    A-G Ratio 0.5 (L) 1.2 - 3.5     CBC WITH AUTOMATED DIFF    Collection Time: 12/27/17  5:18 AM   Result Value Ref Range    WBC 3.9 (L) 4.3 - 11.1 K/uL    RBC 3.30 (L) 4.23 - 5.67 M/uL    HGB 8.2 (L) 13.6 - 17.2 g/dL    HCT 26.2 (L) 41.1 - 50.3 %    MCV 79.4 (L) 79.6 - 97.8 FL    MCH 24.8 (L) 26.1 - 32.9 PG    MCHC 31.3 (L) 31.4 - 35.0 g/dL    RDW 21.4 (H) 11.9 - 14.6 %    PLATELET 512 159 - 591 K/uL    MPV 10.8 10.8 - 14.1 FL    DF AUTOMATED      NEUTROPHILS 40 (L) 43 - 78 %    LYMPHOCYTES 47 (H) 13 - 44 %    MONOCYTES 9 4.0 - 12.0 %    EOSINOPHILS 3 0.5 - 7.8 %    BASOPHILS 1 0.0 - 2.0 %    IMMATURE GRANULOCYTES 0 0.0 - 5.0 %    ABS. NEUTROPHILS 1.6 (L) 1.7 - 8.2 K/UL    ABS. LYMPHOCYTES 1.9 0.5 - 4.6 K/UL    ABS. MONOCYTES 0.3 0.1 - 1.3 K/UL    ABS. EOSINOPHILS 0.1 0.0 - 0.8 K/UL    ABS. BASOPHILS 0.0 0.0 - 0.2 K/UL    ABS. IMM.  GRANS. 0.0 0.0 - 0.5 K/UL         Assessment:     Active Problems:    Sepsis ([facility] [address] (12/26/2017)        Plan:     Sepsis- Pt came in with hypothermia, improved, no source of infection, monitor closely  Hold off on Anticoagulation  F/u cultures, RVP -ve, LA elevated, will recheck one  On ABX- zosyn and Vanco    Hypernatremia- Mostly from decreased PO intake, Will get S/S eval if needed  Start on D5W @ 100 cc/hr  AMS could be from this    Hypothyroidism- TSH elevated, On synthroid, will confirm whether he was taking it rgularly     Hx of BPH - On flomax    DVT Px- Stop HEparin, Order for SCD        Signed By: Mariola Portillo MD     December 27, 2017

## 2017-12-27 NOTE — PROGRESS NOTES
CM Specialist Gogo Ledezma met with patient and discussed admission status. Medicare Outpatient Observation Notice provided to the patient. Oral explanation was provided and all questions answered. Signed document placed in the medical chart. Copy provided to patient.

## 2017-12-27 NOTE — WOUND CARE
Coccyx on left side near buttock with 2x1x0.3cm ulcer, stage 2 pressure wound, present on admission, foam in use, would continue. Continue frequent turning. OK to use barrrier cream should frequent soiling of allevyn cause difficulty in keeping dressing in place. Will monitor.

## 2017-12-27 NOTE — PROGRESS NOTES
Dual skin assessment with Ariela Galindo RN reveals bilateral lower extremities bruising with scraps and scabs, feet with thick yellow toenails, stage 1 sacral/coccyx wound blanchable. Large gentle Allevyn placed and pt turned for comfort. Pt cleaned for incontinence. IV antibiotics infusing. Kianna intact, SR up x 3, bed low locked. Door open.

## 2017-12-27 NOTE — PROGRESS NOTES
Pt resting in bed with eyes closed. No distress noted at this time. Call light in reach, door open will monitor.

## 2017-12-27 NOTE — PROGRESS NOTES
Patient hospitalized under Observation status overnight. He was last discharged in October 2017. Patient has baseline dementia and lives at home with his wife where he is mostly independent in all ADLs. His confusion has been increased from baseline for the past few days. Awaiting PT/OT evaluations. Case Management will follow for discharge planning needs.     Care Management Interventions  Transition of Care Consult (CM Consult): Discharge Planning  Discharge Durable Medical Equipment: No  Physical Therapy Consult: Yes  Occupational Therapy Consult: Yes  Speech Therapy Consult: No  Current Support Network: Lives with Spouse, Own Home  Confirm Follow Up Transport: Family  Plan discussed with Pt/Family/Caregiver: Yes  Freedom of Choice Offered: Yes  Discharge Location  Discharge Placement: Other: (To be determined.)

## 2017-12-27 NOTE — PROGRESS NOTES
TRANSFER - IN REPORT:    Verbal report received from JOSE Siddiqui on Toryandres Hardwick  being received from ED for routine progression of care      Report consisted of patients Situation, Background, Assessment and   Recommendations(SBAR). Information from the following report(s) ED Summary was reviewed with the receiving nurse. Opportunity for questions and clarification was provided. Assessment completed upon patients arrival to unit and care assumed.

## 2017-12-27 NOTE — PROGRESS NOTES
Pharmacokinetic Consult to Pharmacist    Rory Green is a 67 y.o. male being treated for sepsis 2/2 UTI with vancomycin and pip/tazo. Height: 5' 8\" (172.7 cm)  Weight: 60.4 kg (133 lb 1.6 oz)  Lab Results   Component Value Date/Time    BUN 29 12/27/2017 05:18 AM    Creatinine 1.58 12/27/2017 05:18 AM    WBC 3.9 12/27/2017 05:18 AM    Procalcitonin 0.1 12/26/2017 01:24 PM    Lactic Acid (POC) 2.4 12/26/2017 01:28 PM      Estimated Creatinine Clearance: 36.1 mL/min (based on Cr of 1.58). CULTURES:  pending    Day 1 of vancomycin. Goal trough is 15-20. Vancomycin dose initiated at 1000 mg IV q24h. Levels will be ordered as clinically indicated. Pharmacy will continue to follow. Please call with any questions.      Thank you,  Asha Burkett, PharmD  Clinical Pharmacist  [phone]

## 2017-12-27 NOTE — PHYSICIAN ADVISORY
Letter of Determination:  Outpatient states receiving Observation Services    This case was reviewed, and I concur with Outpatient status receiving observation services. This determination may change depending on further medical information, condition changes of the patient, or treatment requirements.       Olinda Angelucci MD, JONO,   Physician Shelton Stuart.

## 2017-12-27 NOTE — PROGRESS NOTES
Pt resting in bed. Pt alert oriented times 2 to person and place at this time. Pt denies pain or distress at this time. Pt turns self in bed. Skin tear to right arm with dressing in place. Sacral wound with wound care consult pending. Pt follows commands. Pt on RA at this time. Posey pad in place.

## 2017-12-27 NOTE — PROGRESS NOTES
Progress Note      Patient: Blanca Santos               Sex: male          MRN: [id number]           YOB: 1945      Age:  67 y.o. PCP:  Mikel Gutierrez MD  Treatment Team: Attending Provider: Kiran Hardwick MD; Utilization Review: Shelbi Barone, JOSE; Care Manager: Kevin Douglas RN; Consulting Provider: Kiran Hardwick MD  Subjective:   Admission HPI-  is a 66 yo male who presented with altered mental status and foul smelling urine on a background of dementia, CKD, COPD, HLD,HTN, hypothyroidism and GI bleed. He has a 1 day history of confusion. His family was called for additional history. He lives with his wife. Yesterday, he was noted be confused. This morning, he slipped and fell on the floor (+ head injury, no LOC). He then got back into the bed. He then started seeing things on the floor that was not there. He was noted to have a fowl smell to his urine.      - Pt remains confused, having weakness in the Rt UE, ataxic which is new symptom as per the RN, no CP, no SOB, no Headaches  Na high      Objective:   Physical Exam:   Visit Vitals    /53    Pulse (!) 53    Temp 98.7 °F (37.1 °C)    Resp 18    Ht 5' 8\" (1.727 m)    Wt 60.4 kg (133 lb 1.6 oz)    SpO2 95%    BMI 20.24 kg/m2      Temp (24hrs), Av.1 °F (36.2 °C), Min:92.2 °F (33.4 °C), Max:98.9 °F (37.2 °C)    Oxygen Therapy  O2 Sat (%): 95 % (17 1659)  Pulse via Oximetry: 93 beats per minute (17 0023)  O2 Device: Room air (17 1323)    Intake/Output Summary (Last 24 hours) at 17 1753  Last data filed at 17 1300   Gross per 24 hour   Intake              590 ml   Output                0 ml   Net              590 ml      General: Conscious, No acute distress  Eyes:  No pallor/icterus    HENT:             Oral Mucosa is dry, No sinus tenderness  Neck:               Supple, No JVD  Lungs:  CTA Bilaterally, No significant wheeze/rhonchi  Heart:  S1 S2 regular  Abdomen: Soft, nromal bowel sounds, NTND, No guarding/rigidity/rebound tend. Extremities: No pedal edema  Neurologic:  AO x 1, weakness in the Rt UE, tremors,   Skin:                No acute rashes  Psych:             Appropriate mood,    LAB  Recent Results (from the past 24 hour(s))   METABOLIC PANEL, COMPREHENSIVE    Collection Time: 12/27/17  5:18 AM   Result Value Ref Range    Sodium 151 (H) 136 - 145 mmol/L    Potassium 4.4 3.5 - 5.1 mmol/L    Chloride 119 (H) 98 - 107 mmol/L    CO2 22 21 - 32 mmol/L    Anion gap 10 7 - 16 mmol/L    Glucose 82 65 - 100 mg/dL    BUN 29 (H) 8 - 23 MG/DL    Creatinine 1.58 (H) 0.8 - 1.5 MG/DL    GFR est AA 56 (L) >60 ml/min/1.73m2    GFR est non-AA 46 (L) >60 ml/min/1.73m2    Calcium 8.2 (L) 8.3 - 10.4 MG/DL    Bilirubin, total 0.3 0.2 - 1.1 MG/DL    ALT (SGPT) 38 12 - 65 U/L    AST (SGOT) 22 15 - 37 U/L    Alk. phosphatase 145 (H) 50 - 136 U/L    Protein, total 7.8 6.3 - 8.2 g/dL    Albumin 2.6 (L) 3.2 - 4.6 g/dL    Globulin 5.2 (H) 2.3 - 3.5 g/dL    A-G Ratio 0.5 (L) 1.2 - 3.5     CBC WITH AUTOMATED DIFF    Collection Time: 12/27/17  5:18 AM   Result Value Ref Range    WBC 3.9 (L) 4.3 - 11.1 K/uL    RBC 3.30 (L) 4.23 - 5.67 M/uL    HGB 8.2 (L) 13.6 - 17.2 g/dL    HCT 26.2 (L) 41.1 - 50.3 %    MCV 79.4 (L) 79.6 - 97.8 FL    MCH 24.8 (L) 26.1 - 32.9 PG    MCHC 31.3 (L) 31.4 - 35.0 g/dL    RDW 21.4 (H) 11.9 - 14.6 %    PLATELET 997 845 - 098 K/uL    MPV 10.8 10.8 - 14.1 FL    DF AUTOMATED      NEUTROPHILS 40 (L) 43 - 78 %    LYMPHOCYTES 47 (H) 13 - 44 %    MONOCYTES 9 4.0 - 12.0 %    EOSINOPHILS 3 0.5 - 7.8 %    BASOPHILS 1 0.0 - 2.0 %    IMMATURE GRANULOCYTES 0 0.0 - 5.0 %    ABS. NEUTROPHILS 1.6 (L) 1.7 - 8.2 K/UL    ABS. LYMPHOCYTES 1.9 0.5 - 4.6 K/UL    ABS. MONOCYTES 0.3 0.1 - 1.3 K/UL    ABS. EOSINOPHILS 0.1 0.0 - 0.8 K/UL    ABS. BASOPHILS 0.0 0.0 - 0.2 K/UL    ABS. IMM.  GRANS. 0.0 0.0 - 0.5 K/UL   LACTIC ACID    Collection Time: 12/27/17 11:06 AM   Result Value Ref Range    Lactic acid 1.6 0.4 - 2.0 MMOL/L       Mri Brain Wo Cont    Result Date: 12/27/2017  MRI BRAIN WITHOUT CONTRAST 12/27/2017 HISTORY: Dementia. Confusion for one day. Fall yesterday. TECHNIQUE: Sagittal and axial T1-weighted, axial T2-weighted, axial and coronal FLAIR, axial T2-weighted gradient-echo, axial diffusion weighted images with ADC maps of the brain. COMPARISON: Head CT December 26, 2017 and Brain MRI December 5, 2016 FINDINGS: There is no acute infarction, acute intracranial hemorrhage or significant mass effect. A disc protrusion contributes to stenosis at the C3-C4 level. Diffuse ventricular enlargement with symmetric significant bilateral hippocampal atrophy is similar in appearance to the previous study. The cerebral sulci and basilar cisterns are patent. There are old cerebellar lacunar infarcts along with old lacunar infarcts in the basal ganglia. On the T2-weighted and FLAIR sequences, there are extensive hyperintense supratentorial white matter lesions compatible with chronic small vessel ischemic disease. Motion artifact on this study limits evaluation of the brain. IMPRESSION: 1. No acute infarction. 2. Advanced cerebral volume loss with ventriculomegaly and hippocampal atrophy. 3. White matter findings compatible with chronic small vessel ischemic disease with old lacunar infarcts in the cerebellum and basal ganglia. Ct Head Wo Cont    Result Date: 12/26/2017  HEAD CT without contrast  12/26/2017 9:45 PM CLINICAL INFORMATION: 67year-old with altered level of consciousness. Confusion. History of dementia. Possible UTI. COMPARISON: 12/4/2016, 11/25/2016. PROCEDURE: Emergent noncontrast head CT was performed in the axial plane. Brain, bone, and soft tissue windows reviewed. Radiation dose reduction techniques were used for this study.  Our CT scanners used one or all of the following: Automated exposure control, adjustment of the MA and/or kV according to patient size, iterative reconstruction. FINDINGS: No acute intracranial hemorrhage, mass, or mass effect. No extra-axial fluid collections. Stable diffuse volume loss. No midline shift; the basilar cisterns are patent. The ventricles are somewhat dilated with stable in caliber. Moderate periventricular, deep and subcortical patchy white matter low attenuation is nonspecific but likely related to chronic small vessel ischemic change, including bilateral basal ganglia and chronic left lacunar infarcts. . There are no specific signs to suggest acute large vessel territory ischemia. The gray-white differentiation is otherwise preserved. No aggressive calvarial process. Visualized portions of the paranasal sinuses and mastoid air cells are patent. IMPRESSION: 1. No convincing acute intracranial abnormality. 2. Stable ventriculomegaly, probable chronic small vessel ischemic change and bilateral basal ganglia/left thalamic lacunar infarcts. Mri Brain Wo Cont    Result Date: 12/27/2017  IMPRESSION: 1. No acute infarction. 2. Advanced cerebral volume loss with ventriculomegaly and hippocampal atrophy. 3. White matter findings compatible with chronic small vessel ischemic disease with old lacunar infarcts in the cerebellum and basal ganglia. Ct Head Wo Cont    Result Date: 12/26/2017  IMPRESSION: 1. No convincing acute intracranial abnormality. 2. Stable ventriculomegaly, probable chronic small vessel ischemic change and bilateral basal ganglia/left thalamic lacunar infarcts. All Micro Results     Procedure Component Value Units Date/Time    CULTURE, URINE [[id number]] Collected:  12/26/17 1650    Order Status:  Completed Specimen:  Urine from [facility] Urine Updated:  12/27/17 1845     Special Requests: NO SPECIAL REQUESTS        Culture result:         NO GROWTH AFTER SHORT PERIOD OF INCUBATION. FURTHER RESULTS TO FOLLOW AFTER OVERNIGHT INCUBATION.     CULTURE, BLOOD [[id number]] Collected:  12/26/17 1746    Order Status: Completed Specimen:  Blood from Blood Updated:  12/27/17 0615     Special Requests: --        RIGHT  FOREARM       Culture result: NO GROWTH AFTER 11 HOURS       CULTURE, BLOOD [[id number]] Collected:  12/26/17 1746    Order Status:  Completed Specimen:  Blood from Blood Updated:  12/27/17 0615     Special Requests: --        LEFT  FOREARM       Culture result: NO GROWTH AFTER 11 HOURS       INFLUENZA A & B AG (RAPID TEST) [[id number]] Collected:  12/26/17 1749    Order Status:  Completed Specimen:  Nasopharyngeal from Nasal washing Updated:  12/26/17 1813     Influenza A Ag NEGATIVE          NEGATIVE FOR THE PRESENCE OF INFLUENZA A ANTIGEN  INFECTION DUE TO INFLUENZA A CANNOT BE RULED OUT. BECAUSE THE ANTIGEN PRESENT IN THE SAMPLE MAY BE BELOW  THE DETECTION LIMIT OF THE TEST. A NEGATIVE TEST IS PRESUMPTIVE AND IT IS RECOMMENDED THAT THESE RESULTS BE CONFIRMED BY VIRAL CULTURE OR AN FDA-CLEARED INFLUENZA A AND B MOLECULAR ASSAY. Influenza B Ag NEGATIVE          NEGATIVE FOR THE PRESENCE OF INFLUENZA B ANTIGEN  INFECTION DUE TO INFLUENZA B CANNOT BE RULED OUT. BECAUSE THE ANTIGEN PRESENT IN THE SAMPLE MAY BE BELOW  THE DETECTION LIMIT OF THE TEST. A NEGATIVE TEST IS PRESUMPTIVE AND IT IS RECOMMENDED THAT THESE RESULTS BE CONFIRMED BY VIRAL CULTURE OR AN FDA-CLEARED INFLUENZA A AND B MOLECULAR ASSAY. CULTURE, BLOOD [[id number]]     Order Status:  Sent Specimen:  Blood from Blood     CULTURE, BLOOD [[id number]]     Order Status:  Sent Specimen:  Blood from Blood           Current Medications Reviewed      Assessment/Plan     Active Problems:    Dementia (11/25/2016)      HTN (hypertension) (5/30/2014)      Hypernatremia (11/25/2016)      Acute metabolic encephalopathy (82/7/4352)      Sepsis ([facility] [address] (12/26/2017)        Plan:   1. Ordered for MRI as wife is concerned about new deficitis, Neuro was consulted, MRI is negative  2.  Started on D5W for Na correction, will allow him to drink water, Recheck BMP this evening  3. Currently on ABX, no source of infection, MOnitor  - Lactic acidosis resolved  DC heparin for possible LP if remains febrile  RVP -ve  4.  High risk patient     Emmett Hightower MD  December 27, 2017

## 2017-12-27 NOTE — PROGRESS NOTES
Problem: Mobility Impaired (Adult and Pediatric)  Goal: *Acute Goals and Plan of Care (Insert Text)  1. Mr. Ravindra Draper will perform supine to sit and sit to supine with supervision in 7 days. 2.  Mr. Ravindra Draper will perform sit to stand and bed to chair with supervision in 7 days. 3.  Mr. Ravindra Draper will perform gait with least restrictive device and supervision 100 ft in 7 days. PHYSICAL THERAPY: Initial Assessment 12/27/2017  OBSERVATION: Hospital Day: 2  Payor: Wanda Gonzalez / Plan: [address] [place] [address] HMO / Product Type: Managed Care Medicare /      NAME/AGE/GENDER: Lev Hernandez is a 67 y.o. male   PRIMARY DIAGNOSIS: Sepsis ([facility] [address] <principal problem not specified> <principal problem not specified>        ICD-10: Treatment Diagnosis:   · Generalized Muscle Weakness (M62.81)  · Difficulty in walking, Not elsewhere classified (R26.2)   Precaution/Allergies:  Review of patient's allergies indicates no known allergies. ASSESSMENT:     Mr. Ravindra Draper presents in bed, very sweet and answering some questions. Knows who he is and where he is. Knows his wife. His wife is at the bedside and provides a lot of information. Most importantly is she says she is taking him home. No facility stay for him. She admits that they turn the stove off at night and that he does wander but she can manage him at home. Her daughter and son are close by. Currently he presents with decreased mobility and decreased gait but likely not far from baseline. Actually he would be able to be more active the sooner he gets home. Mr. Ravindra Draper currently is requiring contact guard to min assist for transfers and gait. Mr. Ravindra Draper is appropriate for skilled PT to maximize her rehab potential.  His wife would like PT to come to the house when they come home. This section established at most recent assessment   PROBLEM LIST (Impairments causing functional limitations):  1. Decreased Transfer Abilities  2.  Decreased Ambulation Ability/Technique  3. Decreased Activity Tolerance   INTERVENTIONS PLANNED: (Benefits and precautions of physical therapy have been discussed with the patient.)  1. Bed Mobility  2. Gait Training  3. Therapeutic Activites  4. Transfer Training  5. Group Therapy     TREATMENT PLAN: Frequency/Duration: 3 times a week for duration of hospital stay  Rehabilitation Potential For Stated Goals: Good     RECOMMENDED REHABILITATION/EQUIPMENT: (at time of discharge pending progress): Due to the probability of continued deficits (see above) this patient will likely need continued skilled physical therapy after discharge. Equipment:    None at this time              HISTORY:   History of Present Injury/Illness (Reason for Referral):   is a 66 yo male who presented with altered mental status and foul smelling urine on a background of dementia, CKD, COPD, HLD,HTN, hypothyroidism and GI bleed. He has a 1 day history of confusion. His family was called for additional history. He lives with his wife. Yesterday, he was noted be confused. This morning, he slipped and fell on the floor (+ head injury, no LOC). He then got back into the bed. He then started seeing things on the floor that was not there. He was noted to have a fowl smell to his urine  Past Medical History/Comorbidities:   Mr. Elizabeth Joshua  has a past medical history of Alcohol abuse; Anemia; Angiodysplasia of stomach (2016); Bladder incontinence (05/28/2014); Chronic airway obstruction, not elsewhere classified (5/28/2014); CKD (chronic kidney disease); COPD (chronic obstructive pulmonary disease) ([facility] [address] Dementia (5/28/2014); Hyperlipidemia (5/28/2014); Hypernatremia (11/25/2016); Hypertension; Hypothyroid; Seizure ([address] (2010); Tobacco abuse (5/28/2014); and Tobacco abuse. Mr. Elizabeth Joshua  has a past surgical history that includes hx colonoscopy (APPROX 2005) and hx endoscopy (2016).   Social History/Living Environment:   Home Environment: Private residence  One/Two Story Residence: One story  Living Alone: No  Support Systems: Child(lissette), Spouse/Significant Other/Partner  Patient Expects to be Discharged to[de-identified] Private residence  Current DME Used/Available at Home: None  Prior Level of Function/Work/Activity:  Furniture walker. age, dementia/advanced. Number of Personal Factors/Comorbidities that affect the Plan of Care: 1-2: MODERATE COMPLEXITY   EXAMINATION:   Most Recent Physical Functioning:   Gross Assessment:  AROM: Generally decreased, functional  Strength: Generally decreased, functional               Posture:  Posture (WDL): Exceptions to WDL  Posture Assessment: Forward head, Rounded shoulders  Balance:  Sitting: Impaired  Sitting - Static: Good (unsupported)  Sitting - Dynamic: Fair (occasional)  Standing: Impaired; With support  Standing - Static: Fair  Standing - Dynamic : Fair Bed Mobility:  Rolling: Stand-by asssistance  Supine to Sit: Contact guard assistance  Wheelchair Mobility:     Transfers:  Sit to Stand: Contact guard assistance  Stand to Sit: Contact guard assistance  Gait:     Base of Support: Narrowed  Speed/Alejandra: Slow;Shuffled  Step Length: Right shortened;Left shortened  Gait Abnormalities: Shuffling gait  Distance (ft): 10 Feet (ft)  Ambulation - Level of Assistance: Contact guard assistance  Interventions: Verbal cues; Tactile cues      Body Structures Involved:  1. Muscles Body Functions Affected:  1. Movement Related Activities and Participation Affected:  1. Mobility   Number of elements that affect the Plan of Care: 3: MODERATE COMPLEXITY   CLINICAL PRESENTATION:   Presentation: Evolving clinical presentation with changing clinical characteristics: MODERATE COMPLEXITY   CLINICAL DECISION MAKIN [facility] Inpatient Short Form  How much difficulty does the patient currently have. .. Unable A Lot A Little None   1.   Turning over in bed (including adjusting bedclothes, sheets and blankets)? [] 1   [] 2   [x] 3   [] 4   2. Sitting down on and standing up from a chair with arms ( e.g., wheelchair, bedside commode, etc.)   [] 1   [] 2   [x] 3   [] 4   3. Moving from lying on back to sitting on the side of the bed? [] 1   [] 2   [x] 3   [] 4   How much help from another person does the patient currently need. .. Total A Lot A Little None   4. Moving to and from a bed to a chair (including a wheelchair)? [] 1   [] 2   [x] 3   [] 4   5. Need to walk in hospital room? [] 1   [] 2   [x] 3   [] 4   6. Climbing 3-5 steps with a railing? [] 1   [] 2   [x] 3   [] 4   © 2007, Trustees of [address] [place] [id number], under license to [employer]. All rights reserved      Score:  Initial: 18 Most Recent: X (Date: -- )    Interpretation of Tool:  Represents activities that are increasingly more difficult (i.e. Bed mobility, Transfers, Gait). Score 24 23 22-20 19-15 14-10 9-7 6     Modifier CH CI CJ CK CL CM CN      ? Mobility - Walking and Moving Around:     - CURRENT STATUS: CK - 40%-59% impaired, limited or restricted    - GOAL STATUS: CK - 40%-59% impaired, limited or restricted    - D/C STATUS:  ---------------To be determined---------------  Payor: [employer] MEDICARE / Plan: [address] [place] [address] HMO / Product Type: Managed Care Medicare /      Medical Necessity:     · Patient is expected to demonstrate progress in functional technique to decrease assistance required with mobility and gait. .  Reason for Services/Other Comments:  · Patient continues to require present interventions due to patient's inability to function at baseline. .   Use of outcome tool(s) and clinical judgement create a POC that gives a: Questionable prediction of patient's progress: MODERATE COMPLEXITY            TREATMENT:   (In addition to Assessment/Re-Assessment sessions the following treatments were rendered)   Pre-treatment Symptoms/Complaints:  none  Pain: Initial:   Pain Intensity 1: 0  Post Session:  none Assessment/Reassessment only, no treatment provided today    Braces/Orthotics/Lines/Etc:   · IV  · O2 Device: Room air  Treatment/Session Assessment:    · Response to Treatment:  good  · Interdisciplinary Collaboration:   o Physical Therapist  o Registered Nurse  · After treatment position/precautions:   o Up in chair  o Bed alarm/tab alert on  o Call light within reach  o RN notified  o Family at bedside   · Compliance with Program/Exercises: Will assess as treatment progresses. · Recommendations/Intent for next treatment session: \"Next visit will focus on advancements to more challenging activities and reduction in assistance provided\".   Total Treatment Duration:  PT Patient Time In/Time Out  Time In: 1130  Time Out: [address] [place], PT

## 2017-12-27 NOTE — PROGRESS NOTES
Pt sitting up in chair after working with PT. Wife at bedside with concerns about right wrist. Right wrist bent and pt cant grasp spoon. Wife reports this is new for the pt. Pt will follow commands moves all extremities. Pt able to raise both arms on commands. Dr. Pascale Canales on floor and made aware and going into see pt.

## 2017-12-28 LAB
ANION GAP SERPL CALC-SCNC: 9 MMOL/L (ref 7–16)
BASOPHILS # BLD: 0 K/UL (ref 0–0.2)
BASOPHILS NFR BLD: 0 % (ref 0–2)
BUN SERPL-MCNC: 23 MG/DL (ref 8–23)
CALCIUM SERPL-MCNC: 8.2 MG/DL (ref 8.3–10.4)
CHLORIDE SERPL-SCNC: 112 MMOL/L (ref 98–107)
CO2 SERPL-SCNC: 24 MMOL/L (ref 21–32)
CREAT SERPL-MCNC: 1.41 MG/DL (ref 0.8–1.5)
DIFFERENTIAL METHOD BLD: ABNORMAL
EOSINOPHIL # BLD: 0.1 K/UL (ref 0–0.8)
EOSINOPHIL NFR BLD: 4 % (ref 0.5–7.8)
ERYTHROCYTE [DISTWIDTH] IN BLOOD BY AUTOMATED COUNT: 20.6 % (ref 11.9–14.6)
FERRITIN SERPL-MCNC: 37 NG/ML (ref 8–388)
FOLATE SERPL-MCNC: 18.2 NG/ML (ref 3.1–17.5)
GLUCOSE SERPL-MCNC: 80 MG/DL (ref 65–100)
HCT VFR BLD AUTO: 24.6 % (ref 41.1–50.3)
HGB BLD-MCNC: 7.7 G/DL (ref 13.6–17.2)
IMM GRANULOCYTES # BLD: 0 K/UL (ref 0–0.5)
IMM GRANULOCYTES NFR BLD AUTO: 0 % (ref 0–5)
IRON SATN MFR SERPL: 19 %
IRON SERPL-MCNC: 53 UG/DL (ref 35–150)
LYMPHOCYTES # BLD: 1.6 K/UL (ref 0.5–4.6)
LYMPHOCYTES NFR BLD: 51 % (ref 13–44)
MAGNESIUM SERPL-MCNC: 1.9 MG/DL (ref 1.8–2.4)
MCH RBC QN AUTO: 24.4 PG (ref 26.1–32.9)
MCHC RBC AUTO-ENTMCNC: 31.3 G/DL (ref 31.4–35)
MCV RBC AUTO: 77.8 FL (ref 79.6–97.8)
MONOCYTES # BLD: 0.3 K/UL (ref 0.1–1.3)
MONOCYTES NFR BLD: 8 % (ref 4–12)
NEUTS SEG # BLD: 1.2 K/UL (ref 1.7–8.2)
NEUTS SEG NFR BLD: 37 % (ref 43–78)
PLATELET # BLD AUTO: 203 K/UL (ref 150–450)
PMV BLD AUTO: 10.7 FL (ref 10.8–14.1)
POTASSIUM SERPL-SCNC: 4 MMOL/L (ref 3.5–5.1)
RBC # BLD AUTO: 3.16 M/UL (ref 4.23–5.67)
SODIUM SERPL-SCNC: 145 MMOL/L (ref 136–145)
TIBC SERPL-MCNC: 274 UG/DL (ref 250–450)
VIT B12 SERPL-MCNC: 530 PG/ML (ref 193–986)
WBC # BLD AUTO: 3.2 K/UL (ref 4.3–11.1)

## 2017-12-28 PROCEDURE — 74011250636 HC RX REV CODE- 250/636: Performed by: INTERNAL MEDICINE

## 2017-12-28 PROCEDURE — 74011250637 HC RX REV CODE- 250/637: Performed by: HOSPITALIST

## 2017-12-28 PROCEDURE — 83735 ASSAY OF MAGNESIUM: CPT | Performed by: HOSPITALIST

## 2017-12-28 PROCEDURE — 82607 VITAMIN B-12: CPT | Performed by: HOSPITALIST

## 2017-12-28 PROCEDURE — 74011250637 HC RX REV CODE- 250/637: Performed by: INTERNAL MEDICINE

## 2017-12-28 PROCEDURE — 82746 ASSAY OF FOLIC ACID SERUM: CPT | Performed by: HOSPITALIST

## 2017-12-28 PROCEDURE — 97166 OT EVAL MOD COMPLEX 45 MIN: CPT

## 2017-12-28 PROCEDURE — 80048 BASIC METABOLIC PNL TOTAL CA: CPT | Performed by: HOSPITALIST

## 2017-12-28 PROCEDURE — 36415 COLL VENOUS BLD VENIPUNCTURE: CPT | Performed by: HOSPITALIST

## 2017-12-28 PROCEDURE — 74011000258 HC RX REV CODE- 258: Performed by: EMERGENCY MEDICINE

## 2017-12-28 PROCEDURE — 82728 ASSAY OF FERRITIN: CPT | Performed by: HOSPITALIST

## 2017-12-28 PROCEDURE — 74011250636 HC RX REV CODE- 250/636: Performed by: EMERGENCY MEDICINE

## 2017-12-28 PROCEDURE — 83540 ASSAY OF IRON: CPT | Performed by: HOSPITALIST

## 2017-12-28 PROCEDURE — 65270000029 HC RM PRIVATE

## 2017-12-28 PROCEDURE — 99218 HC RM OBSERVATION: CPT

## 2017-12-28 PROCEDURE — 85025 COMPLETE CBC W/AUTO DIFF WBC: CPT | Performed by: HOSPITALIST

## 2017-12-28 RX ORDER — LOSARTAN POTASSIUM 50 MG/1
100 TABLET ORAL DAILY
Status: DISCONTINUED | OUTPATIENT
Start: 2017-12-28 | End: 2018-01-01 | Stop reason: HOSPADM

## 2017-12-28 RX ORDER — GUAIFENESIN 100 MG/5ML
81 LIQUID (ML) ORAL DAILY
Status: DISCONTINUED | OUTPATIENT
Start: 2017-12-28 | End: 2018-01-01 | Stop reason: HOSPADM

## 2017-12-28 RX ORDER — HYDRALAZINE HYDROCHLORIDE 20 MG/ML
10 INJECTION INTRAMUSCULAR; INTRAVENOUS
Status: DISCONTINUED | OUTPATIENT
Start: 2017-12-28 | End: 2018-01-01 | Stop reason: HOSPADM

## 2017-12-28 RX ORDER — AMLODIPINE BESYLATE 10 MG/1
10 TABLET ORAL DAILY
Status: DISCONTINUED | OUTPATIENT
Start: 2017-12-28 | End: 2018-01-01 | Stop reason: HOSPADM

## 2017-12-28 RX ADMIN — ASPIRIN 81 MG 81 MG: 81 TABLET ORAL at 10:41

## 2017-12-28 RX ADMIN — Medication 10 ML: at 20:38

## 2017-12-28 RX ADMIN — PIPERACILLIN SODIUM,TAZOBACTAM SODIUM 3.38 G: 3; .375 INJECTION, POWDER, FOR SOLUTION INTRAVENOUS at 02:28

## 2017-12-28 RX ADMIN — LEVETIRACETAM 500 MG: 500 TABLET ORAL at 08:48

## 2017-12-28 RX ADMIN — PIPERACILLIN SODIUM,TAZOBACTAM SODIUM 3.38 G: 3; .375 INJECTION, POWDER, FOR SOLUTION INTRAVENOUS at 10:40

## 2017-12-28 RX ADMIN — FOLIC ACID 1 MG: 1 TABLET ORAL at 08:48

## 2017-12-28 RX ADMIN — HEPARIN SODIUM 5000 UNITS: 5000 INJECTION, SOLUTION INTRAVENOUS; SUBCUTANEOUS at 13:25

## 2017-12-28 RX ADMIN — DIVALPROEX SODIUM 250 MG: 250 TABLET, EXTENDED RELEASE ORAL at 20:45

## 2017-12-28 RX ADMIN — LEVETIRACETAM 500 MG: 500 TABLET ORAL at 16:57

## 2017-12-28 RX ADMIN — PIPERACILLIN SODIUM,TAZOBACTAM SODIUM 3.38 G: 3; .375 INJECTION, POWDER, FOR SOLUTION INTRAVENOUS at 16:57

## 2017-12-28 RX ADMIN — PANTOPRAZOLE SODIUM 40 MG: 40 TABLET, DELAYED RELEASE ORAL at 16:57

## 2017-12-28 RX ADMIN — HEPARIN SODIUM 5000 UNITS: 5000 INJECTION, SOLUTION INTRAVENOUS; SUBCUTANEOUS at 02:27

## 2017-12-28 RX ADMIN — LEVOTHYROXINE SODIUM 100 MCG: 100 TABLET ORAL at 05:46

## 2017-12-28 RX ADMIN — Medication 5 ML: at 05:46

## 2017-12-28 RX ADMIN — TAMSULOSIN HYDROCHLORIDE 0.4 MG: 0.4 CAPSULE ORAL at 20:39

## 2017-12-28 RX ADMIN — LOSARTAN POTASSIUM 100 MG: 50 TABLET ORAL at 08:48

## 2017-12-28 RX ADMIN — Medication 5 ML: at 10:45

## 2017-12-28 RX ADMIN — MIRTAZAPINE 15 MG: 15 TABLET, FILM COATED ORAL at 20:38

## 2017-12-28 RX ADMIN — PANTOPRAZOLE SODIUM 40 MG: 40 TABLET, DELAYED RELEASE ORAL at 08:49

## 2017-12-28 RX ADMIN — AMLODIPINE BESYLATE 10 MG: 10 TABLET ORAL at 08:49

## 2017-12-28 NOTE — PHYSICIAN ADVISORY
Letter of Determination: Upgrade from Observation to Inpatient Status    This patient was originally hospitalized as observation status on 12/26/2017 for acute hypothermia and altered mental status. This patient now meets for Inpatient Admission based on medical necessity. The patient's stay was medically necessary based on presentation with core temperature measured by rectal temp to 92.2 degrees farenheit, and mental status changes persistent more than 24 hours after hospitalization. It is our recommendation that this patient's hospitalization status should be upgraded from OBSERVATION to INPATIENT status.      The final decision regarding the patient's hospitalization status depends on the attending physician's judgement.     Olinda Angelucci MD, JONO,   Physician [facility].

## 2017-12-28 NOTE — PROGRESS NOTES
Progress Note      Patient: Ousmane Shah               Sex: male          MRN: [id number]           YOB: 1945      Age:  67 y.o. PCP:  Demetrice Garham MD  Treatment Team: Attending Provider: Michelle Waller MD; Utilization Review: Crow Whitt RN; Care Manager: Leandro Gutiérrez RN; Consulting Provider: Michelle Waller MD  Subjective:   Admission HPI-  is a 66 yo male who presented with altered mental status and foul smelling urine on a background of dementia, CKD, COPD, HLD,HTN, hypothyroidism and GI bleed. He has a 1 day history of confusion. His family was called for additional history. He lives with his wife. Yesterday, he was noted be confused. This morning, he slipped and fell on the floor (+ head injury, no LOC). He then got back into the bed. He then started seeing things on the floor that was not there. He was noted to have a fowl smell to his urine.      - Pt remains confused, having weakness in the Rt UE, ataxic which is new symptom as per the RN, no CP, no SOB, no Headaches  Na high    - Pt is doing well, he is more awake and following commands, Rt side weakness is improved, no CP, no SOB, no fevers      Objective:   Physical Exam:   Visit Vitals    /84    Pulse (!) 55    Temp 98.3 °F (36.8 °C)    Resp 18    Ht 5' 8\" (1.727 m)    Wt 63.4 kg (139 lb 11.2 oz)    SpO2 97%    BMI 21.24 kg/m2      Temp (24hrs), Av °F (36.7 °C), Min:97.5 °F (36.4 °C), Max:98.7 °F (37.1 °C)    Oxygen Therapy  O2 Sat (%): 97 % (17 1123)  Pulse via Oximetry: 93 beats per minute (17 0023)  O2 Device: Room air (17 1323)    Intake/Output Summary (Last 24 hours) at 17 1527  Last data filed at 17 0900   Gross per 24 hour   Intake             3181 ml   Output                0 ml   Net             3181 ml      General: Conscious, No acute distress  Eyes:  No pallor/icterus    HENT:             Oral Mucosa is dry, No sinus tenderness  Neck:               Supple, No JVD  Lungs:  CTA Bilaterally, No significant wheeze/rhonchi  Heart:  S1 S2 regular  Abdomen: Soft, nromal bowel sounds, NTND, No guarding/rigidity/rebound tend. Extremities: No pedal edema  Neurologic:  AO x 2, weakness in the Rt UE, tremors,   Skin:                No acute rashes  Psych:             Appropriate mood,    LAB  Recent Results (from the past 24 hour(s))   METABOLIC PANEL, BASIC    Collection Time: 12/27/17  6:02 PM   Result Value Ref Range    Sodium 149 (H) 136 - 145 mmol/L    Potassium 4.0 3.5 - 5.1 mmol/L    Chloride 117 (H) 98 - 107 mmol/L    CO2 23 21 - 32 mmol/L    Anion gap 9 7 - 16 mmol/L    Glucose 113 (H) 65 - 100 mg/dL    BUN 27 (H) 8 - 23 MG/DL    Creatinine 1.70 (H) 0.8 - 1.5 MG/DL    GFR est AA 51 (L) >60 ml/min/1.73m2    GFR est non-AA 42 (L) >60 ml/min/1.73m2    Calcium 8.0 (L) 8.3 - 10.4 MG/DL   CBC WITH AUTOMATED DIFF    Collection Time: 12/28/17  6:14 AM   Result Value Ref Range    WBC 3.2 (L) 4.3 - 11.1 K/uL    RBC 3.16 (L) 4.23 - 5.67 M/uL    HGB 7.7 (L) 13.6 - 17.2 g/dL    HCT 24.6 (L) 41.1 - 50.3 %    MCV 77.8 (L) 79.6 - 97.8 FL    MCH 24.4 (L) 26.1 - 32.9 PG    MCHC 31.3 (L) 31.4 - 35.0 g/dL    RDW 20.6 (H) 11.9 - 14.6 %    PLATELET 419 887 - 017 K/uL    MPV 10.7 (L) 10.8 - 14.1 FL    DF AUTOMATED      NEUTROPHILS 37 (L) 43 - 78 %    LYMPHOCYTES 51 (H) 13 - 44 %    MONOCYTES 8 4.0 - 12.0 %    EOSINOPHILS 4 0.5 - 7.8 %    BASOPHILS 0 0.0 - 2.0 %    IMMATURE GRANULOCYTES 0 0.0 - 5.0 %    ABS. NEUTROPHILS 1.2 (L) 1.7 - 8.2 K/UL    ABS. LYMPHOCYTES 1.6 0.5 - 4.6 K/UL    ABS. MONOCYTES 0.3 0.1 - 1.3 K/UL    ABS. EOSINOPHILS 0.1 0.0 - 0.8 K/UL    ABS. BASOPHILS 0.0 0.0 - 0.2 K/UL    ABS. IMM.  GRANS. 0.0 0.0 - 0.5 K/UL   MAGNESIUM    Collection Time: 12/28/17  6:14 AM   Result Value Ref Range    Magnesium 1.9 1.8 - 2.4 mg/dL   METABOLIC PANEL, BASIC    Collection Time: 12/28/17  6:14 AM   Result Value Ref Range    Sodium 145 136 - 145 mmol/L Potassium 4.0 3.5 - 5.1 mmol/L    Chloride 112 (H) 98 - 107 mmol/L    CO2 24 21 - 32 mmol/L    Anion gap 9 7 - 16 mmol/L    Glucose 80 65 - 100 mg/dL    BUN 23 8 - 23 MG/DL    Creatinine 1.41 0.8 - 1.5 MG/DL    GFR est AA >60 >60 ml/min/1.73m2    GFR est non-AA 53 (L) >60 ml/min/1.73m2    Calcium 8.2 (L) 8.3 - 10.4 MG/DL   FERRITIN    Collection Time: 12/28/17  6:14 AM   Result Value Ref Range    Ferritin 37 8 - 388 NG/ML   VITAMIN B12    Collection Time: 12/28/17  6:14 AM   Result Value Ref Range    Vitamin B12 530 193 - 986 pg/mL   FOLATE    Collection Time: 12/28/17  6:14 AM   Result Value Ref Range    Folate 18.2 (H) 3.1 - 17.5 ng/mL   TRANSFERRIN SATURATION    Collection Time: 12/28/17  6:14 AM   Result Value Ref Range    Iron 53 35 - 150 ug/dL    TIBC 274 250 - 450 ug/dL    Transferrin Saturation 19 (L) >20 %       Mri Brain Wo Cont    Result Date: 12/27/2017  MRI BRAIN WITHOUT CONTRAST 12/27/2017 HISTORY: Dementia. Confusion for one day. Fall yesterday. TECHNIQUE: Sagittal and axial T1-weighted, axial T2-weighted, axial and coronal FLAIR, axial T2-weighted gradient-echo, axial diffusion weighted images with ADC maps of the brain. COMPARISON: Head CT December 26, 2017 and Brain MRI December 5, 2016 FINDINGS: There is no acute infarction, acute intracranial hemorrhage or significant mass effect. A disc protrusion contributes to stenosis at the C3-C4 level. Diffuse ventricular enlargement with symmetric significant bilateral hippocampal atrophy is similar in appearance to the previous study. The cerebral sulci and basilar cisterns are patent. There are old cerebellar lacunar infarcts along with old lacunar infarcts in the basal ganglia. On the T2-weighted and FLAIR sequences, there are extensive hyperintense supratentorial white matter lesions compatible with chronic small vessel ischemic disease. Motion artifact on this study limits evaluation of the brain. IMPRESSION: 1. No acute infarction.  2. Advanced cerebral volume loss with ventriculomegaly and hippocampal atrophy. 3. White matter findings compatible with chronic small vessel ischemic disease with old lacunar infarcts in the cerebellum and basal ganglia. Mri Brain Wo Cont    Result Date: 12/27/2017  IMPRESSION: 1. No acute infarction. 2. Advanced cerebral volume loss with ventriculomegaly and hippocampal atrophy. 3. White matter findings compatible with chronic small vessel ischemic disease with old lacunar infarcts in the cerebellum and basal ganglia. All Micro Results     Procedure Component Value Units Date/Time    CULTURE, URINE [[id number]] Collected:  12/26/17 1650    Order Status:  Completed Specimen:  Urine from Cath Urine Updated:  12/28/17 0754     Special Requests: NO SPECIAL REQUESTS        Culture result: NO GROWTH 1 DAY       CULTURE, BLOOD [[id number]] Collected:  12/26/17 1746    Order Status:  Completed Specimen:  Blood from Blood Updated:  12/28/17 0625     Special Requests: --        RIGHT  FOREARM       Culture result: NO GROWTH 2 DAYS       CULTURE, BLOOD [[id number]] Collected:  12/26/17 1746    Order Status:  Completed Specimen:  Blood from Blood Updated:  12/28/17 0625     Special Requests: --        LEFT  FOREARM       Culture result: NO GROWTH 2 DAYS       INFLUENZA A & B AG (RAPID TEST) [[id number]] Collected:  12/26/17 1749    Order Status:  Completed Specimen:  Nasopharyngeal from Nasal washing Updated:  12/26/17 1813     Influenza A Ag NEGATIVE          NEGATIVE FOR THE PRESENCE OF INFLUENZA A ANTIGEN  INFECTION DUE TO INFLUENZA A CANNOT BE RULED OUT. BECAUSE THE ANTIGEN PRESENT IN THE SAMPLE MAY BE BELOW  THE DETECTION LIMIT OF THE TEST. A NEGATIVE TEST IS PRESUMPTIVE AND IT IS RECOMMENDED THAT THESE RESULTS BE CONFIRMED BY VIRAL CULTURE OR AN FDA-CLEARED INFLUENZA A AND B MOLECULAR ASSAY.           Influenza B Ag NEGATIVE          NEGATIVE FOR THE PRESENCE OF INFLUENZA B ANTIGEN  INFECTION DUE TO INFLUENZA B CANNOT BE RULED OUT. BECAUSE THE ANTIGEN PRESENT IN THE SAMPLE MAY BE BELOW  THE DETECTION LIMIT OF THE TEST. A NEGATIVE TEST IS PRESUMPTIVE AND IT IS RECOMMENDED THAT THESE RESULTS BE CONFIRMED BY VIRAL CULTURE OR AN FDA-CLEARED INFLUENZA A AND B MOLECULAR ASSAY. CULTURE, BLOOD [[id number]]     Order Status:  Sent Specimen:  Blood from Blood     CULTURE, BLOOD [[id number]]     Order Status:  Sent Specimen:  Blood from Blood           Current Medications Reviewed      Assessment/Plan     Principal Problem:    Acute metabolic encephalopathy (36/4/4191)    Active Problems:    Dementia (11/25/2016)      HTN (hypertension) (5/30/2014)      Hypernatremia (11/25/2016)      Sepsis ([address] (12/26/2017)        Plan:   1. Ordered for MRI as wife is concerned about new deficitis, Neuro was consulted, MRI is negative, will check his depakote levels,   On ASA    2. Stop D5W, oral intake is good,     3.  Currently on ABX, no source of infection, - Lactic acidosis resolved  - Stop Vancomycin, cultures so far negative    -DC heparin for possible LP if remains febrile  RVP -ve    Hb low, ordered for Anemia work up    Lizy Buckner MD  December 28, 2017

## 2017-12-28 NOTE — PROGRESS NOTES
Problem: Self Care Deficits Care Plan (Adult)  Goal: *Acute Goals and Plan of Care (Insert Text)  1. Patient will complete full body bathing and dressing with SBA and adaptive equipment as needed. 2. Patient will complete toileting with supervision. 3. Patient will tolerate 23 minutes of OT treatment with less than 4 rest breaks to increase activity tolerance for ADLs. 4. Patient will complete functional transfers with supervision and adaptive equipment as needed. Timeframe: 7 visits     Comments:     OCCUPATIONAL THERAPY: Initial Assessment and PM 12/28/2017  INPATIENT: Hospital Day: 3  Payor: Andrea Mary / Plan: [address] [place] [address] HMO / Product Type: Managed Care Medicare /      NAME/AGE/GENDER: Lucy Puente is a 67 y.o. male   PRIMARY DIAGNOSIS:  Sepsis ([address]  Sepsis ([facility] [address] Acute metabolic encephalopathy Acute metabolic encephalopathy        ICD-10: Treatment Diagnosis:    · Generalized Muscle Weakness (M62.81)  · Other lack of cordination (R27.8)  · Repeated Falls (R29.6)  · History of falling (Z91.81)   Precautions/Allergies:    fall risk Review of patient's allergies indicates no known allergies. ASSESSMENT:     Mr. Cruz Pires presents to hospital for above. PMHx includes dementia. Pt lives with wife and son in a one-level home, there are 7 steps to enter from the front with bilateral handrails available. Pt requires assistance with ADLs at baseline and wife suggests he has access to a rolling walker which he does not use regularly. Wife also reports \"too many falls to count\" in the last 6 months. Today, pt is supine in bed upon arrival, alert but oriented to person only. He is able to move supine to sit with SBA and additional time, pt scoots with CGA, completes STS with CGA and RW, and performs functional mobility with min A and RW. Pt requires maximal manual, tactile, and verbal cues for safety awareness.  Pt appears to be impulsive with decreased insight to deficits and need for safety and wife relates this to frequent falls. He may actually perform mobility better without AD as it appeared to be an added distraction based on today's evaluation. He is functioning slightly below his baseline and will require continued skilled OT services to maximize safety and independence with ADLs. Will follow during acute stay. This section established at most recent assessment   PROBLEM LIST (Impairments causing functional limitations):  1. Decreased Strength  2. Decreased ADL/Functional Activities  3. Decreased Transfer Abilities  4. Decreased Ambulation Ability/Technique  5. Decreased Balance  6. Decreased Knowledge of Precautions  7. Decreased Turner with Home Exercise Program  8. Decreased Cognition   INTERVENTIONS PLANNED: (Benefits and precautions of occupational therapy have been discussed with the patient.)  1. Activities of daily living training  2. Balance training  3. Hygiene training  4. Therapeutic activity  5. Therapeutic exercise     TREATMENT PLAN: Frequency/Duration: Follow patient 3x/week to address above goals. Rehabilitation Potential For Stated Goals: Fair     RECOMMENDED REHABILITATION/EQUIPMENT: (at time of discharge pending progress): Due to the probability of continued deficits (see above) this patient will likely need continued skilled occupational therapy after discharge. Equipment:   [employer], Type: Tub Seat/Chair              OCCUPATIONAL PROFILE AND HISTORY:   History of Present Injury/Illness (Reason for Referral):  See H&P  Past Medical History/Comorbidities:   Mr. Roseanna Abad  has a past medical history of Alcohol abuse; Anemia; Angiodysplasia of stomach (2016); Bladder incontinence (05/28/2014); Chronic airway obstruction, not elsewhere classified (5/28/2014); CKD (chronic kidney disease); COPD (chronic obstructive pulmonary disease) ([facility] [address] Dementia (5/28/2014); Hyperlipidemia (5/28/2014); Hypernatremia (11/25/2016); Hypertension; Hypothyroid;  Seizure ([facility] (2010); Tobacco abuse (5/28/2014); and Tobacco abuse. Mr. Lizet Watts  has a past surgical history that includes hx colonoscopy (APPROX 2005) and hx endoscopy (2016). Social History/Living Environment:   Home Environment: Private residence  One/Two Story Residence: One story  Living Alone: No  Support Systems: Child(lissette), Spouse/Significant Other/Partner  Patient Expects to be Discharged to[de-identified] Private residence  Current DME Used/Available at Home: None  Prior Level of Function/Work/Activity:  Requires assistance at baseline with ADLs  Personal Factors:          Sex:  male        Age:  67 y.o. Number of Personal Factors/Comorbidities that affect the Plan of Care: Expanded review of therapy/medical records (1-2):  MODERATE COMPLEXITY   ASSESSMENT OF OCCUPATIONAL PERFORMANCE[de-identified]   Activities of Daily Living:           Basic ADLs (From Assessment) Complex ADLs (From Assessment)   Basic ADL  Feeding: Supervision  Oral Facial Hygiene/Grooming: Minimum assistance  Bathing: Moderate assistance  Upper Body Dressing: Minimum assistance  Lower Body Dressing: Moderate assistance  Toileting: Moderate assistance Instrumental ADL  Meal Preparation: Total assistance  Homemaking: Total assistance  Medication Management: Total assistance  Financial Management: Total assistance   Grooming/Bathing/Dressing Activities of Daily Living     Cognitive Retraining  Safety/Judgement: Fall prevention;Decreased awareness of environment;Decreased awareness of need for assistance;Decreased awareness of need for safety;Decreased insight into deficits                       Bed/Mat Mobility  Supine to Sit: Stand-by asssistance; Additional time  Sit to Supine: Contact guard assistance  Sit to Stand: Contact guard assistance  Bed to Chair: Minimum assistance  Scooting: Contact guard assistance       Most Recent Physical Functioning:   Gross Assessment:  AROM: Generally decreased, functional  Strength: Generally decreased, functional Posture:  Posture (WDL): Exceptions to WDL  Posture Assessment: Forward head, Rounded shoulders  Balance:  Sitting: Impaired  Sitting - Static: Good (unsupported)  Sitting - Dynamic: Fair (occasional)  Standing: Impaired  Standing - Static: Fair  Standing - Dynamic : Fair Bed Mobility:  Supine to Sit: Stand-by asssistance; Additional time  Sit to Supine: Contact guard assistance  Scooting: Contact guard assistance  Wheelchair Mobility:     Transfers:  Sit to Stand: Contact guard assistance  Stand to Sit: Contact guard assistance  Bed to Chair: Minimum assistance                Patient Vitals for the past 6 hrs:   BP SpO2 Pulse   17 1123 165/84 97 % (!) 55       Mental Status  Neurologic State: Alert  Orientation Level: Oriented to person, Disoriented to place, Disoriented to situation, Disoriented to time  Cognition: Follows commands  Perception: Appears intact  Perseveration: No perseveration noted  Safety/Judgement: Fall prevention, Decreased awareness of environment, Decreased awareness of need for assistance, Decreased awareness of need for safety, Decreased insight into deficits                          Physical Skills Involved:  1. Balance  2. Strength  3. Activity Tolerance Cognitive Skills Affected (resulting in the inability to perform in a timely and safe manner):  1. Executive Function  2. Immediate Memory  3. Short Term Recall  4. Long Term Memory  5. Sustained Attention  6. Divided Attention  7. Comprehension  8. Expression Psychosocial Skills Affected:  1. Self-Awareness  2. Awareness of Others   Number of elements that affect the Plan of Care: 5+:  HIGH COMPLEXITY   CLINICAL DECISION MAKIN [facility] [address] [id number] AM-PAC 6 Clicks   Daily Activity Inpatient Short Form  How much help from another person does the patient currently need. .. Total A Lot A Little None   1. Putting on and taking off regular lower body clothing? [] 1   [x] 2   [] 3   [] 4   2.   Bathing (including washing, rinsing, drying)? [] 1   [x] 2   [] 3   [] 4   3. Toileting, which includes using toilet, bedpan or urinal?   [] 1   [x] 2   [] 3   [] 4   4. Putting on and taking off regular upper body clothing? [] 1   [x] 2   [] 3   [] 4   5. Taking care of personal grooming such as brushing teeth? [] 1   [x] 2   [] 3   [] 4   6. Eating meals? [] 1   [] 2   [x] 3   [] 4   © 2007, Trustees of [address] [place] [address] [address] [address], under license to [employer]. All rights reserved      Score:  Initial: 13 Most Recent: X (Date: -- )    Interpretation of Tool:  Represents activities that are increasingly more difficult (i.e. Bed mobility, Transfers, Gait). Score 24 23 22-20 19-15 14-10 9-7 6     Modifier CH CI CJ CK CL CM CN      ? Self Care:     - CURRENT STATUS: CL - 60%-79% impaired, limited or restricted    - GOAL STATUS: CK - 40%-59% impaired, limited or restricted    - D/C STATUS:  ---------------To be determined---------------  Payor: Katelin Germain / Plan: [address] [place] [address] HMO / Product Type: Managed Care Medicare /      Medical Necessity:     · Patient is expected to demonstrate progress in strength and balance to decrease assistance required with ADLs. Reason for Services/Other Comments:  · Patient continues to require skilled intervention due to medical complications and patient unable to attend/participate in therapy as expected.    Use of outcome tool(s) and clinical judgement create a POC that gives a: MODERATE COMPLEXITY         TREATMENT:   (In addition to Assessment/Re-Assessment sessions the following treatments were rendered)     Pre-treatment Symptoms/Complaints:    Pain: Initial:   Pain Intensity 1: 0  Post Session:  same     Assessment/Reassessment only, no treatment provided today    Braces/Orthotics/Lines/Etc:   · O2 Device: Room air  Treatment/Session Assessment:    · Response to Treatment:  eval only   · Interdisciplinary Collaboration:   o Occupational Therapist  o Registered Nurse  · After treatment position/precautions:   o Supine in bed  o Bed alarm/tab alert on  o Bed/Chair-wheels locked  o Bed in low position  o Call light within reach  o Family at bedside   · Compliance with Program/Exercises: Will assess as treatment progresses. · Recommendations/Intent for next treatment session: \"Next visit will focus on reduction in assistance provided\".   Total Treatment Duration:  OT Patient Time In/Time Out  Time In: 1440  Time Out: [place]id[place]

## 2017-12-28 NOTE — PROGRESS NOTES
Pt alert and oriented to person and \"hospital\" at this time. Continues to ask for a lighter and cigarettes. No distress. IV site clear. No further changes.

## 2017-12-28 NOTE — PROGRESS NOTES
Pt resting in bed with eyes closed. Pt awakens when spoken to. Pt oriented to person and place at this time. Pt follows commands. Right wrist bend down pt able to move wrist when asked. Pt turns and repositions himself in the bed. Pt on RA at this time. Pt encouraged to call for assistance if needed call light in reach, door open will monitor.

## 2017-12-28 NOTE — PROGRESS NOTES
Pt resting in bed with wife at bedside. No distress noted at this time. Call light in reach, door open will monitor.

## 2017-12-28 NOTE — PROGRESS NOTES
Pt resting in bed. Pt alert and cooperative. Pt with good po intake today. Pt encouraged to call for assistance if needed call light in reach, door open will monitor.

## 2017-12-28 NOTE — PROGRESS NOTES
Assessment completed, see doc flowsheet. Aware of needed neuro consult as called on day shift. Pt awake and alert, oriented to person and knows family. Pt with situational confusion , hx of dementia noted. Grasps equal bilat but pt report right seems weaker to him. MAEW with purpose. PERRL. Speech clear. IV site clear. No distress.

## 2017-12-28 NOTE — PROGRESS NOTES
Problem: Nutrition Deficit  Goal: *Optimize nutritional status  Nutrition  Reason for assessment: Referral received from nursing admission Malnutrition Screening Tool for unsure weight loss and eating poorly due to decreased appetite. Referral for Pressure Injury  Assessment:   Diet order(s): Mechanical Soft  Food/Nutrition Patient History:  The patient is noted to have a h/o HTN, dementia, COPD, EtOH abuse, AVM and seizures. The patient reports that he has lost ~25# over the past month. Denies any changes in his appetite, nausea, vomiting, constipation, diarrhea, chewing or swallowing difficulties. The patient states that the weight just fell off of him. The patients states that he does not like commercial supplements but does agree to try magic cup. Weight history in the EMR cannot be verified as accurate due to unknown weight source (pt stated vs estimated vs measured). WT / BMI WEIGHT   12/28/2017 139 lb 11.2 oz   10/11/2017 135 lb   9/28/2017 133 lb   9/28/2017 133 lb   9/27/2017 133 lb   6/2/2017 123 lb   4/18/2017 131 lb 6.3 oz   2/10/2017 132 lb   1/20/2017 134 lb   12/28/2016 136 lb 9.6 oz   According to the EMR the patient has gained ~4# over the past year. Skin: Stage 2 to coccyx  Anthropometrics:Height: 5' 8\" (172.7 cm),  Weight: 63.4 kg (139 lb 11.2 oz), Weight Source: Bed, Body mass index is 21.24 kg/(m^2). BMI class of underweight for age >71. Macronutrient needs:  EER:  1178-8488 kcal /day (25-30 kcal/kg actual BW)  EPR:  63-76 grams protein/day (1-1.2 grams/kg IBW)  Intake/Comparative Standards: Average intake for past 2 recorded meal(s): 100%. This potentially meets ~100% of kcal and ~100% of protein needs    Nutrition Diagnosis: Increased nutrient needs related to wound healing as evidenced by patient with stage 2 pressure injury noted to coccyx. Intervention:  Meals and snacks: Continue current diet. Nutrition Supplement Therapy:  Add magic cup bid   Nutrition Discharge Plan: too soon to be determined    Ileene Sonny.  [address], [address] [place] [address], [phone]

## 2017-12-29 LAB
ANION GAP SERPL CALC-SCNC: 9 MMOL/L (ref 7–16)
BACTERIA SPEC CULT: NORMAL
BASOPHILS # BLD: 0 K/UL (ref 0–0.2)
BASOPHILS NFR BLD: 0 % (ref 0–2)
BUN SERPL-MCNC: 24 MG/DL (ref 8–23)
CALCIUM SERPL-MCNC: 8.7 MG/DL (ref 8.3–10.4)
CHLORIDE SERPL-SCNC: 114 MMOL/L (ref 98–107)
CO2 SERPL-SCNC: 23 MMOL/L (ref 21–32)
CREAT SERPL-MCNC: 1.54 MG/DL (ref 0.8–1.5)
DIFFERENTIAL METHOD BLD: ABNORMAL
EOSINOPHIL # BLD: 0 K/UL (ref 0–0.8)
EOSINOPHIL NFR BLD: 0 % (ref 0.5–7.8)
ERYTHROCYTE [DISTWIDTH] IN BLOOD BY AUTOMATED COUNT: 20.9 % (ref 11.9–14.6)
GLUCOSE SERPL-MCNC: 83 MG/DL (ref 65–100)
HCT VFR BLD AUTO: 28.7 % (ref 41.1–50.3)
HGB BLD-MCNC: 9.3 G/DL (ref 13.6–17.2)
IMM GRANULOCYTES # BLD: 0.1 K/UL (ref 0–0.5)
IMM GRANULOCYTES NFR BLD AUTO: 0 % (ref 0–5)
LYMPHOCYTES # BLD: 0.8 K/UL (ref 0.5–4.6)
LYMPHOCYTES NFR BLD: 4 % (ref 13–44)
MAGNESIUM SERPL-MCNC: 1.8 MG/DL (ref 1.8–2.4)
MCH RBC QN AUTO: 25.4 PG (ref 26.1–32.9)
MCHC RBC AUTO-ENTMCNC: 32.4 G/DL (ref 31.4–35)
MCV RBC AUTO: 78.4 FL (ref 79.6–97.8)
MONOCYTES # BLD: 0.6 K/UL (ref 0.1–1.3)
MONOCYTES NFR BLD: 3 % (ref 4–12)
NEUTS SEG # BLD: 15.7 K/UL (ref 1.7–8.2)
NEUTS SEG NFR BLD: 93 % (ref 43–78)
PLATELET # BLD AUTO: 239 K/UL (ref 150–450)
PMV BLD AUTO: 10.4 FL (ref 10.8–14.1)
POTASSIUM SERPL-SCNC: 4.8 MMOL/L (ref 3.5–5.1)
RBC # BLD AUTO: 3.66 M/UL (ref 4.23–5.67)
SERVICE CMNT-IMP: NORMAL
SODIUM SERPL-SCNC: 146 MMOL/L (ref 136–145)
VALPROATE SERPL-MCNC: 13 UG/ML (ref 50–100)
WBC # BLD AUTO: 17.2 K/UL (ref 4.3–11.1)

## 2017-12-29 PROCEDURE — 65270000029 HC RM PRIVATE

## 2017-12-29 PROCEDURE — 74011250636 HC RX REV CODE- 250/636: Performed by: INTERNAL MEDICINE

## 2017-12-29 PROCEDURE — 83735 ASSAY OF MAGNESIUM: CPT | Performed by: HOSPITALIST

## 2017-12-29 PROCEDURE — 85025 COMPLETE CBC W/AUTO DIFF WBC: CPT | Performed by: HOSPITALIST

## 2017-12-29 PROCEDURE — 74011000258 HC RX REV CODE- 258: Performed by: EMERGENCY MEDICINE

## 2017-12-29 PROCEDURE — 80164 ASSAY DIPROPYLACETIC ACD TOT: CPT | Performed by: HOSPITALIST

## 2017-12-29 PROCEDURE — 74011250636 HC RX REV CODE- 250/636: Performed by: EMERGENCY MEDICINE

## 2017-12-29 PROCEDURE — 74011250637 HC RX REV CODE- 250/637: Performed by: HOSPITALIST

## 2017-12-29 PROCEDURE — 80048 BASIC METABOLIC PNL TOTAL CA: CPT | Performed by: HOSPITALIST

## 2017-12-29 PROCEDURE — 74011250637 HC RX REV CODE- 250/637: Performed by: INTERNAL MEDICINE

## 2017-12-29 PROCEDURE — 36415 COLL VENOUS BLD VENIPUNCTURE: CPT | Performed by: HOSPITALIST

## 2017-12-29 RX ORDER — TRAMADOL HYDROCHLORIDE 50 MG/1
50 TABLET ORAL
Status: DISCONTINUED | OUTPATIENT
Start: 2017-12-29 | End: 2018-01-01 | Stop reason: HOSPADM

## 2017-12-29 RX ADMIN — AMLODIPINE BESYLATE 10 MG: 10 TABLET ORAL at 10:30

## 2017-12-29 RX ADMIN — HEPARIN SODIUM 5000 UNITS: 5000 INJECTION, SOLUTION INTRAVENOUS; SUBCUTANEOUS at 02:55

## 2017-12-29 RX ADMIN — PANTOPRAZOLE SODIUM 40 MG: 40 TABLET, DELAYED RELEASE ORAL at 10:30

## 2017-12-29 RX ADMIN — LOSARTAN POTASSIUM 100 MG: 50 TABLET ORAL at 10:30

## 2017-12-29 RX ADMIN — Medication 10 ML: at 14:36

## 2017-12-29 RX ADMIN — PIPERACILLIN SODIUM,TAZOBACTAM SODIUM 3.38 G: 3; .375 INJECTION, POWDER, FOR SOLUTION INTRAVENOUS at 17:09

## 2017-12-29 RX ADMIN — TRAMADOL HYDROCHLORIDE 50 MG: 50 TABLET, FILM COATED ORAL at 17:10

## 2017-12-29 RX ADMIN — MIRTAZAPINE 15 MG: 15 TABLET, FILM COATED ORAL at 23:08

## 2017-12-29 RX ADMIN — PIPERACILLIN SODIUM,TAZOBACTAM SODIUM 3.38 G: 3; .375 INJECTION, POWDER, FOR SOLUTION INTRAVENOUS at 10:29

## 2017-12-29 RX ADMIN — LEVETIRACETAM 500 MG: 500 TABLET ORAL at 10:30

## 2017-12-29 RX ADMIN — FOLIC ACID 1 MG: 1 TABLET ORAL at 10:29

## 2017-12-29 RX ADMIN — PIPERACILLIN SODIUM,TAZOBACTAM SODIUM 3.38 G: 3; .375 INJECTION, POWDER, FOR SOLUTION INTRAVENOUS at 02:55

## 2017-12-29 RX ADMIN — LEVETIRACETAM 500 MG: 500 TABLET ORAL at 17:10

## 2017-12-29 RX ADMIN — LEVOTHYROXINE SODIUM 100 MCG: 100 TABLET ORAL at 05:44

## 2017-12-29 RX ADMIN — Medication 5 ML: at 02:57

## 2017-12-29 RX ADMIN — Medication 5 ML: at 23:08

## 2017-12-29 RX ADMIN — ASPIRIN 81 MG 81 MG: 81 TABLET ORAL at 10:30

## 2017-12-29 RX ADMIN — PANTOPRAZOLE SODIUM 40 MG: 40 TABLET, DELAYED RELEASE ORAL at 17:10

## 2017-12-29 RX ADMIN — HEPARIN SODIUM 5000 UNITS: 5000 INJECTION, SOLUTION INTRAVENOUS; SUBCUTANEOUS at 14:33

## 2017-12-29 NOTE — PROGRESS NOTES
Attempted at [phone] and [phone]. Pt sleeping soundly, will re-attempt to see for PT as scheduling permits.      Manoj Cox, PT

## 2017-12-29 NOTE — PROGRESS NOTES
AM assessment complete. This patient denies pain, SOB, or N/V. Call light is within reach. Encouraged patient to call with needs. Will continue to monitor.  Lap Belt intact

## 2017-12-29 NOTE — PROGRESS NOTES
Bedside report received from Ami Munroe RN. Pt alert to self only. Pt states, \" I need to go get the wood in\"   Tried to reorient to place, time and situation. Posey intact, SR up x 3, bed low locked. Fall precautions maintained. Resp even and unlabored. NO distresss. Will monitor.

## 2017-12-29 NOTE — PROGRESS NOTES
Quiet shift. No distress. Pt remains confused. Alert only to self. SR up x 3, bed low locked. [place] intact. Door open. Bedside report given to oncoming nurse, Adolph Jonas RN.

## 2017-12-29 NOTE — PROGRESS NOTES
Problem: Falls - Risk of  Goal: *Absence of Falls  Document Franklin Fall Risk and appropriate interventions in the flowsheet.    Outcome: Progressing Towards Goal  Fall Risk Interventions:  Mobility Interventions: Bed/chair exit alarm    Mentation Interventions: Adequate sleep, hydration, pain control    Medication Interventions: Bed/chair exit alarm    Elimination Interventions: Bed/chair exit alarm, Call light in reach    History of Falls Interventions: Bed/chair exit alarm

## 2017-12-29 NOTE — PROGRESS NOTES
Problem: Falls - Risk of  Goal: *Absence of Falls  Document Franklin Fall Risk and appropriate interventions in the flowsheet.    Fall Risk Interventions:  Mobility Interventions: Bed/chair exit alarm    Mentation Interventions: Adequate sleep, hydration, pain control    Medication Interventions: Bed/chair exit alarm    Elimination Interventions: Bed/chair exit alarm, Call light in reach    History of Falls Interventions: Bed/chair exit alarm

## 2017-12-29 NOTE — PROGRESS NOTES
Progress Note      Patient: Darylene Gibson               Sex: male          MRN: [id number]           YOB: 1945      Age:  67 y.o. PCP:  Jin Edmonds MD  Treatment Team: Attending Provider: Elaine Martin MD; Utilization Review: Morgan Cornell RN; Care Manager: Mary Guo RN; Consulting Provider: Elaine Martin MD  Subjective:   Admission HPI-  is a 66 yo male who presented with altered mental status and foul smelling urine on a background of dementia, CKD, COPD, HLD,HTN, hypothyroidism and GI bleed. He has a 1 day history of confusion. His family was called for additional history. He lives with his wife. Yesterday, he was noted be confused. This morning, he slipped and fell on the floor (+ head injury, no LOC). He then got back into the bed. He then started seeing things on the floor that was not there. He was noted to have a fowl smell to his urine.      - Pt remains confused, having weakness in the Rt UE, ataxic which is new symptom as per the RN, no CP, no SOB, no Headaches  Na high    - Pt is doing well, he is more awake and following commands, Rt side weakness is improved, no CP, no SOB, no fevers    - Pt having episodes of confusion, his WBC went up today, no fevers, no Cough, no diarrhea      Objective:   Physical Exam:   Visit Vitals    /73 (BP 1 Location: Left arm, BP Patient Position: At rest)    Pulse 85    Temp 97.7 °F (36.5 °C)    Resp 18    Ht 5' 8\" (1.727 m)    Wt 62.4 kg (137 lb 9.6 oz)    SpO2 95%    BMI 20.92 kg/m2      Temp (24hrs), Av °F (36.7 °C), Min:97.7 °F (36.5 °C), Max:98.7 °F (37.1 °C)    Oxygen Therapy  O2 Sat (%): 95 % (17 1150)  Pulse via Oximetry: 93 beats per minute (17 0023)  O2 Device: Room air (17 1323)    Intake/Output Summary (Last 24 hours) at 17 1519  Last data filed at 17 1419   Gross per 24 hour   Intake              480 ml   Output                0 ml   Net              480 ml      General: Conscious, No acute distress  Eyes:  No pallor/icterus    HENT:             Oral Mucosa is dry, No sinus tenderness  Neck:               Supple, No JVD  Lungs:  CTA Bilaterally, No significant wheeze/rhonchi  Heart:  S1 S2 regular  Abdomen: Soft, nromal bowel sounds, NTND, No guarding/rigidity/rebound tend. Extremities: No pedal edema  Neurologic:  AO x 2, weakness in the Rt UE, tremors,   Skin:                No acute rashes  Psych:             Appropriate mood,    LAB  Recent Results (from the past 24 hour(s))   CBC WITH AUTOMATED DIFF    Collection Time: 12/29/17  5:39 AM   Result Value Ref Range    WBC 17.2 (H) 4.3 - 11.1 K/uL    RBC 3.66 (L) 4.23 - 5.67 M/uL    HGB 9.3 (L) 13.6 - 17.2 g/dL    HCT 28.7 (L) 41.1 - 50.3 %    MCV 78.4 (L) 79.6 - 97.8 FL    MCH 25.4 (L) 26.1 - 32.9 PG    MCHC 32.4 31.4 - 35.0 g/dL    RDW 20.9 (H) 11.9 - 14.6 %    PLATELET 855 774 - 153 K/uL    MPV 10.4 (L) 10.8 - 14.1 FL    DF AUTOMATED      NEUTROPHILS 93 (H) 43 - 78 %    LYMPHOCYTES 4 (L) 13 - 44 %    MONOCYTES 3 (L) 4.0 - 12.0 %    EOSINOPHILS 0 (L) 0.5 - 7.8 %    BASOPHILS 0 0.0 - 2.0 %    IMMATURE GRANULOCYTES 0 0.0 - 5.0 %    ABS. NEUTROPHILS 15.7 (H) 1.7 - 8.2 K/UL    ABS. LYMPHOCYTES 0.8 0.5 - 4.6 K/UL    ABS. MONOCYTES 0.6 0.1 - 1.3 K/UL    ABS. EOSINOPHILS 0.0 0.0 - 0.8 K/UL    ABS. BASOPHILS 0.0 0.0 - 0.2 K/UL    ABS. IMM.  GRANS. 0.1 0.0 - 0.5 K/UL   MAGNESIUM    Collection Time: 12/29/17  5:39 AM   Result Value Ref Range    Magnesium 1.8 1.8 - 2.4 mg/dL   METABOLIC PANEL, BASIC    Collection Time: 12/29/17  5:39 AM   Result Value Ref Range    Sodium 146 (H) 136 - 145 mmol/L    Potassium 4.8 3.5 - 5.1 mmol/L    Chloride 114 (H) 98 - 107 mmol/L    CO2 23 21 - 32 mmol/L    Anion gap 9 7 - 16 mmol/L    Glucose 83 65 - 100 mg/dL    BUN 24 (H) 8 - 23 MG/DL    Creatinine 1.54 (H) 0.8 - 1.5 MG/DL    GFR est AA 57 (L) >60 ml/min/1.73m2    GFR est non-AA 47 (L) >60 ml/min/1.73m2 Calcium 8.7 8.3 - 10.4 MG/DL       No results found. No results found. All Micro Results     Procedure Component Value Units Date/Time    CULTURE, BLOOD [[id number]] Collected:  12/26/17 1746    Order Status:  Completed Specimen:  Blood from Blood Updated:  12/29/17 0634     Special Requests: --        RIGHT  FOREARM       Culture result: NO GROWTH 3 DAYS       CULTURE, BLOOD [[id number]] Collected:  12/26/17 1746    Order Status:  Completed Specimen:  Blood from Blood Updated:  12/29/17 0634     Special Requests: --        LEFT  FOREARM       Culture result: NO GROWTH 3 DAYS       CULTURE, URINE [[id number]] Collected:  12/26/17 1650    Order Status:  Completed Specimen:  Urine from Cath Urine Updated:  12/29/17 0630     Special Requests: NO SPECIAL REQUESTS        Culture result: NO GROWTH 2 DAYS       INFLUENZA A & B AG (RAPID TEST) [[id number]] Collected:  12/26/17 1749    Order Status:  Completed Specimen:  Nasopharyngeal from Nasal washing Updated:  12/26/17 1813     Influenza A Ag NEGATIVE          NEGATIVE FOR THE PRESENCE OF INFLUENZA A ANTIGEN  INFECTION DUE TO INFLUENZA A CANNOT BE RULED OUT. BECAUSE THE ANTIGEN PRESENT IN THE SAMPLE MAY BE BELOW  THE DETECTION LIMIT OF THE TEST. A NEGATIVE TEST IS PRESUMPTIVE AND IT IS RECOMMENDED THAT THESE RESULTS BE CONFIRMED BY VIRAL CULTURE OR AN FDA-CLEARED INFLUENZA A AND B MOLECULAR ASSAY. Influenza B Ag NEGATIVE          NEGATIVE FOR THE PRESENCE OF INFLUENZA B ANTIGEN  INFECTION DUE TO INFLUENZA B CANNOT BE RULED OUT. BECAUSE THE ANTIGEN PRESENT IN THE SAMPLE MAY BE BELOW  THE DETECTION LIMIT OF THE TEST. A NEGATIVE TEST IS PRESUMPTIVE AND IT IS RECOMMENDED THAT THESE RESULTS BE CONFIRMED BY VIRAL CULTURE OR AN FDA-CLEARED INFLUENZA A AND B MOLECULAR ASSAY.          CULTURE, BLOOD [[id number]]     Order Status:  Canceled Specimen:  Blood from Blood     CULTURE, BLOOD [[id number]]     Order Status:  Canceled Specimen:  Blood from Blood Current Medications Reviewed      Assessment/Plan     Principal Problem:    Acute metabolic encephalopathy (87/2/1846)    Active Problems:    Dementia (11/25/2016)      HTN (hypertension) (5/30/2014)      Hypernatremia (11/25/2016)      Anemia (11/25/2016)      Overview: ADM 11/25/16:  HGB: 7.6. BASELINE 9      Sepsis ([facility] [address] (12/26/2017)        Plan:   ##  Ordered for MRI as wife is concerned about new deficitis, Neuro was consulted, MRI is negative, F/u depakote levels, Pt seems to be having Sundowning, will try to avoid sedation medications, Should improve when he is at home environment  On ASA    ##. Hypernatremia- He is off  D5W, oral intake is good, Na slightl high today, monitor    ## Elevated WBC - Currently on ABX, no source of infection, - Lactic acidosis resolved  - Stopped Vancomycin, Cont with Zosyn, cultures so far negative, RVP -ve    ## DVT Px - Resume heparin     ## Anemia- Hb improved, ordered for Anemia work up, Tsat is 19, will start on PO Iron     ## Dispo- DC to Home with home PT, wife does not want him to SNF    Taj Murray MD  December 29, 2017

## 2017-12-30 LAB
ANION GAP SERPL CALC-SCNC: 8 MMOL/L (ref 7–16)
BASOPHILS # BLD: 0 K/UL (ref 0–0.2)
BASOPHILS NFR BLD: 0 % (ref 0–2)
BUN SERPL-MCNC: 33 MG/DL (ref 8–23)
CALCIUM SERPL-MCNC: 8.5 MG/DL (ref 8.3–10.4)
CHLORIDE SERPL-SCNC: 113 MMOL/L (ref 98–107)
CO2 SERPL-SCNC: 25 MMOL/L (ref 21–32)
CREAT SERPL-MCNC: 2.02 MG/DL (ref 0.8–1.5)
DIFFERENTIAL METHOD BLD: ABNORMAL
EOSINOPHIL # BLD: 0.2 K/UL (ref 0–0.8)
EOSINOPHIL NFR BLD: 2 % (ref 0.5–7.8)
ERYTHROCYTE [DISTWIDTH] IN BLOOD BY AUTOMATED COUNT: 21.5 % (ref 11.9–14.6)
GLUCOSE SERPL-MCNC: 67 MG/DL (ref 65–100)
HCT VFR BLD AUTO: 26 % (ref 41.1–50.3)
HGB BLD-MCNC: 8.2 G/DL (ref 13.6–17.2)
IMM GRANULOCYTES # BLD: 0 K/UL (ref 0–0.5)
IMM GRANULOCYTES NFR BLD AUTO: 0 % (ref 0–5)
LYMPHOCYTES # BLD: 1.6 K/UL (ref 0.5–4.6)
LYMPHOCYTES NFR BLD: 12 % (ref 13–44)
MAGNESIUM SERPL-MCNC: 2.2 MG/DL (ref 1.8–2.4)
MCH RBC QN AUTO: 24.8 PG (ref 26.1–32.9)
MCHC RBC AUTO-ENTMCNC: 31.5 G/DL (ref 31.4–35)
MCV RBC AUTO: 78.8 FL (ref 79.6–97.8)
MONOCYTES # BLD: 0.5 K/UL (ref 0.1–1.3)
MONOCYTES NFR BLD: 4 % (ref 4–12)
NEUTS SEG # BLD: 10.4 K/UL (ref 1.7–8.2)
NEUTS SEG NFR BLD: 82 % (ref 43–78)
PLATELET # BLD AUTO: 225 K/UL (ref 150–450)
PMV BLD AUTO: ABNORMAL FL (ref 10.8–14.1)
POTASSIUM SERPL-SCNC: 4.3 MMOL/L (ref 3.5–5.1)
RBC # BLD AUTO: 3.3 M/UL (ref 4.23–5.67)
SODIUM SERPL-SCNC: 146 MMOL/L (ref 136–145)
WBC # BLD AUTO: 12.7 K/UL (ref 4.3–11.1)

## 2017-12-30 PROCEDURE — 74011250637 HC RX REV CODE- 250/637: Performed by: HOSPITALIST

## 2017-12-30 PROCEDURE — 85025 COMPLETE CBC W/AUTO DIFF WBC: CPT | Performed by: HOSPITALIST

## 2017-12-30 PROCEDURE — 36415 COLL VENOUS BLD VENIPUNCTURE: CPT | Performed by: HOSPITALIST

## 2017-12-30 PROCEDURE — 74011250637 HC RX REV CODE- 250/637: Performed by: INTERNAL MEDICINE

## 2017-12-30 PROCEDURE — 74011250636 HC RX REV CODE- 250/636: Performed by: NURSE PRACTITIONER

## 2017-12-30 PROCEDURE — 83735 ASSAY OF MAGNESIUM: CPT | Performed by: HOSPITALIST

## 2017-12-30 PROCEDURE — 80048 BASIC METABOLIC PNL TOTAL CA: CPT | Performed by: HOSPITALIST

## 2017-12-30 PROCEDURE — 74011250636 HC RX REV CODE- 250/636: Performed by: INTERNAL MEDICINE

## 2017-12-30 PROCEDURE — 65270000029 HC RM PRIVATE

## 2017-12-30 PROCEDURE — 74011000258 HC RX REV CODE- 258: Performed by: EMERGENCY MEDICINE

## 2017-12-30 PROCEDURE — 74011250636 HC RX REV CODE- 250/636: Performed by: EMERGENCY MEDICINE

## 2017-12-30 RX ORDER — DEXTROSE MONOHYDRATE 50 MG/ML
100 INJECTION, SOLUTION INTRAVENOUS CONTINUOUS
Status: DISCONTINUED | OUTPATIENT
Start: 2017-12-30 | End: 2017-12-31

## 2017-12-30 RX ORDER — DIVALPROEX SODIUM 125 MG/1
125 CAPSULE, COATED PELLETS ORAL EVERY 12 HOURS
Status: DISCONTINUED | OUTPATIENT
Start: 2017-12-30 | End: 2018-01-01 | Stop reason: HOSPADM

## 2017-12-30 RX ADMIN — PIPERACILLIN SODIUM,TAZOBACTAM SODIUM 3.38 G: 3; .375 INJECTION, POWDER, FOR SOLUTION INTRAVENOUS at 02:49

## 2017-12-30 RX ADMIN — FOLIC ACID 1 MG: 1 TABLET ORAL at 09:42

## 2017-12-30 RX ADMIN — PANTOPRAZOLE SODIUM 40 MG: 40 TABLET, DELAYED RELEASE ORAL at 17:02

## 2017-12-30 RX ADMIN — HEPARIN SODIUM 5000 UNITS: 5000 INJECTION, SOLUTION INTRAVENOUS; SUBCUTANEOUS at 17:03

## 2017-12-30 RX ADMIN — DEXTROSE MONOHYDRATE 100 ML/HR: 5 INJECTION, SOLUTION INTRAVENOUS at 14:22

## 2017-12-30 RX ADMIN — ASPIRIN 81 MG 81 MG: 81 TABLET ORAL at 09:41

## 2017-12-30 RX ADMIN — DIVALPROEX SODIUM 125 MG: 125 CAPSULE, COATED PELLETS ORAL at 22:10

## 2017-12-30 RX ADMIN — TAMSULOSIN HYDROCHLORIDE 0.4 MG: 0.4 CAPSULE ORAL at 22:10

## 2017-12-30 RX ADMIN — PIPERACILLIN SODIUM,TAZOBACTAM SODIUM 3.38 G: 3; .375 INJECTION, POWDER, FOR SOLUTION INTRAVENOUS at 09:41

## 2017-12-30 RX ADMIN — PANTOPRAZOLE SODIUM 40 MG: 40 TABLET, DELAYED RELEASE ORAL at 09:42

## 2017-12-30 RX ADMIN — LEVOTHYROXINE SODIUM 100 MCG: 100 TABLET ORAL at 05:29

## 2017-12-30 RX ADMIN — LEVETIRACETAM 500 MG: 500 TABLET ORAL at 17:03

## 2017-12-30 RX ADMIN — Medication 10 ML: at 17:04

## 2017-12-30 RX ADMIN — LOSARTAN POTASSIUM 100 MG: 50 TABLET ORAL at 09:42

## 2017-12-30 RX ADMIN — HEPARIN SODIUM 5000 UNITS: 5000 INJECTION, SOLUTION INTRAVENOUS; SUBCUTANEOUS at 02:49

## 2017-12-30 RX ADMIN — MIRTAZAPINE 15 MG: 15 TABLET, FILM COATED ORAL at 22:10

## 2017-12-30 RX ADMIN — Medication 5 ML: at 22:10

## 2017-12-30 RX ADMIN — PIPERACILLIN SODIUM,TAZOBACTAM SODIUM 3.38 G: 3; .375 INJECTION, POWDER, FOR SOLUTION INTRAVENOUS at 17:03

## 2017-12-30 RX ADMIN — AMLODIPINE BESYLATE 10 MG: 10 TABLET ORAL at 09:41

## 2017-12-30 RX ADMIN — Medication 5 ML: at 05:28

## 2017-12-30 RX ADMIN — LEVETIRACETAM 500 MG: 500 TABLET ORAL at 09:41

## 2017-12-30 NOTE — PROGRESS NOTES
Progress Note    2017  Admit Date: 2017  3:57 PM   NAME: Katelyn Angeles   :  1945   MRN:  [id number]   Attending: Goldy Byrant MD  PCP:  Turner Huitron MD  Treatment Team: Attending Provider: Goldy Bryant MD; Utilization Review: Freddie Anna, RN; Care Manager: Yumiko Newton, JOSE; Consulting Provider: Goldy Bryant MD; Utilization Review: Yves Gaytan    Full Code   SUBJECTIVE:   Mr. Delcie Galeazzi is a 66 yo male who presented with AMS and foul smelling urine. No family at bedside today. Notes report he lives with his wife who noted him to be confused 1 day prior to admission. He slipped and fell the day of admission at home hitting his head, no LOC, he then got back in the bed and wife reports started hallucinating. CT head, CXR without acute findings. UA neg for UTI.   pt started having RUE weakness, MRI brain without acute findings.  pt with elevated WBC on abx, no source of infection. On Vanc and Zosyn on admission however on Zosyn only now. Nursing reports pt is more alert today, eating, trying to feed himself. He is confused. Past Medical History:   Diagnosis Date    Alcohol abuse     QUIT IN . LONG USE    Anemia     GI BLEED AND CHRONIC, BASELINE 9.  16:  HGB: 7.6    Angiodysplasia of stomach     and duodenum    Bladder incontinence 2014    ALSO BOWEL    Chronic airway obstruction, not elsewhere classified 2014    CKD (chronic kidney disease)     COPD [facility] obstructive [facility] disease) [facility] [address]     Dementia 2014    Hyperlipidemia 2014    Hypernatremia 2016:  177, 16:  80    Hypertension     Hypothyroid     Seizure ([address]     NO GRAND MAL. ON DILANTIN THE ENTIRE TIME    Tobacco abuse 2014    Tobacco abuse     ENTIRE ADULT LIFE.   QUIT IN , SNEAKS WHEN POSSIBLE     Recent Results (from the past 24 hour(s))   VALPROIC ACID    Collection Time: 17  4:23 PM   Result Value Ref Range    Valproic acid 13 (L) 50 - 100 ug/ml   MAGNESIUM    Collection Time: 12/30/17  5:29 AM   Result Value Ref Range    Magnesium 2.2 1.8 - 2.4 mg/dL   METABOLIC PANEL, BASIC    Collection Time: 12/30/17  5:29 AM   Result Value Ref Range    Sodium 146 (H) 136 - 145 mmol/L    Potassium 4.3 3.5 - 5.1 mmol/L    Chloride 113 (H) 98 - 107 mmol/L    CO2 25 21 - 32 mmol/L    Anion gap 8 7 - 16 mmol/L    Glucose 67 65 - 100 mg/dL    BUN 33 (H) 8 - 23 MG/DL    Creatinine 2.02 (H) 0.8 - 1.5 MG/DL    GFR est AA 42 (L) >60 ml/min/1.73m2    GFR est non-AA 35 (L) >60 ml/min/1.73m2    Calcium 8.5 8.3 - 10.4 MG/DL   CBC WITH AUTOMATED DIFF    Collection Time: 12/30/17  5:29 AM   Result Value Ref Range    WBC 12.7 (H) 4.3 - 11.1 K/uL    RBC 3.30 (L) 4.23 - 5.67 M/uL    HGB 8.2 (L) 13.6 - 17.2 g/dL    HCT 26.0 (L) 41.1 - 50.3 %    MCV 78.8 (L) 79.6 - 97.8 FL    MCH 24.8 (L) 26.1 - 32.9 PG    MCHC 31.5 31.4 - 35.0 g/dL    RDW 21.5 (H) 11.9 - 14.6 %    PLATELET 619 922 - 549 K/uL    MPV Cannot be calculated 10.8 - 14.1 FL    DF AUTOMATED      NEUTROPHILS 82 (H) 43 - 78 %    LYMPHOCYTES 12 (L) 13 - 44 %    MONOCYTES 4 4.0 - 12.0 %    EOSINOPHILS 2 0.5 - 7.8 %    BASOPHILS 0 0.0 - 2.0 %    IMMATURE GRANULOCYTES 0 0.0 - 5.0 %    ABS. NEUTROPHILS 10.4 (H) 1.7 - 8.2 K/UL    ABS. LYMPHOCYTES 1.6 0.5 - 4.6 K/UL    ABS. MONOCYTES 0.5 0.1 - 1.3 K/UL    ABS. EOSINOPHILS 0.2 0.0 - 0.8 K/UL    ABS. BASOPHILS 0.0 0.0 - 0.2 K/UL    ABS. IMM.  GRANS. 0.0 0.0 - 0.5 K/UL     No Known Allergies  Current Facility-Administered Medications   Medication Dose Route Frequency Provider Last Rate Last Dose    dextrose 5% infusion  100 mL/hr IntraVENous CONTINUOUS Gala Robles  mL/hr at 12/30/17 1422 100 mL/hr at 12/30/17 1422    traMADol (ULTRAM) tablet 50 mg  50 mg Oral Q6H PRN Sj Maciel MD   50 mg at 12/29/17 1710    amLODIPine (NORVASC) tablet 10 mg  10 mg Oral DAILY Sj Maciel MD   10 mg at 12/30/17 0941    losartan (COZAAR) tablet 100 mg  100 mg Oral DAILY Julio Esteban MD   100 mg at 17 1200    hydrALAZINE (APRESOLINE) 20 mg/mL injection 10 mg  10 mg IntraVENous Q4H PRN Julio Esteban MD        aspirin chewable tablet 81 mg  81 mg Oral DAILY Julio Esteban MD   81 mg at 51 4029    folic acid (FOLVITE) tablet 1 mg  1 mg Oral DAILY Dann Daugherty MD   1 mg at 17 7648    divalproex ER (DEPAKOTE ER) 24 hour tablet 250 mg  250 mg Oral QHS Dann Daugherty MD   Stopped at 17 2314    levETIRAcetam (KEPPRA) tablet 500 mg  500 mg Oral BID Dann Daugherty MD   500 mg at 17 0941    levothyroxine (SYNTHROID) tablet 100 mcg  100 mcg Oral ACB Dann Daugherty MD   100 mcg at 17 0529    mirtazapine (REMERON) tablet 15 mg  15 mg Oral QHS Dann Daugherty MD   15 mg at 17 2308    pantoprazole (PROTONIX) tablet 40 mg  40 mg Oral BID Dann Daugherty MD   40 mg at 17 1592    tamsulosin (FLOMAX) capsule 0.4 mg  0.4 mg Oral QHS Dann Daugherty MD   Stopped at 17 2308    sodium chloride (NS) flush 5-10 mL  5-10 mL IntraVENous Q8H Dann Daugherty MD   5 mL at 17 0528    sodium chloride (NS) flush 5-10 mL  5-10 mL IntraVENous PRN Dann Daugherty MD   5 mL at 17 0257    heparin (porcine) injection 5,000 Units  5,000 Units SubCUTAneous Q12H Dann Daugherty MD   5,000 Units at 17 0249    piperacillin-tazobactam (ZOSYN) 3.375 g in 0.9% sodium chloride (MBP/ADV) 100 mL  3.375 g IntraVENous Q8H Franklin Cueto MD 25 mL/hr at 17 0941 3.375 g at 17 0941       Review of Systems unable to obtain  PHYSICAL EXAM     Visit Vitals    /81    Pulse 99    Temp 97.4 °F (36.3 °C)    Resp 18    Ht 5' 8\" (1.727 m)    Wt 61.2 kg (135 lb)    SpO2 98%    BMI 20.53 kg/m2      Temp (24hrs), Av.5 °F (36.4 °C), Min:97.3 °F (36.3 °C), Max:97.9 °F (36.6 °C)    Oxygen Therapy  O2 Sat (%): 98 % (17 1136)  Pulse via Oximetry: 93 beats per minute (17 0023)  O2 Device: Room air (17 1323)    Intake/Output Summary (Last 24 hours) at 12/30/17 1452  Last data filed at 12/30/17 0558   Gross per 24 hour   Intake              220 ml   Output                0 ml   Net              220 ml      General: No acute distress    Lungs: CTA bilaterally. resp even and nonlabored  Heart:  S1S2 present without murmurs rubs gallops. RRR. No edema  Abdomen: Soft, non tender, non distended. BS present  Extremities: Moves ext spontaneously. Neurologic:  Alert, confused     Results summary of Diagnostic Studies/Procedures copied from within [facility] EMR:        [address] Problems    Diagnosis Date Noted    Sepsis ([facility] [address] 12/26/2017    Acute metabolic encephalopathy 16/82/7036    Hypernatremia 11/25/2016    Dementia 11/25/2016    Anemia 11/25/2016     ADM 11/25/16:  HGB: 7.6. BASELINE 9      HTN (hypertension) 05/30/2014     Plan:    Acute metabolic encephalopathy: improving. MRI negative for acute findings. On ASA. Hypernatremia:  Start D5, BMP in AM    DEVORA: creatinine worsening today. Will start D5, BMP in AM.      Leukocytosis: improving, no clear source for infection. Continue zosyn given improvement.        Notes, labs, VS, diagnostic testing reviewed  Time spent with pt 20 min      DVT Prophylaxis: heparin sq  Plan of Care Discussed with: Supervising MD  Dr. Lisa Fernández, care team, pt    Abdon Jackson NP

## 2017-12-30 NOTE — PROGRESS NOTES
BSR pt opens eyes with conversation, nods head yes and no, no visible distress or pain noted will monitor

## 2017-12-30 NOTE — PROGRESS NOTES
Patient is resting in bed quietly, on RA, RR even and unlabored, patient alert and oriented to person and place, patient without c/o pain or discomfort, lap belt in place, door open to monitor, no needs voiced, call light within reach.

## 2017-12-30 NOTE — PROGRESS NOTES
Patient resting quietly throughout night, on RA, RR even and  Unlabored, no c/o pain or discomfort, no needs voiced, call light within reach.

## 2017-12-31 LAB
ANION GAP SERPL CALC-SCNC: 10 MMOL/L (ref 7–16)
BACTERIA SPEC CULT: NORMAL
BACTERIA SPEC CULT: NORMAL
BASOPHILS # BLD: 0 K/UL (ref 0–0.2)
BASOPHILS NFR BLD: 0 % (ref 0–2)
BUN SERPL-MCNC: 33 MG/DL (ref 8–23)
CALCIUM SERPL-MCNC: 8.7 MG/DL (ref 8.3–10.4)
CHLORIDE SERPL-SCNC: 112 MMOL/L (ref 98–107)
CO2 SERPL-SCNC: 21 MMOL/L (ref 21–32)
CREAT SERPL-MCNC: 1.75 MG/DL (ref 0.8–1.5)
DIFFERENTIAL METHOD BLD: ABNORMAL
EOSINOPHIL # BLD: 0.1 K/UL (ref 0–0.8)
EOSINOPHIL NFR BLD: 2 % (ref 0.5–7.8)
ERYTHROCYTE [DISTWIDTH] IN BLOOD BY AUTOMATED COUNT: 21.6 % (ref 11.9–14.6)
FERRITIN SERPL-MCNC: 66 NG/ML (ref 8–388)
FOLATE SERPL-MCNC: 36.5 NG/ML (ref 3.1–17.5)
GLUCOSE SERPL-MCNC: 90 MG/DL (ref 65–100)
HCT VFR BLD AUTO: 23.4 % (ref 41.1–50.3)
HGB BLD-MCNC: 7.3 G/DL (ref 13.6–17.2)
IMM GRANULOCYTES # BLD: 0 K/UL (ref 0–0.5)
IMM GRANULOCYTES NFR BLD AUTO: 0 % (ref 0–5)
LYMPHOCYTES # BLD: 1.5 K/UL (ref 0.5–4.6)
LYMPHOCYTES NFR BLD: 23 % (ref 13–44)
MCH RBC QN AUTO: 24.6 PG (ref 26.1–32.9)
MCHC RBC AUTO-ENTMCNC: 31.2 G/DL (ref 31.4–35)
MCV RBC AUTO: 78.8 FL (ref 79.6–97.8)
MONOCYTES # BLD: 0.4 K/UL (ref 0.1–1.3)
MONOCYTES NFR BLD: 6 % (ref 4–12)
NEUTS SEG # BLD: 4.7 K/UL (ref 1.7–8.2)
NEUTS SEG NFR BLD: 69 % (ref 43–78)
PLATELET # BLD AUTO: 212 K/UL (ref 150–450)
PMV BLD AUTO: 10.9 FL (ref 10.8–14.1)
POTASSIUM SERPL-SCNC: 4.5 MMOL/L (ref 3.5–5.1)
RBC # BLD AUTO: 2.97 M/UL (ref 4.23–5.67)
SERVICE CMNT-IMP: NORMAL
SERVICE CMNT-IMP: NORMAL
SODIUM SERPL-SCNC: 143 MMOL/L (ref 136–145)
TRANSFERRIN SERPL-MCNC: 227 MG/DL (ref 202–364)
VALPROATE SERPL-MCNC: 19 UG/ML (ref 50–100)
VIT B12 SERPL-MCNC: 527 PG/ML (ref 193–986)
WBC # BLD AUTO: 6.7 K/UL (ref 4.3–11.1)

## 2017-12-31 PROCEDURE — 74011250636 HC RX REV CODE- 250/636: Performed by: INTERNAL MEDICINE

## 2017-12-31 PROCEDURE — 82607 VITAMIN B-12: CPT | Performed by: INTERNAL MEDICINE

## 2017-12-31 PROCEDURE — 65270000029 HC RM PRIVATE

## 2017-12-31 PROCEDURE — 74011250637 HC RX REV CODE- 250/637: Performed by: HOSPITALIST

## 2017-12-31 PROCEDURE — 74011250637 HC RX REV CODE- 250/637: Performed by: INTERNAL MEDICINE

## 2017-12-31 PROCEDURE — 85025 COMPLETE CBC W/AUTO DIFF WBC: CPT | Performed by: HOSPITALIST

## 2017-12-31 PROCEDURE — 80048 BASIC METABOLIC PNL TOTAL CA: CPT | Performed by: HOSPITALIST

## 2017-12-31 PROCEDURE — 74011000258 HC RX REV CODE- 258: Performed by: EMERGENCY MEDICINE

## 2017-12-31 PROCEDURE — 80164 ASSAY DIPROPYLACETIC ACD TOT: CPT | Performed by: INTERNAL MEDICINE

## 2017-12-31 PROCEDURE — 82728 ASSAY OF FERRITIN: CPT | Performed by: INTERNAL MEDICINE

## 2017-12-31 PROCEDURE — 84466 ASSAY OF TRANSFERRIN: CPT | Performed by: INTERNAL MEDICINE

## 2017-12-31 PROCEDURE — 82746 ASSAY OF FOLIC ACID SERUM: CPT | Performed by: INTERNAL MEDICINE

## 2017-12-31 PROCEDURE — 74011250636 HC RX REV CODE- 250/636: Performed by: EMERGENCY MEDICINE

## 2017-12-31 PROCEDURE — 36415 COLL VENOUS BLD VENIPUNCTURE: CPT | Performed by: HOSPITALIST

## 2017-12-31 PROCEDURE — 80177 DRUG SCRN QUAN LEVETIRACETAM: CPT | Performed by: INTERNAL MEDICINE

## 2017-12-31 RX ADMIN — PANTOPRAZOLE SODIUM 40 MG: 40 TABLET, DELAYED RELEASE ORAL at 11:16

## 2017-12-31 RX ADMIN — ASPIRIN 81 MG 81 MG: 81 TABLET ORAL at 11:16

## 2017-12-31 RX ADMIN — FOLIC ACID 1 MG: 1 TABLET ORAL at 11:16

## 2017-12-31 RX ADMIN — Medication 10 ML: at 05:28

## 2017-12-31 RX ADMIN — LEVETIRACETAM 500 MG: 500 TABLET ORAL at 18:05

## 2017-12-31 RX ADMIN — PIPERACILLIN SODIUM,TAZOBACTAM SODIUM 3.38 G: 3; .375 INJECTION, POWDER, FOR SOLUTION INTRAVENOUS at 11:16

## 2017-12-31 RX ADMIN — HEPARIN SODIUM 5000 UNITS: 5000 INJECTION, SOLUTION INTRAVENOUS; SUBCUTANEOUS at 14:10

## 2017-12-31 RX ADMIN — LEVETIRACETAM 500 MG: 500 TABLET ORAL at 11:16

## 2017-12-31 RX ADMIN — DIVALPROEX SODIUM 125 MG: 125 CAPSULE, COATED PELLETS ORAL at 11:16

## 2017-12-31 RX ADMIN — TAMSULOSIN HYDROCHLORIDE 0.4 MG: 0.4 CAPSULE ORAL at 21:29

## 2017-12-31 RX ADMIN — Medication 10 ML: at 21:30

## 2017-12-31 RX ADMIN — Medication 10 ML: at 14:12

## 2017-12-31 RX ADMIN — AMLODIPINE BESYLATE 10 MG: 10 TABLET ORAL at 11:16

## 2017-12-31 RX ADMIN — MIRTAZAPINE 15 MG: 15 TABLET, FILM COATED ORAL at 21:30

## 2017-12-31 RX ADMIN — PANTOPRAZOLE SODIUM 40 MG: 40 TABLET, DELAYED RELEASE ORAL at 18:05

## 2017-12-31 RX ADMIN — LOSARTAN POTASSIUM 100 MG: 50 TABLET ORAL at 11:16

## 2017-12-31 RX ADMIN — LEVOTHYROXINE SODIUM 100 MCG: 100 TABLET ORAL at 05:29

## 2017-12-31 RX ADMIN — PIPERACILLIN SODIUM,TAZOBACTAM SODIUM 3.38 G: 3; .375 INJECTION, POWDER, FOR SOLUTION INTRAVENOUS at 18:05

## 2017-12-31 RX ADMIN — DIVALPROEX SODIUM 125 MG: 125 CAPSULE, COATED PELLETS ORAL at 21:30

## 2017-12-31 RX ADMIN — PIPERACILLIN SODIUM,TAZOBACTAM SODIUM 3.38 G: 3; .375 INJECTION, POWDER, FOR SOLUTION INTRAVENOUS at 02:36

## 2017-12-31 RX ADMIN — HEPARIN SODIUM 5000 UNITS: 5000 INJECTION, SOLUTION INTRAVENOUS; SUBCUTANEOUS at 02:36

## 2017-12-31 NOTE — PROGRESS NOTES
Report received from [facility]. AT [place]

## 2017-12-31 NOTE — PROGRESS NOTES
Patient is alert oriented to person, patient sitting up in bed in room, patient without c/o pain or discomfort, IV fluids infusing, no needs voiced, call light within reach.

## 2017-12-31 NOTE — PROGRESS NOTES
Patient has been resting quietly throughout night, calm and cooperative, IV fluids infusing, no needs voiced, denies pain and discomfort, call light within reach.

## 2017-12-31 NOTE — PROGRESS NOTES
Hospitalist Progress Note    2017  Admit Date: 2017  3:57 PM   NAME: Tracee Skaggs   :  1945   MRN:  [id number]   Attending: Jesslyn Boxer, MD  PCP:  Wei Plummer MD    SUBJECTIVE:     68 yo male who presented with AMS and foul smelling urine. Notes report he lives with his wife who noted him to be confused 1 day prior to admission. He slipped and fell the day of admission at home hitting his head, no LOC, he then got back in the bed and wife reports started hallucinating. CT head, CXR without acute findings. UA neg for UTI.   pt started having RUE weakness, MRI brain without acute findings.  pt with elevated WBC on abx, no source of infection. On Vanc and Zosyn on admission. - pt sitting upright in bed and eating. Still confused(baseline) but reportedly much more alert. Spoke with patients sister at bedside this afternoon. She confirms family's wishes for patient to return home at discharge    Review of Systems negative with exception of pertinent positives noted above  PHYSICAL EXAM     Visit Vitals    /62 (BP 1 Location: Left arm, BP Patient Position: At rest)    Pulse 68    Temp 97.6 °F (36.4 °C)    Resp 20    Ht 5' 8\" (1.727 m)    Wt 60.8 kg (134 lb 1.6 oz)    SpO2 100%    BMI 20.39 kg/m2      Temp (24hrs), Av.3 °F (36.8 °C), Min:97.6 °F (36.4 °C), Max:98.8 °F (37.1 °C)    Oxygen Therapy  O2 Sat (%): 100 % (17 1541)  Pulse via Oximetry: 93 beats per minute (17 0023)  O2 Device: Room air (17 1323)    Intake/Output Summary (Last 24 hours) at 17 1744  Last data filed at 17 1310   Gross per 24 hour   Intake             1710 ml   Output                0 ml   Net             1710 ml      General: Awake but confused, no distress   Lungs:  CTA Bilaterally.    Heart:  Regular rate and rhythm,  No murmur, rub, or gallop  Abdomen: Soft, Non distended, Non tender, Positive bowel sounds  Extremities: No cyanosis, clubbing or edema  Neurologic:  No focal deficits    ASSESSMENT      Active Hospital Problems    Diagnosis Date Noted    Sepsis ([facility] [address] 12/26/2017    Acute metabolic encephalopathy 00/87/9747    Hypernatremia 11/25/2016    Dementia 11/25/2016    Anemia 11/25/2016     ADM 11/25/16:  HGB: 7.6. BASELINE 9      HTN (hypertension) 05/30/2014     A/P  - Acute metabolic encephalopathy - improved. Seemingly close to baseline  - Hypernatremia - resolved. Monitor off ivf  - DEVORA/ chronic kidney disease - Cr back to baseline. Monitor off ivf  - dementia - baseline.  - Anemia - hgb 7.3. Order iron panel.      Dispo - likely home in AM    DVT Prophylaxis: hep sq    Signed By: Frances Monroy DO     December 31, 2017

## 2018-01-01 VITALS
SYSTOLIC BLOOD PRESSURE: 108 MMHG | HEIGHT: 68 IN | DIASTOLIC BLOOD PRESSURE: 54 MMHG | TEMPERATURE: 97.4 F | WEIGHT: 135.5 LBS | BODY MASS INDEX: 20.54 KG/M2 | RESPIRATION RATE: 18 BRPM | OXYGEN SATURATION: 90 % | HEART RATE: 68 BPM

## 2018-01-01 LAB
ANION GAP SERPL CALC-SCNC: 8 MMOL/L (ref 7–16)
BASOPHILS # BLD: 0 K/UL (ref 0–0.2)
BASOPHILS NFR BLD: 0 % (ref 0–2)
BUN SERPL-MCNC: 29 MG/DL (ref 8–23)
CALCIUM SERPL-MCNC: 8.9 MG/DL (ref 8.3–10.4)
CHLORIDE SERPL-SCNC: 111 MMOL/L (ref 98–107)
CO2 SERPL-SCNC: 24 MMOL/L (ref 21–32)
CREAT SERPL-MCNC: 1.71 MG/DL (ref 0.8–1.5)
DIFFERENTIAL METHOD BLD: ABNORMAL
EOSINOPHIL # BLD: 0.1 K/UL (ref 0–0.8)
EOSINOPHIL NFR BLD: 3 % (ref 0.5–7.8)
ERYTHROCYTE [DISTWIDTH] IN BLOOD BY AUTOMATED COUNT: 21.5 % (ref 11.9–14.6)
GLUCOSE SERPL-MCNC: 75 MG/DL (ref 65–100)
HCT VFR BLD AUTO: 23.5 % (ref 41.1–50.3)
HGB BLD-MCNC: 7.5 G/DL (ref 13.6–17.2)
IMM GRANULOCYTES # BLD: 0 K/UL (ref 0–0.5)
IMM GRANULOCYTES NFR BLD AUTO: 0 % (ref 0–5)
LYMPHOCYTES # BLD: 1.2 K/UL (ref 0.5–4.6)
LYMPHOCYTES NFR BLD: 22 % (ref 13–44)
MCH RBC QN AUTO: 25.1 PG (ref 26.1–32.9)
MCHC RBC AUTO-ENTMCNC: 31.9 G/DL (ref 31.4–35)
MCV RBC AUTO: 78.6 FL (ref 79.6–97.8)
MONOCYTES # BLD: 0.3 K/UL (ref 0.1–1.3)
MONOCYTES NFR BLD: 6 % (ref 4–12)
NEUTS SEG # BLD: 3.7 K/UL (ref 1.7–8.2)
NEUTS SEG NFR BLD: 69 % (ref 43–78)
PLATELET # BLD AUTO: 261 K/UL (ref 150–450)
PMV BLD AUTO: 11.1 FL (ref 10.8–14.1)
POTASSIUM SERPL-SCNC: 4.1 MMOL/L (ref 3.5–5.1)
RBC # BLD AUTO: 2.99 M/UL (ref 4.23–5.67)
SODIUM SERPL-SCNC: 143 MMOL/L (ref 136–145)
WBC # BLD AUTO: 5.3 K/UL (ref 4.3–11.1)

## 2018-01-01 PROCEDURE — 74011000258 HC RX REV CODE- 258: Performed by: EMERGENCY MEDICINE

## 2018-01-01 PROCEDURE — 80048 BASIC METABOLIC PNL TOTAL CA: CPT | Performed by: INTERNAL MEDICINE

## 2018-01-01 PROCEDURE — 85025 COMPLETE CBC W/AUTO DIFF WBC: CPT | Performed by: INTERNAL MEDICINE

## 2018-01-01 PROCEDURE — 74011250637 HC RX REV CODE- 250/637: Performed by: INTERNAL MEDICINE

## 2018-01-01 PROCEDURE — 74011250636 HC RX REV CODE- 250/636: Performed by: EMERGENCY MEDICINE

## 2018-01-01 PROCEDURE — 74011250637 HC RX REV CODE- 250/637: Performed by: HOSPITALIST

## 2018-01-01 PROCEDURE — 36415 COLL VENOUS BLD VENIPUNCTURE: CPT | Performed by: INTERNAL MEDICINE

## 2018-01-01 PROCEDURE — 74011250636 HC RX REV CODE- 250/636: Performed by: INTERNAL MEDICINE

## 2018-01-01 PROCEDURE — 97530 THERAPEUTIC ACTIVITIES: CPT

## 2018-01-01 RX ADMIN — DIVALPROEX SODIUM 125 MG: 125 CAPSULE, COATED PELLETS ORAL at 08:53

## 2018-01-01 RX ADMIN — PANTOPRAZOLE SODIUM 40 MG: 40 TABLET, DELAYED RELEASE ORAL at 08:53

## 2018-01-01 RX ADMIN — Medication 10 ML: at 06:04

## 2018-01-01 RX ADMIN — HEPARIN SODIUM 5000 UNITS: 5000 INJECTION, SOLUTION INTRAVENOUS; SUBCUTANEOUS at 02:51

## 2018-01-01 RX ADMIN — LEVETIRACETAM 500 MG: 500 TABLET ORAL at 08:52

## 2018-01-01 RX ADMIN — PIPERACILLIN SODIUM,TAZOBACTAM SODIUM 3.38 G: 3; .375 INJECTION, POWDER, FOR SOLUTION INTRAVENOUS at 02:50

## 2018-01-01 RX ADMIN — LOSARTAN POTASSIUM 100 MG: 50 TABLET ORAL at 08:52

## 2018-01-01 RX ADMIN — TRAMADOL HYDROCHLORIDE 50 MG: 50 TABLET, FILM COATED ORAL at 03:30

## 2018-01-01 RX ADMIN — PIPERACILLIN SODIUM,TAZOBACTAM SODIUM 3.38 G: 3; .375 INJECTION, POWDER, FOR SOLUTION INTRAVENOUS at 11:03

## 2018-01-01 RX ADMIN — LEVOTHYROXINE SODIUM 100 MCG: 100 TABLET ORAL at 06:04

## 2018-01-01 RX ADMIN — ASPIRIN 81 MG 81 MG: 81 TABLET ORAL at 08:52

## 2018-01-01 RX ADMIN — AMLODIPINE BESYLATE 10 MG: 10 TABLET ORAL at 08:52

## 2018-01-01 RX ADMIN — FOLIC ACID 1 MG: 1 TABLET ORAL at 08:53

## 2018-01-01 NOTE — PROGRESS NOTES
Patient wife verbalized understanding for discharge instructions, prescriptions and scheduling follow up appointment with primary caregiver. IV to right arm removed; tip intact. Patient dressed. All personal belongings with patient. Transported via wheelchair to wife's car.

## 2018-01-01 NOTE — PROGRESS NOTES
Care Management Interventions  Transition of Care Consult (CM Consult): Discharge Planning, [address]: Yes  Discharge Durable Medical Equipment: No  Physical Therapy Consult: Yes  Occupational Therapy Consult: Yes  Speech Therapy Consult: No  Current Support Network: Lives with Spouse, Own Home  Confirm Follow Up Transport: Family  Plan discussed with Pt/Family/Caregiver: Yes  Freedom of Choice Offered: Yes  Discharge Location  Discharge Placement: Home with home health  Patient to be discharged today and will need PT/OT. Patient to be d/c home with [facility]. PT/OT.

## 2018-01-01 NOTE — PROGRESS NOTES
Problem: Falls - Risk of  Goal: *Absence of Falls  Document Franklin Fall Risk and appropriate interventions in the flowsheet.    Outcome: Progressing Towards Goal  Fall Risk Interventions:  Mobility Interventions: Bed/chair exit alarm, Patient to call before getting OOB    Mentation Interventions: Bed/chair exit alarm, Door open when patient unattended, More frequent rounding, Reorient patient, Update white board, Toileting rounds    Medication Interventions: Evaluate medications/consider consulting pharmacy    Elimination Interventions: Call light in reach, Toileting schedule/hourly rounds    History of Falls Interventions: Bed/chair exit alarm, Door open when patient unattended, Room close to nurse's station

## 2018-01-01 NOTE — DISCHARGE INSTRUCTIONS
DISCHARGE SUMMARY from Nurse    PATIENT INSTRUCTIONS:  What to do at Home:  Recommended activity: Activity as tolerated and PT/OT per Home Health. *  Please give a list of your current medications to your Primary Care Provider. *  Please update this list whenever your medications are discontinued, doses are      changed, or new medications (including over-the-counter products) are added. *  Please carry medication information at all times in case of emergency situations. These are general instructions for a healthy lifestyle:    No smoking/ No tobacco products/ Avoid exposure to second hand smoke  Surgeon General's Warning:  Quitting smoking now greatly reduces serious risk to your health. Obesity, smoking, and sedentary lifestyle greatly increases your risk for illness    A healthy diet, regular physical exercise & weight monitoring are important for maintaining a healthy lifestyle    You may be retaining fluid if you have a history of heart failure or if you experience any of the following symptoms:  Weight gain of 3 pounds or more overnight or 5 pounds in a week, increased swelling in our hands or feet or shortness of breath while lying flat in bed. Please call your doctor as soon as you notice any of these symptoms; do not wait until your next office visit. Recognize signs and symptoms of STROKE:    F-face looks uneven    A-arms unable to move or move unevenly    S-speech slurred or non-existent    T-time-call 911 as soon as signs and symptoms begin-DO NOT go       Back to bed or wait to see if you get better-TIME IS BRAIN. Warning Signs of HEART ATTACK     Call 911 if you have these symptoms:   Chest discomfort. Most heart attacks involve discomfort in the center of the chest that lasts more than a few minutes, or that goes away and comes back. It can feel like uncomfortable pressure, squeezing, fullness, or pain.  Discomfort in other areas of the upper body.  Symptoms can include pain or discomfort in one or both arms, the back, neck, jaw, or stomach.  Shortness of breath with or without chest discomfort.  Other signs may include breaking out in a cold sweat, nausea, or lightheadedness. Don't wait more than five minutes to call 911 - MINUTES MATTER! Fast action can save your life. Calling 911 is almost always the fastest way to get lifesaving treatment. Emergency Medical Services staff can begin treatment when they arrive -- up to an hour sooner than if someone gets to the hospital by car. The discharge information has been reviewed with the daughter. The daughter verbalized understanding. Discharge medications reviewed with the daughter and appropriate educational materials and side effects teaching were provided. Dehydration: Care Instructions  Your Care Instructions  Dehydration happens when your body loses too much fluid. This might happen when you do not drink enough water or you lose large amounts of fluids from your body because of diarrhea, vomiting, or sweating. Severe dehydration can be life-threatening. Water and minerals called electrolytes help put your body fluids back in balance. Learn the early signs of fluid loss, and drink more fluids to prevent dehydration. Follow-up care is a key part of your treatment and safety. Be sure to make and go to all appointments, and call your doctor if you are having problems. It's also a good idea to know your test results and keep a list of the medicines you take. How can you care for yourself at home? · To prevent dehydration, drink plenty of fluids, enough so that your urine is light yellow or clear like water. Choose water and other caffeine-free clear liquids until you feel better. If you have kidney, heart, or liver disease and have to limit fluids, talk with your doctor before you increase the amount of fluids you drink.   · If you do not feel like eating or drinking, try taking small sips of water, sports drinks, or other rehydration drinks. · Get plenty of rest.  To prevent dehydration  · Add more fluids to your diet and daily routine, unless your doctor has told you not to. · During hot weather, drink more fluids. Drink even more fluids if you exercise a lot. Stay away from drinks with alcohol or caffeine. · Watch for the symptoms of dehydration. These include:  ¨ A dry, sticky mouth. ¨ Dark yellow urine, and not much of it. ¨ Dry and sunken eyes. ¨ Feeling very tired. · Learn what problems can lead to dehydration. These include:  ¨ Diarrhea, fever, and vomiting. ¨ Any illness with a fever, such as pneumonia or the flu. ¨ Activities that cause heavy sweating, such as endurance races and heavy outdoor work in hot or humid weather. ¨ Alcohol or drug abuse or withdrawal.  ¨ Certain medicines, such as cold and allergy pills (antihistamines), diet pills (diuretics), and laxatives. ¨ Certain diseases, such as diabetes, cancer, and heart or kidney disease. When should you call for help? Call 911 anytime you think you may need emergency care. For example, call if:  ? · You passed out (lost consciousness). ?Call your doctor now or seek immediate medical care if:  ? · You are confused and cannot think clearly. ? · You are dizzy or lightheaded, or you feel like you may faint. ? · You have signs of needing more fluids. You have sunken eyes and a dry mouth, and you pass only a little dark urine. ? · You cannot keep fluids down. ? Watch closely for changes in your health, and be sure to contact your doctor if:  ? · You are not making tears. ? · Your skin is very dry and sags slowly back into place after you pinch it. ? · Your mouth and eyes are very dry. Where can you learn more? Go to http://malgorzata-violeta.info/. Enter B821 in the search box to learn more about \"Dehydration: Care Instructions. \"  Current as of: March 20, 2017  Content Version: 11.4  © 7943-3108 [employer]. Care instructions adapted under license by [employer] (which disclaims liability or warranty for this information). If you have questions about a medical condition or this instruction, always ask your healthcare professional. [address] any warranty or liability for your use of this information.     ___________________________________________________________________________________________________________________________________

## 2018-01-01 NOTE — PROGRESS NOTES
[address]  Face to Face Encounter    Patients Name: Tory Hardwick    YOB: 1945    Ordering Physician: Trinidad Puente MD    Primary Diagnosis: Sepsis [facility])  Sepsis [facility])    Date of Face to Face:   1/1/2018                                  Face to Face Encounter findings are related to primary reason for home care:   yes. 1. I certify that the patient needs intermittent care as follows: physical therapy: strengthening  occupational therapy:  ADL safety (ie. cooking, bathing, dressing)    2. I certify that this patient is homebound, that is: Patient's condition makes leaving the home medically contraindicated; and 3) patient has a normal inability to leave the home and leaving the home requires considerable and taxing effort. Patient may leave the home for infrequent and short duration for medical reasons, and occasional absences for non-medical reasons. Homebound status is due to the following functional limitations    3. I certify that this patient is under my care and that I, or a nurse practitioner or  [id number], or clinical nurse specialist, or certified nurse midwife, working with me, had a Face-to-Face Encounter that meets the physician Face-to-Face Encounter requirements. The following are the clinical findings from the [address] [place] [address] encounter that support the need for skilled services and is a summary of the encounter:     See Progress notes and D/C Summary     Tammy Khan RN  1/1/2018      THE FOLLOWING TO BE COMPLETED BY THE COMMUNITY PHYSICIAN:    I concur with the findings described above from the F encounter that this patient is homebound and in need of a skilled service.     Certifying Physician: _____________________________________      Printed Certifying Physician Name: _____________________________________    Date: _________________

## 2018-01-01 NOTE — PROGRESS NOTES
Bedside report  received from [facility]. Assessment completed. Bed is in low and locked position with floor free of objects. Patient Alert but confused, and resting quietly in bed. No needs noted at present. Respirations even and non labored. Call light within reach. Will continue to monitor pt.

## 2018-01-01 NOTE — PROGRESS NOTES
Mr. Evelyn Sanz in bed; alert, oriented to person only. Lungs clear on room air. No peripheral edema noted. assisted to sit with HOB elevated and eat breakfast tray; good appetite. Denies pain. Lap belt and bed alarm in place for safety. w ill monitor with door open.

## 2018-01-01 NOTE — DISCHARGE SUMMARY
Hospitalist Discharge Summary     Patient ID:  Lev Hernandez  [id number]  16 y.o.  1945  Admit date: 12/26/2017  3:57 PM  Discharge date and time: 1/1/2018  Attending: Arielle Hogan MD  PCP:  Ashley Capps MD  Treatment Team: Attending Provider: Arielle Hogan MD; Utilization Review: Shae Michel RN; Care Manager: Tabatha Allison RN; Consulting Provider: Arielle Hogan MD    Principal Diagnosis Acute metabolic encephalopathy   Principal Problem:    Acute metabolic encephalopathy (57/0/7964)    Active Problems:    Dementia (11/25/2016)      HTN (hypertension) (5/30/2014)      Hypernatremia (11/25/2016)      Anemia (11/25/2016)      Overview: ADM 11/25/16:  HGB: 7.6. BASELINE 9      Sepsis ([facility] [address] (12/26/2017)             Hospital Course:  Please refer to the admission H&P for details of presentation. In summary 68 yo male who presented with AMS and foul smelling urine. Notes report he lives with his wife who noted him to be confused 1 day prior to admission.  He slipped and fell the day of admission at home hitting his head, no LOC, he then got back in the bed and wife reports started hallucinating. CT head, CXR without acute findings.  UA neg for UTI. MRI non acute. Na elevated on arrival - 149. Normalized with isotonic fluid. Spoke with family on evening of 12/31 and daughter reported pt appeared at baseline. He is alert but with baseline confusion on 1/1 and will discharge home. Will request continuation of [place] [id number] Pt/OT at home. Significant Diagnostic Studies:     IMPRESSION:     1. No acute infarction.     2. Advanced cerebral volume loss with ventriculomegaly and hippocampal atrophy.     3. White matter findings compatible with chronic small vessel ischemic disease  with old lacunar infarcts in the cerebellum and basal ganglia.     Final result (Exam End: 12/26/2017  9:45 PM) Open        Study Result      HEAD CT without contrast  12/26/2017 9:45 PM      CLINICAL INFORMATION: 67year-old with altered level of consciousness. Confusion. History of dementia. Possible UTI.      COMPARISON: 12/4/2016, 11/25/2016.     PROCEDURE: Emergent noncontrast head CT was performed in the axial plane. Brain,  bone, and soft tissue windows reviewed. Radiation dose reduction techniques were  used for this study. Our CT scanners used one or all of the following: Automated  exposure control, adjustment of the MA and/or kV according to patient size,  iterative reconstruction.     FINDINGS: No acute intracranial hemorrhage, mass, or mass effect. No extra-axial  fluid collections. Stable diffuse volume loss. No midline shift; the basilar  cisterns are patent. The ventricles are somewhat dilated with stable in caliber.     Moderate periventricular, deep and subcortical patchy white matter low  attenuation is nonspecific but likely related to chronic small vessel ischemic  change, including bilateral basal ganglia and chronic left lacunar infarcts. .  There are no specific signs to suggest acute large vessel territory ischemia. The gray-white differentiation is otherwise preserved.     No aggressive calvarial process. Visualized portions of the paranasal sinuses  and mastoid air cells are patent.     IMPRESSION  IMPRESSION:     1. No convincing acute intracranial abnormality.     2. Stable ventriculomegaly, probable chronic small vessel ischemic change and  bilateral basal ganglia/left thalamic lacunar infarcts. Final result (Exam End: 12/26/2017  5:27 PM) Open        Study Result      PORTABLE CHEST 2 VIEWS     HISTORY: Sepsis     COMPARISON: April 16, 2017     FINDINGS: EKG leads are present. There is no consolidation, pleural effusions or  advanced pulmonary edema.     IMPRESSION  IMPRESSION: No consolidation.            Labs: Results:       Chemistry Recent Labs      01/01/18   0456  12/31/17   0404  12/30/17   0529   GLU  75  90  67   NA  143  143  146*   K  4.1  4.5  4.3   CL  111*  112*  113*   CO2  24  21  25 BUN  29*  33*  33*   CREA  1.71*  1.75*  2.02*   CA  8.9  8.7  8.5   AGAP  8  10  8      CBC w/Diff Recent Labs      01/01/18   0456  12/31/17   0404  12/30/17   0529   WBC  5.3  6.7  12.7*   RBC  2.99*  2.97*  3.30*   HGB  7.5*  7.3*  8.2*   HCT  23.5*  23.4*  26.0*   PLT  261  212  225   GRANS  69  69  82*   LYMPH  22  23  12*   EOS  3  2  2      Cardiac Enzymes No results for input(s): CPK, CKND1, NADIA in the last 72 hours. No lab exists for component: CKRMB, TROIP   Coagulation No results for input(s): PTP, INR, APTT in the last 72 hours. No lab exists for component: INREXT    Lipid Panel Lab Results   Component Value Date/Time    Cholesterol, total 161 09/27/2017 11:25 AM    Cholesterol (POC) 168 05/30/2014 10:00 AM    HDL Cholesterol 47 09/27/2017 11:25 AM    LDL, calculated 101 09/27/2017 11:25 AM    VLDL, calculated 13 09/27/2017 11:25 AM    Triglyceride 64 09/27/2017 11:25 AM    Triglycerides (POC) 46 05/30/2014 10:00 AM      BNP No results for input(s): BNPP in the last 72 hours. Liver Enzymes No results for input(s): TP, ALB, TBIL, AP, SGOT, GPT in the last 72 hours. No lab exists for component: DBIL   Thyroid Studies Lab Results   Component Value Date/Time    T4, Total 5.9 11/18/2016 12:12 PM    TSH 4.060 12/26/2017 05:46 PM            Discharge Exam:  Visit Vitals    /68    Pulse 67    Temp 97.4 °F (36.3 °C)    Resp 18    Ht 5' 8\" (1.727 m)    Wt 61.5 kg (135 lb 8 oz)    SpO2 91%    BMI 20.6 kg/m2     General appearance: alert, cooperative, no distress, appears stated age  Lungs: clear to auscultation bilaterally  Heart: regular rate and rhythm, S1, S2 normal, no murmur, click, rub or gallop  Abdomen: soft, non-tender.  Bowel sounds normal. No masses,  no organomegaly  Extremities: no cyanosis or edema    Disposition: [facility]  Discharge Condition: stable  Patient Instructions:   Current Discharge Medication List      CONTINUE these medications which have NOT CHANGED Details   levETIRAcetam (KEPPRA) 500 mg tablet Take 1 Tab by mouth two (2) times a day. Qty: 180 Tab, Refills: 1    Associated Diagnoses: Seizure disorder ([address]      levothyroxine (SYNTHROID) 100 mcg tablet Take 1 Tab by mouth Daily (before breakfast). Qty: 90 Tab, Refills: 1    Associated Diagnoses: Acquired hypothyroidism      folic acid (FOLVITE) 1 mg tablet Take 1 Tab by mouth daily. Qty: 90 Tab, Refills: 1      ferrous sulfate 325 mg (65 mg iron) tablet Take 1 Tab by mouth two (2) times daily (with meals). Qty: 60 Tab, Refills: 0      pantoprazole (PROTONIX) 40 mg tablet Take 1 Tab by mouth two (2) times a day. Qty: 180 Tab, Refills: 1    Associated Diagnoses: Gastroesophageal reflux disease without esophagitis      tamsulosin (FLOMAX) 0.4 mg capsule Take 1 Cap by mouth nightly. Qty: 90 Cap, Refills: 1    Associated Diagnoses: Benign non-nodular prostatic hyperplasia without lower urinary tract symptoms      divalproex ER (DEPAKOTE ER) 250 mg ER tablet Take 1 Tab by mouth nightly. Qty: 90 Tab, Refills: 2    Associated Diagnoses: Dementia associated with alcoholism without behavioral disturbance (HCC)      mirtazapine (REMERON) 15 mg tablet Take 1 Tab by mouth nightly. Qty: 90 Tab, Refills: 2    Associated Diagnoses: Primary insomnia      amLODIPine (NORVASC) 10 mg tablet Take 1 Tab by mouth daily. Qty: 90 Tab, Refills: 3    Associated Diagnoses: Essential hypertension      losartan (COZAAR) 100 mg tablet Take 1 Tab by mouth daily.   Qty: 90 Tab, Refills: 3    Associated Diagnoses: Essential hypertension             Activity as tolerated  Diet: mechanical soft      Follow-up  ·  PCP in 1-2 weeks  Time spent to discharge patient 35 minutes  Signed:  Asha Anderson DO  1/1/2018  2:22 PM

## 2018-01-01 NOTE — PROGRESS NOTES
Problem: Mobility Impaired (Adult and Pediatric)  Goal: *Acute Goals and Plan of Care (Insert Text)  1. Mr. Roseanna Abad will perform supine to sit and sit to supine with supervision in 7 days. 2.  Mr. Roseanna Abad will perform sit to stand and bed to chair with supervision in 7 days. 3.  Mr. Roseanna Abad will perform gait with least restrictive device and supervision 100 ft in 7 days. PHYSICAL THERAPY: Daily Note, Treatment Day: 1st, PM 1/1/2018  INPATIENT: Hospital Day: 7  Payor: Javan Prather / Plan: [address] [place] [address] HMO / Product Type: [employer] Medicare /      NAME/AGE/GENDER: Ousmane Shah is a 67 y.o. male   PRIMARY DIAGNOSIS: Sepsis ([address]  Sepsis ([facility] [address] Acute metabolic encephalopathy Acute metabolic encephalopathy        ICD-10: Treatment Diagnosis:   · Generalized Muscle Weakness (M62.81)  · Difficulty in walking, Not elsewhere classified (R26.2)   Precaution/Allergies:  Review of patient's allergies indicates no known allergies. ASSESSMENT:     Mr. Roseanna Abad presents supine in bed. He is agreeable to physical therapy but kept attempting to get back in bed with cues to sit edge of bed. He required minimal assistance with bed mobility and sit to stand transfer. He required moderate assistance with scooting up to head of bed. He would benefit from continued skilled physical therapy in order to restore prior level of function and prevent future impairment. This section established at most recent assessment   PROBLEM LIST (Impairments causing functional limitations):  1. Decreased Transfer Abilities  2. Decreased Ambulation Ability/Technique  3. Decreased Activity Tolerance   INTERVENTIONS PLANNED: (Benefits and precautions of physical therapy have been discussed with the patient.)  1. Bed Mobility  2. Gait Training  3. Therapeutic Activites  4. Transfer Training  5.  Group Therapy     TREATMENT PLAN: Frequency/Duration: 3 times a week for duration of hospital stay  Rehabilitation Potential For Stated Goals: Good     RECOMMENDED REHABILITATION/EQUIPMENT: (at time of discharge pending progress): Due to the probability of continued deficits (see above) this patient will likely need continued skilled physical therapy after discharge. Equipment:    None at this time              HISTORY:   History of Present Injury/Illness (Reason for Referral):   is a 68 yo male who presented with altered mental status and foul smelling urine on a background of dementia, CKD, COPD, HLD,HTN, hypothyroidism and GI bleed. He has a 1 day history of confusion. His family was called for additional history. He lives with his wife. Yesterday, he was noted be confused. This morning, he slipped and fell on the floor (+ head injury, no LOC). He then got back into the bed. He then started seeing things on the floor that was not there. He was noted to have a fowl smell to his urine  Past Medical History/Comorbidities:   Mr. Ravindra Draper  has a past medical history of Alcohol abuse; Anemia; Angiodysplasia of stomach (2016); Bladder incontinence (05/28/2014); Chronic airway obstruction, not elsewhere classified (5/28/2014); CKD (chronic kidney disease); COPD (chronic obstructive pulmonary disease) ([facility] [address] Dementia (5/28/2014); Hyperlipidemia (5/28/2014); Hypernatremia (11/25/2016); Hypertension; Hypothyroid; Seizure ([facility] [address] (2010); Tobacco abuse (5/28/2014); and Tobacco abuse. Mr. Ravindra Draper  has a past surgical history that includes hx colonoscopy (APPROX 2005) and hx endoscopy (2016). Social History/Living Environment:   Home Environment: Private residence  One/Two Story Residence: One story  Living Alone: No  Support Systems: Child(lissette), Spouse/Significant Other/Partner  Patient Expects to be Discharged to[de-identified] Private residence  Current DME Used/Available at Home: None  Prior Level of Function/Work/Activity:  Furniture walker. age, dementia/advanced.    Number of Personal Factors/Comorbidities that affect the Plan of Care: 1-2: MODERATE COMPLEXITY   EXAMINATION:   Most Recent Physical Functioning:   Gross Assessment:  AROM: Generally decreased, functional  Strength: Generally decreased, functional               Posture:  Posture (WDL): Exceptions to WDL  Posture Assessment: Forward head, Rounded shoulders  Balance:  Sitting: Impaired  Sitting - Static: Good (unsupported)  Sitting - Dynamic: Fair (occasional)  Standing - Static: Fair  Standing - Dynamic : Fair Bed Mobility:  Rolling: Minimum assistance  Supine to Sit: Minimum assistance  Sit to Supine: Minimum assistance  Scooting: Moderate assistance  Wheelchair Mobility:     Transfers:  Sit to Stand: Minimum assistance  Stand to Sit: Minimum assistance  Gait:            Body Structures Involved:  1. Muscles Body Functions Affected:  1. Movement Related Activities and Participation Affected:  1. Mobility   Number of elements that affect the Plan of Care: 3: MODERATE COMPLEXITY   CLINICAL PRESENTATION:   Presentation: Evolving clinical presentation with changing clinical characteristics: MODERATE COMPLEXITY   CLINICAL DECISION MAKIN [facility] Inpatient Short Form  How much difficulty does the patient currently have. .. Unable A Lot A Little None   1. Turning over in bed (including adjusting bedclothes, sheets and blankets)? [] 1   [] 2   [x] 3   [] 4   2. Sitting down on and standing up from a chair with arms ( e.g., wheelchair, bedside commode, etc.)   [] 1   [] 2   [x] 3   [] 4   3. Moving from lying on back to sitting on the side of the bed? [] 1   [] 2   [x] 3   [] 4   How much help from another person does the patient currently need. .. Total A Lot A Little None   4. Moving to and from a bed to a chair (including a wheelchair)? [] 1   [] 2   [x] 3   [] 4   5. Need to walk in hospital room? [] 1   [] 2   [x] 3   [] 4   6. Climbing 3-5 steps with a railing?    [] 1   [] 2   [x] 3   [] 4   © , Trustees of [address] [place] [address] [address] [address], under license to [employer]. All rights reserved      Score:  Initial: 18 Most Recent: X (Date: -- )    Interpretation of Tool:  Represents activities that are increasingly more difficult (i.e. Bed mobility, Transfers, Gait). Score 24 23 22-20 19-15 14-10 9-7 6     Modifier CH CI CJ CK CL CM CN      ? Mobility - Walking and Moving Around:     - CURRENT STATUS: CK - 40%-59% impaired, limited or restricted    - GOAL STATUS: CK - 40%-59% impaired, limited or restricted    - D/C STATUS:  ---------------To be determined---------------  Payor: [employer] MEDICARE / Plan: [address] [place] [address] HMO / Product Type: [employer] Medicare /      Medical Necessity:     · Patient is expected to demonstrate progress in functional technique to decrease assistance required with mobility and gait. .  Reason for Services/Other Comments:  · Patient continues to require present interventions due to patient's inability to function at baseline. .   Use of outcome tool(s) and clinical judgement create a POC that gives a: Questionable prediction of patient's progress: MODERATE COMPLEXITY            TREATMENT:   (In addition to Assessment/Re-Assessment sessions the following treatments were rendered)   Pre-treatment Symptoms/Complaints:  none  Pain: Initial:   Pain Intensity 1: 0  Post Session:  none     Therapeutic Activity: (    9 minutes): Therapeutic activities including Bed transfers to improve mobility, strength, balance and coordination. Required minimal assistance   to promote dynamic balance in standing. Braces/Orthotics/Lines/Etc:   · IV  · O2 Device: Room air  Treatment/Session Assessment:    · Response to Treatment: Abundio Cohen did not speak during therapy session and kept trying to get back in bed.  He was nice and followed tactile commands   · Interdisciplinary Collaboration:   o Physical Therapist  o Registered Nurse  · After treatment position/precautions:   o Supine in bed  o Bed alarm/tab alert on  o Bed/Chair-wheels locked  o Bed in low position  o Call light within reach  o Restraints  o lap velcro belt   · Compliance with Program/Exercises: Will assess as treatment progresses. · Recommendations/Intent for next treatment session: \"Next visit will focus on advancements to more challenging activities and reduction in assistance provided\".   Total Treatment Duration:  PT Patient Time In/Time Out  Time In: 1400  Time Out: [address], PT

## 2018-01-02 ENCOUNTER — HOME HEALTH ADMISSION (OUTPATIENT)
Dept: HOME HEALTH SERVICES | Facility: HOME HEALTH | Age: 73
End: 2018-01-02

## 2018-01-03 ENCOUNTER — HOME CARE VISIT (OUTPATIENT)
Dept: SCHEDULING | Facility: HOME HEALTH | Age: 73
End: 2018-01-03

## 2018-01-04 LAB — LEVETIRACETAM SERPL-MCNC: 27.2 UG/ML (ref 10–40)

## 2018-01-12 ENCOUNTER — HOSPITAL ENCOUNTER (INPATIENT)
Age: 73
LOS: 12 days | Discharge: NURSING, REHAB OR LONG-TERM CARE | DRG: 377 | End: 2018-01-24
Attending: EMERGENCY MEDICINE | Admitting: INTERNAL MEDICINE
Payer: MEDICARE

## 2018-01-12 DIAGNOSIS — D64.9 ANEMIA, UNSPECIFIED TYPE: Primary | ICD-10-CM

## 2018-01-12 DIAGNOSIS — F10.27 DEMENTIA ASSOCIATED WITH ALCOHOLISM WITHOUT BEHAVIORAL DISTURBANCE (HCC): ICD-10-CM

## 2018-01-12 LAB
ALBUMIN SERPL-MCNC: 2.6 G/DL (ref 3.2–4.6)
ALBUMIN/GLOB SERPL: 0.5 {RATIO} (ref 1.2–3.5)
ALP SERPL-CCNC: 122 U/L (ref 50–136)
ALT SERPL-CCNC: 18 U/L (ref 12–65)
ANION GAP SERPL CALC-SCNC: 11 MMOL/L (ref 7–16)
AST SERPL-CCNC: 17 U/L (ref 15–37)
BASOPHILS # BLD: 0 K/UL (ref 0–0.2)
BASOPHILS NFR BLD: 0 % (ref 0–2)
BILIRUB SERPL-MCNC: 0.2 MG/DL (ref 0.2–1.1)
BUN SERPL-MCNC: 27 MG/DL (ref 8–23)
CALCIUM SERPL-MCNC: 8.4 MG/DL (ref 8.3–10.4)
CHLORIDE SERPL-SCNC: 123 MMOL/L (ref 98–107)
CO2 SERPL-SCNC: 22 MMOL/L (ref 21–32)
CREAT SERPL-MCNC: 2.12 MG/DL (ref 0.8–1.5)
DIFFERENTIAL METHOD BLD: ABNORMAL
EOSINOPHIL # BLD: 0.1 K/UL (ref 0–0.8)
EOSINOPHIL NFR BLD: 1 % (ref 0.5–7.8)
ERYTHROCYTE [DISTWIDTH] IN BLOOD BY AUTOMATED COUNT: 23.6 % (ref 11.9–14.6)
GLOBULIN SER CALC-MCNC: 5.5 G/DL (ref 2.3–3.5)
GLUCOSE BLD STRIP.AUTO-MCNC: 171 MG/DL (ref 65–100)
GLUCOSE BLD STRIP.AUTO-MCNC: 58 MG/DL (ref 65–100)
GLUCOSE BLD STRIP.AUTO-MCNC: 73 MG/DL (ref 65–100)
GLUCOSE BLD STRIP.AUTO-MCNC: 74 MG/DL (ref 65–100)
GLUCOSE SERPL-MCNC: 78 MG/DL (ref 65–100)
HCT VFR BLD AUTO: 17 % (ref 41.1–50.3)
HGB BLD-MCNC: 5.2 G/DL (ref 13.6–17.2)
IMM GRANULOCYTES # BLD: 0 K/UL (ref 0–0.5)
IMM GRANULOCYTES NFR BLD AUTO: 0 % (ref 0–5)
LYMPHOCYTES # BLD: 1.3 K/UL (ref 0.5–4.6)
LYMPHOCYTES NFR BLD: 17 % (ref 13–44)
MCH RBC QN AUTO: 24.5 PG (ref 26.1–32.9)
MCHC RBC AUTO-ENTMCNC: 30.6 G/DL (ref 31.4–35)
MCV RBC AUTO: 80.2 FL (ref 79.6–97.8)
MONOCYTES # BLD: 0.3 K/UL (ref 0.1–1.3)
MONOCYTES NFR BLD: 4 % (ref 4–12)
NEUTS SEG # BLD: 6.2 K/UL (ref 1.7–8.2)
NEUTS SEG NFR BLD: 78 % (ref 43–78)
PLATELET # BLD AUTO: 524 K/UL (ref 150–450)
PMV BLD AUTO: 9.7 FL (ref 10.8–14.1)
POTASSIUM SERPL-SCNC: 4.1 MMOL/L (ref 3.5–5.1)
PROT SERPL-MCNC: 8.1 G/DL (ref 6.3–8.2)
RBC # BLD AUTO: 2.12 M/UL (ref 4.23–5.67)
SODIUM SERPL-SCNC: 156 MMOL/L (ref 136–145)
WBC # BLD AUTO: 8 K/UL (ref 4.3–11.1)

## 2018-01-12 PROCEDURE — P9016 RBC LEUKOCYTES REDUCED: HCPCS | Performed by: EMERGENCY MEDICINE

## 2018-01-12 PROCEDURE — 86580 TB INTRADERMAL TEST: CPT | Performed by: INTERNAL MEDICINE

## 2018-01-12 PROCEDURE — 99284 EMERGENCY DEPT VISIT MOD MDM: CPT | Performed by: EMERGENCY MEDICINE

## 2018-01-12 PROCEDURE — 86923 COMPATIBILITY TEST ELECTRIC: CPT | Performed by: EMERGENCY MEDICINE

## 2018-01-12 PROCEDURE — 74011000302 HC RX REV CODE- 302: Performed by: INTERNAL MEDICINE

## 2018-01-12 PROCEDURE — 74011250636 HC RX REV CODE- 250/636: Performed by: INTERNAL MEDICINE

## 2018-01-12 PROCEDURE — 36430 TRANSFUSION BLD/BLD COMPNT: CPT

## 2018-01-12 PROCEDURE — 86900 BLOOD TYPING SEROLOGIC ABO: CPT | Performed by: EMERGENCY MEDICINE

## 2018-01-12 PROCEDURE — 82962 GLUCOSE BLOOD TEST: CPT

## 2018-01-12 PROCEDURE — 93005 ELECTROCARDIOGRAM TRACING: CPT | Performed by: EMERGENCY MEDICINE

## 2018-01-12 PROCEDURE — 65270000029 HC RM PRIVATE

## 2018-01-12 PROCEDURE — 74011000250 HC RX REV CODE- 250

## 2018-01-12 PROCEDURE — 74011000250 HC RX REV CODE- 250: Performed by: INTERNAL MEDICINE

## 2018-01-12 PROCEDURE — 80053 COMPREHEN METABOLIC PANEL: CPT | Performed by: EMERGENCY MEDICINE

## 2018-01-12 PROCEDURE — 85025 COMPLETE CBC W/AUTO DIFF WBC: CPT | Performed by: EMERGENCY MEDICINE

## 2018-01-12 RX ORDER — SODIUM CHLORIDE 0.9 % (FLUSH) 0.9 %
5-10 SYRINGE (ML) INJECTION AS NEEDED
Status: DISCONTINUED | OUTPATIENT
Start: 2018-01-12 | End: 2018-01-24 | Stop reason: HOSPADM

## 2018-01-12 RX ORDER — HYDROCODONE BITARTRATE AND ACETAMINOPHEN 5; 325 MG/1; MG/1
1 TABLET ORAL
Status: DISCONTINUED | OUTPATIENT
Start: 2018-01-12 | End: 2018-01-21

## 2018-01-12 RX ORDER — ONDANSETRON 2 MG/ML
4 INJECTION INTRAMUSCULAR; INTRAVENOUS
Status: DISCONTINUED | OUTPATIENT
Start: 2018-01-12 | End: 2018-01-24 | Stop reason: HOSPADM

## 2018-01-12 RX ORDER — SODIUM CHLORIDE 9 MG/ML
250 INJECTION, SOLUTION INTRAVENOUS AS NEEDED
Status: DISCONTINUED | OUTPATIENT
Start: 2018-01-12 | End: 2018-01-24 | Stop reason: SDUPTHER

## 2018-01-12 RX ORDER — DEXTROSE 50 % IN WATER (D50W) INTRAVENOUS SYRINGE
25 ONCE
Status: COMPLETED | OUTPATIENT
Start: 2018-01-12 | End: 2018-01-12

## 2018-01-12 RX ORDER — DEXTROSE 50 % IN WATER (D50W) INTRAVENOUS SYRINGE
Status: COMPLETED
Start: 2018-01-12 | End: 2018-01-12

## 2018-01-12 RX ORDER — ACETAMINOPHEN 325 MG/1
650 TABLET ORAL
Status: DISCONTINUED | OUTPATIENT
Start: 2018-01-12 | End: 2018-01-24 | Stop reason: HOSPADM

## 2018-01-12 RX ORDER — SODIUM CHLORIDE 0.9 % (FLUSH) 0.9 %
5-10 SYRINGE (ML) INJECTION EVERY 8 HOURS
Status: DISCONTINUED | OUTPATIENT
Start: 2018-01-12 | End: 2018-01-24 | Stop reason: HOSPADM

## 2018-01-12 RX ADMIN — DEXTROSE 50 % IN WATER (D50W) INTRAVENOUS SYRINGE 25 G: at 21:44

## 2018-01-12 RX ADMIN — Medication 10 ML: at 23:05

## 2018-01-12 RX ADMIN — SODIUM CHLORIDE 80 MG: 9 INJECTION INTRAMUSCULAR; INTRAVENOUS; SUBCUTANEOUS at 19:34

## 2018-01-12 RX ADMIN — DEXTROSE MONOHYDRATE 25 G: 25 INJECTION, SOLUTION INTRAVENOUS at 21:44

## 2018-01-12 RX ADMIN — TUBERCULIN PURIFIED PROTEIN DERIVATIVE 5 UNITS: 5 INJECTION, SOLUTION INTRADERMAL at 23:01

## 2018-01-12 NOTE — H&P
HOSPITALIST HISTORY AND PHYSICAL  NAME:  Caitlyn Vaughn   Age:  67 y.o.  :   1945   MRN:   [id number]  PCP: Anastasia Xiao MD  Consulting MD:  Treatment Team: Attending Provider: Ezra Herrera MD; Primary Nurse: Dexter Damico    REASON FOR ADMISSION: GIB - anemia    HPI:   Patient is a [place] old male with PMH sig for Aortic stensois, prior gib, avm seen on egd in 2017 - unclear if colonoscopy  done. Dc from hospital 2018. Had follow up with pcp today and hb found to have dropped to 5.5 from 7.5 on last admit. Sent to er- grossly heme- occult positive- blood transfusion ordered. Hospitalist asked to admit. Gi consulted. Complete ROS done and is as stated in HPI or otherwise negative  Past Medical History:   Diagnosis Date    Alcohol abuse     QUIT IN . LONG USE    Anemia     GI BLEED AND CHRONIC, BASELINE 9.  16:  HGB: 7.6    Angiodysplasia of stomach     and duodenum    Bladder incontinence 2014    ALSO BOWEL    Chronic airway obstruction, not elsewhere classified 2014    CKD (chronic kidney disease)     [facility]) [facility]     Dementia 2014    Hyperlipidemia 2014    Hypernatremia 2016:  177, 16:  80    Hypertension     Hypothyroid     Seizure ([facility]     NO GRAND MAL. ON DILANTIN THE ENTIRE TIME    Tobacco abuse 2014    Tobacco abuse     ENTIRE ADULT LIFE. QUIT IN , SNEAKS WHEN POSSIBLE      Past Surgical History:   Procedure Laterality Date    HX COLONOSCOPY  APPROX     UNKNOWN EGD    HX ENDOSCOPY  2016    bicap and clip      Prior to Admission Medications   Prescriptions Last Dose Informant Patient Reported? Taking? amLODIPine (NORVASC) 10 mg tablet   No No   Sig: Take 1 Tab by mouth daily. divalproex ER (DEPAKOTE ER) 250 mg ER tablet   No No   Sig: Take 1 Tab by mouth nightly.    donepezil (ARICEPT) 10 mg tablet   No No   Sig: Take 1 Tab by mouth nightly. ferrous sulfate 325 mg (65 mg iron) tablet   No No   Sig: Take 1 Tab by mouth two (2) times daily (with meals). folic acid (FOLVITE) 1 mg tablet   No No   Sig: Take 1 Tab by mouth daily. gabapentin (NEURONTIN) 300 mg capsule   No No   Sig: Take 1 Cap by mouth three (3) times daily. levETIRAcetam (KEPPRA) 500 mg tablet   No No   Sig: Take 1 Tab by mouth two (2) times a day. levothyroxine (SYNTHROID) 100 mcg tablet   No No   Sig: Take 1 Tab by mouth Daily (before breakfast). losartan (COZAAR) 100 mg tablet   No No   Sig: Take 1 Tab by mouth daily. mirtazapine (REMERON) 15 mg tablet   No No   Sig: Take 1 Tab by mouth nightly. pantoprazole (PROTONIX) 40 mg tablet   No No   Sig: Take 1 Tab by mouth two (2) times a day. tamsulosin (FLOMAX) 0.4 mg capsule   No No   Sig: Take 1 Cap by mouth nightly. Facility-Administered Medications: None     No Known Allergies   Social History   Substance Use Topics    Smoking status: Former Smoker     Packs/day: 0.50     Years: 50.00     Types: Cigarettes    Smokeless tobacco: Never Used      Comment: QUIT IN 2014, SNEAKS WHEN POSSIBLE    Alcohol use No      Comment: HEAVILY IN PAST YEARS, NONE SINCE       No family history on file. Objective:     Visit Vitals    /62 (BP 1 Location: Right arm, BP Patient Position: At rest)    Pulse 99    Temp 97.4 °F (36.3 °C)    Resp 18    Ht 5' 8\" (1.727 m)    Wt 61.7 kg (136 lb)    SpO2 97%    BMI 20.68 kg/m2      Temp (24hrs), Av.4 °F (36.3 °C), Min:97.4 °F (36.3 °C), Max:97.4 °F (36.3 °C)    Oxygen Therapy  O2 Sat (%): 97 % (18)  Pulse via Oximetry: 99 beats per minute (18)  O2 Device: Room air (18)  Physical Exam:  General:    Alert, cooperative, no distress, appears stated age. Head:   Normocephalic, without obvious abnormality, atraumatic. Nose:  Nares normal. No drainage or sinus tenderness. Lungs:   Clear to auscultation bilaterally. No Wheezing or Rhonchi. No rales. Heart:   Regular rate and rhythm,  no murmur, rub or gallop. Abdomen:   Soft, non-tender. Not distended. Bowel sounds normal.   Extremities: No cyanosis. No edema. No clubbing  Skin:     Texture, turgor normal. No rashes or lesions. Not Jaundiced  Neurologic: Alert and oriented x 3, no focal deficits   Data Review: personally by me   Recent Results (from the past 24 hour(s))   AMB POC COMPLETE CBC,AUTOMATED ENTER    Collection Time: 01/12/18  4:03 PM   Result Value Ref Range    WBC (POC) 9.2 4.5 - 10.5 10^3/ul    LYMPHOCYTES (POC) 16.2 (A) 20.5 - 51.1 %    MONOCYTES (POC) 1.7 1.7 - 9.3 %    GRANULOCYTES (POC) 82.1 (A) 42.2 - 75.2 %    ABS. LYMPHS (POC) 1.5 1.2 - 3.4 10^3/ul    ABS. MONOS (POC) 0.2 0.1 - 0.6 10^3/ul    ABS. GRANS (POC) 7.6 (A) 1.4 - 6.5 10^3/ul    RBC (POC) 2.13 (A) 4 - 6 10^6/ul    HGB (POC) 5.4 (A) 11 - 18 g/dL    HCT (POC) 17.0 (A) 35 - 60 %    MCV (POC) 80.1 80 - 99.9 fL    MCH (POC) 25.5 (A) 27 - 31 pg    MCHC (POC) 31.8 (A) 33 - 37 g/dL    RDW (POC) 23.4 (A) 11.6 - 13.7 %    PLATELET (POC) 993 (A) 150 - 450 10^3/ul    MPV (POC) 7.0 (A) 7.8 - 11 fL   TYPE & SCREEN    Collection Time: 01/12/18  4:54 PM   Result Value Ref Range    Crossmatch Expiration 01/15/2018     ABO/Rh(D) Rhunette Mems POSITIVE     Antibody screen NEG    CBC WITH AUTOMATED DIFF    Collection Time: 01/12/18  4:56 PM   Result Value Ref Range    WBC 8.0 4.3 - 11.1 K/uL    RBC 2.12 (L) 4.23 - 5.67 M/uL    HGB 5.2 (LL) 13.6 - 17.2 g/dL    HCT 17.0 (LL) 41.1 - 50.3 %    MCV 80.2 79.6 - 97.8 FL    MCH 24.5 (L) 26.1 - 32.9 PG    MCHC 30.6 (L) 31.4 - 35.0 g/dL    RDW 23.6 (H) 11.9 - 14.6 %    PLATELET 780 (H) 124 - 450 K/uL    MPV 9.7 (L) 10.8 - 14.1 FL    DF AUTOMATED      NEUTROPHILS 78 43 - 78 %    LYMPHOCYTES 17 13 - 44 %    MONOCYTES 4 4.0 - 12.0 %    EOSINOPHILS 1 0.5 - 7.8 %    BASOPHILS 0 0.0 - 2.0 %    IMMATURE GRANULOCYTES 0 0.0 - 5.0 %    ABS. NEUTROPHILS 6.2 1.7 - 8.2 K/UL    ABS.  LYMPHOCYTES 1.3 0.5 - 4.6 K/UL    ABS. MONOCYTES 0.3 0.1 - 1.3 K/UL    ABS. EOSINOPHILS 0.1 0.0 - 0.8 K/UL    ABS. BASOPHILS 0.0 0.0 - 0.2 K/UL    ABS. IMM. GRANS. 0.0 0.0 - 0.5 K/UL   METABOLIC PANEL, COMPREHENSIVE    Collection Time: 01/12/18  4:56 PM   Result Value Ref Range    Sodium 156 (H) 136 - 145 mmol/L    Potassium 4.1 3.5 - 5.1 mmol/L    Chloride 123 (H) 98 - 107 mmol/L    CO2 22 21 - 32 mmol/L    Anion gap 11 7 - 16 mmol/L    Glucose 78 65 - 100 mg/dL    BUN 27 (H) 8 - 23 MG/DL    Creatinine 2.12 (H) 0.8 - 1.5 MG/DL    GFR est AA 40 (L) >60 ml/min/1.73m2    GFR est non-AA 33 (L) >60 ml/min/1.73m2    Calcium 8.4 8.3 - 10.4 MG/DL    Bilirubin, total 0.2 0.2 - 1.1 MG/DL    ALT (SGPT) 18 12 - 65 U/L    AST (SGOT) 17 15 - 37 U/L    Alk. phosphatase 122 50 - 136 U/L    Protein, total 8.1 6.3 - 8.2 g/dL    Albumin 2.6 (L) 3.2 - 4.6 g/dL    Globulin 5.5 (H) 2.3 - 3.5 g/dL    A-G Ratio 0.5 (L) 1.2 - 3.5     EKG, 12 LEAD, INITIAL    Collection Time: 01/12/18  5:02 PM   Result Value Ref Range    Ventricular Rate 70 BPM    Atrial Rate 70 BPM    P-R Interval 170 ms    QRS Duration 140 ms    Q-T Interval 472 ms    QTC Calculation (Bezet) 509 ms    Calculated P Axis 29 degrees    Calculated R Axis 58 degrees    Calculated T Axis 34 degrees    Diagnosis       Normal sinus rhythm  Right bundle branch block  Possible Lateral infarct , age undetermined  Abnormal ECG  When compared with ECG of 26-DEC-2017 17:19,  Vent. rate has increased BY  27 BPM  Right bundle branch block is now Present  Borderline criteria for Lateral infarct are now Present       Imaging /Procedures /Studies reviewed personally by me  XR Results (most recent):    Results from Hospital Encounter encounter on 12/26/17   XR CHEST PORT   Narrative PORTABLE CHEST 2 VIEWS    HISTORY: Sepsis    COMPARISON: April 16, 2017    FINDINGS: EKG leads are present. There is no consolidation, pleural effusions or  advanced pulmonary edema.          Impression IMPRESSION: No consolidation. CT Scan    Results from Hospital Encounter encounter on 12/26/17   CT HEAD WO CONT   Narrative HEAD CT without contrast  12/26/2017 9:45 PM     CLINICAL INFORMATION: 70-year-old with altered level of consciousness. Confusion. History of dementia. Possible UTI. COMPARISON: 12/4/2016, 11/25/2016. PROCEDURE: Emergent noncontrast head CT was performed in the axial plane. Brain,  bone, and soft tissue windows reviewed. Radiation dose reduction techniques were  used for this study. Our CT scanners used one or all of the following: Automated  exposure control, adjustment of the MA and/or kV according to patient size,  iterative reconstruction. FINDINGS: No acute intracranial hemorrhage, mass, or mass effect. No extra-axial  fluid collections. Stable diffuse volume loss. No midline shift; the basilar  cisterns are patent. The ventricles are somewhat dilated with stable in caliber. Moderate periventricular, deep and subcortical patchy white matter low  attenuation is nonspecific but likely related to chronic small vessel ischemic  change, including bilateral basal ganglia and chronic left lacunar infarcts. .  There are no specific signs to suggest acute large vessel territory ischemia. The gray-white differentiation is otherwise preserved. No aggressive calvarial process. Visualized portions of the paranasal sinuses  and mastoid air cells are patent. Impression IMPRESSION:    1. No convincing acute intracranial abnormality. 2. Stable ventriculomegaly, probable chronic small vessel ischemic change and  bilateral basal ganglia/left thalamic lacunar infarcts. VAS/US Results (most recent):  No results found for this or any previous visit. Assessment and Plan:      Active Hospital Problems    Diagnosis Date Noted    GIB (gastrointestinal bleeding) 06/13/2014       PLAN  ·  GIB- pt with a history AVM, will follow up with gi re- colonoscopy, protonix, transfuse, NPO  · Anemia- continue prbcs transfusions per the ER. Monitor H&H   · Dementia- supportive care  · Ppd  · dvt ppx- scds  · Further workup and mgt as his clinical course dictates.      Code Status:   Full   Anticipated discharge:   2-3 days  Signed By: Samia Mathias MD     January 12, 2018

## 2018-01-12 NOTE — IP AVS SNAPSHOT
[address] 
[phone] Patient: Darylene Gibson MRN: [id number] :1945 About your hospitalization You were admitted on:  2018 You last received care in the:  [facility] 6 MED SURG You were discharged on:  2018 Why you were hospitalized Your primary diagnosis was:  Avm (Arteriovenous Malformation) Of Small Bowel, Acquired With Hemorrhage (Hcc) Your diagnoses also included:  Gib (Gastrointestinal Bleeding), Microcytic Hypochromic Anemia, Acute On Chronic Renal Failure (Hcc), History Of Alcohol Abuse, Acute Blood Loss Anemia, Bradycardia, Hypernatremia, Hypoglycemia, Moderate Aortic Stenosis, Dementia, Seizure Disorder (Hcc), Acute Encephalopathy, Hcap (Healthcare-Associated Pneumonia), Uncontrolled Type Ii Diabetes Mellitus (Hcc) Follow-up Information Follow up With Details Comments Contact Info Jin Edmonds MD   [place] [address] 
[phone] [facility] On 2018 at 2:00 [address] [facility] [address] [facility] Anant Jarrell [facility] [address] [id number] [phone] Your Scheduled Appointments 2018 10:30 AM EST Office Visit with Jin Edmonds MD  
[place] [facility] ([facility]) [address] [place] [address]  
[phone] Discharge Orders None A check mack indicates which time of day the medication should be taken. My Medications START taking these medications Instructions Each Dose to Equal  
 Morning Noon Evening Bedtime  
 levoFLOXacin 750 mg tablet Commonly known as:  Kit Counter Your last dose was: Your next dose is: Take 1 Tab by mouth every fourty-eight (48) hours for 2 days. 750 mg CONTINUE taking these medications Instructions Each Dose to Equal  
 Morning Noon Evening Bedtime  
 amLODIPine 10 mg tablet Commonly known as:  [place] Sandhoff Your last dose was: Your next dose is: Take 1 Tab by mouth daily. 10 mg  
    
   
   
   
  
 divalproex  mg ER tablet Commonly known as:  DEPAKOTE ER Your last dose was: Your next dose is: Take 1 Tab by mouth nightly. 250 mg  
    
   
   
   
  
 donepezil 10 mg tablet Commonly known as:  ARICEPT Your last dose was: Your next dose is: Take 1 Tab by mouth nightly. 10 mg  
    
   
   
   
  
 ferrous sulfate 325 mg (65 mg iron) tablet Your last dose was: Your next dose is: Take 1 Tab by mouth two (2) times daily (with meals). 325 mg  
    
   
   
   
  
 folic acid 1 mg tablet Commonly known as:  [employer] Your last dose was: Your next dose is: Take 1 Tab by mouth daily. 1 mg  
    
   
   
   
  
 levETIRAcetam 500 mg tablet Commonly known as:  KEPPRA Your last dose was: Your next dose is: Take 1 Tab by mouth two (2) times a day. 500 mg  
    
   
   
   
  
 levothyroxine 100 mcg tablet Commonly known as:  SYNTHROID Your last dose was: Your next dose is: Take 1 Tab by mouth Daily (before breakfast). 100 mcg  
    
   
   
   
  
 losartan 100 mg tablet Commonly known as:  COZAAR Your last dose was: Your next dose is: Take 1 Tab by mouth daily. 100 mg  
    
   
   
   
  
 mirtazapine 15 mg tablet Commonly known as:  [place]ley Dye Your last dose was: Your next dose is: Take 1 Tab by mouth nightly. 15 mg  
    
   
   
   
  
 pantoprazole 40 mg tablet Commonly known as:  PROTONIX Your last dose was: Your next dose is: Take 1 Tab by mouth two (2) times a day.   
 40 mg  
    
   
   
   
 tamsulosin 0.4 mg capsule Commonly known as:  FLOMAX Your last dose was: Your next dose is: Take 1 Cap by mouth nightly. 0.4 mg  
    
   
   
   
  
  
STOP taking these medications   
 gabapentin 300 mg capsule Commonly known as:  NEURONTIN Where to Get Your Medications Information on where to get these meds will be given to you by the nurse or doctor. ! Ask your nurse or doctor about these medications  
  levoFLOXacin 750 mg tablet Discharge Instructions None [facility] Announcement We are excited to announce that we are making your provider's discharge notes available to you in [facility]. You will see these notes when they are completed and signed by the physician that discharged you from your recent hospital stay. If you have any questions or concerns about any information you see in [facility], please call the Health Information Department where you were seen or reach out to your Primary Care Provider for more information about your plan of care. Providers Seen During Your Hospitalization Provider Specialty Primary office phone [address] [facility], [place] Emergency Medicine [phone] Gina Montilla MD Internal Medicine [phone] Immunizations Administered for This Admission Name Date  
 TB Skin Test (PPD) Intradermal  Deferred (),  Deferred (), 1/12/2018 Your Primary Care Physician (PCP) Primary Care Physician Office Phone Office Fax [phone] 224 [phone] You are allergic to the following No active allergies Recent Documentation Height Weight BMI Smoking Status 1.727 m 62.5 kg 20.95 kg/m2 Former Smoker Emergency Contacts Name Discharge Info Relation Home Work Mobile Inis Buster  Child [2] [phone] Terence Pleasure [22] [phone] Patient Belongings The following personal items are in your possession at time of discharge: 
  Dental Appliances: None         Home Medications: None   Jewelry: None  Clothing: None    Other Valuables: None Please provide this summary of care documentation to your next provider. Signatures-by signing, you are acknowledging that this After Visit Summary has been reviewed with you and you have received a copy. Patient Signature:  ____________________________________________________________ Date:  ____________________________________________________________  
  
[facility] Provider Signature:  ____________________________________________________________ Date:  ____________________________________________________________

## 2018-01-12 NOTE — ED TRIAGE NOTES
Pt was seen at his md office today and had blood work done. Pt has a hemoglobin of 5.5 according to their results. Pt states he feels weak but denies any pain.

## 2018-01-12 NOTE — ED PROVIDER NOTES
HPI:  67 malehistory of peptic ulcer disease, CK-MB, COPD, anemia came in with lightheadedness. Want to primary care physician. Hemoglobin to be 5.4. Sent here for evaluation. Patient denies any syncope. He denies any abdominal pain. No hemoptysis. No hematemesis. No fever. He is unsure if there is blood in his stool.   having dark stools of unknown duration  No vision changes. No unilateral weakness tingling or numbness    ROS  Constitutional: No fever  Skin: no rash  Eye: No vision changes  ENMT: No sore throat  Respiratory: No shortness of breath  Cardiovascular: No chest pain  Gastrointestinal: No vomiting, no nausea, no diarrhea,  + rectal bleeding, no abdominal pain  : No dysuria, no hematuria  MSK: No back pain, no muscle pain, no joint pain  Neuro: No headache, no change in mental status, no numbness, no tingling, no weakness    All other review of systems positive per history of present illness and the above otherwise negative or noncontributory. Visit Vitals    /62 (BP 1 Location: Right arm, BP Patient Position: At rest)    Pulse 99    Temp 97.4 °F (36.3 °C)    Resp 18    Ht 5' 8\" (1.727 m)    Wt 61.7 kg (136 lb)    SpO2 97%    BMI 20.68 kg/m2     Past Medical History:   Diagnosis Date    Alcohol abuse     QUIT IN 2014. LONG USE    Anemia     GI BLEED AND CHRONIC, BASELINE 9.  11/25/16:  HGB: 7.6    Angiodysplasia of stomach 2016    and duodenum    Bladder incontinence 05/28/2014    ALSO BOWEL    Chronic airway obstruction, not elsewhere classified 5/28/2014    CKD (chronic kidney disease)     [facility] disease) [facility]     Dementia 5/28/2014    Hyperlipidemia 5/28/2014    Hypernatremia 11/25/2016 11/25/16:  177, 11/18/16:  80    Hypertension     Hypothyroid     Seizure ([facility] [address] [facility] 2010    NO GRAND MAL. ON DILANTIN THE ENTIRE TIME    Tobacco abuse 5/28/2014    Tobacco abuse     ENTIRE ADULT LIFE.   QUIT IN 2014, SNEAKS WHEN POSSIBLE     Past Surgical History:   Procedure Laterality Date    HX COLONOSCOPY  APPROX 2005    UNKNOWN EGD    HX ENDOSCOPY  2016    bicap and clip     Prior to Admission Medications   Prescriptions Last Dose Informant Patient Reported? Taking? amLODIPine (NORVASC) 10 mg tablet   No No   Sig: Take 1 Tab by mouth daily. divalproex ER (DEPAKOTE ER) 250 mg ER tablet   No No   Sig: Take 1 Tab by mouth nightly. donepezil (ARICEPT) 10 mg tablet   No No   Sig: Take 1 Tab by mouth nightly. ferrous sulfate 325 mg (65 mg iron) tablet   No No   Sig: Take 1 Tab by mouth two (2) times daily (with meals). folic acid (FOLVITE) 1 mg tablet   No No   Sig: Take 1 Tab by mouth daily. gabapentin (NEURONTIN) 300 mg capsule   No No   Sig: Take 1 Cap by mouth three (3) times daily. levETIRAcetam (KEPPRA) 500 mg tablet   No No   Sig: Take 1 Tab by mouth two (2) times a day. levothyroxine (SYNTHROID) 100 mcg tablet   No No   Sig: Take 1 Tab by mouth Daily (before breakfast). losartan (COZAAR) 100 mg tablet   No No   Sig: Take 1 Tab by mouth daily. mirtazapine (REMERON) 15 mg tablet   No No   Sig: Take 1 Tab by mouth nightly. pantoprazole (PROTONIX) 40 mg tablet   No No   Sig: Take 1 Tab by mouth two (2) times a day. tamsulosin (FLOMAX) 0.4 mg capsule   No No   Sig: Take 1 Cap by mouth nightly. Facility-Administered Medications: None         Adult Exam   General: alert, no acute distress  Head: normocephalic, atraumatic  ENT: moist mucous membranes  Pale appearing conjunctivas   Neck: supple, non-tender; full range of motion  Cardiovascular: regular rate and rhythm, normal peripheral perfusion, no edema  Respiratory: lungs are clear to auscultation; normal respirations; no wheezing, rales or rhonchi  Gastrointestinal: soft, non-tender; no rebound or guarding, no peritoneal signs, no distension  Rectal exam with Dr. Victor Hugo Mayorga stool very grossly heme positive with positive control.   No bright red blood per rectum  Back: non-tender, full range of motion  Musculoskeletal: normal range of motion, normal strength, no gross deformities  Neurological: alert and oriented, no gross focal deficits; normal speech  Psychiatric: cooperative; appropriate mood and affect    MDM: consistent with upper GI bleed PID with likely cause of anemia. He has known chronic anemia. Was recently admitted and discharged on January 1 with sepsis secondary to UTI. Hemoglobin on 1/1 was 7.5. It is 5.2 on repeat eval here. Likely GI bleed. Patient will be ttransfused with 2 units. He had an EGD in September 2017 with 10 nonbleeding AVMs that was ligated. I also spoke with GI. Blood noted the patient is here and will likely be admitted. I spoke with the hospitalist.  Recommend admission which they agrees to. Patient blood pressure is stable at this time. Do not suspect any acute intra-abdominal surgical pathology, sepsis. Patient has received blood in the past.   He consented to blood. Risks versus benefits of transfusion given. Consent signed. Recent Results (from the past 8 hour(s))   AMB POC COMPLETE CBC,AUTOMATED ENTER    Collection Time: 01/12/18  4:03 PM   Result Value Ref Range    WBC (POC) 9.2 4.5 - 10.5 10^3/ul    LYMPHOCYTES (POC) 16.2 (A) 20.5 - 51.1 %    MONOCYTES (POC) 1.7 1.7 - 9.3 %    GRANULOCYTES (POC) 82.1 (A) 42.2 - 75.2 %    ABS. LYMPHS (POC) 1.5 1.2 - 3.4 10^3/ul    ABS. MONOS (POC) 0.2 0.1 - 0.6 10^3/ul    ABS.  GRANS (POC) 7.6 (A) 1.4 - 6.5 10^3/ul    RBC (POC) 2.13 (A) 4 - 6 10^6/ul    HGB (POC) 5.4 (A) 11 - 18 g/dL    HCT (POC) 17.0 (A) 35 - 60 %    MCV (POC) 80.1 80 - 99.9 fL    MCH (POC) 25.5 (A) 27 - 31 pg    MCHC (POC) 31.8 (A) 33 - 37 g/dL    RDW (POC) 23.4 (A) 11.6 - 13.7 %    PLATELET (POC) 949 (A) 150 - 450 10^3/ul    MPV (POC) 7.0 (A) 7.8 - 11 fL   TYPE & SCREEN    Collection Time: 01/12/18  4:54 PM   Result Value Ref Range    Crossmatch Expiration 01/15/2018     ABO/Rh(D) Jim Reji POSITIVE     Antibody screen NEG     Unit number [id number]     Blood component type [place]     Unit division 00     Status of unit ALLOCATED     Crossmatch result Compatible     Unit number [id number]     Blood component type [place]     Unit division 00     Status of unit ALLOCATED     Crossmatch result Compatible    CBC WITH AUTOMATED DIFF    Collection Time: 01/12/18  4:56 PM   Result Value Ref Range    WBC 8.0 4.3 - 11.1 K/uL    RBC 2.12 (L) 4.23 - 5.67 M/uL    HGB 5.2 (LL) 13.6 - 17.2 g/dL    HCT 17.0 (LL) 41.1 - 50.3 %    MCV 80.2 79.6 - 97.8 FL    MCH 24.5 (L) 26.1 - 32.9 PG    MCHC 30.6 (L) 31.4 - 35.0 g/dL    RDW 23.6 (H) 11.9 - 14.6 %    PLATELET 050 (H) 709 - 450 K/uL    MPV 9.7 (L) 10.8 - 14.1 FL    DF AUTOMATED      NEUTROPHILS 78 43 - 78 %    LYMPHOCYTES 17 13 - 44 %    MONOCYTES 4 4.0 - 12.0 %    EOSINOPHILS 1 0.5 - 7.8 %    BASOPHILS 0 0.0 - 2.0 %    IMMATURE GRANULOCYTES 0 0.0 - 5.0 %    ABS. NEUTROPHILS 6.2 1.7 - 8.2 K/UL    ABS. LYMPHOCYTES 1.3 0.5 - 4.6 K/UL    ABS. MONOCYTES 0.3 0.1 - 1.3 K/UL    ABS. EOSINOPHILS 0.1 0.0 - 0.8 K/UL    ABS. BASOPHILS 0.0 0.0 - 0.2 K/UL    ABS. IMM. GRANS. 0.0 0.0 - 0.5 K/UL   METABOLIC PANEL, COMPREHENSIVE    Collection Time: 01/12/18  4:56 PM   Result Value Ref Range    Sodium 156 (H) 136 - 145 mmol/L    Potassium 4.1 3.5 - 5.1 mmol/L    Chloride 123 (H) 98 - 107 mmol/L    CO2 22 21 - 32 mmol/L    Anion gap 11 7 - 16 mmol/L    Glucose 78 65 - 100 mg/dL    BUN 27 (H) 8 - 23 MG/DL    Creatinine 2.12 (H) 0.8 - 1.5 MG/DL    GFR est AA 40 (L) >60 ml/min/1.73m2    GFR est non-AA 33 (L) >60 ml/min/1.73m2    Calcium 8.4 8.3 - 10.4 MG/DL    Bilirubin, total 0.2 0.2 - 1.1 MG/DL    ALT (SGPT) 18 12 - 65 U/L    AST (SGOT) 17 15 - 37 U/L    Alk.  phosphatase 122 50 - 136 U/L    Protein, total 8.1 6.3 - 8.2 g/dL    Albumin 2.6 (L) 3.2 - 4.6 g/dL    Globulin 5.5 (H) 2.3 - 3.5 g/dL    A-G Ratio 0.5 (L) 1.2 - 3.5     EKG, 12 LEAD, INITIAL    Collection Time: 01/12/18  5:02 PM   Result Value Ref Range    Ventricular Rate 70 BPM    Atrial Rate 70 BPM    P-R Interval 170 ms    QRS Duration 140 ms    Q-T Interval 472 ms    QTC Calculation (Bezet) 509 ms    Calculated P Axis 29 degrees    Calculated R Axis 58 degrees    Calculated T Axis 34 degrees    Diagnosis       Normal sinus rhythm  Right bundle branch block  Possible Lateral infarct , age undetermined  Abnormal ECG  When compared with ECG of 26-DEC-2017 17:19,  Vent. rate has increased BY  27 BPM  Right bundle branch block is now Present  Borderline criteria for Lateral infarct are now Present           Dragon voice recognition software was used to create this note. Although the note has been reviewed and corrected where necessary, additional errors may have been overlooked and remain in the text.

## 2018-01-13 ENCOUNTER — ANESTHESIA EVENT (OUTPATIENT)
Dept: ENDOSCOPY | Age: 73
DRG: 377 | End: 2018-01-13
Payer: MEDICARE

## 2018-01-13 PROBLEM — N18.9 ACUTE ON CHRONIC RENAL FAILURE (HCC): Chronic | Status: ACTIVE | Noted: 2018-01-13

## 2018-01-13 PROBLEM — E16.2 HYPOGLYCEMIA: Status: ACTIVE | Noted: 2018-01-13

## 2018-01-13 PROBLEM — N17.9 ACUTE ON CHRONIC RENAL FAILURE (HCC): Chronic | Status: ACTIVE | Noted: 2018-01-13

## 2018-01-13 PROBLEM — F10.11 HISTORY OF ALCOHOL ABUSE: Chronic | Status: ACTIVE | Noted: 2018-01-13

## 2018-01-13 PROBLEM — E87.0 HYPERNATREMIA: Status: ACTIVE | Noted: 2018-01-13

## 2018-01-13 PROBLEM — D62 ACUTE BLOOD LOSS ANEMIA: Status: ACTIVE | Noted: 2018-01-13

## 2018-01-13 PROBLEM — I35.0 MODERATE AORTIC STENOSIS: Status: ACTIVE | Noted: 2018-01-13

## 2018-01-13 PROBLEM — R00.1 BRADYCARDIA: Status: ACTIVE | Noted: 2018-01-13

## 2018-01-13 LAB
ANION GAP SERPL CALC-SCNC: 10 MMOL/L (ref 7–16)
ATRIAL RATE: 70 BPM
BASOPHILS # BLD: 0 K/UL (ref 0–0.2)
BASOPHILS NFR BLD: 0 % (ref 0–2)
BUN SERPL-MCNC: 25 MG/DL (ref 8–23)
CALCIUM SERPL-MCNC: 8.4 MG/DL (ref 8.3–10.4)
CALCULATED P AXIS, ECG09: 29 DEGREES
CALCULATED R AXIS, ECG10: 58 DEGREES
CALCULATED T AXIS, ECG11: 34 DEGREES
CHLORIDE SERPL-SCNC: 123 MMOL/L (ref 98–107)
CO2 SERPL-SCNC: 22 MMOL/L (ref 21–32)
CREAT SERPL-MCNC: 1.85 MG/DL (ref 0.8–1.5)
DIAGNOSIS, 93000: NORMAL
DIFFERENTIAL METHOD BLD: ABNORMAL
EOSINOPHIL # BLD: 0.1 K/UL (ref 0–0.8)
EOSINOPHIL NFR BLD: 3 % (ref 0.5–7.8)
ERYTHROCYTE [DISTWIDTH] IN BLOOD BY AUTOMATED COUNT: 19.2 % (ref 11.9–14.6)
GLUCOSE BLD STRIP.AUTO-MCNC: 115 MG/DL (ref 65–100)
GLUCOSE BLD STRIP.AUTO-MCNC: 117 MG/DL (ref 65–100)
GLUCOSE BLD STRIP.AUTO-MCNC: 120 MG/DL (ref 65–100)
GLUCOSE BLD STRIP.AUTO-MCNC: 122 MG/DL (ref 65–100)
GLUCOSE BLD STRIP.AUTO-MCNC: 140 MG/DL (ref 65–100)
GLUCOSE BLD STRIP.AUTO-MCNC: 153 MG/DL (ref 65–100)
GLUCOSE BLD STRIP.AUTO-MCNC: 156 MG/DL (ref 65–100)
GLUCOSE BLD STRIP.AUTO-MCNC: 179 MG/DL (ref 65–100)
GLUCOSE BLD STRIP.AUTO-MCNC: 36 MG/DL (ref 65–100)
GLUCOSE BLD STRIP.AUTO-MCNC: 36 MG/DL (ref 65–100)
GLUCOSE BLD STRIP.AUTO-MCNC: 45 MG/DL (ref 65–100)
GLUCOSE BLD STRIP.AUTO-MCNC: 49 MG/DL (ref 65–100)
GLUCOSE BLD STRIP.AUTO-MCNC: 51 MG/DL (ref 65–100)
GLUCOSE BLD STRIP.AUTO-MCNC: 51 MG/DL (ref 65–100)
GLUCOSE BLD STRIP.AUTO-MCNC: 59 MG/DL (ref 65–100)
GLUCOSE BLD STRIP.AUTO-MCNC: 63 MG/DL (ref 65–100)
GLUCOSE BLD STRIP.AUTO-MCNC: 64 MG/DL (ref 65–100)
GLUCOSE BLD STRIP.AUTO-MCNC: 68 MG/DL (ref 65–100)
GLUCOSE BLD STRIP.AUTO-MCNC: 72 MG/DL (ref 65–100)
GLUCOSE BLD STRIP.AUTO-MCNC: 73 MG/DL (ref 65–100)
GLUCOSE BLD STRIP.AUTO-MCNC: 83 MG/DL (ref 65–100)
GLUCOSE BLD STRIP.AUTO-MCNC: 86 MG/DL (ref 65–100)
GLUCOSE BLD STRIP.AUTO-MCNC: 90 MG/DL (ref 65–100)
GLUCOSE BLD STRIP.AUTO-MCNC: 95 MG/DL (ref 65–100)
GLUCOSE BLD STRIP.AUTO-MCNC: 96 MG/DL (ref 65–100)
GLUCOSE BLD STRIP.AUTO-MCNC: 97 MG/DL (ref 65–100)
GLUCOSE SERPL-MCNC: 108 MG/DL (ref 65–100)
HCT VFR BLD AUTO: 23.1 % (ref 41.1–50.3)
HCT VFR BLD AUTO: 24.5 % (ref 41.1–50.3)
HCT VFR BLD AUTO: 25.1 % (ref 41.1–50.3)
HGB BLD-MCNC: 7.2 G/DL (ref 13.6–17.2)
HGB BLD-MCNC: 7.7 G/DL (ref 13.6–17.2)
HGB BLD-MCNC: 8 G/DL (ref 13.6–17.2)
IMM GRANULOCYTES # BLD: 0 K/UL (ref 0–0.5)
IMM GRANULOCYTES NFR BLD AUTO: 0 % (ref 0–5)
LYMPHOCYTES # BLD: 1.1 K/UL (ref 0.5–4.6)
LYMPHOCYTES NFR BLD: 25 % (ref 13–44)
MCH RBC QN AUTO: 25.3 PG (ref 26.1–32.9)
MCHC RBC AUTO-ENTMCNC: 31.2 G/DL (ref 31.4–35)
MCV RBC AUTO: 81.1 FL (ref 79.6–97.8)
MM INDURATION POC: NORMAL MM (ref 0–5)
MONOCYTES # BLD: 0.3 K/UL (ref 0.1–1.3)
MONOCYTES NFR BLD: 6 % (ref 4–12)
NEUTS SEG # BLD: 3.1 K/UL (ref 1.7–8.2)
NEUTS SEG NFR BLD: 66 % (ref 43–78)
P-R INTERVAL, ECG05: 170 MS
PLATELET # BLD AUTO: 454 K/UL (ref 150–450)
PMV BLD AUTO: 10 FL (ref 10.8–14.1)
POTASSIUM SERPL-SCNC: 3.8 MMOL/L (ref 3.5–5.1)
PPD POC: NORMAL NEGATIVE
Q-T INTERVAL, ECG07: 472 MS
QRS DURATION, ECG06: 140 MS
QTC CALCULATION (BEZET), ECG08: 509 MS
RBC # BLD AUTO: 2.85 M/UL (ref 4.23–5.67)
SODIUM SERPL-SCNC: 149 MMOL/L (ref 136–145)
SODIUM SERPL-SCNC: 153 MMOL/L (ref 136–145)
SODIUM SERPL-SCNC: 155 MMOL/L (ref 136–145)
T4 FREE SERPL-MCNC: 1.1 NG/DL (ref 0.78–1.46)
TSH SERPL DL<=0.005 MIU/L-ACNC: 8.15 UIU/ML (ref 0.36–3.74)
VENTRICULAR RATE, ECG03: 70 BPM
WBC # BLD AUTO: 4.7 K/UL (ref 4.3–11.1)

## 2018-01-13 PROCEDURE — 74011000250 HC RX REV CODE- 250: Performed by: FAMILY MEDICINE

## 2018-01-13 PROCEDURE — 74011000258 HC RX REV CODE- 258: Performed by: FAMILY MEDICINE

## 2018-01-13 PROCEDURE — 74011250636 HC RX REV CODE- 250/636: Performed by: INTERNAL MEDICINE

## 2018-01-13 PROCEDURE — 65270000029 HC RM PRIVATE

## 2018-01-13 PROCEDURE — 82962 GLUCOSE BLOOD TEST: CPT

## 2018-01-13 PROCEDURE — 74011000250 HC RX REV CODE- 250: Performed by: INTERNAL MEDICINE

## 2018-01-13 PROCEDURE — 84439 ASSAY OF FREE THYROXINE: CPT | Performed by: INTERNAL MEDICINE

## 2018-01-13 PROCEDURE — 74011000250 HC RX REV CODE- 250

## 2018-01-13 PROCEDURE — 74011250637 HC RX REV CODE- 250/637: Performed by: INTERNAL MEDICINE

## 2018-01-13 PROCEDURE — 85014 HEMATOCRIT: CPT | Performed by: INTERNAL MEDICINE

## 2018-01-13 PROCEDURE — 85025 COMPLETE CBC W/AUTO DIFF WBC: CPT | Performed by: INTERNAL MEDICINE

## 2018-01-13 PROCEDURE — 80048 BASIC METABOLIC PNL TOTAL CA: CPT | Performed by: INTERNAL MEDICINE

## 2018-01-13 PROCEDURE — 74011000258 HC RX REV CODE- 258: Performed by: INTERNAL MEDICINE

## 2018-01-13 PROCEDURE — 84295 ASSAY OF SERUM SODIUM: CPT | Performed by: INTERNAL MEDICINE

## 2018-01-13 PROCEDURE — 36415 COLL VENOUS BLD VENIPUNCTURE: CPT | Performed by: INTERNAL MEDICINE

## 2018-01-13 PROCEDURE — 84443 ASSAY THYROID STIM HORMONE: CPT | Performed by: INTERNAL MEDICINE

## 2018-01-13 RX ORDER — SODIUM CHLORIDE 0.9 % (FLUSH) 0.9 %
5-10 SYRINGE (ML) INJECTION AS NEEDED
Status: CANCELLED | OUTPATIENT
Start: 2018-01-13

## 2018-01-13 RX ORDER — SODIUM CHLORIDE 0.9 % (FLUSH) 0.9 %
5-10 SYRINGE (ML) INJECTION EVERY 8 HOURS
Status: CANCELLED | OUTPATIENT
Start: 2018-01-13

## 2018-01-13 RX ORDER — DEXTROSE 50 % IN WATER (D50W) INTRAVENOUS SYRINGE
25
Status: COMPLETED | OUTPATIENT
Start: 2018-01-13 | End: 2018-01-13

## 2018-01-13 RX ORDER — DONEPEZIL HYDROCHLORIDE 5 MG/1
10 TABLET, FILM COATED ORAL
Status: DISCONTINUED | OUTPATIENT
Start: 2018-01-13 | End: 2018-01-24 | Stop reason: HOSPADM

## 2018-01-13 RX ORDER — MIRTAZAPINE 15 MG/1
15 TABLET, FILM COATED ORAL
Status: DISCONTINUED | OUTPATIENT
Start: 2018-01-13 | End: 2018-01-19

## 2018-01-13 RX ORDER — TAMSULOSIN HYDROCHLORIDE 0.4 MG/1
0.4 CAPSULE ORAL
Status: DISCONTINUED | OUTPATIENT
Start: 2018-01-13 | End: 2018-01-24 | Stop reason: HOSPADM

## 2018-01-13 RX ORDER — DEXTROSE MONOHYDRATE 100 MG/ML
175 INJECTION, SOLUTION INTRAVENOUS CONTINUOUS
Status: DISCONTINUED | OUTPATIENT
Start: 2018-01-13 | End: 2018-01-14

## 2018-01-13 RX ORDER — DEXTROSE 50 % IN WATER (D50W) INTRAVENOUS SYRINGE
Status: COMPLETED
Start: 2018-01-13 | End: 2018-01-13

## 2018-01-13 RX ORDER — LIDOCAINE HYDROCHLORIDE 10 MG/ML
0.3 INJECTION INFILTRATION; PERINEURAL ONCE
Status: CANCELLED | OUTPATIENT
Start: 2018-01-13 | End: 2018-01-13

## 2018-01-13 RX ORDER — LEVOTHYROXINE SODIUM 100 UG/1
100 TABLET ORAL
Status: DISCONTINUED | OUTPATIENT
Start: 2018-01-14 | End: 2018-01-24 | Stop reason: HOSPADM

## 2018-01-13 RX ORDER — DEXTROSE MONOHYDRATE AND SODIUM CHLORIDE 5; .45 G/100ML; G/100ML
100 INJECTION, SOLUTION INTRAVENOUS CONTINUOUS
Status: DISCONTINUED | OUTPATIENT
Start: 2018-01-13 | End: 2018-01-13

## 2018-01-13 RX ORDER — FOLIC ACID 1 MG/1
1 TABLET ORAL DAILY
Status: DISCONTINUED | OUTPATIENT
Start: 2018-01-14 | End: 2018-01-24 | Stop reason: HOSPADM

## 2018-01-13 RX ORDER — DIVALPROEX SODIUM 250 MG/1
250 TABLET, EXTENDED RELEASE ORAL
Status: DISCONTINUED | OUTPATIENT
Start: 2018-01-13 | End: 2018-01-18

## 2018-01-13 RX ORDER — LEVETIRACETAM 500 MG/1
500 TABLET ORAL 2 TIMES DAILY
Status: DISCONTINUED | OUTPATIENT
Start: 2018-01-13 | End: 2018-01-24 | Stop reason: HOSPADM

## 2018-01-13 RX ORDER — AMLODIPINE BESYLATE 10 MG/1
10 TABLET ORAL DAILY
Status: DISCONTINUED | OUTPATIENT
Start: 2018-01-14 | End: 2018-01-24 | Stop reason: HOSPADM

## 2018-01-13 RX ORDER — HYDROMORPHONE HYDROCHLORIDE 2 MG/ML
0.5 INJECTION, SOLUTION INTRAMUSCULAR; INTRAVENOUS; SUBCUTANEOUS
Status: CANCELLED | OUTPATIENT
Start: 2018-01-13

## 2018-01-13 RX ORDER — SODIUM CHLORIDE, SODIUM LACTATE, POTASSIUM CHLORIDE, CALCIUM CHLORIDE 600; 310; 30; 20 MG/100ML; MG/100ML; MG/100ML; MG/100ML
100 INJECTION, SOLUTION INTRAVENOUS CONTINUOUS
Status: CANCELLED | OUTPATIENT
Start: 2018-01-13 | End: 2018-01-13

## 2018-01-13 RX ORDER — GABAPENTIN 300 MG/1
300 CAPSULE ORAL 3 TIMES DAILY
Status: DISCONTINUED | OUTPATIENT
Start: 2018-01-13 | End: 2018-01-19

## 2018-01-13 RX ORDER — OXYCODONE HYDROCHLORIDE 5 MG/1
10 TABLET ORAL
Status: CANCELLED | OUTPATIENT
Start: 2018-01-13

## 2018-01-13 RX ORDER — DEXTROSE MONOHYDRATE AND SODIUM CHLORIDE 5; .45 G/100ML; G/100ML
125 INJECTION, SOLUTION INTRAVENOUS CONTINUOUS
Status: DISCONTINUED | OUTPATIENT
Start: 2018-01-13 | End: 2018-01-13

## 2018-01-13 RX ORDER — DEXTROSE 50 % IN WATER (D50W) INTRAVENOUS SYRINGE
25-50 AS NEEDED
Status: DISCONTINUED | OUTPATIENT
Start: 2018-01-13 | End: 2018-01-24 | Stop reason: HOSPADM

## 2018-01-13 RX ORDER — LANOLIN ALCOHOL/MO/W.PET/CERES
325 CREAM (GRAM) TOPICAL 2 TIMES DAILY WITH MEALS
Status: DISCONTINUED | OUTPATIENT
Start: 2018-01-13 | End: 2018-01-24 | Stop reason: HOSPADM

## 2018-01-13 RX ORDER — SODIUM CHLORIDE, SODIUM LACTATE, POTASSIUM CHLORIDE, CALCIUM CHLORIDE 600; 310; 30; 20 MG/100ML; MG/100ML; MG/100ML; MG/100ML
100 INJECTION, SOLUTION INTRAVENOUS CONTINUOUS
Status: CANCELLED | OUTPATIENT
Start: 2018-01-13 | End: 2018-01-14

## 2018-01-13 RX ADMIN — DONEPEZIL HYDROCHLORIDE 10 MG: 5 TABLET, FILM COATED ORAL at 22:28

## 2018-01-13 RX ADMIN — SODIUM CHLORIDE 40 MG: 9 INJECTION INTRAMUSCULAR; INTRAVENOUS; SUBCUTANEOUS at 22:29

## 2018-01-13 RX ADMIN — DEXTROSE MONOHYDRATE 25 G: 25 INJECTION, SOLUTION INTRAVENOUS at 12:23

## 2018-01-13 RX ADMIN — DEXTROSE: 50 INJECTION, SOLUTION INTRAVENOUS at 02:16

## 2018-01-13 RX ADMIN — DEXTROSE MONOHYDRATE 25 G: 25 INJECTION, SOLUTION INTRAVENOUS at 14:23

## 2018-01-13 RX ADMIN — GABAPENTIN 300 MG: 300 CAPSULE ORAL at 22:28

## 2018-01-13 RX ADMIN — DEXTROSE MONOHYDRATE AND SODIUM CHLORIDE 75 ML/HR: 5; .45 INJECTION, SOLUTION INTRAVENOUS at 01:53

## 2018-01-13 RX ADMIN — FERROUS SULFATE TAB 325 MG (65 MG ELEMENTAL FE) 325 MG: 325 (65 FE) TAB at 18:16

## 2018-01-13 RX ADMIN — DEXTROSE MONOHYDRATE 25 G: 25 INJECTION, SOLUTION INTRAVENOUS at 15:58

## 2018-01-13 RX ADMIN — Medication 10 ML: at 06:28

## 2018-01-13 RX ADMIN — LEVETIRACETAM 500 MG: 500 TABLET ORAL at 18:16

## 2018-01-13 RX ADMIN — SODIUM CHLORIDE: 234 INJECTION, SOLUTION INTRAVENOUS at 08:48

## 2018-01-13 RX ADMIN — TAMSULOSIN HYDROCHLORIDE 0.4 MG: 0.4 CAPSULE ORAL at 22:28

## 2018-01-13 RX ADMIN — DEXTROSE 50 % IN WATER (D50W) INTRAVENOUS SYRINGE 25 G: at 00:42

## 2018-01-13 RX ADMIN — DEXTROSE MONOHYDRATE 25 G: 25 INJECTION, SOLUTION INTRAVENOUS at 00:42

## 2018-01-13 RX ADMIN — DEXTROSE MONOHYDRATE 175 ML: 10 INJECTION, SOLUTION INTRAVENOUS at 12:40

## 2018-01-13 RX ADMIN — DEXTROSE 25 G: 50 INJECTION, SOLUTION INTRAVENOUS at 02:16

## 2018-01-13 RX ADMIN — SODIUM CHLORIDE: 234 INJECTION, SOLUTION INTRAVENOUS at 06:15

## 2018-01-13 RX ADMIN — Medication 10 ML: at 14:32

## 2018-01-13 RX ADMIN — DIVALPROEX SODIUM 250 MG: 250 TABLET, EXTENDED RELEASE ORAL at 22:28

## 2018-01-13 RX ADMIN — Medication 10 ML: at 22:29

## 2018-01-13 RX ADMIN — SODIUM CHLORIDE 40 MG: 9 INJECTION INTRAMUSCULAR; INTRAVENOUS; SUBCUTANEOUS at 08:45

## 2018-01-13 RX ADMIN — DEXTROSE MONOHYDRATE 25 G: 25 INJECTION, SOLUTION INTRAVENOUS at 10:13

## 2018-01-13 NOTE — PROGRESS NOTES
Visit with patient to build rapport with . Patient is calm. Receptive to  presence. Encouraged and assured patient of our continued care.     Zeferino Hinson,  Staff   C: [phone]  /  [email or web address]

## 2018-01-13 NOTE — PROGRESS NOTES
Dr. Vickie Conner notified pt HR trend. HR 45 at this time, /52 post 2 units PRBC's; order to consult cardiology for bradycardia. Will continue to monitor.

## 2018-01-13 NOTE — CONSULTS
[facility] Consult Note       Primary GI Physician: Yanci Gaspar    Referring Physician:  Monserrat Dudley    Consult Date:  1/12/2018    Admit Date:  1/12/2018    Chief Complaint:  Anemia    Subjective:     History of Present Illness:  Patient is a 67 y.o. male with PMH of  AVMs with chronic recurrent bleeding, CKD, dementia, HLD, HTN, Hypothyroid, seizure history, COPD, and history of ETOH abuse formerly, who is admitted with severe anemia with hgb 5.2. He is seen in consultation at the request of Dr. Yaima Ellis. Pt denies abdominal pain or changes in bowel habits. Heme +.         EGD 9-29-17 by Dr Allie Figueroa  Ten small nonbleeding AVM's in the duodenum and jejunum were cauterized by APC.   EGD with Dr Calvin Mediate 11/28/16 that revealed gastric AVM on lesser curvature and duodenal bulb AVM, both were clipped. Colonoscopy on 6/17/14 with Dr Alie Cisneros that revealed tubular adenoma polyps and a nonbleeding cecal AVM. Recommendation was made for repeat colonoscopy in 6/2017. EGD with pediatric colonoscope to Jejunum 12/8/16 by Dr. Vonnie Mcadams revealed 2mm nodule at GE junctions, clip along proximal greater curvature, Few AVM, not actively bleeding in the duodenum. Treated with APC; Multiple AVMs throughout the jejunum, not actively bleeding. Treated with APC. Terminated early due to poor O2 saturation and possible aspiration. PMH:  Past Medical History:   Diagnosis Date    Alcohol abuse     QUIT IN 2014.  LONG USE    Anemia     GI BLEED AND CHRONIC, BASELINE 9.  11/25/16:  HGB: 7.6    Angiodysplasia of stomach 2016    and duodenum    Bladder incontinence 05/28/2014    ALSO BOWEL    Chronic airway obstruction, not elsewhere classified 5/28/2014    CKD (chronic kidney disease)     [facility]     Dementia 5/28/2014    Hyperlipidemia 5/28/2014    Hypernatremia 11/25/2016 11/25/16:  177, 11/18/16:  80    Hypertension     Hypothyroid     Seizure ([facility] 2010    NO GRAND HESS. ON DILANTIN THE ENTIRE TIME    Tobacco abuse 5/28/2014    Tobacco abuse     ENTIRE ADULT LIFE. QUIT IN 2014, SNEAKS WHEN POSSIBLE       PSH:  Past Surgical History:   Procedure Laterality Date    HX COLONOSCOPY  APPROX 2005    UNKNOWN EGD    HX ENDOSCOPY  2016    bicap and clip       Allergies:  No Known Allergies    Home Medications:  Prior to Admission medications    Medication Sig Start Date End Date Taking? Authorizing Provider   levothyroxine (SYNTHROID) 100 mcg tablet Take 1 Tab by mouth Daily (before breakfast). 1/12/18   Barrie Pierce MD   levETIRAcetam (KEPPRA) 500 mg tablet Take 1 Tab by mouth two (2) times a day. 1/12/18   Barrie Pierce MD   ferrous sulfate 325 mg (65 mg iron) tablet Take 1 Tab by mouth two (2) times daily (with meals). 1/12/18   Barrie Pierce MD   pantoprazole (PROTONIX) 40 mg tablet Take 1 Tab by mouth two (2) times a day. 1/12/18   Barrie Pierce MD   divalproex ER (DEPAKOTE ER) 250 mg ER tablet Take 1 Tab by mouth nightly. 1/12/18   Barrie Pierce MD   tamsulosin (FLOMAX) 0.4 mg capsule Take 1 Cap by mouth nightly. 1/12/18   Barrie Pierce MD   folic acid (FOLVITE) 1 mg tablet Take 1 Tab by mouth daily. 1/12/18   Barrie Pierce MD   mirtazapine (REMERON) 15 mg tablet Take 1 Tab by mouth nightly. 1/12/18   Barrie Pierce MD   donepezil (ARICEPT) 10 mg tablet Take 1 Tab by mouth nightly. 1/12/18   Barrie Pierce MD   gabapentin (NEURONTIN) 300 mg capsule Take 1 Cap by mouth three (3) times daily. 1/12/18   Barrie Pierce MD   amLODIPine (NORVASC) 10 mg tablet Take 1 Tab by mouth daily. 6/2/17   Barrie Pierce MD   losartan (COZAAR) 100 mg tablet Take 1 Tab by mouth daily.  6/2/17   Barrie Pierce MD       Hospital Medications:  Current Facility-Administered Medications   Medication Dose Route Frequency    0.9% sodium chloride infusion 250 mL  250 mL IntraVENous PRN    [START ON 1/13/2018] pantoprazole (PROTONIX) 40 mg in sodium chloride 0.9 % 10 mL injection  40 mg IntraVENous Q12H       Social History:  Social History   Substance Use Topics    Smoking status: Former Smoker     Packs/day: 0.50     Years: 50.00     Types: Cigarettes    Smokeless tobacco: Never Used      Comment: QUIT IN 2014, SNEAKS WHEN POSSIBLE    Alcohol use No      Comment: HEAVILY IN PAST YEARS, NONE SINCE 2014       Pt denies any history of drug use, blood transfusions, or tattoos. Family History:  No family history on file. Review of Systems:  A detailed 10 system ROS is obtained, with pertinent positives as listed above. All others are negative. Diet:      Objective:     Physical Exam:  Vitals:  Visit Vitals    /70    Pulse 63    Temp 97.7 °F (36.5 °C)    Resp 16    Ht 5' 8\" (1.727 m)    Wt 61.7 kg (136 lb)    SpO2 100%    BMI 20.68 kg/m2     Gen:  Pt is alert, cooperative, no acute distress  Skin:  Extremities and face reveal no rashes. HEENT: Sclerae anicteric. Extra-occular muscles are intact. No oral ulcers. No abnormal pigmentation of the lips. The neck is supple. Cardiovascular: Regular rate and rhythm. No murmurs. Respiratory:  Comfortable breathing with no accessory muscle use. Clear breath sounds anteriorly with no wheezes, rales, or rhonchi. GI:  Abdomen nondistended, soft, and nontender. Normal active bowel sounds. No enlargement of the liver or spleen. No masses palpable. Rectal:  Deferred    Laboratory:    Recent Labs      01/12/18   1656   WBC  8.0   HGB  5.2*   HCT  17.0*   PLT  524*   MCV  80.2   NA  156*   K  4.1   CL  123*   CO2  22   BUN  27*   CREA  2.12*   CA  8.4   GLU  78   AP  122   SGOT  17   ALT  18   TBILI  0.2   ALB  2.6*   TP  8.1          Assessment:     Active Problems:    GIB (gastrointestinal bleeding) (6/13/2014)        Plan:       Likely will need another EGD with peds colonoscope this admission. Presumably bleeding from AVMs. Had cecal AVM in past as well and likely has them troughout his SB etc.  Not a candidate for thalidomide. Hx of colon polyps and is past due for another colonoscopy.

## 2018-01-13 NOTE — PROGRESS NOTES
Hourly rounds performed; all pt needs met/ no pt complaints. Alert and oriented x2. Notified by telemetry of HR dropping to 41 with PACs and PVCs at this time; day shift RN notified. No observable active bleeding. BS and Hgb stable at this time. Bed L/L, SR up x3, call light/ personal items within reach. Bedside shift report to Avila Knowles RN; charge RN, Keith Toscano,  updated to pt situation.

## 2018-01-13 NOTE — PROGRESS NOTES
[facility] Consult                Date of  Admission: 1/12/2018  5:24 PM     CC/Reason for consult: Bradycardia      Alexander Freedman is a 67 y.o. male admitted for anemia    No prior history of CAD. Prior hx of preserved EF with mild to moderate aortic stenosis (12/16). Also history of recurrent prior GI bleeds due to AVMs/MEGAN. Patient presented 1/12 for weakness and hg of 5.5. Given two units PRBCs with improvement in hg to 7.5. Currently evaluated by GI with planned EGD. Poor historian; appears some dementia per records. Overnight with some HR ~40 per reports and cardiology consult obtained. Patient with no symptoms and currently with HR ~50-60. Denies any chest pain/dizziness/dyspnea.  Review of prior records with prior bradycardia noted as well (12/26; EKG with HR at 43; no sx)        Patient Active Problem List   Diagnosis Code    Dementia F03.90    Tobacco abuse Z72.0    Chronic airway obstruction, not elsewhere classified J44.9    Weight loss R63.4    Hyperlipidemia E78.5    Bladder incontinence R32    HTN (hypertension) I10    COPD (chronic obstructive pulmonary disease) with emphysema (HCC) J43.9    GIB (gastrointestinal bleeding) K92.2    Seiz[facility] disorder ([facility] G40.909    DEVORA (acute kidney injury) ([facility] [facility] N17.9    Hypernatremia E87.0    Microcytic hypochromic anemia D50.9    History of alcohol abuse Z87.898    GI bleed K92.2    Anemia D64.9    AVM (arteriovenous malformation) of small bowel, acquired with hemorrhage (HCC) K55.8    Benign neoplasm of colon D12.6    Dementia associated with alcoholism without behavioral disturbance (HCC) F10.97    Acquired hypothyroidism E03.9    Abdominal pain R10.9    Anorexia R63.0    Debility R53.81    Acute encephalopathy G93.40    Lethargy R53.83    Acute on chronic renal failure (HCC) N17.9, N18.9    Failure to thrive in adult R62.7    Protein-calorie malnutrition, severe (HCC) E43    Seizure (HCC) R56.9    Acute metabolic encephalopathy G93.41    Urinary tract infection associated with indwelling urethral catheter ([facility]) T83.511A, N39.0    Bradycardia R00.1    Severe sepsis(995.92) A41.9, R65.20    Acute blood loss anemia D62    Hypothermia T68. Darlene Whittington    Acute renal failure (ARF) ([facility]) N17.9    Rhabdomyolysis M62.82    Protein malnutrition ([facility]) E46    Acute hypernatremia E87.0    Sepsis ([address] A41.9    Hypoglycemia E16.2    Moderate aortic stenosis I35.0       Past Medical History:   Diagnosis Date    Alcohol abuse     QUIT IN 2014. LONG USE    Anemia     GI BLEED AND CHRONIC, BASELINE 9.  11/25/16:  HGB: 7.6    Angiodysplasia of stomach 2016    and duodenum    Bladder incontinence 05/28/2014    ALSO BOWEL    Chronic airway obstruction, not elsewhere classified 5/28/2014    CKD (chronic kidney disease)     COPD (chronic obstructive [facility] disease) ([facility] [address] [facility]     Dementia 5/28/2014    Hyperlipidemia 5/28/2014    Hypernatremia 11/25/2016 11/25/16:  177, 11/18/16:  80    Hypertension     Hypothyroid     Seizure ([facility] [address] 2010    NO GRAND MAL. ON DILANTIN THE ENTIRE TIME    Tobacco abuse 5/28/2014    Tobacco abuse     ENTIRE ADULT LIFE. QUIT IN 2014, SNEAKS WHEN POSSIBLE      Past Surgical History:   Procedure Laterality Date    HX COLONOSCOPY  APPROX 2005    UNKNOWN EGD    HX ENDOSCOPY  2016    bicap and clip     No Known Allergies   No family history on file.      Current Facility-Administered Medications   Medication Dose Route Frequency    glucagon (GLUCAGEN) injection 1 mg  1 mg IntraMUSCular PRN    dextrose (D50W) injection syrg 12.5-25 g  25-50 mL IntraVENous PRN    dextrose 10% infusion 175 mL  175 mL IntraVENous CONTINUOUS    0.9% sodium chloride infusion 250 mL  250 mL IntraVENous PRN    sodium chloride (NS) flush 5-10 mL  5-10 mL IntraVENous Q8H    sodium chloride (NS) flush 5-10 mL  5-10 mL IntraVENous PRN    tuberculin injection 5 Units  5 Units IntraDERMal ONCE    acetaminophen (TYLENOL) tablet 650 mg  650 mg Oral Q4H PRN    HYDROcodone-acetaminophen (NORCO) 5-325 mg per tablet 1 Tab  1 Tab Oral Q4H PRN    ondansetron (ZOFRAN) injection 4 mg  4 mg IntraVENous Q4H PRN    pantoprazole (PROTONIX) 40 mg in sodium chloride 0.9 % 10 mL injection  40 mg IntraVENous Q12H       Review of Systems   Constitution: Negative for chills, decreased appetite, fever, malaise/fatigue, weight gain and weight loss. HENT: Negative for hearing loss and sore throat. Eyes: Negative for blurred vision and double vision. Cardiovascular: Negative for chest pain, dyspnea on exertion, orthopnea, palpitations, paroxysmal nocturnal dyspnea and syncope. Respiratory: Negative for cough and shortness of breath. Endocrine: Negative for cold intolerance and heat intolerance. Hematologic/Lymphatic: Negative for bleeding problem. Musculoskeletal: Negative for falls, muscle cramps, muscle weakness and myalgias. Gastrointestinal: Negative for abdominal pain, hematemesis, hematochezia and melena. Neurological: Negative for dizziness and headaches. Psychiatric/Behavioral: Negative for altered mental status. The patient is not nervous/anxious.          Physical Exam  Vitals:    01/13/18 0406 01/13/18 0650 01/13/18 1033 01/13/18 1437   BP: 129/52 152/70 145/66 146/66   Pulse: (!) 45 (!) 48 (!) 47 (!) 49   Resp: 16 16 16 16   Temp: 97.4 °F (36.3 °C) 97.6 °F (36.4 °C) 97.8 °F (36.6 °C) 97.2 °F (36.2 °C)   SpO2: 99% 100% 100% 98%   Weight:       Height:           Physical Exam:  General: Well Developed, Well Nourished, No Acute Distress  HEENT: pupils equal and round, no abnormalities noted  Neck: supple, no JVD, no carotid bruits  Heart: S1S2 with RRR; 2/6 BENY @RUSB  Lungs: Clear throughout auscultation bilaterally without adventitious sounds  Abd: soft, nontender, nondistended, with good bowel sounds  Ext: warm, no edema, calves supple/nontender, pulses 2+ bilaterally  Skin: warm and dry  Psychiatric: Normal mood and affect  Neurologic: Alert and oriented X 3    Labs:   Recent Labs      01/13/18   1222  01/13/18   0352  01/12/18   1656   NA  153*  155*  156*   K   --   3.8  4.1   BUN   --   25*  27*   CREA   --   1.85*  2.12*   GLU   --   108*  78   WBC   --   4.7  8.0   HGB  8.0*  7.2*  5.2*   HCT  25.1*  23.1*  17.0*   PLT   --   454*  524*        Assessment/Plan:     Assessment:      Principal Problem:    AVM (arteriovenous malformation) of small bowel, acquired with hemorrhage ([facility] (6/15/2014)    Active Problems:    GIB (gastrointestinal bleeding) (6/13/2014)      Hypernatremia (1/13/2018)      Microcytic hypochromic anemia (6/13/2014)      History of alcohol abuse (1/13/2018)      Overview: FORMERLY DRANK  1-2 12oz. Beers daily            NONE 2014      Acute on chronic renal failure ([facility] (1/13/2018)      Bradycardia (1/13/2018)      Acute blood loss anemia (1/13/2018)      Hypoglycemia (1/13/2018)      Moderate aortic stenosis (1/13/2018)      Noted some issues with bradycardia which appears asymptomatic. Check TFTs. Does not warrant any intervention at this time. No current causative meds but noted on aricept as outpatient which could contribute. Also some hypernatremia noted and increase free water but defer to primary team. Planned EGD per GI in am. Appears anemia likely secondary to AVMs; prior also noted MEGAN. No further cardiac w/u needed at this time. AS can be followed up as outpatient. Thank you very much for this referral. We appreciate the opportunity to participate in this patient's care.     Luis Fox MD

## 2018-01-13 NOTE — PROGRESS NOTES
BS rechecked an hour post 2nd amp of D50 this shift; BS 51. Hypoglycemic protocol followed. 1 amp D50 administered per orders; D5 1/2 rate changed to 125 mL/ hr per Dr. Hari Treviño instructions until BS> 100 x2 checks. Bs rechecked per policy at 626; will continue to monitor. 3 total amps of D50 administered this shift.

## 2018-01-13 NOTE — PROGRESS NOTES
GI DAILY PROGRESS NOTE    Admit Date: 1/12/2018    CC: gi blood loss anemia    Subjective:     Patient is a 67 y.o. Demetra Munize PMH of  AVMs with chronic recurrent bleeding, CKD, dementia, HLD, HTN, Hypothyroid, seizure history, COPD, and history of ETOH abuse formerly, who is admitted with severe anemia with hgb 5.2. He is seen in consultation at the request of Dr. Karen Stanton. Heme +. EGD 9-29-17 by Dr Suri Arroyo  Ten small nonbleeding AVM's in the duodenum and jejunum were cauterized by APC.   EGD with Dr Ivan Muniz 11/28/16 that revealed gastric AVM on lesser curvature and duodenal bulb AVM, both were clipped. Colonoscopy on 6/17/14 with Dr Judith Chand that revealed tubular adenoma polyps and a nonbleeding cecal AVM. Recommendation was made for repeat colonoscopy in 6/2017. EGD with pediatric colonoscope to Jejunum 12/8/16 by Dr. Davion Bernal revealed 2mm nodule at GE junctions, clip along proximal greater curvature, Few AVM, not actively bleeding in the duodenum. Treated with APC; Multiple AVMs throughout the jejunum, not actively bleeding. Treated with APC. Terminated early due to poor O2 saturation and possible aspiration. Denies further bleeding, abdominal pain etc.  Hgb post tx up to 7.2.      ROS:  Objective:   Vitals:  Visit Vitals    /70 (BP 1 Location: Left arm, BP Patient Position: Lying right side)    Pulse (!) 48    Temp 97.6 °F (36.4 °C)    Resp 16    Ht 5' 8\" (1.727 m)    Wt 61.7 kg (136 lb)    SpO2 100%    BMI 20.68 kg/m2         Exam:  General appearance: alert, cooperative, no distress  Lungs: clear to auscultation bilaterally  Heart: regular rate and rhythm  Abdomen: soft, non-tender.  Bowel sounds normal. No masses,  no organomegaly  Extremities: extremities normal, atraumatic, no cyanosis or edema    Data Review (Labs):    Recent Labs      01/13/18   0352  01/12/18   1656  01/12/18   1626   WBC  4.7  8.0   --    HGB  7.2*  5.2*   --    MCV  81.1  80.2   --    PLT  454*  524*   --    NA 155*  156*  157*   K  3.8  4.1  4.6   CREA  1.85*  2.12*  2.02*   BUN  25*  27*  25   ALB   --   2.6*  3.4*   TBILI   --   0.2  <0.2   SGOT   --   17  16   ALT   --   18  14   AP   --   122  116       Assessment:   Active Problems:    GIB (gastrointestinal bleeding) (6/13/2014)      Microcytic hypochromic anemia (6/13/2014)      AVM (arteriovenous malformation) of small bowel, acquired with hemorrhage ([facility] [address] (6/15/2014)          Plan:     EGD for tomorrow. Probably with the peds colonoscope with APC of AVMs etc. Discussed risks including bleeding, perforation, IV complications, sedation risks, and pt states understanding and agrees to proceed.      Cassi Tsang MD

## 2018-01-13 NOTE — PROGRESS NOTES
1hr 45min post 1st amp of D50 pt BS checked at 73 from 171; continuing to monitor. Rechecked 2 1/2 hours post first amp of D50 at 0029; BS 36. Hypoglycemic protocol followed. 1 amp of D50 administered per orders; start D5 1/2 NS at 75 per Dr. Myriam Gamboa. BS rechecked per policy at 018.     2 amps of D50 total this shift at this time.

## 2018-01-13 NOTE — ED NOTES
Patient has pulled out line at this time. Pharmacy notified for possible adverse reaction. VSS throughout entire transfusion thus far.

## 2018-01-13 NOTE — PROGRESS NOTES
BS 74 per ED RN; pt BS on arrival to floor was 58; Hypoglycemic protocol followed; pt received 1amp D50. MD notified and order obtained. Pt rechecked per policy; .

## 2018-01-13 NOTE — PROGRESS NOTES
Dr. Myriam Gamboa on floor at this time. Made aware of RN concerns of quick onset hypoglycemic episodes following D50 administration and bradycardia; MD has reviewed pt chart with RN including hx, BS, and VS with RN.  States am MD to address BS in am.

## 2018-01-13 NOTE — ED NOTES
Dr. Reynaldo Mae has been notified of change in patient's condition. Verbal orders to check patient's bgl at this time. Will continue to closely monitor and assess.

## 2018-01-13 NOTE — ED NOTES
TRANSFER - OUT REPORT:    Verbal report given to Jarocho James RN (name) on [facility] [place]  being transferred to  936 00 [id number] (unit) for routine progression of care       Report consisted of patients Situation, Background, Assessment and   Recommendations(SBAR). Information from the following report(s) SBAR, ED Summary, [facility] and Recent Results was reviewed with the receiving nurse. Lines:   Peripheral IV 01/12/18 Left Antecubital (Active)   Site Assessment Clean, dry, & intact 1/12/2018  8:54 PM   Phlebitis Assessment 0 1/12/2018  8:54 PM   Infiltration Assessment 0 1/12/2018  8:54 PM   Dressing Status Clean, dry, & intact 1/12/2018  8:54 PM   Dressing Type Transparent 1/12/2018  8:54 PM        Opportunity for questions and clarification was provided.       Patient transported with:   Registered Nurse

## 2018-01-13 NOTE — PROGRESS NOTES
Hospitalist Progress Note    Subjective:   Daily Progress Note: 1/13/2018 11:42 AM    Mr. Delcie Galeazzi is a 66 yo AAM, with a pmh of moderate aortic stenosis and prior gi bleeding due to AVMs, who presented 1/12 for weakness and hg of 5.5 with grossly occult positive stool. Given two units PRBCs with improvement in hg to 7.5. On iv protonix BID. GI with plans to do EGD and colonoscopy tomorrow. Course complicated by multiple other issues. Patient is hypoglycemic even on D10 125/hr. Is not a known diabetic and therefore not on insulin/oral diabetic agents. Is hypernatremic to 157. Also with some acute on chronic renal failure. Baseline creatinine is around 1.5 and it was 2.1 on admission. Is having bradycardic events to high 30s/40s range with pvcs. Patient is lethargic but no sob, chest pain. Night provider consulted cardiology. No family currently at bedside. Current Facility-Administered Medications   Medication Dose Route Frequency    glucagon (GLUCAGEN) injection 1 mg  1 mg IntraMUSCular PRN    dextrose (D50W) injection syrg 12.5-25 g  25-50 mL IntraVENous PRN    dextrose 10% infusion 175 mL  175 mL IntraVENous CONTINUOUS    0.9% sodium chloride infusion 250 mL  250 mL IntraVENous PRN    sodium chloride (NS) flush 5-10 mL  5-10 mL IntraVENous Q8H    sodium chloride (NS) flush 5-10 mL  5-10 mL IntraVENous PRN    tuberculin injection 5 Units  5 Units IntraDERMal ONCE    acetaminophen (TYLENOL) tablet 650 mg  650 mg Oral Q4H PRN    HYDROcodone-acetaminophen (NORCO) 5-325 mg per tablet 1 Tab  1 Tab Oral Q4H PRN    ondansetron (ZOFRAN) injection 4 mg  4 mg IntraVENous Q4H PRN    pantoprazole (PROTONIX) 40 mg in sodium chloride 0.9 % 10 mL injection  40 mg IntraVENous Q12H        Review of Systems  A comprehensive review of systems was negative except for that written in the HPI.     Objective:     Visit Vitals    /66 (BP 1 Location: Left arm, BP Patient Position: At rest)    Pulse (!) 47  Temp 97.8 °F (36.6 °C)    Resp 16    Ht 5' 8\" (1.727 m)    Wt 61.7 kg (136 lb)    SpO2 100%    BMI 20.68 kg/m2      O2 Device: Room air    Temp (24hrs), Av.6 °F (36.4 °C), Min:97.4 °F (36.3 °C), Max:97.8 °F (36.6 °C)          1901 -  0700  In: 1054.3 [I.V.:255]  Out: -     General: asleep but arousable, weak and lethargic, doesn't appear acutely distressed  Eyes; non icteric, EOMI  Neck supple  CV: regular, bradycardic  Pulm; non labored, CTAB  Abd; soft, non tender, active BS    Additional comments:I reviewed the patient's new clinical lab test results. j    Data Review    Recent Results (from the past 24 hour(s))   AMB POC COMPLETE CBC,AUTOMATED ENTER    Collection Time: 18  4:03 PM   Result Value Ref Range    WBC (POC) 9.2 4.5 - 10.5 10^3/ul    LYMPHOCYTES (POC) 16.2 (A) 20.5 - 51.1 %    MONOCYTES (POC) 1.7 1.7 - 9.3 %    GRANULOCYTES (POC) 82.1 (A) 42.2 - 75.2 %    ABS. LYMPHS (POC) 1.5 1.2 - 3.4 10^3/ul    ABS. MONOS (POC) 0.2 0.1 - 0.6 10^3/ul    ABS.  GRANS (POC) 7.6 (A) 1.4 - 6.5 10^3/ul    RBC (POC) 2.13 (A) 4 - 6 10^6/ul    HGB (POC) 5.4 (A) 11 - 18 g/dL    HCT (POC) 17.0 (A) 35 - 60 %    MCV (POC) 80.1 80 - 99.9 fL    MCH (POC) 25.5 (A) 27 - 31 pg    MCHC (POC) 31.8 (A) 33 - 37 g/dL    RDW (POC) 23.4 (A) 11.6 - 13.7 %    PLATELET (POC) 970 (A) 150 - 450 10^3/ul    MPV (POC) 7.0 (A) 7.8 - 11 fL   METABOLIC PANEL, COMPREHENSIVE    Collection Time: 18  4:26 PM   Result Value Ref Range    Glucose 81 65 - 99 mg/dL    BUN 25 8 - 27 mg/dL    Creatinine 2.02 (H) 0.76 - 1.27 mg/dL    GFR est non-AA 32 (L) >59 mL/min/1.73    GFR est AA 37 (L) >59 mL/min/1.73    BUN/Creatinine ratio 12 10 - 24    Sodium 157 (H) 134 - 144 mmol/L    Potassium 4.6 3.5 - 5.2 mmol/L    Chloride 120 (HH) 96 - 106 mmol/L    CO2 18 18 - 29 mmol/L    Calcium 8.4 (L) 8.6 - 10.2 mg/dL    Protein, total 7.1 6.0 - 8.5 g/dL    Albumin 3.4 (L) 3.5 - 4.8 g/dL    GLOBULIN, TOTAL 3.7 1.5 - 4.5 g/dL    A-G Ratio 0.9 (L) 1.2 - 2.2    Bilirubin, total <0.2 0.0 - 1.2 mg/dL    Alk. phosphatase 116 39 - 117 IU/L    AST (SGOT) 16 0 - 40 IU/L    ALT (SGPT) 14 0 - 44 IU/L   TYPE & SCREEN    Collection Time: 01/12/18  4:54 PM   Result Value Ref Range    Crossmatch Expiration 01/15/2018     ABO/Rh(D) O POSITIVE     Antibody screen NEG     Unit number [id number]     Blood component type [facility]     Unit division 00     Status of unit TRANSFUSED     Crossmatch result Compatible     Unit number [id number]     Blood component type [facility]     Unit division 00     Status of unit TRANSFUSED     Crossmatch result Compatible    CBC WITH AUTOMATED DIFF    Collection Time: 01/12/18  4:56 PM   Result Value Ref Range    WBC 8.0 4.3 - 11.1 K/uL    RBC 2.12 (L) 4.23 - 5.67 M/uL    HGB 5.2 (LL) 13.6 - 17.2 g/dL    HCT 17.0 (LL) 41.1 - 50.3 %    MCV 80.2 79.6 - 97.8 FL    MCH 24.5 (L) 26.1 - 32.9 PG    MCHC 30.6 (L) 31.4 - 35.0 g/dL    RDW 23.6 (H) 11.9 - 14.6 %    PLATELET 389 (H) 348 - 450 K/uL    MPV 9.7 (L) 10.8 - 14.1 FL    DF AUTOMATED      NEUTROPHILS 78 43 - 78 %    LYMPHOCYTES 17 13 - 44 %    MONOCYTES 4 4.0 - 12.0 %    EOSINOPHILS 1 0.5 - 7.8 %    BASOPHILS 0 0.0 - 2.0 %    IMMATURE GRANULOCYTES 0 0.0 - 5.0 %    ABS. NEUTROPHILS 6.2 1.7 - 8.2 K/UL    ABS. LYMPHOCYTES 1.3 0.5 - 4.6 K/UL    ABS. MONOCYTES 0.3 0.1 - 1.3 K/UL    ABS. EOSINOPHILS 0.1 0.0 - 0.8 K/UL    ABS. BASOPHILS 0.0 0.0 - 0.2 K/UL    ABS. IMM.  GRANS. 0.0 0.0 - 0.5 K/UL   METABOLIC PANEL, COMPREHENSIVE    Collection Time: 01/12/18  4:56 PM   Result Value Ref Range    Sodium 156 (H) 136 - 145 mmol/L    Potassium 4.1 3.5 - 5.1 mmol/L    Chloride 123 (H) 98 - 107 mmol/L    CO2 22 21 - 32 mmol/L    Anion gap 11 7 - 16 mmol/L    Glucose 78 65 - 100 mg/dL    BUN 27 (H) 8 - 23 MG/DL    Creatinine 2.12 (H) 0.8 - 1.5 MG/DL    GFR est AA 40 (L) >60 ml/min/1.73m2    GFR est non-AA 33 (L) >60 ml/min/1.73m2    Calcium 8.4 8.3 - 10.4 MG/DL    Bilirubin, total 0.2 0.2 - 1.1 MG/DL    ALT (SGPT) 18 12 - 65 U/L    AST (SGOT) 17 15 - 37 U/L    Alk. phosphatase 122 50 - 136 U/L    Protein, total 8.1 6.3 - 8.2 g/dL    Albumin 2.6 (L) 3.2 - 4.6 g/dL    Globulin 5.5 (H) 2.3 - 3.5 g/dL    A-G Ratio 0.5 (L) 1.2 - 3.5     EKG, 12 LEAD, INITIAL    Collection Time: 01/12/18  5:02 PM   Result Value Ref Range    Ventricular Rate 70 BPM    Atrial Rate 70 BPM    P-R Interval 170 ms    QRS Duration 140 ms    Q-T Interval 472 ms    QTC Calculation (Bezet) 509 ms    Calculated P Axis 29 degrees    Calculated R Axis 58 degrees    Calculated T Axis 34 degrees    Diagnosis       Normal sinus rhythm  Right bundle branch block  Possible Lateral infarct , age undetermined  Abnormal ECG  When compared with ECG of 26-DEC-2017 17:19,  Vent.  rate has increased BY  27 BPM  Right bundle branch block is now Present  Borderline criteria for Lateral infarct are now Present  Confirmed by Aashish Malcolm MD (), Jed Ritchie ([id number]) on 1/13/2018 10:33:53 AM     GLUCOSE, POC    Collection Time: 01/12/18  8:51 PM   Result Value Ref Range    Glucose (POC) 74 65 - 100 mg/dL   GLUCOSE, POC    Collection Time: 01/12/18  9:31 PM   Result Value Ref Range    Glucose (POC) 58 (L) 65 - 100 mg/dL   GLUCOSE, POC    Collection Time: 01/12/18  9:59 PM   Result Value Ref Range    Glucose (POC) 171 (H) 65 - 100 mg/dL   GLUCOSE, POC    Collection Time: 01/12/18 11:47 PM   Result Value Ref Range    Glucose (POC) 73 65 - 100 mg/dL   GLUCOSE, POC    Collection Time: 01/13/18 12:29 AM   Result Value Ref Range    Glucose (POC) 36 (LL) 65 - 100 mg/dL   GLUCOSE, POC    Collection Time: 01/13/18 12:40 AM   Result Value Ref Range    Glucose (POC) 36 (LL) 65 - 100 mg/dL   GLUCOSE, POC    Collection Time: 01/13/18  1:09 AM   Result Value Ref Range    Glucose (POC) 122 (H) 65 - 100 mg/dL   GLUCOSE, POC    Collection Time: 01/13/18  2:13 AM   Result Value Ref Range    Glucose (POC) 51 (L) 65 - 100 mg/dL   GLUCOSE, POC    Collection Time: 01/13/18  2:43 AM   Result Value Ref Range    Glucose (POC) 153 (H) 65 - 100 mg/dL   GLUCOSE, POC    Collection Time: 01/13/18  3:51 AM   Result Value Ref Range    Glucose (POC) 120 (H) 65 - 592 mg/dL   METABOLIC PANEL, BASIC    Collection Time: 01/13/18  3:52 AM   Result Value Ref Range    Sodium 155 (H) 136 - 145 mmol/L    Potassium 3.8 3.5 - 5.1 mmol/L    Chloride 123 (H) 98 - 107 mmol/L    CO2 22 21 - 32 mmol/L    Anion gap 10 7 - 16 mmol/L    Glucose 108 (H) 65 - 100 mg/dL    BUN 25 (H) 8 - 23 MG/DL    Creatinine 1.85 (H) 0.8 - 1.5 MG/DL    GFR est AA 46 (L) >60 ml/min/1.73m2    GFR est non-AA 38 (L) >60 ml/min/1.73m2    Calcium 8.4 8.3 - 10.4 MG/DL   CBC WITH AUTOMATED DIFF    Collection Time: 01/13/18  3:52 AM   Result Value Ref Range    WBC 4.7 4.3 - 11.1 K/uL    RBC 2.85 (L) 4.23 - 5.67 M/uL    HGB 7.2 (L) 13.6 - 17.2 g/dL    HCT 23.1 (L) 41.1 - 50.3 %    MCV 81.1 79.6 - 97.8 FL    MCH 25.3 (L) 26.1 - 32.9 PG    MCHC 31.2 (L) 31.4 - 35.0 g/dL    RDW 19.2 (H) 11.9 - 14.6 %    PLATELET 053 (H) 571 - 450 K/uL    MPV 10.0 (L) 10.8 - 14.1 FL    DF AUTOMATED      NEUTROPHILS 66 43 - 78 %    LYMPHOCYTES 25 13 - 44 %    MONOCYTES 6 4.0 - 12.0 %    EOSINOPHILS 3 0.5 - 7.8 %    BASOPHILS 0 0.0 - 2.0 %    IMMATURE GRANULOCYTES 0 0.0 - 5.0 %    ABS. NEUTROPHILS 3.1 1.7 - 8.2 K/UL    ABS. LYMPHOCYTES 1.1 0.5 - 4.6 K/UL    ABS. MONOCYTES 0.3 0.1 - 1.3 K/UL    ABS. EOSINOPHILS 0.1 0.0 - 0.8 K/UL    ABS. BASOPHILS 0.0 0.0 - 0.2 K/UL    ABS. IMM.  GRANS. 0.0 0.0 - 0.5 K/UL   GLUCOSE, POC    Collection Time: 01/13/18  5:50 AM   Result Value Ref Range    Glucose (POC) 68 65 - 100 mg/dL   GLUCOSE, POC    Collection Time: 01/13/18  6:05 AM   Result Value Ref Range    Glucose (POC) 73 65 - 100 mg/dL   GLUCOSE, POC    Collection Time: 01/13/18  6:53 AM   Result Value Ref Range    Glucose (POC) 72 65 - 100 mg/dL   GLUCOSE, POC    Collection Time: 01/13/18  8:36 AM   Result Value Ref Range    Glucose (POC) 83 65 - 100 mg/dL   GLUCOSE, POC    Collection Time: 01/13/18 9:59 AM   Result Value Ref Range    Glucose (POC) 59 (L) 65 - 100 mg/dL   GLUCOSE, POC    Collection Time: 01/13/18 10:35 AM   Result Value Ref Range    Glucose (POC) 156 (H) 65 - 100 mg/dL         Assessment/Plan:     Principal Problem:    AVM (arteriovenous malformation) of small bowel, acquired with hemorrhage ([facility] [address] (6/15/2014)  Continue BID IV protonix. Check Q8 hemoglobins. EGD and colonoscopy with GI tomorrow. Active Problems:    GIB (gastrointestinal bleeding) (6/13/2014)      Hypernatremia (1/13/2018)  On D10W IVFs. Check Q8 sodiums. From 157 to 155 thus far. Microcytic hypochromic anemia (6/13/2014)      History of alcohol abuse (1/13/2018)      Overview: FORMERLY DRANK  1-2 12oz. Beers daily            NONE 2014      Acute on chronic renal failure ([facility] [address] (1/13/2018)      Bradycardia (1/13/2018)  Cardiology consulted. Not on any rate lowering medications. Acute blood loss anemia (1/13/2018)  Due to probable AVM bleeding. Status post 2 units PRBCs. Check hg q8      Hypoglycemia (0/84/5746)  Uncertain etiology. Asked RN to contact family to see if any diabetics live in home and if meds could have been confused. Increase D10W to 175/hr with Q1 blood sugar checks.        Moderate aortic stenosis (1/13/2018)    Care Plan discussed with: Patient/Family and Nurse      Signed By: Lula Xavier MD     January 13, 2018

## 2018-01-13 NOTE — ED NOTES
New IV obtained. Blood is being transfused again at this time. Cardiac monitoring and cycling vitals in place. Will continue to monitor.

## 2018-01-14 ENCOUNTER — ANESTHESIA (OUTPATIENT)
Dept: ENDOSCOPY | Age: 73
DRG: 377 | End: 2018-01-14
Payer: MEDICARE

## 2018-01-14 LAB
ANION GAP SERPL CALC-SCNC: 9 MMOL/L (ref 7–16)
BUN SERPL-MCNC: 16 MG/DL (ref 8–23)
CALCIUM SERPL-MCNC: 7.7 MG/DL (ref 8.3–10.4)
CHLORIDE SERPL-SCNC: 114 MMOL/L (ref 98–107)
CO2 SERPL-SCNC: 22 MMOL/L (ref 21–32)
CREAT SERPL-MCNC: 1.51 MG/DL (ref 0.8–1.5)
GLUCOSE BLD STRIP.AUTO-MCNC: 108 MG/DL (ref 65–100)
GLUCOSE BLD STRIP.AUTO-MCNC: 110 MG/DL (ref 65–100)
GLUCOSE BLD STRIP.AUTO-MCNC: 113 MG/DL (ref 65–100)
GLUCOSE BLD STRIP.AUTO-MCNC: 118 MG/DL (ref 65–100)
GLUCOSE BLD STRIP.AUTO-MCNC: 120 MG/DL (ref 65–100)
GLUCOSE BLD STRIP.AUTO-MCNC: 121 MG/DL (ref 65–100)
GLUCOSE BLD STRIP.AUTO-MCNC: 131 MG/DL (ref 65–100)
GLUCOSE BLD STRIP.AUTO-MCNC: 136 MG/DL (ref 65–100)
GLUCOSE BLD STRIP.AUTO-MCNC: 146 MG/DL (ref 65–100)
GLUCOSE BLD STRIP.AUTO-MCNC: 166 MG/DL (ref 65–100)
GLUCOSE BLD STRIP.AUTO-MCNC: 236 MG/DL (ref 65–100)
GLUCOSE BLD STRIP.AUTO-MCNC: 55 MG/DL (ref 65–100)
GLUCOSE BLD STRIP.AUTO-MCNC: 98 MG/DL (ref 65–100)
GLUCOSE SERPL-MCNC: 98 MG/DL (ref 65–100)
HCT VFR BLD AUTO: 22.2 % (ref 41.1–50.3)
HCT VFR BLD AUTO: 28.1 % (ref 41.1–50.3)
HGB BLD-MCNC: 7.1 G/DL (ref 13.6–17.2)
HGB BLD-MCNC: 9.1 G/DL (ref 13.6–17.2)
MAGNESIUM SERPL-MCNC: 1.7 MG/DL (ref 1.8–2.4)
MAGNESIUM SERPL-MCNC: 1.8 MG/DL (ref 1.8–2.4)
MM INDURATION POC: 0 MM (ref 0–5)
PHOSPHATE SERPL-MCNC: 2.4 MG/DL (ref 2.3–3.7)
POTASSIUM SERPL-SCNC: 3.8 MMOL/L (ref 3.5–5.1)
PPD POC: NEGATIVE NEGATIVE
SODIUM SERPL-SCNC: 145 MMOL/L (ref 136–145)

## 2018-01-14 PROCEDURE — 84100 ASSAY OF PHOSPHORUS: CPT | Performed by: INTERNAL MEDICINE

## 2018-01-14 PROCEDURE — 0W3P8ZZ CONTROL BLEEDING IN GASTROINTESTINAL TRACT, VIA NATURAL OR ARTIFICIAL OPENING ENDOSCOPIC: ICD-10-PCS | Performed by: INTERNAL MEDICINE

## 2018-01-14 PROCEDURE — 83735 ASSAY OF MAGNESIUM: CPT | Performed by: INTERNAL MEDICINE

## 2018-01-14 PROCEDURE — 85018 HEMOGLOBIN: CPT | Performed by: INTERNAL MEDICINE

## 2018-01-14 PROCEDURE — 77030011640 HC PAD GRND REM COVD -A: Performed by: INTERNAL MEDICINE

## 2018-01-14 PROCEDURE — 85014 HEMATOCRIT: CPT | Performed by: INTERNAL MEDICINE

## 2018-01-14 PROCEDURE — 36430 TRANSFUSION BLD/BLD COMPNT: CPT

## 2018-01-14 PROCEDURE — 74011000250 HC RX REV CODE- 250: Performed by: INTERNAL MEDICINE

## 2018-01-14 PROCEDURE — 77030022875 HC PRB AR PLSM COAG ERBE -C: Performed by: INTERNAL MEDICINE

## 2018-01-14 PROCEDURE — 80048 BASIC METABOLIC PNL TOTAL CA: CPT | Performed by: INTERNAL MEDICINE

## 2018-01-14 PROCEDURE — 74011000258 HC RX REV CODE- 258: Performed by: INTERNAL MEDICINE

## 2018-01-14 PROCEDURE — 65270000029 HC RM PRIVATE

## 2018-01-14 PROCEDURE — 74011250637 HC RX REV CODE- 250/637: Performed by: INTERNAL MEDICINE

## 2018-01-14 PROCEDURE — 74011250636 HC RX REV CODE- 250/636: Performed by: INTERNAL MEDICINE

## 2018-01-14 PROCEDURE — 36415 COLL VENOUS BLD VENIPUNCTURE: CPT | Performed by: INTERNAL MEDICINE

## 2018-01-14 PROCEDURE — 74011250636 HC RX REV CODE- 250/636

## 2018-01-14 PROCEDURE — 82962 GLUCOSE BLOOD TEST: CPT

## 2018-01-14 PROCEDURE — P9016 RBC LEUKOCYTES REDUCED: HCPCS | Performed by: EMERGENCY MEDICINE

## 2018-01-14 PROCEDURE — 76040000025: Performed by: INTERNAL MEDICINE

## 2018-01-14 PROCEDURE — 76060000032 HC ANESTHESIA 0.5 TO 1 HR: Performed by: INTERNAL MEDICINE

## 2018-01-14 RX ORDER — DEXTROSE MONOHYDRATE 100 MG/ML
100 INJECTION, SOLUTION INTRAVENOUS CONTINUOUS
Status: DISCONTINUED | OUTPATIENT
Start: 2018-01-14 | End: 2018-01-15

## 2018-01-14 RX ORDER — PROPOFOL 10 MG/ML
INJECTION, EMULSION INTRAVENOUS
Status: DISCONTINUED | OUTPATIENT
Start: 2018-01-14 | End: 2018-01-14 | Stop reason: HOSPADM

## 2018-01-14 RX ORDER — SODIUM CHLORIDE 9 MG/ML
250 INJECTION, SOLUTION INTRAVENOUS AS NEEDED
Status: DISCONTINUED | OUTPATIENT
Start: 2018-01-14 | End: 2018-01-24 | Stop reason: HOSPADM

## 2018-01-14 RX ORDER — PROPOFOL 10 MG/ML
INJECTION, EMULSION INTRAVENOUS AS NEEDED
Status: DISCONTINUED | OUTPATIENT
Start: 2018-01-14 | End: 2018-01-14 | Stop reason: HOSPADM

## 2018-01-14 RX ORDER — SODIUM CHLORIDE, SODIUM LACTATE, POTASSIUM CHLORIDE, CALCIUM CHLORIDE 600; 310; 30; 20 MG/100ML; MG/100ML; MG/100ML; MG/100ML
INJECTION, SOLUTION INTRAVENOUS
Status: DISCONTINUED | OUTPATIENT
Start: 2018-01-14 | End: 2018-01-14 | Stop reason: HOSPADM

## 2018-01-14 RX ADMIN — PROPOFOL 160 MCG/KG/MIN: 10 INJECTION, EMULSION INTRAVENOUS at 08:10

## 2018-01-14 RX ADMIN — GABAPENTIN 300 MG: 300 CAPSULE ORAL at 21:40

## 2018-01-14 RX ADMIN — Medication 10 ML: at 06:10

## 2018-01-14 RX ADMIN — SODIUM CHLORIDE 40 MG: 9 INJECTION INTRAMUSCULAR; INTRAVENOUS; SUBCUTANEOUS at 06:09

## 2018-01-14 RX ADMIN — Medication 10 ML: at 14:01

## 2018-01-14 RX ADMIN — GABAPENTIN 300 MG: 300 CAPSULE ORAL at 06:09

## 2018-01-14 RX ADMIN — DEXTROSE MONOHYDRATE 100 ML: 10 INJECTION, SOLUTION INTRAVENOUS at 18:16

## 2018-01-14 RX ADMIN — PROPOFOL 20 MG: 10 INJECTION, EMULSION INTRAVENOUS at 08:10

## 2018-01-14 RX ADMIN — PROPOFOL 20 MG: 10 INJECTION, EMULSION INTRAVENOUS at 08:11

## 2018-01-14 RX ADMIN — FERROUS SULFATE TAB 325 MG (65 MG ELEMENTAL FE) 325 MG: 325 (65 FE) TAB at 10:25

## 2018-01-14 RX ADMIN — DONEPEZIL HYDROCHLORIDE 10 MG: 5 TABLET, FILM COATED ORAL at 21:40

## 2018-01-14 RX ADMIN — FOLIC ACID 1 MG: 1 TABLET ORAL at 10:25

## 2018-01-14 RX ADMIN — GABAPENTIN 300 MG: 300 CAPSULE ORAL at 16:51

## 2018-01-14 RX ADMIN — LEVETIRACETAM 500 MG: 500 TABLET ORAL at 17:01

## 2018-01-14 RX ADMIN — MIRTAZAPINE 15 MG: 15 TABLET, FILM COATED ORAL at 21:40

## 2018-01-14 RX ADMIN — FERROUS SULFATE TAB 325 MG (65 MG ELEMENTAL FE) 325 MG: 325 (65 FE) TAB at 16:51

## 2018-01-14 RX ADMIN — SODIUM CHLORIDE 40 MG: 9 INJECTION INTRAMUSCULAR; INTRAVENOUS; SUBCUTANEOUS at 21:40

## 2018-01-14 RX ADMIN — DIVALPROEX SODIUM 250 MG: 250 TABLET, EXTENDED RELEASE ORAL at 21:40

## 2018-01-14 RX ADMIN — AMLODIPINE BESYLATE 10 MG: 10 TABLET ORAL at 06:09

## 2018-01-14 RX ADMIN — SODIUM CHLORIDE, SODIUM LACTATE, POTASSIUM CHLORIDE, CALCIUM CHLORIDE: 600; 310; 30; 20 INJECTION, SOLUTION INTRAVENOUS at 08:00

## 2018-01-14 RX ADMIN — TAMSULOSIN HYDROCHLORIDE 0.4 MG: 0.4 CAPSULE ORAL at 21:40

## 2018-01-14 RX ADMIN — SODIUM CHLORIDE 250 ML: 900 INJECTION, SOLUTION INTRAVENOUS at 14:50

## 2018-01-14 RX ADMIN — Medication 10 ML: at 21:40

## 2018-01-14 RX ADMIN — DEXTROSE MONOHYDRATE 175 ML: 10 INJECTION, SOLUTION INTRAVENOUS at 07:46

## 2018-01-14 RX ADMIN — DEXTROSE MONOHYDRATE 25 G: 25 INJECTION, SOLUTION INTRAVENOUS at 16:41

## 2018-01-14 RX ADMIN — LEVETIRACETAM 500 MG: 500 TABLET ORAL at 06:09

## 2018-01-14 RX ADMIN — LEVOTHYROXINE SODIUM 100 MCG: 100 TABLET ORAL at 06:09

## 2018-01-14 NOTE — PROGRESS NOTES
TRANSFER - OUT REPORT:    Verbal report given to Alessandra Priest RN on [employer]  being transferred to pre-op for routine progression of care       Report consisted of patients Situation, Background, Assessment and   Recommendations(SBAR). Information from the following report(s) SBAR was reviewed with the receiving nurse. Lines:   Peripheral IV 01/12/18 Left Antecubital (Active)   Site Assessment Clean, dry, & intact 1/14/2018  2:22 AM   Phlebitis Assessment 0 1/14/2018  2:22 AM   Infiltration Assessment 0 1/14/2018  2:22 AM   Dressing Status Clean, dry, & intact 1/14/2018  2:22 AM   Dressing Type Transparent;Tape 1/14/2018  2:22 AM   Hub Color/Line Status Patent; Infusing 1/14/2018  2:22 AM        Opportunity for questions and clarification was provided. Patient transported with:   Tech, D10 running, extra bag of D10, telemetry (monitor and leads- called to inform Alessandra Priest this was sent with pt). Pt has been NPO since 440 this am (pepsi/ juice until 440 for BS). Hibiclens complete/ consent on chart. Pt was administered am protonix/ gabapentin/ keppra/ synthroid/ norvasc per Alessandra Priest, pre-op RN. Alessandra Priest notified to monitor BS (frequent hypoglycemic episodes), Hgb 7.1 this am, and bradycardic down to 40's.

## 2018-01-14 NOTE — PROGRESS NOTES
Speech Pathology Note:    Bedside swallow evaluation orders received. Patient off floor for procedure. Will re-attempt as schedule permits. Thank you for this consult. Juliann Cabral.  MS Dheeraj, CCC-SLP

## 2018-01-14 NOTE — ANESTHESIA PREPROCEDURE EVALUATION
Anesthetic History   No history of anesthetic complications            Review of Systems / Medical History  Patient summary reviewed and pertinent labs reviewed    Pulmonary    COPD: moderate      Smoker      Comments: occ still smokes cigs   Neuro/Psych     seizures    Dementia    Comments: Seizure years ago, ETOH induced dementia Cardiovascular    Hypertension              Exercise tolerance: <4 METS     GI/Hepatic/Renal         Renal disease: CRI  Liver disease    Comments: multiple GI AVM's noted in the past Endo/Other      Hypothyroidism  Anemia     Other Findings              Physical Exam    Airway  Mallampati: II  TM Distance: > 6 cm    Mouth opening: Normal     Cardiovascular    Rhythm: regular  Rate: normal         Dental    Dentition: Poor dentition  Comments: Edentulous upper, a few stand alone teeth on bottom, denies loose   Pulmonary  Breath sounds clear to auscultation               Abdominal  GI exam deferred       Other Findings            Anesthetic Plan    ASA: 4  Anesthesia type: total IV anesthesia            Anesthetic plan and risks discussed with: Patient

## 2018-01-14 NOTE — ADDENDUM NOTE
Addendum  created 01/14/18 1011 by Ree Alas CRNA    Anesthesia Intra Flowsheets edited, Anesthesia Intra Meds edited

## 2018-01-14 NOTE — PROGRESS NOTES
Hourly rounds performed; all pt needs met; pt slept well throughout night. Alert and orineted x2. BS continues to drop every couple of hours but was maintained. Remains bradycardic per telemetry. Incontinent to briefs. Hgb 8.0 to 7.1 this am.     Bed L/L, SR up x3, call light/ personal items within reach. Bedside shift report to JOSE Rabago.

## 2018-01-14 NOTE — PROGRESS NOTES
TRANSFER - IN REPORT:    Verbal report received from JOSE Rodriguez(name) on Sirisha Do  being received from [employer]) for routine progression of care      Report consisted of patients Situation, Background, Assessment and   Recommendations(SBAR). Information from the following report(s) SBAR, Kardex and ED Summary was reviewed with the receiving nurse. Opportunity for questions and clarification was provided. Assessment completed upon patients arrival to unit and care assumed.

## 2018-01-14 NOTE — ROUTINE PROCESS
TRANSFER - OUT REPORT:    Verbal report given to [address] [place] [address] on [employer]  being transferred to  936 00 18 for routine post - op       Report consisted of patients Situation, Background, Assessment and   Recommendations(SBAR). Information from the following report(s) SBAR and Procedure Summary was reviewed with the receiving nurse. Lines:   Peripheral IV 01/12/18 Left Antecubital (Active)   Site Assessment Clean, dry, & intact 1/14/2018  9:35 AM   Phlebitis Assessment 0 1/14/2018  9:35 AM   Infiltration Assessment 0 1/14/2018  9:35 AM   Dressing Status Clean, dry, & intact 1/14/2018  9:35 AM   Dressing Type Transparent;Tape 1/14/2018  9:35 AM   Hub Color/Line Status Green; Infusing 1/14/2018  9:35 AM   Alcohol Cap Used No 1/14/2018  9:00 AM        Opportunity for questions and clarification was provided. Patient transported with:   O2 @ 2 liters  Tech    VTE prophylaxis orders have not been written for [employer]. Patient and family given floor number and nurses name. Family updated re: pt status after security code verified.

## 2018-01-14 NOTE — ANESTHESIA POSTPROCEDURE EVALUATION
Post-Anesthesia Evaluation and Assessment    Patient: Charly Restrepo MRN: [id number]  SSN: [id number]    YOB: 1945  Age: 67 y.o. Sex: male       Cardiovascular Function/Vital Signs  Visit Vitals    /69    Pulse (!) 48    Temp 36 °C (96.8 °F)    Resp 16    Ht 5' 8\" (1.727 m)    Wt 61.7 kg (136 lb)    SpO2 96%    BMI 20.68 kg/m2       Patient is status post total IV anesthesia anesthesia for Procedure(s):  ESOPHAGOGASTRODUODENOSCOPY (EGD) PUSH  ENDOSCOPIC ARGON PLASMA COAGULATION. Nausea/Vomiting: None    Postoperative hydration reviewed and adequate. Pain:  Pain Scale 1: Numeric (0 - 10) (01/14/18 0900)  Pain Intensity 1: 0 (01/14/18 0900)   Managed    Neurological Status:   Neuro (WDL): Exceptions to WDL (01/14/18 0900)  Neuro  Neurologic State: Drowsy (01/14/18 0900)  Orientation Level: Oriented to person (01/14/18 0900)  Cognition: Follows commands (01/14/18 0900)  Speech: Clear (01/14/18 0900)  Assessment L Pupil: Brisk;Round (01/14/18 0900)  Assessment R Pupil: Brisk;Round (01/14/18 0900)  LUE Motor Response: Purposeful (01/14/18 0900)  LLE Motor Response: Purposeful (01/14/18 0900)  RUE Motor Response: Purposeful (01/14/18 0900)  RLE Motor Response: Purposeful (01/14/18 0900)   At baseline    Mental Status and Level of Consciousness: awake and at baseline    Pulmonary Status:   O2 Device: Nasal cannula (01/14/18 0924)   Adequate oxygenation and airway patent    Complications related to anesthesia: None    Post-anesthesia assessment completed.  No concerns    Signed By: Lisbeth Carmona MD     January 14, 2018

## 2018-01-14 NOTE — PROCEDURES
Endoscopic Gastroduodenoscopy Procedure Note    Indications:  GI blood loss anemia/Hx of AVMs    Anesthesia/Sedation: TIVA    Pre-Procedure Physical:    Current Facility-Administered Medications   Medication Dose Route Frequency    glucagon (GLUCAGEN) injection 1 mg  1 mg IntraMUSCular PRN    dextrose (D50W) injection syrg 12.5-25 g  25-50 mL IntraVENous PRN    dextrose 10% infusion 175 mL  175 mL IntraVENous CONTINUOUS    amLODIPine (NORVASC) tablet 10 mg  10 mg Oral DAILY    divalproex ER (DEPAKOTE ER) 24 hour tablet 250 mg  250 mg Oral QHS    donepezil (ARICEPT) tablet 10 mg  10 mg Oral QHS    ferrous sulfate tablet 325 mg  325 mg Oral BID WITH MEALS    folic acid (FOLVITE) tablet 1 mg  1 mg Oral DAILY    gabapentin (NEURONTIN) capsule 300 mg  300 mg Oral TID    levETIRAcetam (KEPPRA) tablet 500 mg  500 mg Oral BID    levothyroxine (SYNTHROID) tablet 100 mcg  100 mcg Oral ACB    mirtazapine (REMERON) tablet 15 mg  15 mg Oral QHS    tamsulosin (FLOMAX) capsule 0.4 mg  0.4 mg Oral QHS    0.9% sodium chloride infusion 250 mL  250 mL IntraVENous PRN    sodium chloride (NS) flush 5-10 mL  5-10 mL IntraVENous Q8H    sodium chloride (NS) flush 5-10 mL  5-10 mL IntraVENous PRN    acetaminophen (TYLENOL) tablet 650 mg  650 mg Oral Q4H PRN    HYDROcodone-acetaminophen (NORCO) 5-325 mg per tablet 1 Tab  1 Tab Oral Q4H PRN    ondansetron (ZOFRAN) injection 4 mg  4 mg IntraVENous Q4H PRN    pantoprazole (PROTONIX) 40 mg in sodium chloride 0.9 % 10 mL injection  40 mg IntraVENous Q12H      Review of patient's allergies indicates no known allergies.     Patient Vitals for the past 8 hrs:   BP Temp Pulse Resp SpO2   01/14/18 0709 140/67 97.2 °F (36.2 °C) (!) 48 18 99 %   01/14/18 0459 129/70 97.3 °F (36.3 °C) (!) 54 18 93 %       Exam      Airway: clear   Heart: normal S1and S2    Lungs: clear bilateral  Abdomen: soft, nontender, bowel sounds present and normal in all quads   Mental Status: awake, alert and oriented to person, place and time          Procedure Details     Informed consent was obtained for the procedure, including conscious sedation. Risks of pancreatitis, infection, perforation, hemorrhage, adverse drug reaction and aspiration were discussed. The patient was placed in the left lateral decubitus position. Based on the pre-procedure assessment, including review of the patient's medical history, medications, allergies, and review of systems, he had been deemed to be an appropriate candidate for conscious sedation; he was therefore sedated with the medications listed below. He was monitored continuously with ECG tracing, pulse oximetry, blood pressure monitoring, and direct observation. The EGD gastroscope was inserted into the mouth and advanced under direct vision to the second portion of the duodenum. A careful inspection was made as the gastroscope was withdrawn, including a retroflexed view of the proximal stomach; findings and interventions are described below. Appropriate photodocumentation was obtained. Findings:   Esophagus- Normal.  Stomach- Normal.  Duodenum- No bleeding seen. The bulb is normal.  In the second and third portion are small non bleeding AVMs that I APCed. Therapies: APC of duodenal AVMs    Specimens: None    Estimated Blood Loss: Minimal           Complications:   None; patient tolerated the procedure well. Attending Attestation:  I performed the procedure. Impression:  No active bleeding seen                        Duodenal AVMs  S/P APC      Recommendations:  Monitor sx and hgb                                      Advance diet. Consider colonoscopy etc if bleeding or worsening anemia.

## 2018-01-14 NOTE — PROGRESS NOTES
[facility] CARDIOLOGY PROGRESS NOTE           1/14/2018 12:36 PM    Admit Date: 1/12/2018      Subjective:     Drowsy post procedure; warming blanket on. ROS:  Cardiovascular:  As noted above    Objective:      Vitals:    01/14/18 0940 01/14/18 1045 01/14/18 1057 01/14/18 1111   BP: 137/59 123/65 139/81    Pulse: (!) 50 (!) 50 84    Resp: 16 18 26    Temp:  97.1 °F (36.2 °C) (!) 91.7 °F (33.2 °C) (!) 93.4 °F (34.1 °C)   SpO2: 94% 100% 90%    Weight:       Height:           Physical Exam:  General-No Acute Distress  Neck- supple, no JVD  CV- regular rate and rhythm; 2/6 BENY at RUSB  Lung- clear bilaterally  Abd- soft, nontender, nondistended  Ext- no edema bilaterally. Skin- warm and dry    Data Review:   Recent Labs      01/14/18   0532  01/13/18 2024   01/13/18   0352  01/12/18   1656   NA  145  149*   < >  155*  156*   K  3.8   --    --   3.8  4.1   MG  1.8   --    --    --    --    BUN  16   --    --   25*  27*   CREA  1.51*   --    --   1.85*  2.12*   GLU  98   --    --   108*  78   WBC   --    --    --   4.7  8.0   HGB  7.1*  7.7*   < >  7.2*  5.2*   HCT  22.2*  24.5*   < >  23.1*  17.0*   PLT   --    --    --   454*  524*    < > = values in this interval not displayed. Assessment/Plan:     Principal Problem:    AVM (arteriovenous malformation) of small bowel, acquired with hemorrhage ([facility] [address] (6/15/2014)     Active Problems:    GIB (gastrointestinal bleeding) (6/13/2014)       Hypernatremia (1/13/2018)       Microcytic hypochromic anemia (6/13/2014)       History of alcohol abuse (1/13/2018)      Overview: FORMERLY DRANK  1-2 12oz. Beers daily          NONE 2014       Acute on chronic renal failure (HCC) (1/13/2018)       Bradycardia (1/13/2018)       Acute blood loss anemia (1/13/2018)       Hypoglycemia (1/13/2018)       Moderate aortic stenosis (1/13/2018)        Noted some issues with bradycardia which appears asymptomatic.  TSH elevated but normal fT4 (consideration to increase synthroid vs subclinical). Does not warrant any intervention at this time. No current causative meds but noted on aricept as outpatient which could contribute. Improved hypernatremia. Appears anemia likely secondary to AVMs; prior also noted MEGAN. No further cardiac w/u needed at this time. AS can be followed up as outpatient. Will sign off.  Please call if questions    Lyly Coy MD  1/14/2018 12:36 PM

## 2018-01-14 NOTE — PERIOP NOTES
TRANSFER - IN REPORT:    Verbal report received from [facility], RN on Rory Estevez  being received from 6th floor, rm [phone] for ordered procedure      Report consisted of patients Situation, Background, Assessment and   Recommendations(SBAR). Information from the following report(s) SBAR, Kardex and MAR was reviewed with the receiving nurse. Opportunity for questions and clarification was provided. Assessment completed upon patients arrival to unit and care assumed.

## 2018-01-14 NOTE — PROGRESS NOTES
Hospitalist Progress Note    Subjective:   Daily Progress Note: 1/14/2018 12:53 PM    Mr. Elizabeth Joshua is a 68 yo AAM, with a pmh of moderate aortic stenosis and prior gi bleeding due to AVMs, who presented 1/12 for weakness and hg of 5.5 with grossly occult positive stool. Given two units PRBCs with improvement in hg to 7.5. On iv protonix BID. GI performed EGD this morning, showed duodenal AVMs that were clipped but no active bleeding. Course complicated by multiple other issues. Patient was hypoglycemic even on D10 125/hr. Is not a known diabetic and therefore not on insulin/oral diabetic agents. Was hypernatremic to 157. Also with some acute on chronic renal failure. Baseline creatinine is around 1.5 and it was 2.1 on admission. Is having bradycardic events to high 30s/40s range with pvcs. No intervention needed per cardiology. Patient currently hypothermic at 93 post anesthesia/EGD, soheila annmariegger in place. No family currently at bedside.      Current Facility-Administered Medications   Medication Dose Route Frequency    0.9% sodium chloride infusion 250 mL  250 mL IntraVENous PRN    dextrose 10% infusion 100 mL  100 mL IntraVENous CONTINUOUS    glucagon (GLUCAGEN) injection 1 mg  1 mg IntraMUSCular PRN    dextrose (D50W) injection syrg 12.5-25 g  25-50 mL IntraVENous PRN    amLODIPine (NORVASC) tablet 10 mg  10 mg Oral DAILY    divalproex ER (DEPAKOTE ER) 24 hour tablet 250 mg  250 mg Oral QHS    donepezil (ARICEPT) tablet 10 mg  10 mg Oral QHS    ferrous sulfate tablet 325 mg  325 mg Oral BID WITH MEALS    folic acid (FOLVITE) tablet 1 mg  1 mg Oral DAILY    gabapentin (NEURONTIN) capsule 300 mg  300 mg Oral TID    levETIRAcetam (KEPPRA) tablet 500 mg  500 mg Oral BID    levothyroxine (SYNTHROID) tablet 100 mcg  100 mcg Oral ACB    mirtazapine (REMERON) tablet 15 mg  15 mg Oral QHS    tamsulosin (FLOMAX) capsule 0.4 mg  0.4 mg Oral QHS    0.9% sodium chloride infusion 250 mL  250 mL IntraVENous PRN    sodium chloride (NS) flush 5-10 mL  5-10 mL IntraVENous Q8H    sodium chloride (NS) flush 5-10 mL  5-10 mL IntraVENous PRN    acetaminophen (TYLENOL) tablet 650 mg  650 mg Oral Q4H PRN    HYDROcodone-acetaminophen (NORCO) 5-325 mg per tablet 1 Tab  1 Tab Oral Q4H PRN    ondansetron (ZOFRAN) injection 4 mg  4 mg IntraVENous Q4H PRN    pantoprazole (PROTONIX) 40 mg in sodium chloride 0.9 % 10 mL injection  40 mg IntraVENous Q12H        Review of Systems  A comprehensive review of systems was negative except for that written in the HPI.     Objective:     Visit Vitals    /81    Pulse 84    Temp (!) 93.4 °F (34.1 °C)    Resp 26    Ht 5' 8\" (1.727 m)    Wt 61.7 kg (136 lb)    SpO2 90%    BMI 20.68 kg/m2    O2 Flow Rate (L/min): 2 l/min O2 Device: Nasal cannula    Temp (24hrs), Av.4 °F (35.8 °C), Min:91.7 °F (33.2 °C), Max:97.6 °F (36.4 °C)       07 -  1900  In: 200 [I.V.:200]  Out: 2    190 -  0700  In: 2713.3 [P.O.:360; I.V.:1554]  Out: -     General: asleep, arousable, confused  Eyes; non icteric  Neck; supple  Cv: RRR  pulm CTAB  Abd; soft, non distended, active bowel sounds    Additional comments:I reviewed the patient's new clinical lab test results. j    Data Review    Recent Results (from the past 24 hour(s))   GLUCOSE, POC    Collection Time: 18  1:15 PM   Result Value Ref Range    Glucose (POC) 86 65 - 100 mg/dL   GLUCOSE, POC    Collection Time: 18  2:19 PM   Result Value Ref Range    Glucose (POC) 45 (L) 65 - 100 mg/dL   GLUCOSE, POC    Collection Time: 18  2:52 PM   Result Value Ref Range    Glucose (POC) 95 65 - 100 mg/dL   GLUCOSE, POC    Collection Time: 18  3:44 PM   Result Value Ref Range    Glucose (POC) 51 (L) 65 - 100 mg/dL   GLUCOSE, POC    Collection Time: 18  4:50 PM   Result Value Ref Range    Glucose (POC) 179 (H) 65 - 100 mg/dL   GLUCOSE, POC    Collection Time: 18  5:22 PM   Result Value Ref Range    Glucose (POC) 97 65 - 100 mg/dL   T4, FREE    Collection Time: 01/13/18  5:50 PM   Result Value Ref Range    T4, Free 1.1 0.78 - 1.46 NG/DL   TSH 3RD GENERATION    Collection Time: 01/13/18  5:50 PM   Result Value Ref Range    TSH 8.150 (H) 0.358 - 3.740 uIU/mL   GLUCOSE, POC    Collection Time: 01/13/18  6:09 PM   Result Value Ref Range    Glucose (POC) 96 65 - 100 mg/dL   GLUCOSE, POC    Collection Time: 01/13/18  7:38 PM   Result Value Ref Range    Glucose (POC) 140 (H) 65 - 100 mg/dL   SODIUM    Collection Time: 01/13/18  8:24 PM   Result Value Ref Range    Sodium 149 (H) 136 - 145 mmol/L   HGB & HCT    Collection Time: 01/13/18  8:24 PM   Result Value Ref Range    HGB 7.7 (L) 13.6 - 17.2 g/dL    HCT 24.5 (L) 41.1 - 50.3 %   GLUCOSE, POC    Collection Time: 01/13/18  8:38 PM   Result Value Ref Range    Glucose (POC) 90 65 - 100 mg/dL   GLUCOSE, POC    Collection Time: 01/13/18  9:57 PM   Result Value Ref Range    Glucose (POC) 117 (H) 65 - 100 mg/dL   PLEASE READ & DOCUMENT PPD TEST IN 24 HRS    Collection Time: 01/13/18 11:00 PM   Result Value Ref Range    PPD  Negative    mm Induration  0 mm   GLUCOSE, POC    Collection Time: 01/13/18 11:06 PM   Result Value Ref Range    Glucose (POC) 115 (H) 65 - 100 mg/dL   GLUCOSE, POC    Collection Time: 01/13/18 11:46 PM   Result Value Ref Range    Glucose (POC) 63 (L) 65 - 100 mg/dL   GLUCOSE, POC    Collection Time: 01/14/18  1:15 AM   Result Value Ref Range    Glucose (POC) 98 65 - 100 mg/dL   GLUCOSE, POC    Collection Time: 01/14/18  2:04 AM   Result Value Ref Range    Glucose (POC) 136 (H) 65 - 100 mg/dL   GLUCOSE, POC    Collection Time: 01/14/18  3:11 AM   Result Value Ref Range    Glucose (POC) 166 (H) 65 - 100 mg/dL   GLUCOSE, POC    Collection Time: 01/14/18  5:13 AM   Result Value Ref Range    Glucose (POC) 110 (H) 65 - 100 mg/dL   HGB & HCT    Collection Time: 01/14/18  5:32 AM   Result Value Ref Range    HGB 7.1 (L) 13.6 - 17.2 g/dL    HCT 22.2 (L) 41.1 - 08.3 %   METABOLIC PANEL, BASIC    Collection Time: 01/14/18  5:32 AM   Result Value Ref Range    Sodium 145 136 - 145 mmol/L    Potassium 3.8 3.5 - 5.1 mmol/L    Chloride 114 (H) 98 - 107 mmol/L    CO2 22 21 - 32 mmol/L    Anion gap 9 7 - 16 mmol/L    Glucose 98 65 - 100 mg/dL    BUN 16 8 - 23 MG/DL    Creatinine 1.51 (H) 0.8 - 1.5 MG/DL    GFR est AA 59 (L) >60 ml/min/1.73m2    GFR est non-AA 49 (L) >60 ml/min/1.73m2    Calcium 7.7 (L) 8.3 - 10.4 MG/DL   MAGNESIUM    Collection Time: 01/14/18  5:32 AM   Result Value Ref Range    Magnesium 1.8 1.8 - 2.4 mg/dL   PHOSPHORUS    Collection Time: 01/14/18  5:32 AM   Result Value Ref Range    Phosphorus 2.4 2.3 - 3.7 MG/DL   GLUCOSE, POC    Collection Time: 01/14/18  6:01 AM   Result Value Ref Range    Glucose (POC) 120 (H) 65 - 100 mg/dL   GLUCOSE, POC    Collection Time: 01/14/18  7:32 AM   Result Value Ref Range    Glucose (POC) 113 (H) 65 - 100 mg/dL   GLUCOSE, POC    Collection Time: 01/14/18  8:54 AM   Result Value Ref Range    Glucose (POC) 146 (H) 65 - 100 mg/dL   GLUCOSE, POC    Collection Time: 01/14/18 10:48 AM   Result Value Ref Range    Glucose (POC) 118 (H) 65 - 100 mg/dL         Assessment/Plan:     Principal Problem:    AVM (arteriovenous malformation) of small bowel, acquired with hemorrhage (HCC) (6/15/2014)  Status post clipping in EGD    Active Problems:    GIB (gastrointestinal bleeding) (6/13/2014)      Hypernatremia (1/13/2018)  Resolved, decreasing D10 drip      Microcytic hypochromic anemia (6/13/2014)      History of alcohol abuse (1/13/2018)      Overview: FORMERLY DRANK  1-2 12oz. Beers daily            NONE 2014      Acute on chronic renal failure ([address] (1/13/2018)  Back to baseline      Bradycardia (1/13/2018)  No intervention needed per cardiology      Acute blood loss anemia (1/13/2018)  Hg 7.1. Will transfuse one unit PRBCs due to his significant comorbidiities. Q12 Hgs. Hypoglycemia (1/13/2018)  Improving.   Uncertain etiology. Decrease D10 from 175 to 100/hr.        Moderate aortic stenosis (1/13/2018)          Care Plan discussed with: Patient/Family and Nurse      Signed By: Neva Mckeon MD     January 14, 2018

## 2018-01-15 ENCOUNTER — APPOINTMENT (OUTPATIENT)
Dept: GENERAL RADIOLOGY | Age: 73
DRG: 377 | End: 2018-01-15
Attending: INTERNAL MEDICINE
Payer: MEDICARE

## 2018-01-15 LAB
ABO + RH BLD: NORMAL
AMMONIA PLAS-SCNC: 24 UMOL/L (ref 11–32)
ANION GAP SERPL CALC-SCNC: 8 MMOL/L (ref 7–16)
ARTERIAL PATENCY WRIST A: POSITIVE
BASE DEFICIT BLDA-SCNC: 3.8 MMOL/L (ref 0–2)
BASOPHILS # BLD: 0 K/UL (ref 0–0.2)
BASOPHILS NFR BLD: 0 % (ref 0–2)
BDY SITE: ABNORMAL
BLD PROD TYP BPU: NORMAL
BLOOD GROUP ANTIBODIES SERPL: NORMAL
BPU ID: NORMAL
BUN SERPL-MCNC: 14 MG/DL (ref 8–23)
CALCIUM SERPL-MCNC: 7.7 MG/DL (ref 8.3–10.4)
CHLORIDE SERPL-SCNC: 114 MMOL/L (ref 98–107)
CO2 SERPL-SCNC: 22 MMOL/L (ref 21–32)
COHGB MFR BLD: 0.3 % (ref 0.5–1.5)
CREAT SERPL-MCNC: 1.82 MG/DL (ref 0.8–1.5)
CROSSMATCH RESULT,%XM: NORMAL
DIFFERENTIAL METHOD BLD: ABNORMAL
DO-HGB BLD-MCNC: 7 % (ref 0–5)
EOSINOPHIL # BLD: 0.2 K/UL (ref 0–0.8)
EOSINOPHIL NFR BLD: 3 % (ref 0.5–7.8)
ERYTHROCYTE [DISTWIDTH] IN BLOOD BY AUTOMATED COUNT: 18.6 % (ref 11.9–14.6)
ERYTHROCYTE [SEDIMENTATION RATE] IN BLOOD: 95 MM/HR (ref 0–20)
GAS FLOW.O2 O2 DELIVERY SYS: 2 L/MIN
GLUCOSE BLD STRIP.AUTO-MCNC: 100 MG/DL (ref 65–100)
GLUCOSE BLD STRIP.AUTO-MCNC: 101 MG/DL (ref 65–100)
GLUCOSE BLD STRIP.AUTO-MCNC: 105 MG/DL (ref 65–100)
GLUCOSE BLD STRIP.AUTO-MCNC: 109 MG/DL (ref 65–100)
GLUCOSE BLD STRIP.AUTO-MCNC: 109 MG/DL (ref 65–100)
GLUCOSE BLD STRIP.AUTO-MCNC: 110 MG/DL (ref 65–100)
GLUCOSE BLD STRIP.AUTO-MCNC: 112 MG/DL (ref 65–100)
GLUCOSE BLD STRIP.AUTO-MCNC: 113 MG/DL (ref 65–100)
GLUCOSE BLD STRIP.AUTO-MCNC: 119 MG/DL (ref 65–100)
GLUCOSE BLD STRIP.AUTO-MCNC: 120 MG/DL (ref 65–100)
GLUCOSE BLD STRIP.AUTO-MCNC: 135 MG/DL (ref 65–100)
GLUCOSE BLD STRIP.AUTO-MCNC: 72 MG/DL (ref 65–100)
GLUCOSE BLD STRIP.AUTO-MCNC: 80 MG/DL (ref 65–100)
GLUCOSE BLD STRIP.AUTO-MCNC: 85 MG/DL (ref 65–100)
GLUCOSE BLD STRIP.AUTO-MCNC: 88 MG/DL (ref 65–100)
GLUCOSE BLD STRIP.AUTO-MCNC: 88 MG/DL (ref 65–100)
GLUCOSE BLD STRIP.AUTO-MCNC: 90 MG/DL (ref 65–100)
GLUCOSE BLD STRIP.AUTO-MCNC: 90 MG/DL (ref 65–100)
GLUCOSE BLD STRIP.AUTO-MCNC: 95 MG/DL (ref 65–100)
GLUCOSE BLD STRIP.AUTO-MCNC: 95 MG/DL (ref 65–100)
GLUCOSE BLD STRIP.AUTO-MCNC: 97 MG/DL (ref 65–100)
GLUCOSE BLD STRIP.AUTO-MCNC: 98 MG/DL (ref 65–100)
GLUCOSE BLD STRIP.AUTO-MCNC: 99 MG/DL (ref 65–100)
GLUCOSE SERPL-MCNC: 102 MG/DL (ref 65–100)
HCO3 BLDA-SCNC: 20 MMOL/L (ref 22–26)
HCT VFR BLD AUTO: 28 % (ref 41.1–50.3)
HGB BLD-MCNC: 9 G/DL (ref 13.6–17.2)
HGB BLDMV-MCNC: 9.7 GM/DL (ref 11.7–15)
IMM GRANULOCYTES # BLD: 0 K/UL (ref 0–0.5)
IMM GRANULOCYTES NFR BLD AUTO: 0 % (ref 0–5)
LYMPHOCYTES # BLD: 1 K/UL (ref 0.5–4.6)
LYMPHOCYTES NFR BLD: 15 % (ref 13–44)
MCH RBC QN AUTO: 25.5 PG (ref 26.1–32.9)
MCHC RBC AUTO-ENTMCNC: 32.1 G/DL (ref 31.4–35)
MCV RBC AUTO: 79.3 FL (ref 79.6–97.8)
METHGB MFR BLD: 0.7 % (ref 0–1.5)
MONOCYTES # BLD: 0.5 K/UL (ref 0.1–1.3)
MONOCYTES NFR BLD: 8 % (ref 4–12)
NEUTS SEG # BLD: 5 K/UL (ref 1.7–8.2)
NEUTS SEG NFR BLD: 74 % (ref 43–78)
OXYHGB MFR BLDA: 92 % (ref 94–97)
PCO2 BLDA: 32 MMHG (ref 35–45)
PH BLDA: 7.41 [PH] (ref 7.35–7.45)
PHOSPHATE SERPL-MCNC: 2.2 MG/DL (ref 2.3–3.7)
PLATELET # BLD AUTO: 383 K/UL (ref 150–450)
PMV BLD AUTO: 10.2 FL (ref 10.8–14.1)
PO2 BLDA: 69 MMHG (ref 80–105)
POTASSIUM SERPL-SCNC: 3.9 MMOL/L (ref 3.5–5.1)
RBC # BLD AUTO: 3.53 M/UL (ref 4.23–5.67)
SAO2 % BLD: 93 % (ref 92–98.5)
SODIUM SERPL-SCNC: 144 MMOL/L (ref 136–145)
SPECIMEN EXP DATE BLD: NORMAL
STATUS OF UNIT,%ST: NORMAL
UNIT DIVISION, %UDIV: 0
VALPROATE SERPL-MCNC: 6 UG/ML (ref 50–100)
VENTILATION MODE VENT: ABNORMAL
WBC # BLD AUTO: 6.8 K/UL (ref 4.3–11.1)

## 2018-01-15 PROCEDURE — 36600 WITHDRAWAL OF ARTERIAL BLOOD: CPT

## 2018-01-15 PROCEDURE — 94760 N-INVAS EAR/PLS OXIMETRY 1: CPT

## 2018-01-15 PROCEDURE — 77010033678 HC OXYGEN DAILY

## 2018-01-15 PROCEDURE — 74011250637 HC RX REV CODE- 250/637: Performed by: INTERNAL MEDICINE

## 2018-01-15 PROCEDURE — 36415 COLL VENOUS BLD VENIPUNCTURE: CPT | Performed by: INTERNAL MEDICINE

## 2018-01-15 PROCEDURE — 80164 ASSAY DIPROPYLACETIC ACD TOT: CPT | Performed by: INTERNAL MEDICINE

## 2018-01-15 PROCEDURE — 84100 ASSAY OF PHOSPHORUS: CPT | Performed by: INTERNAL MEDICINE

## 2018-01-15 PROCEDURE — 80048 BASIC METABOLIC PNL TOTAL CA: CPT | Performed by: INTERNAL MEDICINE

## 2018-01-15 PROCEDURE — 74011000258 HC RX REV CODE- 258: Performed by: INTERNAL MEDICINE

## 2018-01-15 PROCEDURE — 74011000250 HC RX REV CODE- 250: Performed by: INTERNAL MEDICINE

## 2018-01-15 PROCEDURE — 74011250636 HC RX REV CODE- 250/636: Performed by: INTERNAL MEDICINE

## 2018-01-15 PROCEDURE — 85025 COMPLETE CBC W/AUTO DIFF WBC: CPT | Performed by: INTERNAL MEDICINE

## 2018-01-15 PROCEDURE — 92610 EVALUATE SWALLOWING FUNCTION: CPT

## 2018-01-15 PROCEDURE — 82803 BLOOD GASES ANY COMBINATION: CPT

## 2018-01-15 PROCEDURE — 71045 X-RAY EXAM CHEST 1 VIEW: CPT

## 2018-01-15 PROCEDURE — 65270000029 HC RM PRIVATE

## 2018-01-15 PROCEDURE — 85652 RBC SED RATE AUTOMATED: CPT | Performed by: INTERNAL MEDICINE

## 2018-01-15 PROCEDURE — 87040 BLOOD CULTURE FOR BACTERIA: CPT | Performed by: INTERNAL MEDICINE

## 2018-01-15 PROCEDURE — 82140 ASSAY OF AMMONIA: CPT | Performed by: INTERNAL MEDICINE

## 2018-01-15 PROCEDURE — 82962 GLUCOSE BLOOD TEST: CPT

## 2018-01-15 RX ORDER — DEXTROSE MONOHYDRATE AND SODIUM CHLORIDE 5; .45 G/100ML; G/100ML
125 INJECTION, SOLUTION INTRAVENOUS CONTINUOUS
Status: DISCONTINUED | OUTPATIENT
Start: 2018-01-15 | End: 2018-01-16

## 2018-01-15 RX ORDER — PANTOPRAZOLE SODIUM 40 MG/1
40 TABLET, DELAYED RELEASE ORAL
Status: DISCONTINUED | OUTPATIENT
Start: 2018-01-15 | End: 2018-01-16

## 2018-01-15 RX ADMIN — FERROUS SULFATE TAB 325 MG (65 MG ELEMENTAL FE) 325 MG: 325 (65 FE) TAB at 08:29

## 2018-01-15 RX ADMIN — MIRTAZAPINE 15 MG: 15 TABLET, FILM COATED ORAL at 21:57

## 2018-01-15 RX ADMIN — FERROUS SULFATE TAB 325 MG (65 MG ELEMENTAL FE) 325 MG: 325 (65 FE) TAB at 16:00

## 2018-01-15 RX ADMIN — DIVALPROEX SODIUM 250 MG: 250 TABLET, EXTENDED RELEASE ORAL at 21:57

## 2018-01-15 RX ADMIN — AMLODIPINE BESYLATE 10 MG: 10 TABLET ORAL at 08:29

## 2018-01-15 RX ADMIN — Medication 10 ML: at 05:04

## 2018-01-15 RX ADMIN — Medication 10 ML: at 21:58

## 2018-01-15 RX ADMIN — LEVETIRACETAM 500 MG: 500 TABLET ORAL at 16:07

## 2018-01-15 RX ADMIN — LEVOTHYROXINE SODIUM 100 MCG: 100 TABLET ORAL at 05:03

## 2018-01-15 RX ADMIN — PANTOPRAZOLE SODIUM 40 MG: 40 TABLET, DELAYED RELEASE ORAL at 16:06

## 2018-01-15 RX ADMIN — GABAPENTIN 300 MG: 300 CAPSULE ORAL at 21:58

## 2018-01-15 RX ADMIN — DEXTROSE MONOHYDRATE 100 ML: 10 INJECTION, SOLUTION INTRAVENOUS at 04:55

## 2018-01-15 RX ADMIN — SODIUM CHLORIDE 40 MG: 9 INJECTION INTRAMUSCULAR; INTRAVENOUS; SUBCUTANEOUS at 08:28

## 2018-01-15 RX ADMIN — DONEPEZIL HYDROCHLORIDE 10 MG: 5 TABLET, FILM COATED ORAL at 21:57

## 2018-01-15 RX ADMIN — Medication 10 ML: at 16:01

## 2018-01-15 RX ADMIN — LEVETIRACETAM 500 MG: 500 TABLET ORAL at 08:29

## 2018-01-15 RX ADMIN — GABAPENTIN 300 MG: 300 CAPSULE ORAL at 15:58

## 2018-01-15 RX ADMIN — DEXTROSE MONOHYDRATE AND SODIUM CHLORIDE 125 ML/HR: 5; .45 INJECTION, SOLUTION INTRAVENOUS at 08:38

## 2018-01-15 RX ADMIN — FOLIC ACID 1 MG: 1 TABLET ORAL at 08:28

## 2018-01-15 RX ADMIN — DEXTROSE MONOHYDRATE AND SODIUM CHLORIDE 125 ML/HR: 5; .45 INJECTION, SOLUTION INTRAVENOUS at 16:00

## 2018-01-15 RX ADMIN — WATER 1 G: 1 INJECTION INTRAMUSCULAR; INTRAVENOUS; SUBCUTANEOUS at 17:53

## 2018-01-15 RX ADMIN — TAMSULOSIN HYDROCHLORIDE 0.4 MG: 0.4 CAPSULE ORAL at 21:57

## 2018-01-15 RX ADMIN — GABAPENTIN 300 MG: 300 CAPSULE ORAL at 08:28

## 2018-01-15 NOTE — PROGRESS NOTES
STG: Pt will tolerate full liquid/nectar thick consistency without overt signs/sx of aspiration with 100% accuracy  STG: Pt will participate with trials of thin liquids with SLP only for diet advancement as tolerated  LTG: Pt will tolerate the least restrictive diet at discharge without respiratory compromise    Speech language pathology: bedside swallow note: Initial Assessment    NAME/AGE/GENDER: Lucy Puente is a 67 y.o. male  DATE: 1/15/2018  PRIMARY DIAGNOSIS: GIB (gastrointestinal bleeding)  GI BLEED, ANEMIA  Procedure(s) (LRB):  ESOPHAGOGASTRODUODENOSCOPY (EGD) PUSH (N/A)  ENDOSCOPIC ARGON PLASMA COAGULATION (N/A) 1 Day Post-Op  ICD-10: Treatment Diagnosis: dysphagia; oropharyngeal R13.12  INTERDISCIPLINARY COLLABORATION: Registered Nurse and CNA  PRECAUTIONS/ALLERGIES: Review of patient's allergies indicates no known allergies. ASSESSMENT:Based on the objective data described below, Mr. Cruz Pires presents with mild-moderate oropharyngeal dysphagia exacerbated by current altered mental status  Patient keeps his eyes closed during most of the session but responds to questioning. Oriented to person only. Squirming in the bed stating he is trying to get up. Re-oriented patient to situation. Patient presented with strong cough with initial presentation of thin liquids. Delayed initiation of the swallow with all po trials; however, no overt signs/sx of aspiration with nectar liquids by cup or straw. Tolerated pudding with delayed AP transit but oral cavity effectively cleared. Recommend full liquids (per GI)/nectar thick. 1:1 assist.  Patient followed by speech therapy during several past admissions and per notes has fluctuated in recommendations between nectar thick and thin liquids based on level of alertness and cognitive status at the time of assessment. Patient's lunch tray arrived with Ensure Enlive (nectar-like when chilled) and cream broth. Jello removed.     CNA arrived to assist with meal and was notified of diet changes. Will follow for diet tolerance and advancement as appropriate. Patient will benefit from skilled intervention to address the below impairments. ?????? ? ? This section established at most recent assessment??????????  PROBLEM LIST (Impairments causing functional limitations):  1. dysphagia  cognition  REHABILITATION POTENTIAL FOR STATED GOALS: Good  PLAN OF CARE:   Patient will benefit from skilled intervention to address the following impairments. RECOMMENDATIONS AND PLANNED INTERVENTIONS (Benefits and precautions of therapy have been discussed with the patient.):  · full liquids  MEDICATIONS:  · Crushed in puree  COMPENSATORY STRATEGIES/MODIFICATIONS INCLUDING:  · Small sips and bites  · 1:1 feeding assistance  OTHER RECOMMENDATIONS (including follow up treatment recommendations): · Family training/education  · Patient education  · po trials  RECOMMENDED DIET MODIFICATIONS DISCUSSED WITH:  · Nursing  · Patient  FREQUENCY/DURATION: Continue to follow patient 3 times a week for duration of hospital stay to address above goals. RECOMMENDED REHABILITATION/EQUIPMENT: (at time of discharge pending progress): Due to the probability of continued deficits (see above) this patient will not likely need continued skilled speech therapy after discharge. SUBJECTIVE:   Confused. History of Present Injury/Illness: Mr. Concepcion Alegre  has a past medical history of Alcohol abuse; Anemia; Angiodysplasia of stomach (2016); Bladder incontinence (05/28/2014); Chronic airway obstruction, not elsewhere classified (5/28/2014); CKD (chronic kidney disease); COPD (chronic obstructive pulmonary disease) ([facility] [address] Dementia (5/28/2014); Hyperlipidemia (5/28/2014); Hypernatremia (11/25/2016); Hypertension; Hypothyroid; Seizure ([facility] [address] (2010); Tobacco abuse (5/28/2014); and Tobacco abuse. He also  has a past surgical history that includes hx colonoscopy (APPROX 2005) and hx endoscopy (2016).    Present Symptoms: cough with thin liquids x1  Pain Intensity 1: 0  Pain Intervention(s) 1: Rest  Current Medications:   No current facility-administered medications on file prior to encounter. Current Outpatient Prescriptions on File Prior to Encounter   Medication Sig Dispense Refill    levothyroxine (SYNTHROID) 100 mcg tablet Take 1 Tab by mouth Daily (before breakfast). 90 Tab 1    levETIRAcetam (KEPPRA) 500 mg tablet Take 1 Tab by mouth two (2) times a day. 180 Tab 1    pantoprazole (PROTONIX) 40 mg tablet Take 1 Tab by mouth two (2) times a day. 701 Tab 2    folic acid (FOLVITE) 1 mg tablet Take 1 Tab by mouth daily. 90 Tab 2    mirtazapine (REMERON) 15 mg tablet Take 1 Tab by mouth nightly. 90 Tab 2    donepezil (ARICEPT) 10 mg tablet Take 1 Tab by mouth nightly. 90 Tab 3    gabapentin (NEURONTIN) 300 mg capsule Take 1 Cap by mouth three (3) times daily. 90 Cap 2    amLODIPine (NORVASC) 10 mg tablet Take 1 Tab by mouth daily. 90 Tab 3    losartan (COZAAR) 100 mg tablet Take 1 Tab by mouth daily. 90 Tab 3    ferrous sulfate 325 mg (65 mg iron) tablet Take 1 Tab by mouth two (2) times daily (with meals). 60 Tab 0    divalproex ER (DEPAKOTE ER) 250 mg ER tablet Take 1 Tab by mouth nightly. 90 Tab 2    tamsulosin (FLOMAX) 0.4 mg capsule Take 1 Cap by mouth nightly. 90 Cap 2     Current Dietary Status:  Full liquids      History of reflux:  yes}    Reflux medication: protonix  Social History/Home Situation: home with family     OBJECTIVE:   Respiratory Status:  Nasal cannula  2 l/min  CXR Results: n/a this admission  MRI/CT Results:n/a this admission  Oral Motor Structure/Speech:  Oral-Motor Structure/Motor Speech  Labial: Decreased seal  Dentition: Limited, Natural  Lingual: Decreased rate, Incoordinated    Cognitive and Communication Status:  Neurologic State: Alert  Orientation Level: Oriented to person;Disoriented to place; Disoriented to situation;Disoriented to time  Cognition: Decreased attention/concentration; Impaired decision making  Perception:  (keeps his eyes closed)  Perseveration: Perseverates during conversation  Safety/Judgement: Decreased awareness of need for safety    BEDSIDE SWALLOW EVALUATION  Oral Assessment:  Oral Assessment  Labial: Decreased seal  Dentition: Limited;Natural  Lingual: Decreased rate; Incoordinated  P.O. Trials:  Patient Position: upright in bed    The patient was given tsp to straw amounts of the following:   Consistency Presented:  Thin liquid;Pudding;Nectar thick liquid  How Presented: Straw;Successive swallows;Cup/sip;SLP-fed/presented;Self-fed/presented    ORAL PHASE:  Bolus Acceptance: Impaired  Bolus Formation/Control: Impaired  Propulsion: Delayed (# of seconds)  Type of Impairment: Delayed;Mastication  Oral Residue: None    PHARYNGEAL PHASE:  Initiation of Swallow: Delayed (# of seconds)  Laryngeal Elevation: Decreased  Aspiration Signs/Symptoms: Strong cough  Vocal Quality: No impairment           Pharyngeal Phase Characteristics: Easily fatigued     OTHER OBSERVATIONS:  Rate/bite size: WNL   Endurance:  Impaired     Tool Used: Dysphagia Outcome and Severity Scale (NABILA)    Score Comments   Normal Diet  [] 7 With no strategies or extra time needed   Functional Swallow  [] 6 May have mild oral or pharyngeal delay       Mild Dysphagia    [] 5 Which may require one diet consistency restricted (those who demonstrate penetration which is entirely cleared on MBS would be included)   Mild-Moderate Dysphagia  [x] 4 With 1-2 diet consistencies restricted       Moderate Dysphagia  [] 3 With 2 or more diet consistencies restricted       Moderately Severe Dysphagia  [] 2 With partial PO strategies (trials with ST only)       Severe Dysphagia  [] 1 With inability to tolerate any PO safely          Score:  Initial: 4 Most Recent: X (Date: -- )   Interpretation of Tool: The Dysphagia Outcome and Severity Scale (NABILA) is a simple, easy-to-use, 7-point scale developed to systematically rate the functional severity of dysphagia based on objective assessment and make recommendations for diet level, independence level, and type of nutrition. Score 7 6 5 4 3 2 1   Modifier CH CI CJ CK CL CM CN   ? Swallowing:     - CURRENT STATUS: CK - 40%-59% impaired, limited or restricted    - GOAL STATUS:  CL - 60%-79% impaired, limited or restricted    - D/C STATUS:  ---------------To be determined---------------  Payor: [employer] / Plan: [facility] [employer] MEDICARE HMO / Product Type: Surveypal Care Medicare /     TREATMENT:    (In addition to Assessment/Re-Assessment sessions the following treatments were rendered)  Assessment/Reassessment only, no treatment provided today     ORAL MOTOR  EXERCISES:                                                                                                                                                                      LARYNGEAL / PHARYNGEAL EXERCISES:                                                                                                                                     __________________________________________________________________________________________________  Safety:   After treatment position/precautions:  · RN notified  · Upright in Bed  Progression/Medical Necessity:   · Skilled intervention continues to be required due to persistent signs and symptoms of aspiration and patient still consuming a modified diet. Compliance with Program/Exercises: Will assess as treatment progresses. Reason for Continuation of Services/Other Comments:  · Patient continues to require skilled intervention due to patient unable to attend/participate in therapy as expected. Recommendations/Intent for next treatment session: \"Treatment next visit will focus on po trials; diet tolerance\".     Total Treatment Duration:  Time In: 1140  Time Out: 5821 [facility] MS, CCC-SLP

## 2018-01-15 NOTE — PROGRESS NOTES
Called by patient's nurse that family had multiple questions about patient's care. Also called by tele-neuro about recommendations. He recommended checking ammonia, depakote level, ESR, ABG. I have reviewed chart as well. Likely that patient's lethargy is delirium in the setting of dementia. In the setting of hypoglycemia and hypothermia need to rule out underlying infection. Bradycardia likely related to hypoglycemia as well, appears to be resolving as patient's BS are better controlled. TFTs consistent with subclinical hypothyroidism/sick euthyroid. Hgb stable since transfusion, remains on IV protonix for AVMs seen on EGD. Na improving, but can certainly be contributing to his delirium.    - depakote level low, not toxic. Ammonia WNL  - ESR pending  - check CXR, UA, blood cx. Start Rocephin empirically  - if infection ruled out tomorrow, patient needs an EEG to r/o seizures causing mental status change  - discussed plan with wife. She asked that the physician taking over patient's care discuss results and treatment plan with her and her daughter (who is a nurse at [employer]).     Hugo Peter MD  01/15/18  5:35 PM

## 2018-01-15 NOTE — PROGRESS NOTES
Hospitalist Progress Note    Subjective:   Daily Progress Note: 1/15/2018 9:48 AM    Mr. Odin Garcia is a 68 yo AAM, with a pmh of moderate aortic stenosis and prior gi bleeding due to AVMs, who presented 1/12 for weakness and hg of 5.5 with grossly occult positive stool.  Given two units PRBCs with improvement in hg to 7.5, another unit yesterday for hg 7.1 with improvement to 9 this morning.  On iv protonix BID.  GI performed EGD yesterday morning, showed duodenal AVMs that were clipped but no active bleeding.  Course complicated by multiple other issues.  Patient was hypoglycemic even on D10 125/hr.  Is not a known diabetic and therefore not on insulin/oral diabetic agents.  Was hypernatremic to 157-this has resolved. Also with some acute on chronic renal failure. Baseline creatinine is around 1.5 and it was 2.1 on admission.  Is having bradycardic events to high 30s/40s range with pvcs. No intervention needed per cardiology. Patient hypothermic after EGD yesterday at 80 post anesthesia/EGD, soheila simms in place. No family currently at bedside.      Current Facility-Administered Medications   Medication Dose Route Frequency    dextrose 5 % - 0.45% NaCl infusion  125 mL/hr IntraVENous CONTINUOUS    0.9% sodium chloride infusion 250 mL  250 mL IntraVENous PRN    glucagon (GLUCAGEN) injection 1 mg  1 mg IntraMUSCular PRN    dextrose (D50W) injection syrg 12.5-25 g  25-50 mL IntraVENous PRN    amLODIPine (NORVASC) tablet 10 mg  10 mg Oral DAILY    divalproex ER (DEPAKOTE ER) 24 hour tablet 250 mg  250 mg Oral QHS    donepezil (ARICEPT) tablet 10 mg  10 mg Oral QHS    ferrous sulfate tablet 325 mg  325 mg Oral BID WITH MEALS    folic acid (FOLVITE) tablet 1 mg  1 mg Oral DAILY    gabapentin (NEURONTIN) capsule 300 mg  300 mg Oral TID    levETIRAcetam (KEPPRA) tablet 500 mg  500 mg Oral BID    levothyroxine (SYNTHROID) tablet 100 mcg  100 mcg Oral ACB    mirtazapine (REMERON) tablet 15 mg  15 mg Oral QHS    tamsulosin (FLOMAX) capsule 0.4 mg  0.4 mg Oral QHS    0.9% sodium chloride infusion 250 mL  250 mL IntraVENous PRN    sodium chloride (NS) flush 5-10 mL  5-10 mL IntraVENous Q8H    sodium chloride (NS) flush 5-10 mL  5-10 mL IntraVENous PRN    acetaminophen (TYLENOL) tablet 650 mg  650 mg Oral Q4H PRN    HYDROcodone-acetaminophen (NORCO) 5-325 mg per tablet 1 Tab  1 Tab Oral Q4H PRN    ondansetron (ZOFRAN) injection 4 mg  4 mg IntraVENous Q4H PRN    pantoprazole (PROTONIX) 40 mg in sodium chloride 0.9 % 10 mL injection  40 mg IntraVENous Q12H        Review of Systems  Review of systems not obtained due to patient factors. Objective:     Visit Vitals    /61    Pulse 69    Temp 98.6 °F (37 °C)    Resp 18    Ht 5' 8\" (1.727 m)    Wt 61.7 kg (136 lb)    SpO2 95%    BMI 20.68 kg/m2    O2 Flow Rate (L/min): 2 l/min O2 Device: Nasal cannula    Temp (24hrs), Av.3 °F (35.7 °C), Min:91.7 °F (33.2 °C), Max:98.9 °F (37.2 °C)          1901 - 01/15 0700  In: 440 [P.O.:240;  I.V.:200]  Out: 2     General: asleep but arousable, mostly nonverbal, ill appearing but not acutely distressed  Eyes; non icteric, EOMI  Neck; supple  CV: regular, bradycardic  Pulm; course breath sounds, non labored, normal effort  Abd; soft, non tender, non distended  Neuro: won't participate in exam    Additional comments:I reviewed the patient's new clinical lab test results. j    Data Review    Recent Results (from the past 24 hour(s))   GLUCOSE, POC    Collection Time: 18 10:48 AM   Result Value Ref Range    Glucose (POC) 118 (H) 65 - 100 mg/dL   MAGNESIUM    Collection Time: 18 12:06 PM   Result Value Ref Range    Magnesium 1.7 (L) 1.8 - 2.4 mg/dL   GLUCOSE, POC    Collection Time: 18  4:24 PM   Result Value Ref Range    Glucose (POC) 55 (L) 65 - 100 mg/dL   GLUCOSE, POC    Collection Time: 18  4:49 PM   Result Value Ref Range    Glucose (POC) 236 (H) 65 - 100 mg/dL   GLUCOSE, POC    Collection Time: 01/14/18  7:52 PM   Result Value Ref Range    Glucose (POC) 121 (H) 65 - 100 mg/dL   GLUCOSE, POC    Collection Time: 01/14/18  9:04 PM   Result Value Ref Range    Glucose (POC) 108 (H) 65 - 100 mg/dL   HGB & HCT    Collection Time: 01/14/18  9:20 PM   Result Value Ref Range    HGB 9.1 (L) 13.6 - 17.2 g/dL    HCT 28.1 (L) 41.1 - 50.3 %   GLUCOSE, POC    Collection Time: 01/14/18 10:23 PM   Result Value Ref Range    Glucose (POC) 131 (H) 65 - 100 mg/dL   GLUCOSE, POC    Collection Time: 01/15/18 12:35 AM   Result Value Ref Range    Glucose (POC) 101 (H) 65 - 100 mg/dL   GLUCOSE, POC    Collection Time: 01/15/18  1:41 AM   Result Value Ref Range    Glucose (POC) 90 65 - 100 mg/dL   GLUCOSE, POC    Collection Time: 01/15/18  2:57 AM   Result Value Ref Range    Glucose (POC) 100 65 - 100 mg/dL   GLUCOSE, POC    Collection Time: 01/15/18  3:58 AM   Result Value Ref Range    Glucose (POC) 120 (H) 65 - 100 mg/dL   GLUCOSE, POC    Collection Time: 01/15/18  5:07 AM   Result Value Ref Range    Glucose (POC) 97 65 - 100 mg/dL   CBC WITH AUTOMATED DIFF    Collection Time: 01/15/18  6:09 AM   Result Value Ref Range    WBC 6.8 4.3 - 11.1 K/uL    RBC 3.53 (L) 4.23 - 5.67 M/uL    HGB 9.0 (L) 13.6 - 17.2 g/dL    HCT 28.0 (L) 41.1 - 50.3 %    MCV 79.3 (L) 79.6 - 97.8 FL    MCH 25.5 (L) 26.1 - 32.9 PG    MCHC 32.1 31.4 - 35.0 g/dL    RDW 18.6 (H) 11.9 - 14.6 %    PLATELET 967 553 - 564 K/uL    MPV 10.2 (L) 10.8 - 14.1 FL    DF AUTOMATED      NEUTROPHILS 74 43 - 78 %    LYMPHOCYTES 15 13 - 44 %    MONOCYTES 8 4.0 - 12.0 %    EOSINOPHILS 3 0.5 - 7.8 %    BASOPHILS 0 0.0 - 2.0 %    IMMATURE GRANULOCYTES 0 0.0 - 5.0 %    ABS. NEUTROPHILS 5.0 1.7 - 8.2 K/UL    ABS. LYMPHOCYTES 1.0 0.5 - 4.6 K/UL    ABS. MONOCYTES 0.5 0.1 - 1.3 K/UL    ABS. EOSINOPHILS 0.2 0.0 - 0.8 K/UL    ABS. BASOPHILS 0.0 0.0 - 0.2 K/UL    ABS. IMM.  GRANS. 0.0 0.0 - 0.5 K/UL   METABOLIC PANEL, BASIC    Collection Time: 01/15/18  6:09 AM   Result Value Ref Range Sodium 144 136 - 145 mmol/L    Potassium 3.9 3.5 - 5.1 mmol/L    Chloride 114 (H) 98 - 107 mmol/L    CO2 22 21 - 32 mmol/L    Anion gap 8 7 - 16 mmol/L    Glucose 102 (H) 65 - 100 mg/dL    BUN 14 8 - 23 MG/DL    Creatinine 1.82 (H) 0.8 - 1.5 MG/DL    GFR est AA 47 (L) >60 ml/min/1.73m2    GFR est non-AA 39 (L) >60 ml/min/1.73m2    Calcium 7.7 (L) 8.3 - 10.4 MG/DL   PHOSPHORUS    Collection Time: 01/15/18  6:09 AM   Result Value Ref Range    Phosphorus 2.2 (L) 2.3 - 3.7 MG/DL   GLUCOSE, POC    Collection Time: 01/15/18  6:17 AM   Result Value Ref Range    Glucose (POC) 101 (H) 65 - 100 mg/dL   GLUCOSE, POC    Collection Time: 01/15/18  6:53 AM   Result Value Ref Range    Glucose (POC) 105 (H) 65 - 100 mg/dL   GLUCOSE, POC    Collection Time: 01/15/18  8:23 AM   Result Value Ref Range    Glucose (POC) 88 65 - 100 mg/dL         Assessment/Plan:     Principal Problem:    AVM (arteriovenous malformation) of small bowel, acquired with hemorrhage (HCC) (6/15/2014)  BID protonix. No active bleeds on 1/14 EGD. Active Problems:    GIB (gastrointestinal bleeding) (6/13/2014)      Hypernatremia (1/13/2018)  resolved      Microcytic hypochromic anemia (6/13/2014)      History of alcohol abuse (1/13/2018)      Overview: FORMERLY DRANK  1-2 12oz. Beers daily            NONE 2014      Acute on chronic renal failure ([address] (1/13/2018)  Creatinine again slightly increased this morning, will change IVFs to D5 1/2NS 100/hr      Bradycardia (1/13/2018)  Cardiology states no intervention needed. Not on any rate lowering medications. Acute blood loss anemia (1/13/2018)  Status post 3 units PRBCs this admission. Hg 9. Hypoglycemia (1/13/2018)  Change to D5 1/2 /hr today      Moderate aortic stenosis (1/13/2018)    This is a strange case. Uncertain etiology of what all is going on. Due to hypothermia, hypernatremia, hypoglycemia I am concerned for an autonomic dysfunction process?   Will consult neurology    Care Plan discussed with: Patient/Family and Nurse      Signed By: José Luis Tucker MD     January 15, 2018

## 2018-01-15 NOTE — PROGRESS NOTES
GI DAILY PROGRESS NOTE    Admit Date:  1/12/2018    Today's Date:  1/15/2018    CC:  GIB, Anemia    Subjective:     Patient underwent EGD yesterday that revealed small non bleeding AVMs in second and third portion of duodenum that were treated with APC. Pt was hypothermic yesterday post procedure. Sommer hugger was placed. HGB 9.0 today (9.1 yesterday). Last transfusion 1/14/18. Hopitalist has consulted neuro due to hypothermia, hyponatremia, and hypoglycemia--concerns for autonomic dysfunction process. He denies any abdominal pain, nausea, vomiting.  Nurse at bedside reports no BM this AM.     Medications:   Current Facility-Administered Medications   Medication Dose Route Frequency    dextrose 5 % - 0.45% NaCl infusion  125 mL/hr IntraVENous CONTINUOUS    0.9% sodium chloride infusion 250 mL  250 mL IntraVENous PRN    glucagon (GLUCAGEN) injection 1 mg  1 mg IntraMUSCular PRN    dextrose (D50W) injection syrg 12.5-25 g  25-50 mL IntraVENous PRN    amLODIPine (NORVASC) tablet 10 mg  10 mg Oral DAILY    divalproex ER (DEPAKOTE ER) 24 hour tablet 250 mg  250 mg Oral QHS    donepezil (ARICEPT) tablet 10 mg  10 mg Oral QHS    ferrous sulfate tablet 325 mg  325 mg Oral BID WITH MEALS    folic acid (FOLVITE) tablet 1 mg  1 mg Oral DAILY    gabapentin (NEURONTIN) capsule 300 mg  300 mg Oral TID    levETIRAcetam (KEPPRA) tablet 500 mg  500 mg Oral BID    levothyroxine (SYNTHROID) tablet 100 mcg  100 mcg Oral ACB    mirtazapine (REMERON) tablet 15 mg  15 mg Oral QHS    tamsulosin (FLOMAX) capsule 0.4 mg  0.4 mg Oral QHS    0.9% sodium chloride infusion 250 mL  250 mL IntraVENous PRN    sodium chloride (NS) flush 5-10 mL  5-10 mL IntraVENous Q8H    sodium chloride (NS) flush 5-10 mL  5-10 mL IntraVENous PRN    acetaminophen (TYLENOL) tablet 650 mg  650 mg Oral Q4H PRN    HYDROcodone-acetaminophen (NORCO) 5-325 mg per tablet 1 Tab  1 Tab Oral Q4H PRN    ondansetron (ZOFRAN) injection 4 mg  4 mg IntraVENous Q4H PRN    pantoprazole (PROTONIX) 40 mg in sodium chloride 0.9 % 10 mL injection  40 mg IntraVENous Q12H       Review of Systems:  ROS was obtained, with pertinent positives as listed above. No chest pain or SOB. Diet:  Full liquids    Objective:   Vitals:  Visit Vitals    /61    Pulse 69    Temp 98.6 °F (37 °C)    Resp 18    Ht 5' 8\" (1.727 m)    Wt 61.7 kg (136 lb)    SpO2 95%    BMI 20.68 kg/m2     Intake/Output:  01/15 0701 - 01/15 1900  In: 360 [P.O.:360]  Out: -   01/13 1901 - 01/15 0700  In: 440 [P.O.:240; I.V.:200]  Out: 2   Exam:  General appearance: drowsy but arousable, no distress, minimal verbal response  Lungs: coarse breath sounds throughout  Heart: regular rate and rhythm  Abdomen: soft, non-tender.  Bowel sounds normal. No masses, no organomegaly  Extremities: extremities normal, atraumatic, no cyanosis or edema  Neuro:  Drowsy, minimal response to questions    Data Review (Labs):    Recent Labs      01/15/18   0609  01/14/18   2120  01/14/18   1206  01/14/18   0532  01/13/18   2024  01/13/18   1222  01/13/18   0352  01/12/18   1656  01/12/18   1626   WBC  6.8   --    --    --    --    --   4.7  8.0   --    HGB  9.0*  9.1*   --   7.1*  7.7*  8.0*  7.2*  5.2*   --    HCT  28.0*  28.1*   --   22.2*  24.5*  25.1*  23.1*  17.0*   --    PLT  383   --    --    --    --    --   454*  524*   --    MCV  79.3*   --    --    --    --    --   81.1  80.2   --    NA  144   --    --   145  149*  153*  155*  156*  157*   K  3.9   --    --   3.8   --    --   3.8  4.1  4.6   CL  114*   --    --   114*   --    --   123*  123*  120*   CO2  22   --    --   22   --    --   22  22  18   BUN  14   --    --   16   --    --   25*  27*  25   CREA  1.82*   --    --   1.51*   --    --   1.85*  2.12*  2.02*   CA  7.7*   --    --   7.7*   --    --   8.4  8.4  8.4*   MG   --    --   1.7*  1.8   --    --    --    --    --    GLU  102*   --    --   98   --    --   108*  78  81   AP   --    --    --    --    -- --    --   122  116   SGOT   --    --    --    --    --    --    --   17  16   ALT   --    --    --    --    --    --    --   18  14   TBILI   --    --    --    --    --    --    --   0.2  <0.2   ALB   --    --    --    --    --    --    --   2.6*  3.4*   TP   --    --    --    --    --    --    --   8.1  7.1     EGD Procedure Details  1/14/18   Informed consent was obtained for the procedure, including conscious sedation. Risks of pancreatitis, infection, perforation, hemorrhage, adverse drug reaction and aspiration were discussed. The patient was placed in the left lateral decubitus position. Based on the pre-procedure assessment, including review of the patient's medical history, medications, allergies, and review of systems, he had been deemed to be an appropriate candidate for conscious sedation; he was therefore sedated with the medications listed below. He was monitored continuously with ECG tracing, pulse oximetry, blood pressure monitoring, and direct observation.    The EGD gastroscope was inserted into the mouth and advanced under direct vision to the second portion of the duodenum. A careful inspection was made as the gastroscope was withdrawn, including a retroflexed view of the proximal stomach; findings and interventions are described below. Appropriate photodocumentation was obtained.   Findings:   Esophagus- Normal.  Stomach- Normal.  Duodenum- No bleeding seen. The bulb is normal.  In the second and third portion are small non bleeding AVMs that I APCed.   Therapies: APC of duodenal AVMs   Specimens: None   Estimated Blood Loss: Minimal    Complications:   None; patient tolerated the procedure well.    Attending Attestation:  I performed the procedure.   Impression:  No active bleeding seen                        Duodenal AVMs  S/P APC   Recommendations:  Monitor sx and hgb                                      Advance diet.   Consider colonoscopy etc if bleeding or worsening anemia.       Assessment:     Principal Problem:    AVM (arteriovenous malformation) of small bowel, acquired with hemorrhage ([facility] [address] (6/15/2014)    Active Problems:    GIB (gastrointestinal bleeding) (6/13/2014)      Hypernatremia (1/13/2018)      Microcytic hypochromic anemia (6/13/2014)      History of alcohol abuse (1/13/2018)      Overview: FORMERLY DRANK  1-2 12oz. Beers daily            NONE 2014      Acute on chronic renal failure (HCC) (1/13/2018)      Bradycardia (1/13/2018)      Acute blood loss anemia (1/13/2018)      Hypoglycemia (1/13/2018)      Moderate aortic stenosis (1/13/2018)    Patient is a 67 y.o. Shilpi Cramerion PMH of  AVMs with chronic recurrent bleeding, CKD, dementia, HLD, HTN, Hypothyroid, seizure history, COPD, and history of ETOH abuse formerly, who is admitted with severe anemia with hgb 5.2. He is seen in consultation at the request of Dr. Isiah Garrison +. EGD 9-29-17 by Dr Jean-Claude Gómez small nonbleeding AVM's in the duodenum and jejunum were cauterized by APC.   EGD with Dr Campbell Esteves 11/28/16 that revealed gastric AVM on lesser curvature and duodenal bulb AVM, both were clipped. Colonoscopy on 6/17/14 with Dr Jeremi García that revealed tubular adenoma polyps and a nonbleeding cecal AVM. Recommendation was made for repeat colonoscopy in 6/2017. EGD with pediatric colonoscope to Jejunum 12/8/16 by Dr. Perla Tillman revealed 2mm nodule at GE junctions, clip along proximal greater curvature, Few AVM, not actively bleeding in the duodenum. Treated with APC; Multiple AVMs throughout the jejunum, not actively bleeding. Treated with APC. Terminated early due to poor O2 saturation and possible aspiration    Plan:     -Monitor HGB and transfuse as needed  -Continue pantoprazole 40mg IV BID  -Consider repeat colo for APC of AVM as outpt  -Continue to follow     ARINA Hawkins    Patient is seen and examined in collaboration with Dr. Mayra Kenny. Assessment and plan as per Dr. Mayra Kenny.     I have seen and examined this patient, and agree with above assessment and plan. Anemia likely related to multiple small bowel AVMs. Colonic AVMs possible, but currently no active bleeding to suggest this. With multiple severe comorbidities, further colon cancer screening not indicated  Pt somnolent, mumbles a few words. No abdom pain, N/V. No overt bleeding. Currently Cr trending up, mult systemic issues. Recommend conservative treatment of anemia, attempt to minimize invasive procedures if possible  Cont Fe supplements. Advance diet. Cont PPI, change to PO bid  Will folllow up anemia as outpt. Pls call if further issues arise.      Daisy Gutiérrez MD

## 2018-01-15 NOTE — PROGRESS NOTES
Pt temp was stable till now. Temp of 97.5, soheila simms being put on pt. Will recheck in an hour and will continue to monitor.

## 2018-01-16 LAB
ANION GAP SERPL CALC-SCNC: 11 MMOL/L (ref 7–16)
BASOPHILS # BLD: 0 K/UL (ref 0–0.2)
BASOPHILS NFR BLD: 0 % (ref 0–2)
BUN SERPL-MCNC: 15 MG/DL (ref 8–23)
CALCIUM SERPL-MCNC: 7.8 MG/DL (ref 8.3–10.4)
CHLORIDE SERPL-SCNC: 117 MMOL/L (ref 98–107)
CO2 SERPL-SCNC: 22 MMOL/L (ref 21–32)
CREAT SERPL-MCNC: 1.68 MG/DL (ref 0.8–1.5)
DIFFERENTIAL METHOD BLD: ABNORMAL
EOSINOPHIL # BLD: 0.2 K/UL (ref 0–0.8)
EOSINOPHIL NFR BLD: 3 % (ref 0.5–7.8)
ERYTHROCYTE [DISTWIDTH] IN BLOOD BY AUTOMATED COUNT: 19.2 % (ref 11.9–14.6)
GLUCOSE BLD STRIP.AUTO-MCNC: 106 MG/DL (ref 65–100)
GLUCOSE BLD STRIP.AUTO-MCNC: 112 MG/DL (ref 65–100)
GLUCOSE BLD STRIP.AUTO-MCNC: 114 MG/DL (ref 65–100)
GLUCOSE BLD STRIP.AUTO-MCNC: 130 MG/DL (ref 65–100)
GLUCOSE BLD STRIP.AUTO-MCNC: 135 MG/DL (ref 65–100)
GLUCOSE BLD STRIP.AUTO-MCNC: 136 MG/DL (ref 65–100)
GLUCOSE BLD STRIP.AUTO-MCNC: 75 MG/DL (ref 65–100)
GLUCOSE BLD STRIP.AUTO-MCNC: 77 MG/DL (ref 65–100)
GLUCOSE BLD STRIP.AUTO-MCNC: 79 MG/DL (ref 65–100)
GLUCOSE BLD STRIP.AUTO-MCNC: 80 MG/DL (ref 65–100)
GLUCOSE BLD STRIP.AUTO-MCNC: 83 MG/DL (ref 65–100)
GLUCOSE BLD STRIP.AUTO-MCNC: 83 MG/DL (ref 65–100)
GLUCOSE BLD STRIP.AUTO-MCNC: 91 MG/DL (ref 65–100)
GLUCOSE BLD STRIP.AUTO-MCNC: 92 MG/DL (ref 65–100)
GLUCOSE BLD STRIP.AUTO-MCNC: 94 MG/DL (ref 65–100)
GLUCOSE BLD STRIP.AUTO-MCNC: 95 MG/DL (ref 65–100)
GLUCOSE BLD STRIP.AUTO-MCNC: 97 MG/DL (ref 65–100)
GLUCOSE BLD STRIP.AUTO-MCNC: 98 MG/DL (ref 65–100)
GLUCOSE SERPL-MCNC: 81 MG/DL (ref 65–100)
HCT VFR BLD AUTO: 26.8 % (ref 41.1–50.3)
HGB BLD-MCNC: 8.6 G/DL (ref 13.6–17.2)
IMM GRANULOCYTES # BLD: 0 K/UL (ref 0–0.5)
IMM GRANULOCYTES NFR BLD AUTO: 0 % (ref 0–5)
LYMPHOCYTES # BLD: 1.1 K/UL (ref 0.5–4.6)
LYMPHOCYTES NFR BLD: 14 % (ref 13–44)
MAGNESIUM SERPL-MCNC: 2.1 MG/DL (ref 1.8–2.4)
MCH RBC QN AUTO: 25.7 PG (ref 26.1–32.9)
MCHC RBC AUTO-ENTMCNC: 32.1 G/DL (ref 31.4–35)
MCV RBC AUTO: 80.2 FL (ref 79.6–97.8)
MONOCYTES # BLD: 0.8 K/UL (ref 0.1–1.3)
MONOCYTES NFR BLD: 10 % (ref 4–12)
NEUTS SEG # BLD: 5.6 K/UL (ref 1.7–8.2)
NEUTS SEG NFR BLD: 73 % (ref 43–78)
PHOSPHATE SERPL-MCNC: 2.5 MG/DL (ref 2.3–3.7)
PLATELET # BLD AUTO: 357 K/UL (ref 150–450)
PMV BLD AUTO: 10.2 FL (ref 10.8–14.1)
POTASSIUM SERPL-SCNC: 4 MMOL/L (ref 3.5–5.1)
RBC # BLD AUTO: 3.34 M/UL (ref 4.23–5.67)
SODIUM SERPL-SCNC: 150 MMOL/L (ref 136–145)
WBC # BLD AUTO: 7.7 K/UL (ref 4.3–11.1)

## 2018-01-16 PROCEDURE — 74011250637 HC RX REV CODE- 250/637: Performed by: INTERNAL MEDICINE

## 2018-01-16 PROCEDURE — 87086 URINE CULTURE/COLONY COUNT: CPT | Performed by: INTERNAL MEDICINE

## 2018-01-16 PROCEDURE — 74011000250 HC RX REV CODE- 250: Performed by: INTERNAL MEDICINE

## 2018-01-16 PROCEDURE — 82962 GLUCOSE BLOOD TEST: CPT

## 2018-01-16 PROCEDURE — 74011250637 HC RX REV CODE- 250/637: Performed by: FAMILY MEDICINE

## 2018-01-16 PROCEDURE — 74011000258 HC RX REV CODE- 258: Performed by: INTERNAL MEDICINE

## 2018-01-16 PROCEDURE — 36415 COLL VENOUS BLD VENIPUNCTURE: CPT | Performed by: INTERNAL MEDICINE

## 2018-01-16 PROCEDURE — 84100 ASSAY OF PHOSPHORUS: CPT | Performed by: INTERNAL MEDICINE

## 2018-01-16 PROCEDURE — 92526 ORAL FUNCTION THERAPY: CPT

## 2018-01-16 PROCEDURE — 74011250636 HC RX REV CODE- 250/636: Performed by: INTERNAL MEDICINE

## 2018-01-16 PROCEDURE — 83735 ASSAY OF MAGNESIUM: CPT | Performed by: INTERNAL MEDICINE

## 2018-01-16 PROCEDURE — 80048 BASIC METABOLIC PNL TOTAL CA: CPT | Performed by: INTERNAL MEDICINE

## 2018-01-16 PROCEDURE — 85025 COMPLETE CBC W/AUTO DIFF WBC: CPT | Performed by: INTERNAL MEDICINE

## 2018-01-16 PROCEDURE — 65270000029 HC RM PRIVATE

## 2018-01-16 RX ORDER — FAMOTIDINE 40 MG/5ML
20 POWDER, FOR SUSPENSION ORAL DAILY
Status: DISCONTINUED | OUTPATIENT
Start: 2018-01-16 | End: 2018-01-24 | Stop reason: HOSPADM

## 2018-01-16 RX ORDER — DEXTROSE MONOHYDRATE 50 MG/ML
75 INJECTION, SOLUTION INTRAVENOUS CONTINUOUS
Status: DISCONTINUED | OUTPATIENT
Start: 2018-01-16 | End: 2018-01-20

## 2018-01-16 RX ADMIN — Medication 10 ML: at 06:03

## 2018-01-16 RX ADMIN — DEXTROSE MONOHYDRATE AND SODIUM CHLORIDE 125 ML/HR: 5; .45 INJECTION, SOLUTION INTRAVENOUS at 00:31

## 2018-01-16 RX ADMIN — FERROUS SULFATE TAB 325 MG (65 MG ELEMENTAL FE) 325 MG: 325 (65 FE) TAB at 16:34

## 2018-01-16 RX ADMIN — Medication 10 ML: at 21:13

## 2018-01-16 RX ADMIN — LEVETIRACETAM 500 MG: 500 TABLET ORAL at 16:35

## 2018-01-16 RX ADMIN — LEVOTHYROXINE SODIUM 100 MCG: 100 TABLET ORAL at 06:03

## 2018-01-16 RX ADMIN — AMLODIPINE BESYLATE 10 MG: 10 TABLET ORAL at 08:32

## 2018-01-16 RX ADMIN — TAMSULOSIN HYDROCHLORIDE 0.4 MG: 0.4 CAPSULE ORAL at 21:13

## 2018-01-16 RX ADMIN — DEXTROSE MONOHYDRATE AND SODIUM CHLORIDE 125 ML/HR: 5; .45 INJECTION, SOLUTION INTRAVENOUS at 08:38

## 2018-01-16 RX ADMIN — GABAPENTIN 300 MG: 300 CAPSULE ORAL at 16:34

## 2018-01-16 RX ADMIN — LEVETIRACETAM 500 MG: 500 TABLET ORAL at 08:32

## 2018-01-16 RX ADMIN — WATER 1 G: 1 INJECTION INTRAMUSCULAR; INTRAVENOUS; SUBCUTANEOUS at 17:27

## 2018-01-16 RX ADMIN — DEXTROSE MONOHYDRATE 100 ML/HR: 5 INJECTION, SOLUTION INTRAVENOUS at 12:16

## 2018-01-16 RX ADMIN — FAMOTIDINE 20 MG: 40 POWDER, FOR SUSPENSION ORAL at 06:03

## 2018-01-16 RX ADMIN — DONEPEZIL HYDROCHLORIDE 10 MG: 5 TABLET, FILM COATED ORAL at 21:13

## 2018-01-16 RX ADMIN — FOLIC ACID 1 MG: 1 TABLET ORAL at 08:32

## 2018-01-16 RX ADMIN — Medication 10 ML: at 15:16

## 2018-01-16 RX ADMIN — DIVALPROEX SODIUM 250 MG: 250 TABLET, EXTENDED RELEASE ORAL at 21:13

## 2018-01-16 RX ADMIN — MIRTAZAPINE 15 MG: 15 TABLET, FILM COATED ORAL at 21:13

## 2018-01-16 RX ADMIN — DEXTROSE MONOHYDRATE 100 ML/HR: 5 INJECTION, SOLUTION INTRAVENOUS at 22:20

## 2018-01-16 RX ADMIN — GABAPENTIN 300 MG: 300 CAPSULE ORAL at 21:13

## 2018-01-16 RX ADMIN — GABAPENTIN 300 MG: 300 CAPSULE ORAL at 08:32

## 2018-01-16 RX ADMIN — FERROUS SULFATE TAB 325 MG (65 MG ELEMENTAL FE) 325 MG: 325 (65 FE) TAB at 08:32

## 2018-01-16 NOTE — PROGRESS NOTES
Hourly rounds were done and pt needs were met. Report  Will be given to day shift nurse and will continue to monitor.

## 2018-01-16 NOTE — PROGRESS NOTES
STG: Pt will tolerate full liquid/nectar thick consistency without overt signs/sx of aspiration with 100% accuracy  STG: Pt will participate with trials of thin liquids with SLP only for diet advancement as tolerated  LTG: Pt will tolerate the least restrictive diet at discharge without respiratory compromise    Speech language pathology: bedside swallow note: Daily Note 1    NAME/AGE/GENDER: Susannah Holbrook is a 67 y.o. male  DATE: 1/16/2018  PRIMARY DIAGNOSIS: GIB (gastrointestinal bleeding)  GI BLEED, ANEMIA  Procedure(s) (LRB):  ESOPHAGOGASTRODUODENOSCOPY (EGD) PUSH (N/A)  ENDOSCOPIC ARGON PLASMA COAGULATION (N/A) 2 Days Post-Op  ICD-10: Treatment Diagnosis: dysphagia; oropharyngeal R13.12  INTERDISCIPLINARY COLLABORATION: Registered Nurse and CNA  PRECAUTIONS/ALLERGIES: Review of patient's allergies indicates no known allergies. ASSESSMENT:Based on the objective data described below, Mr. Tate Hopper presents with moderate oropharyngeal dysphagia exacerbated by current altered mental status. Patient with increased oral delays today with increased anterior spillage with nectar thick trials and intermittent cough with thickened broth from the spoon. Patient fed himself a container of pudding with no overt signs/sx of aspiration. Difficulty holding the cup for liquid trials. Improved tolerance with honey thick liquids with no overt signs/sx of aspiration with honey thick via cup or straw this date. Recommend downgrade to honey thick liquids. Continue full liquids per GI.  1:1 assistance  Notified RN of diet changes. Will follow for diet tolerance and advancement as appropriate. Patient will benefit from skilled intervention to address the below impairments. ?????? ? ? This section established at most recent assessment??????????  PROBLEM LIST (Impairments causing functional limitations):  1.  Dysphagia  2. cognition  REHABILITATION POTENTIAL FOR STATED GOALS: Good  PLAN OF CARE:   Patient will benefit from skilled intervention to address the following impairments. RECOMMENDATIONS AND PLANNED INTERVENTIONS (Benefits and precautions of therapy have been discussed with the patient.):  · full liquids  MEDICATIONS:  · Crushed in puree  COMPENSATORY STRATEGIES/MODIFICATIONS INCLUDING:  · Small sips and bites  · 1:1 feeding assistance  OTHER RECOMMENDATIONS (including follow up treatment recommendations): · Family training/education  · Patient education  · po trials  RECOMMENDED DIET MODIFICATIONS DISCUSSED WITH:  · Nursing  · Patient  FREQUENCY/DURATION: Continue to follow patient 3 times a week for duration of hospital stay to address above goals. RECOMMENDED REHABILITATION/EQUIPMENT: (at time of discharge pending progress): Due to the probability of continued deficits (see above) this patient will not likely need continued skilled speech therapy after discharge. SUBJECTIVE:   Confused but cooperative. History of Present Injury/Illness: Mr. Gus Huffman  has a past medical history of Alcohol abuse; Anemia; Angiodysplasia of stomach (2016); Bladder incontinence (05/28/2014); Chronic airway obstruction, not elsewhere classified (5/28/2014); CKD (chronic kidney disease); COPD (chronic obstructive pulmonary disease) ([facility] [address] Dementia (5/28/2014); Hyperlipidemia (5/28/2014); Hypernatremia (11/25/2016); Hypertension; Hypothyroid; Seizure ([facility] [address] (2010); Tobacco abuse (5/28/2014); and Tobacco abuse. He also  has a past surgical history that includes hx colonoscopy (APPROX 2005) and hx endoscopy (2016). Present Symptoms: cough with thin liquids x1  Pain Intensity 1: 0  Pain Intervention(s) 1: Rest  Current Medications:   No current facility-administered medications on file prior to encounter. Current Outpatient Prescriptions on File Prior to Encounter   Medication Sig Dispense Refill    levothyroxine (SYNTHROID) 100 mcg tablet Take 1 Tab by mouth Daily (before breakfast).  90 Tab 1    levETIRAcetam (KEPPRA) 500 mg tablet Take 1 Tab by mouth two (2) times a day. 180 Tab 1    pantoprazole (PROTONIX) 40 mg tablet Take 1 Tab by mouth two (2) times a day. 569 Tab 2    folic acid (FOLVITE) 1 mg tablet Take 1 Tab by mouth daily. 90 Tab 2    mirtazapine (REMERON) 15 mg tablet Take 1 Tab by mouth nightly. 90 Tab 2    donepezil (ARICEPT) 10 mg tablet Take 1 Tab by mouth nightly. 90 Tab 3    gabapentin (NEURONTIN) 300 mg capsule Take 1 Cap by mouth three (3) times daily. 90 Cap 2    amLODIPine (NORVASC) 10 mg tablet Take 1 Tab by mouth daily. 90 Tab 3    losartan (COZAAR) 100 mg tablet Take 1 Tab by mouth daily. 90 Tab 3    ferrous sulfate 325 mg (65 mg iron) tablet Take 1 Tab by mouth two (2) times daily (with meals). 60 Tab 0    divalproex ER (DEPAKOTE ER) 250 mg ER tablet Take 1 Tab by mouth nightly. 90 Tab 2    tamsulosin (FLOMAX) 0.4 mg capsule Take 1 Cap by mouth nightly. 90 Cap 2     Current Dietary Status:  Full liquids      History of reflux:  yes}    Reflux medication: protonix  Social History/Home Situation: home with family     OBJECTIVE:   Respiratory Status:  Room air  0 l/min  CXR Results: n/a this admission  MRI/CT Results:n/a this admission  Oral Motor Structure/Speech:  Oral-Motor Structure/Motor Speech  Labial: Decreased seal  Dentition: Natural, Limited  Lingual: Decreased rate, Incoordinated    Cognitive and Communication Status:  Neurologic State: Alert  Orientation Level: Oriented to person  Cognition: Decreased attention/concentration     Perseveration: Perseverates during conversation  Safety/Judgement: Decreased awareness of need for safety    BEDSIDE SWALLOW EVALUATION  Oral Assessment:  Oral Assessment  Labial: Decreased seal  Dentition: Natural;Limited  Lingual: Decreased rate; Incoordinated  P.O. Trials:  Patient Position: upright in bed    The patient was given tsp to straw amounts of the following:   Consistency Presented: Nectar thick liquid;Puree; Honey thick liquid  How Presented: Straw;Cup/sip    ORAL PHASE:  Bolus Acceptance: Impaired  Bolus Formation/Control: Impaired  Propulsion: Delayed (# of seconds); Discoordination  Type of Impairment: Delayed;Mastication;Spillage;Lip closure; Incomplete  Oral Residue: 10-50% of bolus    PHARYNGEAL PHASE:  Initiation of Swallow: Delayed (# of seconds)  Laryngeal Elevation: Decreased  Aspiration Signs/Symptoms: Strong cough  Vocal Quality: Low volume           Pharyngeal Phase Characteristics: Poor endurance;Easily fatigued     OTHER OBSERVATIONS:  Rate/bite size: WNL   Endurance:  Impaired     Tool Used: Dysphagia Outcome and Severity Scale (NABILA)    Score Comments   Normal Diet  [] 7 With no strategies or extra time needed   Functional Swallow  [] 6 May have mild oral or pharyngeal delay       Mild Dysphagia    [] 5 Which may require one diet consistency restricted (those who demonstrate penetration which is entirely cleared on MBS would be included)   Mild-Moderate Dysphagia  [x] 4 With 1-2 diet consistencies restricted       Moderate Dysphagia  [] 3 With 2 or more diet consistencies restricted       Moderately Severe Dysphagia  [] 2 With partial PO strategies (trials with ST only)       Severe Dysphagia  [] 1 With inability to tolerate any PO safely          Score:  Initial: 4 Most Recent: X (Date: -- )   Interpretation of Tool: The Dysphagia Outcome and Severity Scale (NABILA) is a simple, easy-to-use, 7-point scale developed to systematically rate the functional severity of dysphagia based on objective assessment and make recommendations for diet level, independence level, and type of nutrition. Score 7 6 5 4 3 2 1   Modifier CH CI CJ CK CL CM CN   ?  Swallowing:     - CURRENT STATUS: CK - 40%-59% impaired, limited or restricted    - GOAL STATUS:  CL - 60%-79% impaired, limited or restricted    - D/C STATUS:  ---------------To be determined---------------  Payor: Diane Sheldon / Plan: [address] [place] [address] HMO / Product Type: Managed Care Medicare / TREATMENT:    (In addition to Assessment/Re-Assessment sessions the following treatments were rendered)  Dysphagia Activities: Activities/Procedures listed utilized to improve progress in swallow function and diet tolerance. Required maximal cueing to decrease aspiration risk. ORAL MOTOR  EXERCISES:                                                                                                                                                                      LARYNGEAL / PHARYNGEAL EXERCISES:                                                                                                                                     __________________________________________________________________________________________________  Safety:   After treatment position/precautions:  · RN notified  · Upright in Bed  Progression/Medical Necessity:   · Skilled intervention continues to be required due to persistent signs and symptoms of aspiration and patient still consuming a modified diet. Compliance with Program/Exercises: Will assess as treatment progresses. Reason for Continuation of Services/Other Comments:  · Patient continues to require skilled intervention due to patient unable to attend/participate in therapy as expected. Recommendations/Intent for next treatment session: \"Treatment next visit will focus on po trials; diet tolerance\".     Total Treatment Duration:  Time In: 1140  Time Out: [address] MS, CCC-SLP

## 2018-01-16 NOTE — PROGRESS NOTES
Care Management Interventions  PCP Verified by CM: Yes  Transition of Care Consult (CM Consult): SNF  Partner SNF: Yes  Physical Therapy Consult: Yes  Occupational Therapy Consult: Yes  Plan discussed with Pt/Family/Caregiver: Yes  Freedom of Choice Offered: Yes  Discharge Location  Discharge Placement: Skilled nursing facility     Patient would like to go to [facility] [place]. He has been referred and accepted and we await a [employer] pre cert. Anticipated dc date is Friday. PT and OT and PPD placed. Fredy Finn

## 2018-01-16 NOTE — PROGRESS NOTES
Hospitalist Progress Note    Subjective:   Patient examined at bedside. He was noted alert, but drowsy. Had his breakfast. Still hypernatremic and started on D5 infusion. Care manager involved. He denies nausea, vomiting, chest pain, abdominal pain, diarrhea. Current Facility-Administered Medications   Medication Dose Route Frequency    famotidine (PEPCID) 40 mg/5 mL (8 mg/mL) oral suspension 20 mg  20 mg Oral DAILY    dextrose 5 % - 0.45% NaCl infusion  125 mL/hr IntraVENous CONTINUOUS    cefTRIAXone (ROCEPHIN) 1 g in sterile water (preservative free) 10 mL IV syringe  1 g IntraVENous Q24H    0.9% sodium chloride infusion 250 mL  250 mL IntraVENous PRN    glucagon (GLUCAGEN) injection 1 mg  1 mg IntraMUSCular PRN    dextrose (D50W) injection syrg 12.5-25 g  25-50 mL IntraVENous PRN    amLODIPine (NORVASC) tablet 10 mg  10 mg Oral DAILY    divalproex ER (DEPAKOTE ER) 24 hour tablet 250 mg  250 mg Oral QHS    donepezil (ARICEPT) tablet 10 mg  10 mg Oral QHS    ferrous sulfate tablet 325 mg  325 mg Oral BID WITH MEALS    folic acid (FOLVITE) tablet 1 mg  1 mg Oral DAILY    gabapentin (NEURONTIN) capsule 300 mg  300 mg Oral TID    levETIRAcetam (KEPPRA) tablet 500 mg  500 mg Oral BID    levothyroxine (SYNTHROID) tablet 100 mcg  100 mcg Oral ACB    mirtazapine (REMERON) tablet 15 mg  15 mg Oral QHS    tamsulosin (FLOMAX) capsule 0.4 mg  0.4 mg Oral QHS    0.9% sodium chloride infusion 250 mL  250 mL IntraVENous PRN    sodium chloride (NS) flush 5-10 mL  5-10 mL IntraVENous Q8H    sodium chloride (NS) flush 5-10 mL  5-10 mL IntraVENous PRN    acetaminophen (TYLENOL) tablet 650 mg  650 mg Oral Q4H PRN    HYDROcodone-acetaminophen (NORCO) 5-325 mg per tablet 1 Tab  1 Tab Oral Q4H PRN    ondansetron (ZOFRAN) injection 4 mg  4 mg IntraVENous Q4H PRN        Review of Systems  Review of systems not obtained due to patient factors.     Objective:     Visit Vitals    /61    Pulse 71    Temp 98.6 °F (37 °C)    Resp 18    Ht 5' 8\" (1.727 m)    Wt 61.7 kg (136 lb)    SpO2 93%    BMI 20.68 kg/m2    O2 Flow Rate (L/min): 0 l/min O2 Device: Room air    Temp (24hrs), Av.1 °F (36.7 °C), Min:97.3 °F (36.3 °C), Max:99.3 °F (37.4 °C)          1901 -  0700  In: 7302 [P.O.:360; I.V.:7131]  Out: -     General: asleep but arousable, mostly nonverbal, ill appearing but not acutely distressed  Eyes; non icteric, EOMI  Neck; supple  CV: regular, no murmurs, no gallops   Pulm; course breath sounds, non labored, normal effort  Abd; soft, non tender, non distended  Neuro: unable to perform since he was drowsy likely secondary to hypernatremia     Additional comments:I reviewed the patient's new clinical lab test results. j    Data Review    Recent Results (from the past 24 hour(s))   GLUCOSE, POC    Collection Time: 01/15/18  9:33 AM   Result Value Ref Range    Glucose (POC) 98 65 - 100 mg/dL   GLUCOSE, POC    Collection Time: 01/15/18  9:46 AM   Result Value Ref Range    Glucose (POC) 80 65 - 100 mg/dL   GLUCOSE, POC    Collection Time: 01/15/18 10:15 AM   Result Value Ref Range    Glucose (POC) 95 65 - 100 mg/dL   GLUCOSE, POC    Collection Time: 01/15/18 10:49 AM   Result Value Ref Range    Glucose (POC) 99 65 - 100 mg/dL   GLUCOSE, POC    Collection Time: 01/15/18 11:13 AM   Result Value Ref Range    Glucose (POC) 90 65 - 100 mg/dL   GLUCOSE, POC    Collection Time: 01/15/18 11:49 AM   Result Value Ref Range    Glucose (POC) 135 (H) 65 - 100 mg/dL   GLUCOSE, POC    Collection Time: 01/15/18 12:45 PM   Result Value Ref Range    Glucose (POC) 85 65 - 100 mg/dL   GLUCOSE, POC    Collection Time: 01/15/18  1:37 PM   Result Value Ref Range    Glucose (POC) 101 (H) 65 - 100 mg/dL   GLUCOSE, POC    Collection Time: 01/15/18  2:29 PM   Result Value Ref Range    Glucose (POC) 109 (H) 65 - 100 mg/dL   BLOOD GAS, ARTERIAL    Collection Time: 01/15/18  3:02 PM   Result Value Ref Range    pH 7.41 7.35 - 7.45 PCO2 32 (L) 35 - 45 mmHg    PO2 69 (L) 80 - 105 mmHg    BICARBONATE 20 (L) 22 - 26 mmol/L    BASE DEFICIT 3.8 (H) 0 - 2 mmol/L    TOTAL HEMOGLOBIN 9.7 (L) 11.7 - 15.0 GM/DL    O2 SAT 93 92 - 98.5 %    Arterial O2 Hgb 92.0 (L) 94 - 97 %    CARBOXYHEMOGLOBIN 0.3 (L) 0.5 - 1.5 %    METHEMOGLOBIN 0.7 0.0 - 1.5 %    DEOXYHEMOGLOBIN 7 (H) 0.0 - 5.0 %    SITE RR     ALLENS TEST POSITIVE      MODE NC     O2 FLOW 2.00 L/min   AMMONIA    Collection Time: 01/15/18  3:29 PM   Result Value Ref Range    Ammonia 24 11 - 32 UMOL/L   VALPROIC ACID    Collection Time: 01/15/18  3:29 PM   Result Value Ref Range    Valproic acid 6 (L) 50 - 100 ug/ml   SED RATE, AUTOMATED    Collection Time: 01/15/18  3:29 PM   Result Value Ref Range    Sed rate, automated 95 (H) 0 - 20 mm/hr   CULTURE, BLOOD    Collection Time: 01/15/18  3:29 PM   Result Value Ref Range    Special Requests: RIGHT  Antecubital        Culture result: NO GROWTH AFTER 14 HOURS     CULTURE, BLOOD    Collection Time: 01/15/18  3:30 PM   Result Value Ref Range    Special Requests: RIGHT  Antecubital        Culture result: NO GROWTH AFTER 14 HOURS     GLUCOSE, POC    Collection Time: 01/15/18  3:37 PM   Result Value Ref Range    Glucose (POC) 72 65 - 100 mg/dL   GLUCOSE, POC    Collection Time: 01/15/18  4:07 PM   Result Value Ref Range    Glucose (POC) 88 65 - 100 mg/dL   GLUCOSE, POC    Collection Time: 01/15/18  5:00 PM   Result Value Ref Range    Glucose (POC) 95 65 - 100 mg/dL   GLUCOSE, POC    Collection Time: 01/15/18  5:49 PM   Result Value Ref Range    Glucose (POC) 119 (H) 65 - 100 mg/dL   GLUCOSE, POC    Collection Time: 01/15/18  6:23 PM   Result Value Ref Range    Glucose (POC) 113 (H) 65 - 100 mg/dL   GLUCOSE, POC    Collection Time: 01/15/18  7:10 PM   Result Value Ref Range    Glucose (POC) 112 (H) 65 - 100 mg/dL   GLUCOSE, POC    Collection Time: 01/15/18  7:49 PM   Result Value Ref Range    Glucose (POC) 109 (H) 65 - 100 mg/dL   GLUCOSE, POC    Collection Time: 01/15/18 10:49 PM   Result Value Ref Range    Glucose (POC) 110 (H) 65 - 100 mg/dL   GLUCOSE, POC    Collection Time: 01/16/18 12:39 AM   Result Value Ref Range    Glucose (POC) 92 65 - 100 mg/dL   CULTURE, URINE    Collection Time: 01/16/18  1:20 AM   Result Value Ref Range    Special Requests: NO SPECIAL REQUESTS      Culture result:        NO GROWTH AFTER SHORT PERIOD OF INCUBATION. FURTHER RESULTS TO FOLLOW AFTER OVERNIGHT INCUBATION.    GLUCOSE, POC    Collection Time: 01/16/18  2:49 AM   Result Value Ref Range    Glucose (POC) 97 65 - 100 mg/dL   GLUCOSE, POC    Collection Time: 01/16/18  3:04 AM   Result Value Ref Range    Glucose (POC) 106 (H) 65 - 100 mg/dL   GLUCOSE, POC    Collection Time: 01/16/18  4:08 AM   Result Value Ref Range    Glucose (POC) 91 65 - 715 mg/dL   METABOLIC PANEL, BASIC    Collection Time: 01/16/18  4:57 AM   Result Value Ref Range    Sodium 150 (H) 136 - 145 mmol/L    Potassium 4.0 3.5 - 5.1 mmol/L    Chloride 117 (H) 98 - 107 mmol/L    CO2 22 21 - 32 mmol/L    Anion gap 11 7 - 16 mmol/L    Glucose 81 65 - 100 mg/dL    BUN 15 8 - 23 MG/DL    Creatinine 1.68 (H) 0.8 - 1.5 MG/DL    GFR est AA 52 (L) >60 ml/min/1.73m2    GFR est non-AA 43 (L) >60 ml/min/1.73m2    Calcium 7.8 (L) 8.3 - 10.4 MG/DL   MAGNESIUM    Collection Time: 01/16/18  4:57 AM   Result Value Ref Range    Magnesium 2.1 1.8 - 2.4 mg/dL   PHOSPHORUS    Collection Time: 01/16/18  4:57 AM   Result Value Ref Range    Phosphorus 2.5 2.3 - 3.7 MG/DL   CBC WITH AUTOMATED DIFF    Collection Time: 01/16/18  4:57 AM   Result Value Ref Range    WBC 7.7 4.3 - 11.1 K/uL    RBC 3.34 (L) 4.23 - 5.67 M/uL    HGB 8.6 (L) 13.6 - 17.2 g/dL    HCT 26.8 (L) 41.1 - 50.3 %    MCV 80.2 79.6 - 97.8 FL    MCH 25.7 (L) 26.1 - 32.9 PG    MCHC 32.1 31.4 - 35.0 g/dL    RDW 19.2 (H) 11.9 - 14.6 %    PLATELET 788 038 - 946 K/uL    MPV 10.2 (L) 10.8 - 14.1 FL    DF AUTOMATED      NEUTROPHILS 73 43 - 78 %    LYMPHOCYTES 14 13 - 44 %    MONOCYTES 10 4.0 - 12.0 %    EOSINOPHILS 3 0.5 - 7.8 %    BASOPHILS 0 0.0 - 2.0 %    IMMATURE GRANULOCYTES 0 0.0 - 5.0 %    ABS. NEUTROPHILS 5.6 1.7 - 8.2 K/UL    ABS. LYMPHOCYTES 1.1 0.5 - 4.6 K/UL    ABS. MONOCYTES 0.8 0.1 - 1.3 K/UL    ABS. EOSINOPHILS 0.2 0.0 - 0.8 K/UL    ABS. BASOPHILS 0.0 0.0 - 0.2 K/UL    ABS. IMM. GRANS. 0.0 0.0 - 0.5 K/UL   GLUCOSE, POC    Collection Time: 01/16/18  6:32 AM   Result Value Ref Range    Glucose (POC) 83 65 - 100 mg/dL   GLUCOSE, POC    Collection Time: 01/16/18  7:38 AM   Result Value Ref Range    Glucose (POC) 75 65 - 100 mg/dL   GLUCOSE, POC    Collection Time: 01/16/18  8:01 AM   Result Value Ref Range    Glucose (POC) 83 65 - 100 mg/dL         Assessment/Plan:     Principal Problem:    AVM (arteriovenous malformation) of small bowel, acquired with hemorrhage ([facility] (6/15/2014)  BID protonix changed to IV famotidine today. No active bleeds on 1/14 EGD. Active Problems:    GIB (gastrointestinal bleeding) (6/13/2014)      Hypernatremia (1/13/2018): start D5 infusion - monitor chem daily       Microcytic hypochromic anemia (6/13/2014)      History of alcohol abuse (1/13/2018)      Overview: FORMERLY DRANK  1-2 12oz. Beers daily            NONE 2014      Acute on chronic renal failure ([facility] (1/13/2018): his Cr seems to be at baseline now- will start D5 due to hypernatremia and discontinue D5 half NS       Bradycardia (1/13/2018)  Cardiology states no intervention needed. Not on any rate lowering medications. Acute blood loss anemia (1/13/2018)  Status post 3 units PRBCs this admission.   Hg stable       Hypoglycemia (1/13/2018): lower FS readings - on D5 infusion - will check insulin and C-peptide levels       Moderate aortic stenosis (1/13/2018)    Care Plan discussed with: Patient/Family and Nurse      Signed By: Dwight Krause MD     January 16, 2018

## 2018-01-17 ENCOUNTER — APPOINTMENT (OUTPATIENT)
Dept: CT IMAGING | Age: 73
DRG: 377 | End: 2018-01-17
Attending: INTERNAL MEDICINE
Payer: MEDICARE

## 2018-01-17 ENCOUNTER — APPOINTMENT (OUTPATIENT)
Dept: GENERAL RADIOLOGY | Age: 73
DRG: 377 | End: 2018-01-17
Attending: INTERNAL MEDICINE
Payer: MEDICARE

## 2018-01-17 LAB
ANION GAP SERPL CALC-SCNC: 7 MMOL/L (ref 7–16)
BUN SERPL-MCNC: 15 MG/DL (ref 8–23)
CALCIUM SERPL-MCNC: 8.3 MG/DL (ref 8.3–10.4)
CHLORIDE SERPL-SCNC: 114 MMOL/L (ref 98–107)
CO2 SERPL-SCNC: 25 MMOL/L (ref 21–32)
CREAT SERPL-MCNC: 1.45 MG/DL (ref 0.8–1.5)
ERYTHROCYTE [DISTWIDTH] IN BLOOD BY AUTOMATED COUNT: 19.5 % (ref 11.9–14.6)
GLUCOSE BLD STRIP.AUTO-MCNC: 101 MG/DL (ref 65–100)
GLUCOSE BLD STRIP.AUTO-MCNC: 106 MG/DL (ref 65–100)
GLUCOSE BLD STRIP.AUTO-MCNC: 109 MG/DL (ref 65–100)
GLUCOSE BLD STRIP.AUTO-MCNC: 134 MG/DL (ref 65–100)
GLUCOSE BLD STRIP.AUTO-MCNC: 147 MG/DL (ref 65–100)
GLUCOSE BLD STRIP.AUTO-MCNC: 262 MG/DL (ref 65–100)
GLUCOSE BLD STRIP.AUTO-MCNC: 68 MG/DL (ref 65–100)
GLUCOSE BLD STRIP.AUTO-MCNC: 69 MG/DL (ref 65–100)
GLUCOSE BLD STRIP.AUTO-MCNC: 76 MG/DL (ref 65–100)
GLUCOSE BLD STRIP.AUTO-MCNC: 77 MG/DL (ref 65–100)
GLUCOSE BLD STRIP.AUTO-MCNC: 82 MG/DL (ref 65–100)
GLUCOSE BLD STRIP.AUTO-MCNC: 83 MG/DL (ref 65–100)
GLUCOSE BLD STRIP.AUTO-MCNC: 90 MG/DL (ref 65–100)
GLUCOSE BLD STRIP.AUTO-MCNC: 94 MG/DL (ref 65–100)
GLUCOSE BLD STRIP.AUTO-MCNC: 98 MG/DL (ref 65–100)
GLUCOSE SERPL-MCNC: 75 MG/DL (ref 65–100)
HCT VFR BLD AUTO: 29.5 % (ref 41.1–50.3)
HGB BLD-MCNC: 9.3 G/DL (ref 13.6–17.2)
MCH RBC QN AUTO: 25.5 PG (ref 26.1–32.9)
MCHC RBC AUTO-ENTMCNC: 31.5 G/DL (ref 31.4–35)
MCV RBC AUTO: 80.8 FL (ref 79.6–97.8)
PLATELET # BLD AUTO: 376 K/UL (ref 150–450)
PMV BLD AUTO: 10.6 FL (ref 10.8–14.1)
POTASSIUM SERPL-SCNC: 4.2 MMOL/L (ref 3.5–5.1)
PROCALCITONIN SERPL-MCNC: 0.1 NG/ML
RBC # BLD AUTO: 3.65 M/UL (ref 4.23–5.67)
SODIUM SERPL-SCNC: 146 MMOL/L (ref 136–145)
WBC # BLD AUTO: 6.6 K/UL (ref 4.3–11.1)

## 2018-01-17 PROCEDURE — 80048 BASIC METABOLIC PNL TOTAL CA: CPT | Performed by: INTERNAL MEDICINE

## 2018-01-17 PROCEDURE — 74011250636 HC RX REV CODE- 250/636: Performed by: INTERNAL MEDICINE

## 2018-01-17 PROCEDURE — 84681 ASSAY OF C-PEPTIDE: CPT | Performed by: INTERNAL MEDICINE

## 2018-01-17 PROCEDURE — 97166 OT EVAL MOD COMPLEX 45 MIN: CPT

## 2018-01-17 PROCEDURE — 74011250637 HC RX REV CODE- 250/637: Performed by: FAMILY MEDICINE

## 2018-01-17 PROCEDURE — 77030020250 HC SOL INJ D 5% LFCR 1000ML BG LF

## 2018-01-17 PROCEDURE — 74011250637 HC RX REV CODE- 250/637: Performed by: INTERNAL MEDICINE

## 2018-01-17 PROCEDURE — 70450 CT HEAD/BRAIN W/O DYE: CPT

## 2018-01-17 PROCEDURE — 82962 GLUCOSE BLOOD TEST: CPT

## 2018-01-17 PROCEDURE — 74011000250 HC RX REV CODE- 250: Performed by: INTERNAL MEDICINE

## 2018-01-17 PROCEDURE — 65270000029 HC RM PRIVATE

## 2018-01-17 PROCEDURE — 97162 PT EVAL MOD COMPLEX 30 MIN: CPT

## 2018-01-17 PROCEDURE — 36415 COLL VENOUS BLD VENIPUNCTURE: CPT | Performed by: INTERNAL MEDICINE

## 2018-01-17 PROCEDURE — 95816 EEG AWAKE AND DROWSY: CPT | Performed by: INTERNAL MEDICINE

## 2018-01-17 PROCEDURE — 85027 COMPLETE CBC AUTOMATED: CPT | Performed by: INTERNAL MEDICINE

## 2018-01-17 PROCEDURE — 83525 ASSAY OF INSULIN: CPT | Performed by: INTERNAL MEDICINE

## 2018-01-17 PROCEDURE — 74011000258 HC RX REV CODE- 258: Performed by: INTERNAL MEDICINE

## 2018-01-17 PROCEDURE — 71045 X-RAY EXAM CHEST 1 VIEW: CPT

## 2018-01-17 PROCEDURE — 87040 BLOOD CULTURE FOR BACTERIA: CPT | Performed by: INTERNAL MEDICINE

## 2018-01-17 PROCEDURE — 84145 PROCALCITONIN (PCT): CPT | Performed by: INTERNAL MEDICINE

## 2018-01-17 RX ADMIN — GABAPENTIN 300 MG: 300 CAPSULE ORAL at 08:47

## 2018-01-17 RX ADMIN — VANCOMYCIN 1000 MG: 1 INJECTION, SOLUTION INTRAVENOUS at 21:00

## 2018-01-17 RX ADMIN — SODIUM CHLORIDE 2 G: 900 INJECTION, SOLUTION INTRAVENOUS at 23:59

## 2018-01-17 RX ADMIN — ACETAMINOPHEN 650 MG: 325 TABLET ORAL at 12:03

## 2018-01-17 RX ADMIN — DONEPEZIL HYDROCHLORIDE 10 MG: 5 TABLET, FILM COATED ORAL at 22:18

## 2018-01-17 RX ADMIN — MIRTAZAPINE 15 MG: 15 TABLET, FILM COATED ORAL at 22:18

## 2018-01-17 RX ADMIN — FERROUS SULFATE TAB 325 MG (65 MG ELEMENTAL FE) 325 MG: 325 (65 FE) TAB at 08:47

## 2018-01-17 RX ADMIN — FOLIC ACID 1 MG: 1 TABLET ORAL at 08:47

## 2018-01-17 RX ADMIN — Medication 10 ML: at 13:24

## 2018-01-17 RX ADMIN — LEVETIRACETAM 500 MG: 500 TABLET ORAL at 08:47

## 2018-01-17 RX ADMIN — GABAPENTIN 300 MG: 300 CAPSULE ORAL at 22:18

## 2018-01-17 RX ADMIN — GLUCAGON HYDROCHLORIDE 1 MG: KIT at 23:14

## 2018-01-17 RX ADMIN — GABAPENTIN 300 MG: 300 CAPSULE ORAL at 17:13

## 2018-01-17 RX ADMIN — FAMOTIDINE 20 MG: 40 POWDER, FOR SUSPENSION ORAL at 08:47

## 2018-01-17 RX ADMIN — LEVETIRACETAM 500 MG: 500 TABLET ORAL at 17:12

## 2018-01-17 RX ADMIN — FERROUS SULFATE TAB 325 MG (65 MG ELEMENTAL FE) 325 MG: 325 (65 FE) TAB at 17:13

## 2018-01-17 RX ADMIN — Medication 10 ML: at 22:27

## 2018-01-17 RX ADMIN — WATER 1 G: 1 INJECTION INTRAMUSCULAR; INTRAVENOUS; SUBCUTANEOUS at 17:13

## 2018-01-17 RX ADMIN — DEXTROSE MONOHYDRATE 100 ML/HR: 5 INJECTION, SOLUTION INTRAVENOUS at 09:41

## 2018-01-17 RX ADMIN — LEVOTHYROXINE SODIUM 100 MCG: 100 TABLET ORAL at 05:25

## 2018-01-17 RX ADMIN — TAMSULOSIN HYDROCHLORIDE 0.4 MG: 0.4 CAPSULE ORAL at 22:18

## 2018-01-17 RX ADMIN — DIVALPROEX SODIUM 250 MG: 250 TABLET, EXTENDED RELEASE ORAL at 22:18

## 2018-01-17 RX ADMIN — Medication 10 ML: at 05:25

## 2018-01-17 NOTE — PROGRESS NOTES
Problem: Self Care Deficits Care Plan (Adult)  Goal: *Acute Goals and Plan of Care (Insert Text)  1. Patient will complete grooming with moderate assistance and adaptive equipment as needed. 2. Patient will tolerate sitting edge of bed for 5 minutes with CGA for ADL prep. 3. Patient will tolerate 15 minutes of OT treatment with minimal rest breaks to increase activity tolerance for ADLs. 4. Patient will complete functional transfers with moderate assistance and adaptive equipment as needed. Timeframe: 7 visits     OCCUPATIONAL THERAPY: Initial Assessment 1/17/2018  INPATIENT: Hospital Day: 6  Payor: Alisia Back / Plan: [address] [place] [address] HMO / Product Type: Managed Care Medicare /      NAME/AGE/GENDER: Vangie Galicia is a 67 y.o. male   PRIMARY DIAGNOSIS:  GIB (gastrointestinal bleeding)  GI BLEED, ANEMIA AVM (arteriovenous malformation) of small bowel, acquired with hemorrhage ([address] AVM (arteriovenous malformation) of small bowel, acquired with hemorrhage ([address]  Procedure(s) (LRB):  ESOPHAGOGASTRODUODENOSCOPY (EGD) PUSH (N/A)  ENDOSCOPIC ARGON PLASMA COAGULATION (N/A)  3 Days Post-Op  ICD-10: Treatment Diagnosis:    · Generalized Muscle Weakness (M62.81)   Precautions/Allergies:     Review of patient's allergies indicates no known allergies. ASSESSMENT:     Mr. Lesa Benjamin presents supine in bed with minimal verbal response to questions, opened eyes once with tactile stimulation. Pt very drowsy. Pt required total assistance to sit edge of bed though once positioned, was able to sit independently >1 minute (eyes remained closed). Pt with tendency to lean to the right. Pt tolerated sitting edge of bed for a few minutes to complete OT evaluation. Limited BUE strength and ROM assessed secondary to pt's ability to follow commands; appears somewhat functional to keep self in seated position.   Mr. Lesa Benjamin presents with severely decreased activity tolerance, and decreased independence for self care, functional mobility, and ADL's. Requires skilled OT to maximize independence with self care tasks and functional transfers. Pt left with PT and all needs in reach, pt instructed to call for assistance; RN aware. Conference with SW, PT, OT, RN      This section established at most recent assessment   PROBLEM LIST (Impairments causing functional limitations):  1. Decreased Strength  2. Decreased ADL/Functional Activities  3. Decreased Transfer Abilities  4. Decreased Ambulation Ability/Technique  5. Decreased Balance  6. Decreased Activity Tolerance  7. Increased Fatigue  8. Decreased Cognition   INTERVENTIONS PLANNED: (Benefits and precautions of occupational therapy have been discussed with the patient.)  1. Activities of daily living training  2. Adaptive equipment training  3. Balance training  4. Cognitive training  5. Donning&doffing training  6. Group therapy  7. Therapeutic activity  8. Therapeutic exercise     TREATMENT PLAN: Frequency/Duration: Follow patient 3x/week to address above goals. Rehabilitation Potential For Stated Goals: Guarded     RECOMMENDED REHABILITATION/EQUIPMENT: (at time of discharge pending progress): Due to the probability of continued deficits (see above) this patient will likely need continued skilled occupational therapy after discharge. Equipment:    TBD              OCCUPATIONAL PROFILE AND HISTORY:   History of Present Injury/Illness (Reason for Referral):  See H&P  Past Medical History/Comorbidities:   Mr. Roseanne Morgan  has a past medical history of Alcohol abuse; Anemia; Angiodysplasia of stomach (2016); Bladder incontinence (05/28/2014); Chronic airway obstruction, not elsewhere classified (5/28/2014); CKD (chronic kidney disease); COPD (chronic obstructive pulmonary disease) ([address] Dementia (5/28/2014); Hyperlipidemia (5/28/2014); Hypernatremia (11/25/2016); Hypertension; Hypothyroid; Seizure ([facility] (2010); Tobacco abuse (5/28/2014); and Tobacco abuse.   Mr. Roseanne Morgan  has a past surgical history that includes hx colonoscopy (APPROX 2005) and hx endoscopy (2016). Social History/Living Environment:     unable to obtain from pt  Prior Level of Function/Work/Activity:  unable to obtain from pt     Number of Personal Factors/Comorbidities that affect the Plan of Care: Expanded review of therapy/medical records (1-2):  MODERATE COMPLEXITY   ASSESSMENT OF OCCUPATIONAL PERFORMANCE[de-identified]   Activities of Daily Living:           Basic ADLs (From Assessment) Complex ADLs (From Assessment)   Basic ADL  Feeding: Other (comment) (unable to assess)  Oral Facial Hygiene/Grooming: Other (comment) (unable to assess)  Bathing: Other (comment) (unable to assess)  Upper Body Dressing: Other (comment) (unable to assess)  Lower Body Dressing: Other (comment) (unable to assess)  Toileting: Other (comment) (unable to assess) Instrumental ADL  Meal Preparation: Total assistance  Homemaking: Total assistance   Grooming/Bathing/Dressing Activities of Daily Living     Cognitive Retraining  Safety/Judgement: Fall prevention                       Bed/Mat Mobility  Rolling: Total assistance  Supine to Sit: Total assistance  Sit to Supine: Total assistance;Assist x2  Sit to Stand: Other (comment) (unable)  Scooting: Total assistance;Assist x2       Most Recent Physical Functioning:   Gross Assessment:  AROM:  (attempted , min command following to assess accurately)  Strength: Grossly decreased, non-functional               Posture:  Posture (WDL): Exceptions to WDL  Posture Assessment: Forward head, Rounded shoulders  Balance:  Sitting: Impaired  Sitting - Static: Fair (occasional); Poor (constant support)  Sitting - Dynamic: Poor (constant support)  Standing:  (unable at this time) Bed Mobility:  Rolling: Total assistance  Supine to Sit: Total assistance  Sit to Supine: Total assistance;Assist x2  Scooting: Total assistance;Assist x2  Wheelchair Mobility:     Transfers:  Sit to Stand:  Other (comment) (unable) Patient Vitals for the past 6 hrs:   BP BP Patient Position SpO2 O2 Flow Rate (L/min) Pulse   18 0737 107/72 At rest 95 % 2 l/min 77   18 1022 - - - 2 l/min -       Mental Status  Neurologic State: Eyes open to stimulus, Drowsy (minimal)  Orientation Level: Oriented to person, Disoriented to place, Disoriented to situation, Disoriented to time  Cognition: Unable to assess (comment) (minimal command following)  Perception: Cues to maintain midline in sitting  Perseveration: No perseveration noted  Safety/Judgement: Fall prevention                          Physical Skills Involved:  1. Range of Motion  2. Balance  3. Strength  4. Activity Tolerance  5. Fine Motor Control  6. Gross Motor Control Cognitive Skills Affected (resulting in the inability to perform in a timely and safe manner):  1. Unable to accurately assess at this time Psychosocial Skills Affected:  1. Habits/Routines  2. Environmental Adaptation  3. Social Interaction  4. Emotional Regulation  5. Self-Awareness  6. Awareness of Others  7. Social Roles   Number of elements that affect the Plan of Care: 5+:  HIGH COMPLEXITY   CLINICAL DECISION MAKIN[address] [place] [id number] AM-PAC 6 Clicks   Daily Activity Inpatient Short Form  How much help from another person does the patient currently need. .. Total A Lot A Little None   1. Putting on and taking off regular lower body clothing? [x] 1   [] 2   [] 3   [] 4   2. Bathing (including washing, rinsing, drying)? [x] 1   [] 2   [] 3   [] 4   3. Toileting, which includes using toilet, bedpan or urinal?   [x] 1   [] 2   [] 3   [] 4   4. Putting on and taking off regular upper body clothing? [x] 1   [] 2   [] 3   [] 4   5. Taking care of personal grooming such as brushing teeth? [x] 1   [] 2   [] 3   [] 4   6. Eating meals? [x] 1   [] 2   [] 3   [] 4   © , Trustees of [address] [place] [id number], under license to [employer].  All rights reserved      Score:  Initial: 6 Most Recent: X (Date: -- )    Interpretation of Tool:  Represents activities that are increasingly more difficult (i.e. Bed mobility, Transfers, Gait). Score 24 23 22-20 19-15 14-10 9-7 6     Modifier CH CI CJ CK CL CM CN      ? Self Care:     - CURRENT STATUS: CN - 100% impaired, limited or restricted    - GOAL STATUS: CM - 80%-99% impaired, limited or restricted    - D/C STATUS:  ---------------To be determined---------------  Payor: [employer] MEDICARE / Plan: [address] [place] [address] HMO / Product Type: Managed Care Medicare /      Medical Necessity:     · Patient demonstrates fair rehab potential due to higher previous functional level. Reason for Services/Other Comments:  · Patient continues to require skilled intervention due to medical complications and patient unable to attend/participate in therapy as expected. Use of outcome tool(s) and clinical judgement create a POC that gives a: MODERATE COMPLEXITY         TREATMENT:   (In addition to Assessment/Re-Assessment sessions the following treatments were rendered)     Pre-treatment Symptoms/Complaints:    Pain: Initial:   Pain Intensity 1: 0  Post Session:  0     Assessment/Reassessment only, no treatment provided today    Braces/Orthotics/Lines/Etc:   · IV  · O2 Device: Nasal cannula  Treatment/Session Assessment:    · Response to Treatment:  Very drowsy, limited participation  · Interdisciplinary Collaboration:   o Physical Therapist  o Occupational Therapist  o Registered Nurse  · After treatment position/precautions:   o Supine in bed  o Bed alarm/tab alert on  o Call light within reach  o RN notified   · Compliance with Program/Exercises: Will assess as treatment progresses. · Recommendations/Intent for next treatment session: \"Next visit will focus on advancements to more challenging activities and reduction in assistance provided\".   Total Treatment Duration:  OT Patient Time In/Time Out  Time In: 1005  Time Out: [address] [place], OTR/L

## 2018-01-17 NOTE — PROGRESS NOTES
Pt temp 101.6, tylenol given per [facility] and soheila simms turned off. Temp is now 100.6 axillary. Will continue to monitor.

## 2018-01-17 NOTE — PROGRESS NOTES
Hourly rounds performed on pt, pt resting in bed with all needs met. Pt slept through most of the shift. Follows all commands but not very talkative. Will continue to monitor pt per shift.

## 2018-01-17 NOTE — PROGRESS NOTES
Problem: Mobility Impaired (Adult and Pediatric)  Goal: *Acute Goals and Plan of Care (Insert Text)  LTG:  (1.)Mr. Vidya Gutiérrez will move from supine to sit and sit to supine, scoot up and down and roll side to side with MODERATE ASSIST within 7 day(s). (2.)Mr. Vidya Gutiérrez will follow verbal cues for ROM/mobility 50% of the time within 7 days. (3.)Mr. Vidya Gutiérrez will perform seated activities and exercises for 8+ minutes with minimal assist within 7 days. (4.)Mr. Vidya Gutiérrez will transfer from bed to chair and chair to bed with MAXIMAL ASSIST using the least restrictive device within 7 day(s). ________________________________________________________________________________________________    PHYSICAL THERAPY: Initial Assessment, AM 1/17/2018  INPATIENT: Hospital Day: 6  Payor: [employer] / Plan: [address] [place] [address] HMO / Product Type: [employer] /      NAME/AGE/GENDER: Rolf Stahl is a 67 y.o. male   PRIMARY DIAGNOSIS: GIB (gastrointestinal bleeding)  GI BLEED, ANEMIA AVM (arteriovenous malformation) of small bowel, acquired with hemorrhage ([facility] AVM (arteriovenous malformation) of small bowel, acquired with hemorrhage ([facility]  Procedure(s) (LRB):  ESOPHAGOGASTRODUODENOSCOPY (EGD) PUSH (N/A)  ENDOSCOPIC ARGON PLASMA COAGULATION (N/A)  3 Days Post-Op  ICD-10: Treatment Diagnosis:   · Generalized Muscle Weakness (M62.81)  · Other lack of cordination (R27.8)  · Other abnormalities of gait and mobility (R26.89)   Precaution/Allergies:  Review of patient's allergies indicates no known allergies. ASSESSMENT:     Mr. Vidya Gutiérrez is a 67year old male admitted from home for GI bleed, anemia. S/p EGD. He presents sitting at edge of bed with OT finishing evaluation. Pt is not very responsive today; he does not open eyes to verbal or tactile stimuli and has forward flexed posture at edge of bed.  Does not follow commands for formal assessment of AROM or strength although do note some active movement in both legs intermittently. With posterior-lateral trunk lean and unable to sit unsupported, demonstrates fair- to poor seated balance. Unsafe to attempt any further mobility. He needs total assist of 2 to return to supine and reposition in bed. Ousmane Shah appears to have experienced a significant decline in overall status (mental status, alertness, strength, etc) and unsure of the cause. He would not be safe to return home in current condition and will need continued therapy/rehab. This section established at most recent assessment   PROBLEM LIST (Impairments causing functional limitations):  1. Decreased Strength  2. Decreased ADL/Functional Activities  3. Decreased Transfer Abilities  4. Decreased Ambulation Ability/Technique  5. Decreased Balance  6. Decreased Activity Tolerance  7. Decreased Cognition   INTERVENTIONS PLANNED: (Benefits and precautions of physical therapy have been discussed with the patient.)  1. Balance Exercise  2. Bed Mobility  3. Gait Training  4. Range of Motion (ROM)  5. Therapeutic Activites  6. Therapeutic Exercise/Strengthening  7. Transfer Training     TREATMENT PLAN: Frequency/Duration: 3 times a week for duration of hospital stay  Rehabilitation Potential For Stated Goals: Juliann Braxton REHABILITATION/EQUIPMENT: (at time of discharge pending progress): Due to the probability of continued deficits (see above) this patient will likely need continued skilled physical therapy after discharge. Equipment:    TBD pending progress              HISTORY:   History of Present Injury/Illness (Reason for Referral):  Per H&P, \"Patient is a Hailee Eans old male with PMH sig for Aortic stensois, prior gib, avm seen on egd in September 2017 - unclear if colonoscopy  done. Dc from hospital 01/01/2018. Had follow up with pcp today and hb found to have dropped to 5.5 from 7.5 on last admit. Sent to er- grossly heme- occult positive- blood transfusion ordered. Hospitalist asked to admit. Gi consulted. \"    Past Medical History/Comorbidities:   Mr. Concepcion Alegre  has a past medical history of Alcohol abuse; Anemia; Angiodysplasia of stomach (2016); Bladder incontinence (05/28/2014); Chronic airway obstruction, not elsewhere classified (5/28/2014); CKD (chronic kidney disease); COPD [facility]) [facility] Dementia (5/28/2014); Hyperlipidemia (5/28/2014); Hypernatremia (11/25/2016); Hypertension; Hypothyroid; Seizure ([facility] [address] (2010); Tobacco abuse (5/28/2014); and Tobacco abuse. Mr. Concepcion Alegre  has a past surgical history that includes hx colonoscopy (APPROX 2005) and hx endoscopy (2016). Social History/Living Environment:      Prior Level of Function/Work/Activity:  Unknown. Pt unable to provide history. Per chart, it appears that pt was discharged home with spouse after recent stay at [facility] earlier this month. It appears he has some baseline dementia but is ambulatory around the house. On 1/1/18 pt was performing bed mobility and sit-stand transfers with min assist.      Number of Personal Factors/Comorbidities that affect the Plan of Care: 3+: HIGH COMPLEXITY   EXAMINATION:   Most Recent Physical Functioning:   Gross Assessment:  AROM: Grossly decreased, non-functional (B LEs)  PROM: Generally decreased, functional (B LEs)  Strength: Grossly decreased, non-functional (B LEs)  Coordination: Grossly decreased, non-functional (B LEs)               Posture:  Posture (WDL): Exceptions to WDL  Posture Assessment: Forward head, Rounded shoulders  Balance:  Sitting: Impaired  Sitting - Static: Fair (occasional); Poor (constant support)  Sitting - Dynamic: Poor (constant support) Bed Mobility:  Sit to Supine: Total assistance;Assist x2  Scooting: Total assistance;Assist x2  Wheelchair Mobility:     Transfers:     Gait:            Body Structures Involved:  1. Eyes and Ears  2. Voice/Speech  3. Muscles Body Functions Affected:  1. Mental  2. Movement Related  3.  Skin Related Activities and Participation Affected:  1. Learning and Applying Knowledge  2. General Tasks and Demands  3. Communication  4. Mobility  5. Self Care  6. Domestic Life  7. Interpersonal Interactions and Relationships  8. Community, Social and [place]   Number of elements that affect the Plan of Care: 4+: HIGH COMPLEXITY   CLINICAL PRESENTATION:   Presentation: Evolving clinical presentation with changing clinical characteristics: MODERATE COMPLEXITY   CLINICAL DECISION MAKIN [facility] Mobility Inpatient Short Form  How much difficulty does the patient currently have. .. Unable A Lot A Little None   1. Turning over in bed (including adjusting bedclothes, sheets and blankets)? [] 1   [x] 2   [] 3   [] 4   2. Sitting down on and standing up from a chair with arms ( e.g., wheelchair, bedside commode, etc.)   [x] 1   [] 2   [] 3   [] 4   3. Moving from lying on back to sitting on the side of the bed? [x] 1   [] 2   [] 3   [] 4   How much help from another person does the patient currently need. .. Total A Lot A Little None   4. Moving to and from a bed to a chair (including a wheelchair)? [x] 1   [] 2   [] 3   [] 4   5. Need to walk in hospital room? [x] 1   [] 2   [] 3   [] 4   6. Climbing 3-5 steps with a railing? [x] 1   [] 2   [] 3   [] 4   © , Trustees of [employer], under license to [employer]. All rights reserved      Score:  Initial: 7 Most Recent: X (Date: -- )    Interpretation of Tool:  Represents activities that are increasingly more difficult (i.e. Bed mobility, Transfers, Gait). Score 24 23 22-20 19-15 14-10 9-7 6     Modifier CH CI CJ CK CL CM CN      ?  Mobility - Walking and Moving Around:     - CURRENT STATUS: CM - 80%-99% impaired, limited or restricted    - GOAL STATUS: CL - 60%-79% impaired, limited or restricted    - D/C STATUS:  ---------------To be determined---------------  Payor: Jesus Garnica / Plan: [address] [place] [address] HMO / Product Type: Managed Care Medicare /      Medical Necessity:     · Patient demonstrates fair rehab potential due to higher previous functional level. Reason for Services/Other Comments:  · Patient continues to demonstrate capacity to improve strength, mobility, balance, transfers which will increase independence, decrease amount of assistance required from caregiver and increase safety. Use of outcome tool(s) and clinical judgement create a POC that gives a: Questionable prediction of patient's progress: MODERATE COMPLEXITY            TREATMENT:   (In addition to Assessment/Re-Assessment sessions the following treatments were rendered)   Pre-treatment Symptoms/Complaints: \"Yes ma'am\"  Pain: Initial:   Pain Intensity 1: 0  Post Session:  0/10 visual     Assessment/Reassessment only, no treatment provided today    Braces/Orthotics/Lines/Etc:   · IV  · O2 Device: Nasal cannula  Treatment/Session Assessment:    · Response to Treatment:  Pt requires total assist with mobility. Weak, mostly non responsive and does not open eyes throughout  · Interdisciplinary Collaboration:   o Physical Therapist  o Registered Nurse  · After treatment position/precautions:   o Supine in bed  o Bed alarm/tab alert on  o Bed/Chair-wheels locked  o Bed in low position  o Call light within reach  o RN notified   · Compliance with Program/Exercises: Will assess as treatment progresses. · Recommendations/Intent for next treatment session: \"Next visit will focus on advancements to more challenging activities and reduction in assistance provided\".   Total Treatment Duration:  PT Patient Time In/Time Out  Time In: 1014  Time Out: [address], DPT

## 2018-01-17 NOTE — PROGRESS NOTES
Hospitalist Progress Note    Subjective:   Patient examined at bedside. He was noted alert, but drowsy. Spiked fever today. Blood cultures ordered. Sodium improving, serum glucose stable. He denies nausea, vomiting, chest pain, abdominal pain, diarrhea. Current Facility-Administered Medications   Medication Dose Route Frequency    famotidine (PEPCID) 40 mg/5 mL (8 mg/mL) oral suspension 20 mg  20 mg Oral DAILY    dextrose 5% infusion  100 mL/hr IntraVENous CONTINUOUS    cefTRIAXone (ROCEPHIN) 1 g in sterile water (preservative free) 10 mL IV syringe  1 g IntraVENous Q24H    0.9% sodium chloride infusion 250 mL  250 mL IntraVENous PRN    glucagon (GLUCAGEN) injection 1 mg  1 mg IntraMUSCular PRN    dextrose (D50W) injection syrg 12.5-25 g  25-50 mL IntraVENous PRN    amLODIPine (NORVASC) tablet 10 mg  10 mg Oral DAILY    divalproex ER (DEPAKOTE ER) 24 hour tablet 250 mg  250 mg Oral QHS    donepezil (ARICEPT) tablet 10 mg  10 mg Oral QHS    ferrous sulfate tablet 325 mg  325 mg Oral BID WITH MEALS    folic acid (FOLVITE) tablet 1 mg  1 mg Oral DAILY    gabapentin (NEURONTIN) capsule 300 mg  300 mg Oral TID    levETIRAcetam (KEPPRA) tablet 500 mg  500 mg Oral BID    levothyroxine (SYNTHROID) tablet 100 mcg  100 mcg Oral ACB    mirtazapine (REMERON) tablet 15 mg  15 mg Oral QHS    tamsulosin (FLOMAX) capsule 0.4 mg  0.4 mg Oral QHS    0.9% sodium chloride infusion 250 mL  250 mL IntraVENous PRN    sodium chloride (NS) flush 5-10 mL  5-10 mL IntraVENous Q8H    sodium chloride (NS) flush 5-10 mL  5-10 mL IntraVENous PRN    acetaminophen (TYLENOL) tablet 650 mg  650 mg Oral Q4H PRN    HYDROcodone-acetaminophen (NORCO) 5-325 mg per tablet 1 Tab  1 Tab Oral Q4H PRN    ondansetron (ZOFRAN) injection 4 mg  4 mg IntraVENous Q4H PRN        Review of Systems  Review of systems not obtained due to patient factors.     Objective:     Visit Vitals    /72 (BP 1 Location: Left arm, BP Patient Position: At rest)    Pulse 77    Temp 98.4 °F (36.9 °C)    Resp 16    Ht 5' 8\" (1.727 m)    Wt 61.7 kg (136 lb)    SpO2 95%    BMI 20.68 kg/m2    O2 Flow Rate (L/min): 2 l/min O2 Device: Nasal cannula    Temp (24hrs), Av.6 °F (36.4 °C), Min:96.7 °F (35.9 °C), Max:98.4 °F (36.9 °C)         01/15 1901 -  0700  In: 7070 [I.V.:7131]  Out: -     General: asleep but arousable, mostly nonverbal, ill appearing but not acutely distressed  Eyes; non icteric, EOMI  Neck; supple  CV: regular, no murmurs, no gallops   Pulm; course breath sounds, non labored, normal effort  Abd; soft, non tender, non distended  Neuro: unable to perform since he was drowsy likely secondary to hypernatremia     Additional comments:I reviewed the patient's new clinical lab test results. j    Data Review    Recent Results (from the past 24 hour(s))   GLUCOSE, POC    Collection Time: 18  9:01 AM   Result Value Ref Range    Glucose (POC) 77 65 - 100 mg/dL   GLUCOSE, POC    Collection Time: 18 10:19 AM   Result Value Ref Range    Glucose (POC) 95 65 - 100 mg/dL   GLUCOSE, POC    Collection Time: 18 11:07 AM   Result Value Ref Range    Glucose (POC) 94 65 - 100 mg/dL   GLUCOSE, POC    Collection Time: 18 12:23 PM   Result Value Ref Range    Glucose (POC) 79 65 - 100 mg/dL   GLUCOSE, POC    Collection Time: 18  1:29 PM   Result Value Ref Range    Glucose (POC) 75 65 - 100 mg/dL   GLUCOSE, POC    Collection Time: 18  2:32 PM   Result Value Ref Range    Glucose (POC) 112 (H) 65 - 100 mg/dL   GLUCOSE, POC    Collection Time: 18  3:55 PM   Result Value Ref Range    Glucose (POC) 75 65 - 100 mg/dL   GLUCOSE, POC    Collection Time: 18  5:15 PM   Result Value Ref Range    Glucose (POC) 136 (H) 65 - 100 mg/dL   GLUCOSE, POC    Collection Time: 18  6:40 PM   Result Value Ref Range    Glucose (POC) 114 (H) 65 - 100 mg/dL   GLUCOSE, POC    Collection Time: 18  7:54 PM   Result Value Ref Range    Glucose (POC) 98 65 - 100 mg/dL   GLUCOSE, POC    Collection Time: 01/16/18  9:23 PM   Result Value Ref Range    Glucose (POC) 80 65 - 100 mg/dL   GLUCOSE, POC    Collection Time: 01/16/18 10:33 PM   Result Value Ref Range    Glucose (POC) 135 (H) 65 - 100 mg/dL   GLUCOSE, POC    Collection Time: 01/16/18 11:31 PM   Result Value Ref Range    Glucose (POC) 130 (H) 65 - 100 mg/dL   GLUCOSE, POC    Collection Time: 01/17/18  1:34 AM   Result Value Ref Range    Glucose (POC) 147 (H) 65 - 100 mg/dL   GLUCOSE, POC    Collection Time: 01/17/18  2:13 AM   Result Value Ref Range    Glucose (POC) 82 65 - 100 mg/dL   GLUCOSE, POC    Collection Time: 01/17/18  3:14 AM   Result Value Ref Range    Glucose (POC) 134 (H) 65 - 100 mg/dL   GLUCOSE, POC    Collection Time: 01/17/18  4:26 AM   Result Value Ref Range    Glucose (POC) 101 (H) 65 - 100 mg/dL   CBC W/O DIFF    Collection Time: 01/17/18  5:28 AM   Result Value Ref Range    WBC 6.6 4.3 - 11.1 K/uL    RBC 3.65 (L) 4.23 - 5.67 M/uL    HGB 9.3 (L) 13.6 - 17.2 g/dL    HCT 29.5 (L) 41.1 - 50.3 %    MCV 80.8 79.6 - 97.8 FL    MCH 25.5 (L) 26.1 - 32.9 PG    MCHC 31.5 31.4 - 35.0 g/dL    RDW 19.5 (H) 11.9 - 14.6 %    PLATELET 103 303 - 713 K/uL    MPV 10.6 (L) 10.8 - 27.6 FL   METABOLIC PANEL, BASIC    Collection Time: 01/17/18  5:28 AM   Result Value Ref Range    Sodium 146 (H) 136 - 145 mmol/L    Potassium 4.2 3.5 - 5.1 mmol/L    Chloride 114 (H) 98 - 107 mmol/L    CO2 25 21 - 32 mmol/L    Anion gap 7 7 - 16 mmol/L    Glucose 75 65 - 100 mg/dL    BUN 15 8 - 23 MG/DL    Creatinine 1.45 0.8 - 1.5 MG/DL    GFR est AA >60 >60 ml/min/1.73m2    GFR est non-AA 51 (L) >60 ml/min/1.73m2    Calcium 8.3 8.3 - 10.4 MG/DL   GLUCOSE, POC    Collection Time: 01/17/18  5:37 AM   Result Value Ref Range    Glucose (POC) 76 65 - 100 mg/dL   GLUCOSE, POC    Collection Time: 01/17/18  6:44 AM   Result Value Ref Range    Glucose (POC) 109 (H) 65 - 100 mg/dL   GLUCOSE, POC    Collection Time: 01/17/18  7:34 AM   Result Value Ref Range    Glucose (POC) 83 65 - 100 mg/dL         Assessment/Plan:     Principal Problem:    Episode of fever today: he has no leukocytosis: blood cultures ordered, cxr, procalcitonin - DC rocephin- if cxr and procalcitonin are positive will change to vanc/maxipime    AMS: multifactorial: hypernatremia: infection: cultures sent, cxr- EEG to rule out seizures- Brain CT scan- monitor -       AVM (arteriovenous malformation) of small bowel, acquired with hemorrhage:  Acute blood loss anemia (1/13/2018), Status post 3 units PRBCs this admission. Hg stable  BID protonix changed to IV famotidine. No active bleeds on 1/14 EGD. Hypernatremia: improving- continue D5 infusion - monitor chem daily      Acute on chronic renal failure : his Cr seems to be at baseline now- continue D5 due to hypernatremia       Bradycardia: Cardiology states no intervention needed. Not on any rate lowering medications.       Hypoglycemia (1/13/2018): lower FS readings - on D5 infusion - f/u insulin and C-peptide levels       Moderate aortic stenosis (1/13/2018)    Full code    Care Plan discussed with: Patient/Family and Nurse      Signed By: Suri Powell MD     January 17, 2018

## 2018-01-17 NOTE — PROGRESS NOTES
Pharmacokinetic Consult to Pharmacist    Fredilavonzachary Reyna is a 67 y.o. male being treated for suspected HAP. Height: 5' 8\" (172.7 cm)  Weight: 61.7 kg (136 lb)  Lab Results   Component Value Date/Time    BUN 15 01/17/2018 05:28 AM    Creatinine 1.45 01/17/2018 05:28 AM    WBC 6.6 01/17/2018 05:28 AM    Procalcitonin 0.1 01/17/2018 05:12 PM    Lactic acid 1.6 12/27/2017 11:06 AM    Lactic Acid (POC) 2.4 12/26/2017 01:28 PM      Estimated Creatinine Clearance: 40.2 mL/min (based on Cr of 1.45). CULTURES:  All Micro Results     Procedure Component Value Units Date/Time    CULTURE, BLOOD [[id number]] Collected:  01/17/18 1719    Order Status:  Completed Specimen:  Blood from Blood Updated:  01/17/18 1751    CULTURE, BLOOD [[id number]] Collected:  01/17/18 1712    Order Status:  Completed Specimen:  Blood from Blood Updated:  01/17/18 1749    CULTURE, URINE [[id number]] Collected:  01/16/18 0120    Order Status:  Completed Specimen:  Urine from Cath Urine Updated:  01/17/18 0642     Special Requests: NO SPECIAL REQUESTS        Culture result: NO GROWTH 1 DAY       CULTURE, BLOOD [[id number]] Collected:  01/15/18 1529    Order Status:  Completed Specimen:  Blood from Blood Updated:  01/17/18 0624     Special Requests: --        RIGHT  Antecubital       Culture result: NO GROWTH 2 DAYS       CULTURE, BLOOD [[id number]] Collected:  01/15/18 1530    Order Status:  Completed Specimen:  Blood from Blood Updated:  01/17/18 0624     Special Requests: --        RIGHT  Antecubital       Culture result: NO GROWTH 2 DAYS           Day 1 of vancomycin. Goal trough is 15-20. I will begin 1000mg q24h and plan for a trough level before the 4th dose. Continue to trend SrCr. Pharmacist will continue to follow patient. Please call with any questions. Thank you,  Myra Griffith.  Tiffanie Platt, PharmD  Clinical Pharmacist  [phone]

## 2018-01-17 NOTE — PROGRESS NOTES
SPEECH PATHOLOGY NOTE:    Attempted to see patient for po trials and diet tolerance. RN reports minimal alertness today. Not appropriate for po trials at this time. Will re-attempt at later time/date.      MARION Espino, CCC-SLP, CBIS

## 2018-01-18 LAB
ANION GAP SERPL CALC-SCNC: 8 MMOL/L (ref 7–16)
BACTERIA SPEC CULT: NORMAL
BUN SERPL-MCNC: 17 MG/DL (ref 8–23)
C PEPTIDE SERPL-MCNC: 4.4 NG/ML (ref 1.1–4.4)
CALCIUM SERPL-MCNC: 8.2 MG/DL (ref 8.3–10.4)
CHLORIDE SERPL-SCNC: 111 MMOL/L (ref 98–107)
CO2 SERPL-SCNC: 25 MMOL/L (ref 21–32)
CREAT SERPL-MCNC: 1.62 MG/DL (ref 0.8–1.5)
ERYTHROCYTE [DISTWIDTH] IN BLOOD BY AUTOMATED COUNT: 19 % (ref 11.9–14.6)
GLUCOSE BLD STRIP.AUTO-MCNC: 173 MG/DL (ref 65–100)
GLUCOSE BLD STRIP.AUTO-MCNC: 67 MG/DL (ref 65–100)
GLUCOSE BLD STRIP.AUTO-MCNC: 68 MG/DL (ref 65–100)
GLUCOSE BLD STRIP.AUTO-MCNC: 71 MG/DL (ref 65–100)
GLUCOSE BLD STRIP.AUTO-MCNC: 76 MG/DL (ref 65–100)
GLUCOSE SERPL-MCNC: 180 MG/DL (ref 65–100)
HCT VFR BLD AUTO: 28.1 % (ref 41.1–50.3)
HGB BLD-MCNC: 8.8 G/DL (ref 13.6–17.2)
INSULIN SERPL-ACNC: 5.7 UIU/ML (ref 2.6–24.9)
MCH RBC QN AUTO: 25.4 PG (ref 26.1–32.9)
MCHC RBC AUTO-ENTMCNC: 31.3 G/DL (ref 31.4–35)
MCV RBC AUTO: 81 FL (ref 79.6–97.8)
PLATELET # BLD AUTO: 361 K/UL (ref 150–450)
PMV BLD AUTO: 10.3 FL (ref 10.8–14.1)
POTASSIUM SERPL-SCNC: 4.2 MMOL/L (ref 3.5–5.1)
RBC # BLD AUTO: 3.47 M/UL (ref 4.23–5.67)
SERVICE CMNT-IMP: NORMAL
SODIUM SERPL-SCNC: 144 MMOL/L (ref 136–145)
WBC # BLD AUTO: 4.2 K/UL (ref 4.3–11.1)

## 2018-01-18 PROCEDURE — 74011250637 HC RX REV CODE- 250/637: Performed by: INTERNAL MEDICINE

## 2018-01-18 PROCEDURE — 82962 GLUCOSE BLOOD TEST: CPT

## 2018-01-18 PROCEDURE — 74011000250 HC RX REV CODE- 250: Performed by: INTERNAL MEDICINE

## 2018-01-18 PROCEDURE — 92526 ORAL FUNCTION THERAPY: CPT

## 2018-01-18 PROCEDURE — 85027 COMPLETE CBC AUTOMATED: CPT | Performed by: INTERNAL MEDICINE

## 2018-01-18 PROCEDURE — 74011000258 HC RX REV CODE- 258: Performed by: INTERNAL MEDICINE

## 2018-01-18 PROCEDURE — 36415 COLL VENOUS BLD VENIPUNCTURE: CPT | Performed by: INTERNAL MEDICINE

## 2018-01-18 PROCEDURE — 65270000029 HC RM PRIVATE

## 2018-01-18 PROCEDURE — 74011250636 HC RX REV CODE- 250/636: Performed by: INTERNAL MEDICINE

## 2018-01-18 PROCEDURE — 97530 THERAPEUTIC ACTIVITIES: CPT

## 2018-01-18 PROCEDURE — 80048 BASIC METABOLIC PNL TOTAL CA: CPT | Performed by: INTERNAL MEDICINE

## 2018-01-18 RX ORDER — DIVALPROEX SODIUM 250 MG/1
500 TABLET, EXTENDED RELEASE ORAL
Status: DISCONTINUED | OUTPATIENT
Start: 2018-01-18 | End: 2018-01-19

## 2018-01-18 RX ADMIN — FOLIC ACID 1 MG: 1 TABLET ORAL at 08:52

## 2018-01-18 RX ADMIN — DEXTROSE MONOHYDRATE 12.5 G: 25 INJECTION, SOLUTION INTRAVENOUS at 15:54

## 2018-01-18 RX ADMIN — DEXTROSE MONOHYDRATE 25 G: 25 INJECTION, SOLUTION INTRAVENOUS at 04:34

## 2018-01-18 RX ADMIN — VANCOMYCIN 1000 MG: 1 INJECTION, SOLUTION INTRAVENOUS at 21:20

## 2018-01-18 RX ADMIN — DEXTROSE MONOHYDRATE 12.5 G: 25 INJECTION, SOLUTION INTRAVENOUS at 08:51

## 2018-01-18 RX ADMIN — GABAPENTIN 300 MG: 300 CAPSULE ORAL at 08:52

## 2018-01-18 RX ADMIN — TAMSULOSIN HYDROCHLORIDE 0.4 MG: 0.4 CAPSULE ORAL at 21:18

## 2018-01-18 RX ADMIN — LEVOTHYROXINE SODIUM 100 MCG: 100 TABLET ORAL at 05:01

## 2018-01-18 RX ADMIN — GABAPENTIN 300 MG: 300 CAPSULE ORAL at 21:18

## 2018-01-18 RX ADMIN — DEXTROSE MONOHYDRATE 12.5 G: 25 INJECTION, SOLUTION INTRAVENOUS at 12:25

## 2018-01-18 RX ADMIN — LEVETIRACETAM 500 MG: 500 TABLET ORAL at 08:52

## 2018-01-18 RX ADMIN — Medication 10 ML: at 21:21

## 2018-01-18 RX ADMIN — FERROUS SULFATE TAB 325 MG (65 MG ELEMENTAL FE) 325 MG: 325 (65 FE) TAB at 08:52

## 2018-01-18 RX ADMIN — Medication 10 ML: at 05:01

## 2018-01-18 RX ADMIN — AMLODIPINE BESYLATE 10 MG: 10 TABLET ORAL at 08:52

## 2018-01-18 RX ADMIN — FERROUS SULFATE TAB 325 MG (65 MG ELEMENTAL FE) 325 MG: 325 (65 FE) TAB at 17:34

## 2018-01-18 RX ADMIN — SODIUM CHLORIDE 2 G: 900 INJECTION, SOLUTION INTRAVENOUS at 12:24

## 2018-01-18 RX ADMIN — Medication 10 ML: at 15:54

## 2018-01-18 RX ADMIN — DONEPEZIL HYDROCHLORIDE 10 MG: 5 TABLET, FILM COATED ORAL at 21:18

## 2018-01-18 RX ADMIN — MIRTAZAPINE 15 MG: 15 TABLET, FILM COATED ORAL at 21:18

## 2018-01-18 RX ADMIN — SODIUM CHLORIDE 2 G: 900 INJECTION, SOLUTION INTRAVENOUS at 23:50

## 2018-01-18 RX ADMIN — LEVETIRACETAM 500 MG: 500 TABLET ORAL at 17:34

## 2018-01-18 NOTE — PROGRESS NOTES
Problem: Mobility Impaired (Adult and Pediatric)  Goal: *Acute Goals and Plan of Care (Insert Text)  LTG:  (1.)Mr. Delcie Galeazzi will move from supine to sit and sit to supine, scoot up and down and roll side to side with MODERATE ASSIST within 7 day(s). (2.)Mr. Delcie Galeazzi will follow verbal cues for ROM/mobility 50% of the time within 7 days. (3.)Mr. Delcie Galeazzi will perform seated activities and exercises for 8+ minutes with minimal assist within 7 days. (4.)Mr. Delcie Galeazzi will transfer from bed to chair and chair to bed with MAXIMAL ASSIST using the least restrictive device within 7 day(s). ________________________________________________________________________________________________    PHYSICAL THERAPY: Daily Note, Treatment Day: 1st, AM 1/18/2018  INPATIENT: Hospital Day: 7  Payor: Jesus Garnica / Plan: [address] [place] [address] HMO / Product Type: [employer] Medicare /      NAME/AGE/GENDER: Katelyn Angeles is a 67 y.o. male   PRIMARY DIAGNOSIS: GIB (gastrointestinal bleeding)  GI BLEED, ANEMIA AVM (arteriovenous malformation) of small bowel, acquired with hemorrhage ([facility] [address] AVM (arteriovenous malformation) of small bowel, acquired with hemorrhage ([facility] [address]  Procedure(s) (LRB):  ESOPHAGOGASTRODUODENOSCOPY (EGD) PUSH (N/A)  ENDOSCOPIC ARGON PLASMA COAGULATION (N/A)  4 Days Post-Op  ICD-10: Treatment Diagnosis:   · Generalized Muscle Weakness (M62.81)  · Other lack of cordination (R27.8)  · Other abnormalities of gait and mobility (R26.89)   Precaution/Allergies:  Review of patient's allergies indicates no known allergies. ASSESSMENT:     Mr. Delcie Galeazzi is a 67year old male admitted from home for GI bleed, anemia. S/p EGD. Pt is more responsive today. He was in fetal position on contact. He keeps eyes closed a good bit and has forward flexed posture at edge of bed but nodding to answer questions. He sat to EOB with max assist x 2. Sat several minutes. Sit to stand x 2 to RW with mod assist x 2.   He was able to ambulate ~ 3' to chair with RW with mod assist x 2. Sat a few minutes in chair. Decided to get pt back to bed d/t feeling he was not safe in chair alone. He stood and ambulated back to the EOB in same manner. EOB to supine with mod assist. Rolled side to side to change brief. Juan Garay appears to have experienced a significant decline in overall status (mental status, alertness, strength, etc) and unsure of the cause. He has made significant improvement toward goals since IE but would still not be safe to return home in current condition and will need continued therapy/rehab. This section established at most recent assessment   PROBLEM LIST (Impairments causing functional limitations):  1. Decreased Strength  2. Decreased ADL/Functional Activities  3. Decreased Transfer Abilities  4. Decreased Ambulation Ability/Technique  5. Decreased Balance  6. Decreased Activity Tolerance  7. Decreased Cognition   INTERVENTIONS PLANNED: (Benefits and precautions of physical therapy have been discussed with the patient.)  1. Balance Exercise  2. Bed Mobility  3. Gait Training  4. Range of Motion (ROM)  5. Therapeutic Activites  6. Therapeutic Exercise/Strengthening  7. Transfer Training     TREATMENT PLAN: Frequency/Duration: 3 times a week for duration of hospital stay  Rehabilitation Potential For Stated Goals: [facility] REHABILITATION/EQUIPMENT: (at time of discharge pending progress): Due to the probability of continued deficits (see above) this patient will likely need continued skilled physical therapy after discharge. Equipment:    TBD pending progress              HISTORY:   History of Present Injury/Illness (Reason for Referral):  Per H&P, \"Patient is a Jose Scarce old male with PMH sig for Aortic stensois, prior gib, avm seen on egd in September 2017 - unclear if colonoscopy  done. Dc from hospital 01/01/2018. Had follow up with pcp today and hb found to have dropped to 5.5 from 7.5 on last admit. Sent to er- grossly heme- occult positive- blood transfusion ordered. Hospitalist asked to admit. Gi consulted. \"    Past Medical History/Comorbidities:   Mr. Concepcion Alegre  has a past medical history of Alcohol abuse; Anemia; Angiodysplasia of stomach (2016); Bladder incontinence (05/28/2014); Chronic airway obstruction, not elsewhere classified (5/28/2014); CKD (chronic kidney disease); COPD (chronic obstructive pulmonary disease) ([facility] [address] Dementia (5/28/2014); Hyperlipidemia (5/28/2014); Hypernatremia (11/25/2016); Hypertension; Hypothyroid; Seizure ([facility] [address] (2010); Tobacco abuse (5/28/2014); and Tobacco abuse. Mr. Concepcion Alegre  has a past surgical history that includes hx colonoscopy (APPROX 2005) and hx endoscopy (2016). Social History/Living Environment:   Home Environment: Private residence  One/Two Story Residence: One story  Living Alone: No  Support Systems: Family member(s), Friends \ neighbors  Patient Expects to be Discharged to[de-identified] Rehabilitation facility  Current DME Used/Available at Home: None  Prior Level of Function/Work/Activity:  Unknown. Pt unable to provide history. Per chart, it appears that pt was discharged home with spouse after recent stay at [facility] earlier this month. It appears he has some baseline dementia but is ambulatory around the house. On 1/1/18 pt was performing bed mobility and sit-stand transfers with min assist.      Number of Personal Factors/Comorbidities that affect the Plan of Care: 3+: HIGH COMPLEXITY   EXAMINATION:   Most Recent Physical Functioning:   Gross Assessment:                  Posture:  Posture (WDL): Exceptions to WDL  Posture Assessment: Forward head, Rounded shoulders, Trunk flexion  Balance:  Sitting: Impaired  Sitting - Static: Fair (occasional)  Sitting - Dynamic: Fair (occasional)  Standing: Impaired  Standing - Static: Poor  Standing - Dynamic : Poor Bed Mobility:  Rolling: Moderate assistance  Supine to Sit: Maximum assistance;Assist x2  Sit to Supine:  Total assistance;Assist x2  Scooting: Total assistance;Assist x2  Wheelchair Mobility:     Transfers:  Sit to Stand: Moderate assistance;Assist x2  Stand to Sit: Moderate assistance;Assist x2  Gait:     Base of Support: Narrowed  Speed/Alejandra: Delayed; Slow  Step Length: Left shortened;Right shortened  Gait Abnormalities: Decreased step clearance  Distance (ft): 4 Feet (ft) (x 2)  Assistive Device: Walker, rolling  Ambulation - Level of Assistance: Moderate assistance;Assist x2  Interventions: Manual cues; Safety awareness training      Body Structures Involved:  1. Eyes and Ears  2. Voice/Speech  3. Muscles Body Functions Affected:  1. Mental  2. Movement Related  3. Skin Related Activities and Participation Affected:  1. Learning and Applying Knowledge  2. General Tasks and Demands  3. Communication  4. Mobility  5. Self Care  6. Domestic Life  7. Interpersonal Interactions and Relationships  8. Community, Social and [place]   Number of elements that affect the Plan of Care: 4+: HIGH COMPLEXITY   CLINICAL PRESENTATION:   Presentation: Evolving clinical presentation with changing clinical characteristics: MODERATE COMPLEXITY   CLINICAL DECISION MAKIN [facility] Inpatient Short Form  How much difficulty does the patient currently have. .. Unable A Lot A Little None   1. Turning over in bed (including adjusting bedclothes, sheets and blankets)? [] 1   [x] 2   [] 3   [] 4   2. Sitting down on and standing up from a chair with arms ( e.g., wheelchair, bedside commode, etc.)   [x] 1   [] 2   [] 3   [] 4   3. Moving from lying on back to sitting on the side of the bed? [x] 1   [] 2   [] 3   [] 4   How much help from another person does the patient currently need. .. Total A Lot A Little None   4. Moving to and from a bed to a chair (including a wheelchair)? [x] 1   [] 2   [] 3   [] 4   5. Need to walk in hospital room? [x] 1   [] 2   [] 3   [] 4   6.   Climbing 3-5 steps with a railing? [x] 1   [] 2   [] 3   [] 4   © 2007, Trustees of [address] [place] [address] [address] [address], under license to [employer]. All rights reserved      Score:  Initial: 7 Most Recent: X (Date: -- )    Interpretation of Tool:  Represents activities that are increasingly more difficult (i.e. Bed mobility, Transfers, Gait). Score 24 23 22-20 19-15 14-10 9-7 6     Modifier CH CI CJ CK CL CM CN      ? Mobility - Walking and Moving Around:     - CURRENT STATUS: CM - 80%-99% impaired, limited or restricted    - GOAL STATUS: CL - 60%-79% impaired, limited or restricted    - D/C STATUS:  ---------------To be determined---------------  Payor: [employer] MEDICARE / Plan: [address] [place] [address] HMO / Product Type: Managed Care Medicare /      Medical Necessity:     · Patient demonstrates fair rehab potential due to higher previous functional level. Reason for Services/Other Comments:  · Patient continues to demonstrate capacity to improve strength, mobility, balance, transfers which will increase independence, decrease amount of assistance required from caregiver and increase safety. Use of outcome tool(s) and clinical judgement create a POC that gives a: Questionable prediction of patient's progress: MODERATE COMPLEXITY            TREATMENT:   (In addition to Assessment/Re-Assessment sessions the following treatments were rendered)   Pre-treatment Symptoms/Complaints:  Nods head to answer questions. Pain: Initial: No c/o. Post Session:       Therapeutic Activity: (    24 min): Therapeutic activities including bed mobility, gt on level surface, sitting balance to improve mobility, strength and balance. Required Manual cues; Safety awareness training to promote dynamic balance in standing.          Braces/Orthotics/Lines/Etc:   · IV  · O2 Device: Nasal cannula  Treatment/Session Assessment:    · Response to Treatment:  Good effort  · Interdisciplinary Collaboration:   o Physical Therapy Assistant  o Registered Nurse  o Rehabilitation Attendant  · After treatment position/precautions:   o Supine in bed  o Bed alarm/tab alert on  o Bed/Chair-wheels locked  o Bed in low position  o Call light within reach  o RN notified   · Compliance with Program/Exercises: Will assess as treatment progresses. · Recommendations/Intent for next treatment session: \"Next visit will focus on advancements to more challenging activities and reduction in assistance provided\".   Total Treatment Duration:  PT Patient Time In/Time Out  Time In: 1058  Time Out: 44 [facility]

## 2018-01-18 NOTE — PROCEDURES
[address] 65  EEG    Geovany Cuba  MR#: [id number]  : 1945  ACCOUNT #: [de-identified]   DATE OF SERVICE: 2018    This is a 16-channel EEG with 2 central leads, 2 eye leads for a total of 21-channel EEG performed on this patient to rule out seizure activity. There is a great deal of movement artifact noted throughout this EEG, but the predominant background rhythm does contain a moderate amplitude approximately of 6-7 Hz activity seen generally throughout this EEG. There is no spike and wave or sharp wave activity noted throughout this entire EEG. There was a lot of movement artifact noted, but no seizure activity during the movement artifact. Photic stimulation was performed and did elicit driving response at mid flash frequencies. Hyperventilation was not performed. There was no spike and wave or sharp waves noted. IMPRESSION:  This is a normal EEG for the patient's age. There is no or lateralizing or epileptiform activity identified throughout this EEG. Clinical correlation is advised.       DO DARRIAN oJhnson  D: 2018 12:16     T: 2018 12:39  JOB #: [id number]

## 2018-01-18 NOTE — INTERDISCIPLINARY ROUNDS
Interdisciplinary team rounds were held 1/18/2018 with the following team members:Care Management, Nursing, Pharmacy and Physical Therapy and the patient. Plan of care discussed. See clinical pathway and/or care plan for interventions and desired outcomes. Patient is confused. No family present at bedside for rounds. When asked if comfortable, patient states yes. Anticipating discharge to [facility] when medically ready.

## 2018-01-18 NOTE — PROGRESS NOTES
STG: Pt will participate with trials of puree/honey thick liquids with SLP only for diet reinstatement as appropriate (goal revised 1/18/18)  STG: Pt will participate with trials of thin liquids with SLP only for diet advancement as tolerated (goal discontinued 1/18/18)  STG: Pt will participate with modified barium swallow study x1  LTG: Pt will tolerate the least restrictive diet at discharge without respiratory compromise    Speech language pathology: bedside swallow note: Daily Note 2    NAME/AGE/GENDER: Danitza Simeon is a 67 y.o. male  DATE: 1/18/2018  PRIMARY DIAGNOSIS: GIB (gastrointestinal bleeding)  GI BLEED, ANEMIA  Procedure(s) (LRB):  ESOPHAGOGASTRODUODENOSCOPY (EGD) PUSH (N/A)  ENDOSCOPIC ARGON PLASMA COAGULATION (N/A) 4 Days Post-Op  ICD-10: Treatment Diagnosis: dysphagia; oropharyngeal R13.12  INTERDISCIPLINARY COLLABORATION: Registered Nurse and CNA  PRECAUTIONS/ALLERGIES: Review of patient's allergies indicates no known allergies. ASSESSMENT:Patient seen for diet tolerance. Decreased level of alertness compared with previous sessions. Spiked a fever yesterday. Patient tolerated pills crushed in puree without overt signs/sx of aspiration. Speech more difficult to understand this session with increased cues to respond. Patient with significant delayed initiation of the swallow and anterior spillage with honey thick liquids. Cough 1 of 3 cup sips. Attempted via spoon with continued signs/sx of aspiration. Recommend NPO except meds crushed in puree. Will follow for diet reinstatement as level of alertness allows. Patient will benefit from skilled intervention to address the below impairments. ?????? ? ? This section established at most recent assessment??????????  PROBLEM LIST (Impairments causing functional limitations):  1.  Dysphagia  2. cognition  REHABILITATION POTENTIAL FOR STATED GOALS: Good  PLAN OF CARE:   Patient will benefit from skilled intervention to address the following impairments. RECOMMENDATIONS AND PLANNED INTERVENTIONS (Benefits and precautions of therapy have been discussed with the patient.):  · NPO except meds  MEDICATIONS:  · Crushed in puree  COMPENSATORY STRATEGIES/MODIFICATIONS INCLUDING:  · Small sips and bites  · 1:1 feeding assistance  OTHER RECOMMENDATIONS (including follow up treatment recommendations): · Family training/education  · Patient education  · po trials  RECOMMENDED DIET MODIFICATIONS DISCUSSED WITH:  · Nursing  · Patient  FREQUENCY/DURATION: Continue to follow patient 3 times a week for duration of hospital stay to address above goals. RECOMMENDED REHABILITATION/EQUIPMENT: (at time of discharge pending progress): Due to the probability of continued deficits (see above) this patient will not likely need continued skilled speech therapy after discharge. SUBJECTIVE:   Lethargic. History of Present Injury/Illness: Mr. Alicia Adam  has a past medical history of Alcohol abuse; Anemia; Angiodysplasia of stomach (2016); Bladder incontinence (05/28/2014); Chronic airway obstruction, not elsewhere classified (5/28/2014); CKD (chronic kidney disease); COPD (chronic obstructive pulmonary disease) ([facility] [address] Dementia (5/28/2014); Hyperlipidemia (5/28/2014); Hypernatremia (11/25/2016); Hypertension; Hypothyroid; Seizure ([facility] [address] (2010); Tobacco abuse (5/28/2014); and Tobacco abuse. He also  has a past surgical history that includes hx colonoscopy (APPROX 2005) and hx endoscopy (2016). Present Symptoms: cough with thin liquids x1  Pain Intensity 1: 0  Pain Intervention(s) 1: Rest  Current Medications:   No current facility-administered medications on file prior to encounter. Current Outpatient Prescriptions on File Prior to Encounter   Medication Sig Dispense Refill    levothyroxine (SYNTHROID) 100 mcg tablet Take 1 Tab by mouth Daily (before breakfast). 90 Tab 1    levETIRAcetam (KEPPRA) 500 mg tablet Take 1 Tab by mouth two (2) times a day.  180 Tab 1    pantoprazole (PROTONIX) 40 mg tablet Take 1 Tab by mouth two (2) times a day. 877 Tab 2    folic acid (FOLVITE) 1 mg tablet Take 1 Tab by mouth daily. 90 Tab 2    mirtazapine (REMERON) 15 mg tablet Take 1 Tab by mouth nightly. 90 Tab 2    donepezil (ARICEPT) 10 mg tablet Take 1 Tab by mouth nightly. 90 Tab 3    gabapentin (NEURONTIN) 300 mg capsule Take 1 Cap by mouth three (3) times daily. 90 Cap 2    amLODIPine (NORVASC) 10 mg tablet Take 1 Tab by mouth daily. 90 Tab 3    losartan (COZAAR) 100 mg tablet Take 1 Tab by mouth daily. 90 Tab 3    ferrous sulfate 325 mg (65 mg iron) tablet Take 1 Tab by mouth two (2) times daily (with meals). 60 Tab 0    divalproex ER (DEPAKOTE ER) 250 mg ER tablet Take 1 Tab by mouth nightly. 90 Tab 2    tamsulosin (FLOMAX) 0.4 mg capsule Take 1 Cap by mouth nightly.  90 Cap 2     Current Dietary Status:  Full liquids      History of reflux:  yes}    Reflux medication: protonix  Social History/Home Situation: home with family  Home Environment: Private residence  One/Two Story Residence: One story  Living Alone: No  Support Systems: Family member(s), Friends \ neighbors  Patient Expects to be Discharged to[de-identified] Rehabilitation facility  Current DME Used/Available at Home: None  OBJECTIVE:   Respiratory Status:        CXR Results: n/a this admission  MRI/CT Results:n/a this admission  Oral Motor Structure/Speech:  Oral-Motor Structure/Motor Speech  Labial: Decreased seal, Impaired coordination  Dentition: Limited  Lingual: Decreased rate, Incoordinated    Cognitive and Communication Status:  Neurologic State: Confused;Drowsy  Orientation Level: Oriented to person  Cognition: Decreased attention/concentration  Perception: Cues to maintain midline in sitting     Safety/Judgement: Decreased awareness of need for assistance    BEDSIDE SWALLOW EVALUATION  Oral Assessment:  Oral Assessment  Labial: Decreased seal;Impaired coordination  Dentition: Limited  Lingual: Decreased rate;Incoordinated  P.O. Trials:  Patient Position: upright in bed    The patient was given tsp to straw amounts of the following:   Consistency Presented: Honey thick liquid;Puree  How Presented: SLP-fed/presented;Spoon;Cup/sip    ORAL PHASE:  Bolus Acceptance: Impaired  Bolus Formation/Control: Impaired  Propulsion: Delayed (# of seconds)  Type of Impairment: Delayed;Lip closure;Mastication;Piecemeal;Spillage  Oral Residue: Less than 10% of bolus    PHARYNGEAL PHASE:  Initiation of Swallow: Delayed (# of seconds)  Laryngeal Elevation: Decreased  Aspiration Signs/Symptoms: Strong cough  Vocal Quality: Low volume (dyarthric)           Pharyngeal Phase Characteristics: Poor endurance;Easily fatigued     OTHER OBSERVATIONS:  Rate/bite size: WNL   Endurance:  Impaired     Tool Used: Dysphagia Outcome and Severity Scale (NABILA)    Score Comments   Normal Diet  [] 7 With no strategies or extra time needed   Functional Swallow  [] 6 May have mild oral or pharyngeal delay       Mild Dysphagia    [] 5 Which may require one diet consistency restricted (those who demonstrate penetration which is entirely cleared on MBS would be included)   Mild-Moderate Dysphagia  [x] 4 With 1-2 diet consistencies restricted       Moderate Dysphagia  [] 3 With 2 or more diet consistencies restricted       Moderately Severe Dysphagia  [] 2 With partial PO strategies (trials with ST only)       Severe Dysphagia  [] 1 With inability to tolerate any PO safely          Score:  Initial: 4 Most Recent: X (Date: -- )   Interpretation of Tool: The Dysphagia Outcome and Severity Scale (NABILA) is a simple, easy-to-use, 7-point scale developed to systematically rate the functional severity of dysphagia based on objective assessment and make recommendations for diet level, independence level, and type of nutrition. Score 7 6 5 4 3 2 1   Modifier CH CI CJ CK CL CM CN   ?  Swallowing:     - CURRENT STATUS: CK - 40%-59% impaired, limited or restricted    - GOAL STATUS:  CL - 60%-79% impaired, limited or restricted    - D/C STATUS:  ---------------To be determined---------------  Payor: Alessandro Grider / Plan: [facility] / Product Type: Managed Care Medicare /     TREATMENT:    (In addition to Assessment/Re-Assessment sessions the following treatments were rendered)  Dysphagia Activities: Activities/Procedures listed utilized to improve progress in swallow function and diet tolerance. Required maximal cueing to decrease aspiration risk. ORAL MOTOR  EXERCISES:                                                                                                                                                                      LARYNGEAL / PHARYNGEAL EXERCISES:                                                                                                                                     __________________________________________________________________________________________________  Safety:   After treatment position/precautions:  · RN notified  · Upright in Bed  Progression/Medical Necessity:   · Skilled intervention continues to be required due to persistent signs and symptoms of aspiration and patient still consuming a modified diet. Compliance with Program/Exercises: Will assess as treatment progresses. Reason for Continuation of Services/Other Comments:  · Patient continues to require skilled intervention due to patient unable to attend/participate in therapy as expected. Recommendations/Intent for next treatment session: \"Treatment next visit will focus on po trials\".     Total Treatment Duration:  Time In: 0856  Time Out: [address] MS, CCC-SLP

## 2018-01-18 NOTE — PROGRESS NOTES
Hospitalist Progress Note    Subjective:   Patient examined at bedside. Was noted confused, mild drowsy but was able to be sit in a chair by PT/OT. Had a fever episodes yesterday and hypothermia later after. Sodium levels improved. EEG and brain ct were normal. I reviewed neurology evaluation from 1/15/18. Likely AMS on top of his dementia worsened by HCAP- hypoglycemia- hypernatremia. Depakote was increased. Patient was able to follow my orders, he moved his limbs, opened his eyes. Appreciate speech evaluation- for now he will be placed NPO.     Current Facility-Administered Medications   Medication Dose Route Frequency    cefepime (MAXIPIME) 2 g in 0.9% sodium chloride (MBP/ADV) 100 mL ADV  2 g IntraVENous Q12H    vancomycin (VANCOCIN) 1,000 mg in 0.9% sodium chloride 250 mL IVPB  1,000 mg IntraVENous Q24H    famotidine (PEPCID) 40 mg/5 mL (8 mg/mL) oral suspension 20 mg  20 mg Oral DAILY    dextrose 5% infusion  100 mL/hr IntraVENous CONTINUOUS    0.9% sodium chloride infusion 250 mL  250 mL IntraVENous PRN    glucagon (GLUCAGEN) injection 1 mg  1 mg IntraMUSCular PRN    dextrose (D50W) injection syrg 12.5-25 g  25-50 mL IntraVENous PRN    amLODIPine (NORVASC) tablet 10 mg  10 mg Oral DAILY    divalproex ER (DEPAKOTE ER) 24 hour tablet 250 mg  250 mg Oral QHS    donepezil (ARICEPT) tablet 10 mg  10 mg Oral QHS    ferrous sulfate tablet 325 mg  325 mg Oral BID WITH MEALS    folic acid (FOLVITE) tablet 1 mg  1 mg Oral DAILY    gabapentin (NEURONTIN) capsule 300 mg  300 mg Oral TID    levETIRAcetam (KEPPRA) tablet 500 mg  500 mg Oral BID    levothyroxine (SYNTHROID) tablet 100 mcg  100 mcg Oral ACB    mirtazapine (REMERON) tablet 15 mg  15 mg Oral QHS    tamsulosin (FLOMAX) capsule 0.4 mg  0.4 mg Oral QHS    0.9% sodium chloride infusion 250 mL  250 mL IntraVENous PRN    sodium chloride (NS) flush 5-10 mL  5-10 mL IntraVENous Q8H    sodium chloride (NS) flush 5-10 mL  5-10 mL IntraVENous PRN    acetaminophen (TYLENOL) tablet 650 mg  650 mg Oral Q4H PRN    HYDROcodone-acetaminophen (NORCO) 5-325 mg per tablet 1 Tab  1 Tab Oral Q4H PRN    ondansetron (ZOFRAN) injection 4 mg  4 mg IntraVENous Q4H PRN        Review of Systems  Review of systems not obtained due to patient factors.     Objective:     Visit Vitals    /65 (BP 1 Location: Right arm, BP Patient Position: At rest)    Pulse (!) 57    Temp 97.2 °F (36.2 °C)    Resp 17    Ht 5' 8\" (1.727 m)    Wt 61.7 kg (136 lb)    SpO2 93%    BMI 20.68 kg/m2    O2 Flow Rate (L/min): 2 l/min O2 Device: Nasal cannula    Temp (24hrs), Av.8 °F (37.1 °C), Min:93.4 °F (34.1 °C), Max:101.6 °F (38.7 °C)          1901 -  0700  In: 9985 [I.V.:3653]  Out: -     General: asleep but arousable, mostly nonverbal, ill appearing but not acutely distressed  Eyes; non icteric, EOMI  Neck; supple  CV: regular, no murmurs, no gallops   Pulm; course breath sounds, non labored, normal effort  Abd; soft, non tender, non distended  Neuro: unable to perform since he was drowsy likely secondary to hypernatremia     Additional comments:I reviewed the patient's new clinical lab test results. j    Data Review    Recent Results (from the past 24 hour(s))   GLUCOSE, POC    Collection Time: 18  8:52 AM   Result Value Ref Range    Glucose (POC) 77 65 - 100 mg/dL   GLUCOSE, POC    Collection Time: 18  9:55 AM   Result Value Ref Range    Glucose (POC) 90 65 - 100 mg/dL   GLUCOSE, POC    Collection Time: 18 11:51 AM   Result Value Ref Range    Glucose (POC) 94 65 - 100 mg/dL   GLUCOSE, POC    Collection Time: 18  1:27 PM   Result Value Ref Range    Glucose (POC) 106 (H) 65 - 100 mg/dL   GLUCOSE, POC    Collection Time: 18  4:25 PM   Result Value Ref Range    Glucose (POC) 262 (H) 65 - 100 mg/dL   CULTURE, BLOOD    Collection Time: 18  5:12 PM   Result Value Ref Range    Special Requests: LEFT  HAND        Culture result: NO GROWTH AFTER 12 HOURS     PROCALCITONIN    Collection Time: 01/17/18  5:12 PM   Result Value Ref Range    Procalcitonin 0.1 ng/mL   CULTURE, BLOOD    Collection Time: 01/17/18  5:19 PM   Result Value Ref Range    Special Requests: RIGHT  Antecubital        Culture result: NO GROWTH AFTER 12 HOURS     GLUCOSE, POC    Collection Time: 01/17/18  8:52 PM   Result Value Ref Range    Glucose (POC) 69 65 - 100 mg/dL   GLUCOSE, POC    Collection Time: 01/17/18 10:50 PM   Result Value Ref Range    Glucose (POC) 68 65 - 100 mg/dL   GLUCOSE, POC    Collection Time: 01/17/18 11:31 PM   Result Value Ref Range    Glucose (POC) 98 65 - 100 mg/dL   GLUCOSE, POC    Collection Time: 01/18/18  4:28 AM   Result Value Ref Range    Glucose (POC) 68 65 - 100 mg/dL   GLUCOSE, POC    Collection Time: 01/18/18  5:03 AM   Result Value Ref Range    Glucose (POC) 173 (H) 65 - 100 mg/dL   CBC W/O DIFF    Collection Time: 01/18/18  5:04 AM   Result Value Ref Range    WBC 4.2 (L) 4.3 - 11.1 K/uL    RBC 3.47 (L) 4.23 - 5.67 M/uL    HGB 8.8 (L) 13.6 - 17.2 g/dL    HCT 28.1 (L) 41.1 - 50.3 %    MCV 81.0 79.6 - 97.8 FL    MCH 25.4 (L) 26.1 - 32.9 PG    MCHC 31.3 (L) 31.4 - 35.0 g/dL    RDW 19.0 (H) 11.9 - 14.6 %    PLATELET 417 548 - 287 K/uL    MPV 10.3 (L) 10.8 - 04.7 FL   METABOLIC PANEL, BASIC    Collection Time: 01/18/18  5:04 AM   Result Value Ref Range    Sodium 144 136 - 145 mmol/L    Potassium 4.2 3.5 - 5.1 mmol/L    Chloride 111 (H) 98 - 107 mmol/L    CO2 25 21 - 32 mmol/L    Anion gap 8 7 - 16 mmol/L    Glucose 180 (H) 65 - 100 mg/dL    BUN 17 8 - 23 MG/DL    Creatinine 1.62 (H) 0.8 - 1.5 MG/DL    GFR est AA 54 (L) >60 ml/min/1.73m2    GFR est non-AA 45 (L) >60 ml/min/1.73m2    Calcium 8.2 (L) 8.3 - 10.4 MG/DL         Assessment/Plan:     Principal Problem:    Episode of fever-hypothermia: he has no leukocytosis: blood cultures ordered, cxr positive for RLL, procalcitonin negative - Due to high fever event and RLL with hypothermia and AMS he was started on vanc/maxipime for presumptive HAP. AMS: multifactorial: hypernatremia/ infection/ history of partial seizures: cultures sent-  EEG done today was unremarkable- Brain CT scan no acute findings, ammonia levels normal- monitor -Keep NPO       AVM (arteriovenous malformation) of small bowel, acquired with hemorrhage:  Acute blood loss anemia (1/13/2018), Status post 3 units PRBCs this admission. Hg stable -on  famotidine. No active bleeds on 1/14 EGD. Hypernatremia: resolved- continue D5 iv       Acute on chronic renal failure : his Cr seems to be at baseline now- continue D5 due to hypernatremia       Bradycardia: Cardiology states no intervention needed. Not on any rate lowering medications.       Hypoglycemia (1/13/2018): resolved- continue on D5 infusion due to AMS and high risk for aspiration now - insulin and C-peptide levels: normal       Moderate aortic stenosis (1/13/2018)    Full code  PT/OT/SPEECH    Care Plan discussed with: Patient/Family and Nurse      Signed By: Dwight Krause MD     January 18, 2018

## 2018-01-19 LAB
ANION GAP SERPL CALC-SCNC: 9 MMOL/L (ref 7–16)
BASOPHILS # BLD: 0 K/UL (ref 0–0.2)
BASOPHILS NFR BLD: 1 % (ref 0–2)
BUN SERPL-MCNC: 17 MG/DL (ref 8–23)
CALCIUM SERPL-MCNC: 8.4 MG/DL (ref 8.3–10.4)
CHLORIDE SERPL-SCNC: 109 MMOL/L (ref 98–107)
CO2 SERPL-SCNC: 24 MMOL/L (ref 21–32)
CREAT SERPL-MCNC: 1.8 MG/DL (ref 0.8–1.5)
DIFFERENTIAL METHOD BLD: ABNORMAL
EOSINOPHIL # BLD: 0.1 K/UL (ref 0–0.8)
EOSINOPHIL NFR BLD: 3 % (ref 0.5–7.8)
ERYTHROCYTE [DISTWIDTH] IN BLOOD BY AUTOMATED COUNT: 18.5 % (ref 11.9–14.6)
GLUCOSE BLD STRIP.AUTO-MCNC: 103 MG/DL (ref 65–100)
GLUCOSE BLD STRIP.AUTO-MCNC: 104 MG/DL (ref 65–100)
GLUCOSE BLD STRIP.AUTO-MCNC: 134 MG/DL (ref 65–100)
GLUCOSE SERPL-MCNC: 94 MG/DL (ref 65–100)
HCT VFR BLD AUTO: 29.1 % (ref 41.1–50.3)
HGB BLD-MCNC: 9.2 G/DL (ref 13.6–17.2)
IMM GRANULOCYTES # BLD: 0 K/UL (ref 0–0.5)
IMM GRANULOCYTES NFR BLD AUTO: 0 % (ref 0–5)
LYMPHOCYTES # BLD: 0.9 K/UL (ref 0.5–4.6)
LYMPHOCYTES NFR BLD: 24 % (ref 13–44)
MCH RBC QN AUTO: 25.6 PG (ref 26.1–32.9)
MCHC RBC AUTO-ENTMCNC: 31.6 G/DL (ref 31.4–35)
MCV RBC AUTO: 80.8 FL (ref 79.6–97.8)
MONOCYTES # BLD: 0.5 K/UL (ref 0.1–1.3)
MONOCYTES NFR BLD: 12 % (ref 4–12)
NEUTS SEG # BLD: 2.2 K/UL (ref 1.7–8.2)
NEUTS SEG NFR BLD: 60 % (ref 43–78)
PLATELET # BLD AUTO: 383 K/UL (ref 150–450)
PMV BLD AUTO: 10 FL (ref 10.8–14.1)
POTASSIUM SERPL-SCNC: 4.2 MMOL/L (ref 3.5–5.1)
RBC # BLD AUTO: 3.6 M/UL (ref 4.23–5.67)
SODIUM SERPL-SCNC: 142 MMOL/L (ref 136–145)
VANCOMYCIN TROUGH SERPL-MCNC: 8.9 UG/ML (ref 5–20)
WBC # BLD AUTO: 3.7 K/UL (ref 4.3–11.1)

## 2018-01-19 PROCEDURE — 82962 GLUCOSE BLOOD TEST: CPT

## 2018-01-19 PROCEDURE — 97530 THERAPEUTIC ACTIVITIES: CPT

## 2018-01-19 PROCEDURE — 74011250637 HC RX REV CODE- 250/637: Performed by: INTERNAL MEDICINE

## 2018-01-19 PROCEDURE — 74011000258 HC RX REV CODE- 258: Performed by: INTERNAL MEDICINE

## 2018-01-19 PROCEDURE — 77010033678 HC OXYGEN DAILY

## 2018-01-19 PROCEDURE — 74011250636 HC RX REV CODE- 250/636: Performed by: INTERNAL MEDICINE

## 2018-01-19 PROCEDURE — 94760 N-INVAS EAR/PLS OXIMETRY 1: CPT

## 2018-01-19 PROCEDURE — 92526 ORAL FUNCTION THERAPY: CPT

## 2018-01-19 PROCEDURE — 36415 COLL VENOUS BLD VENIPUNCTURE: CPT | Performed by: INTERNAL MEDICINE

## 2018-01-19 PROCEDURE — 85025 COMPLETE CBC W/AUTO DIFF WBC: CPT | Performed by: INTERNAL MEDICINE

## 2018-01-19 PROCEDURE — 80048 BASIC METABOLIC PNL TOTAL CA: CPT | Performed by: INTERNAL MEDICINE

## 2018-01-19 PROCEDURE — 65270000029 HC RM PRIVATE

## 2018-01-19 PROCEDURE — 80202 ASSAY OF VANCOMYCIN: CPT | Performed by: INTERNAL MEDICINE

## 2018-01-19 RX ORDER — DIVALPROEX SODIUM 125 MG/1
125 CAPSULE, COATED PELLETS ORAL EVERY 6 HOURS
Status: DISCONTINUED | OUTPATIENT
Start: 2018-01-19 | End: 2018-01-24 | Stop reason: HOSPADM

## 2018-01-19 RX ADMIN — Medication 10 ML: at 05:00

## 2018-01-19 RX ADMIN — Medication 10 ML: at 14:00

## 2018-01-19 RX ADMIN — DIVALPROEX SODIUM 125 MG: 125 CAPSULE, COATED PELLETS ORAL at 05:00

## 2018-01-19 RX ADMIN — TAMSULOSIN HYDROCHLORIDE 0.4 MG: 0.4 CAPSULE ORAL at 22:13

## 2018-01-19 RX ADMIN — AMLODIPINE BESYLATE 10 MG: 10 TABLET ORAL at 09:05

## 2018-01-19 RX ADMIN — DIVALPROEX SODIUM 125 MG: 125 CAPSULE, COATED PELLETS ORAL at 01:17

## 2018-01-19 RX ADMIN — SODIUM CHLORIDE 2 G: 900 INJECTION, SOLUTION INTRAVENOUS at 12:52

## 2018-01-19 RX ADMIN — DONEPEZIL HYDROCHLORIDE 10 MG: 5 TABLET, FILM COATED ORAL at 22:13

## 2018-01-19 RX ADMIN — DIVALPROEX SODIUM 125 MG: 125 CAPSULE, COATED PELLETS ORAL at 17:24

## 2018-01-19 RX ADMIN — DIVALPROEX SODIUM 125 MG: 125 CAPSULE, COATED PELLETS ORAL at 12:51

## 2018-01-19 RX ADMIN — FOLIC ACID 1 MG: 1 TABLET ORAL at 09:05

## 2018-01-19 RX ADMIN — LEVETIRACETAM 500 MG: 500 TABLET ORAL at 09:05

## 2018-01-19 RX ADMIN — LEVOTHYROXINE SODIUM 100 MCG: 100 TABLET ORAL at 05:00

## 2018-01-19 RX ADMIN — LEVETIRACETAM 500 MG: 500 TABLET ORAL at 17:24

## 2018-01-19 RX ADMIN — FERROUS SULFATE TAB 325 MG (65 MG ELEMENTAL FE) 325 MG: 325 (65 FE) TAB at 17:24

## 2018-01-19 NOTE — PROGRESS NOTES
STG: Pt will participate with trials of puree/honey thick liquids with SLP only for diet reinstatement as appropriate (goal revised 1/18/18)  STG: Pt will participate with trials of thin liquids with SLP only for diet advancement as tolerated (goal discontinued 1/18/18)  STG: Pt will participate with modified barium swallow study x1  LTG: Pt will tolerate the least restrictive diet at discharge without respiratory compromise    Speech language pathology: bedside swallow note: Daily Note 3    NAME/AGE/GENDER: Tory Hardwick is a 67 y.o. male  DATE: 1/19/2018  PRIMARY DIAGNOSIS: GIB (gastrointestinal bleeding)  GI BLEED, ANEMIA  Procedure(s) (LRB):  ESOPHAGOGASTRODUODENOSCOPY (EGD) PUSH (N/A)  ENDOSCOPIC ARGON PLASMA COAGULATION (N/A) 5 Days Post-Op  ICD-10: Treatment Diagnosis: dysphagia; oropharyngeal R13.12  INTERDISCIPLINARY COLLABORATION: Registered Nurse and CNA  PRECAUTIONS/ALLERGIES: Review of patient's allergies indicates no known allergies. ASSESSMENT:Patient seen with po trials. More alert compared with previous session. Held the cup to administer trials with assistance. Delayed propulsion of the bolus and initiation of the swallow; however, no overt signs/sx of aspiration with 4oz of honey thick juice vis the cup this date. Minimal anterior spillage intermittently. Also tolerated a container of pudding with no overt signs/sx of aspiration. Patient has previously been on pureed diet during past admissions and given fluctuating mental status and oral delays is safest for this diet at this time with no chewable textures assessed. Dr. Lucas Foote reported patient could be advanced from full liquids. Recommend pureed/honey thick liquids. Pills crushed in puree. 1:1 feeding assistance. Only when fully awake/alert. Will follow diet tolerance. Patient will benefit from skilled intervention to address the below impairments. ?????? ? ? This section established at most recent assessment??????????  PROBLEM LIST (Impairments causing functional limitations):  1. Dysphagia  2. cognition  REHABILITATION POTENTIAL FOR STATED GOALS: Good  PLAN OF CARE:   Patient will benefit from skilled intervention to address the following impairments. RECOMMENDATIONS AND PLANNED INTERVENTIONS (Benefits and precautions of therapy have been discussed with the patient.):  · PO:  Pureed  · Liquids:  honey  MEDICATIONS:  · Crushed in puree  COMPENSATORY STRATEGIES/MODIFICATIONS INCLUDING:  · Small sips and bites  · 1:1 feeding assistance  OTHER RECOMMENDATIONS (including follow up treatment recommendations): · Family training/education  · Patient education  · po trials  RECOMMENDED DIET MODIFICATIONS DISCUSSED WITH:  · Nursing  · Patient  FREQUENCY/DURATION: Continue to follow patient 3 times a week for duration of hospital stay to address above goals. RECOMMENDED REHABILITATION/EQUIPMENT: (at time of discharge pending progress): Due to the probability of continued deficits (see above) this patient will not likely need continued skilled speech therapy after discharge. SUBJECTIVE:   Confused but cooperative. History of Present Injury/Illness: Mr. Denver Proffer  has a past medical history of Alcohol abuse; Anemia; Angiodysplasia of stomach (2016); Bladder incontinence (05/28/2014); Chronic airway obstruction, not elsewhere classified (5/28/2014); CKD (chronic kidney disease); COPD (chronic obstructive pulmonary disease) ([facility] [address] Dementia (5/28/2014); Hyperlipidemia (5/28/2014); Hypernatremia (11/25/2016); Hypertension; Hypothyroid; Seizure ([facility] [address] (2010); Tobacco abuse (5/28/2014); and Tobacco abuse. He also  has a past surgical history that includes hx colonoscopy (APPROX 2005) and hx endoscopy (2016). Present Symptoms: cough with thin liquids x1  Pain Intensity 1: 0  Pain Intervention(s) 1: Rest  Current Medications:   No current facility-administered medications on file prior to encounter.       Current Outpatient Prescriptions on File Prior to Encounter   Medication Sig Dispense Refill    levothyroxine (SYNTHROID) 100 mcg tablet Take 1 Tab by mouth Daily (before breakfast). 90 Tab 1    levETIRAcetam (KEPPRA) 500 mg tablet Take 1 Tab by mouth two (2) times a day. 180 Tab 1    pantoprazole (PROTONIX) 40 mg tablet Take 1 Tab by mouth two (2) times a day. 446 Tab 2    folic acid (FOLVITE) 1 mg tablet Take 1 Tab by mouth daily. 90 Tab 2    mirtazapine (REMERON) 15 mg tablet Take 1 Tab by mouth nightly. 90 Tab 2    donepezil (ARICEPT) 10 mg tablet Take 1 Tab by mouth nightly. 90 Tab 3    gabapentin (NEURONTIN) 300 mg capsule Take 1 Cap by mouth three (3) times daily. 90 Cap 2    amLODIPine (NORVASC) 10 mg tablet Take 1 Tab by mouth daily. 90 Tab 3    losartan (COZAAR) 100 mg tablet Take 1 Tab by mouth daily. 90 Tab 3    ferrous sulfate 325 mg (65 mg iron) tablet Take 1 Tab by mouth two (2) times daily (with meals). 60 Tab 0    divalproex ER (DEPAKOTE ER) 250 mg ER tablet Take 1 Tab by mouth nightly. 90 Tab 2    tamsulosin (FLOMAX) 0.4 mg capsule Take 1 Cap by mouth nightly.  90 Cap 2     Current Dietary Status:  Full liquids      History of reflux:  yes}    Reflux medication: protonix  Social History/Home Situation: home with family  Home Environment: Private residence  One/Two Story Residence: One story  Living Alone: No  Support Systems: Family member(s), Friends \ neighbors  Patient Expects to be Discharged to[de-identified] Rehabilitation facility  Current DME Used/Available at Home: None  OBJECTIVE:   Respiratory Status:        CXR Results: n/a this admission  MRI/CT Results:n/a this admission  Oral Motor Structure/Speech:  Oral-Motor Structure/Motor Speech  Labial: Impaired coordination, Decreased rate, Decreased seal  Dentition: Limited  Lingual: Decreased rate, Incoordinated    Cognitive and Communication Status:  Neurologic State: Alert  Orientation Level: Oriented to person;Disoriented to situation;Disoriented to place  Cognition: Decreased attention/concentration; Follows commands     Perseveration: No perseveration noted  Safety/Judgement: Decreased awareness of need for assistance    BEDSIDE SWALLOW EVALUATION  Oral Assessment:  Oral Assessment  Labial: Impaired coordination;Decreased rate;Decreased seal  Dentition: Limited  Lingual: Decreased rate; Incoordinated  P.O. Trials:  Patient Position: upright in bed    The patient was given tsp to straw amounts of the following:   Consistency Presented: Puree; Honey thick liquid  How Presented: Cup/sip;Spoon;SLP-fed/presented;Self-fed/presented    ORAL PHASE:  Bolus Acceptance: Impaired  Bolus Formation/Control: Impaired  Propulsion: Delayed (# of seconds)  Type of Impairment: Delayed;Lip closure;Mastication  Oral Residue: Less than 10% of bolus    PHARYNGEAL PHASE:  Initiation of Swallow: Delayed (# of seconds)     Aspiration Signs/Symptoms: None  Vocal Quality: Low volume       OTHER OBSERVATIONS:  Rate/bite size: WNL   Endurance:  Impaired     Tool Used: Dysphagia Outcome and Severity Scale (NABILA)    Score Comments   Normal Diet  [] 7 With no strategies or extra time needed   Functional Swallow  [] 6 May have mild oral or pharyngeal delay       Mild Dysphagia    [] 5 Which may require one diet consistency restricted (those who demonstrate penetration which is entirely cleared on MBS would be included)   Mild-Moderate Dysphagia  [x] 4 With 1-2 diet consistencies restricted       Moderate Dysphagia  [] 3 With 2 or more diet consistencies restricted       Moderately Severe Dysphagia  [] 2 With partial PO strategies (trials with ST only)       Severe Dysphagia  [] 1 With inability to tolerate any PO safely          Score:  Initial: 4 Most Recent: X (Date: -- )   Interpretation of Tool: The Dysphagia Outcome and Severity Scale (NABILA) is a simple, easy-to-use, 7-point scale developed to systematically rate the functional severity of dysphagia based on objective assessment and make recommendations for diet level, independence level, and type of nutrition. Score 7 6 5 4 3 2 1   Modifier CH CI CJ CK CL CM CN   ? Swallowing:     - CURRENT STATUS: CK - 40%-59% impaired, limited or restricted    - GOAL STATUS:  CL - 60%-79% impaired, limited or restricted    - D/C STATUS:  ---------------To be determined---------------  Payor: [employer] MEDICARE / Plan: [facility] [employer] MEDICARE HMO / Product Type: [employer] /     TREATMENT:    (In addition to Assessment/Re-Assessment sessions the following treatments were rendered)  Dysphagia Activities: Activities/Procedures listed utilized to improve progress in swallow function and diet tolerance. Required maximal cueing to decrease aspiration risk. ORAL MOTOR  EXERCISES:                                                                                                                                                                      LARYNGEAL / PHARYNGEAL EXERCISES:                                                                                                                                     __________________________________________________________________________________________________  Safety:   After treatment position/precautions:  · RN notified  · Upright in Bed  Progression/Medical Necessity:   · Skilled intervention continues to be required due to persistent signs and symptoms of aspiration and patient still consuming a modified diet. Compliance with Program/Exercises: Will assess as treatment progresses. Reason for Continuation of Services/Other Comments:  · Patient continues to require skilled intervention due to patient unable to attend/participate in therapy as expected.   Recommendations/Intent for next treatment session: \"Treatment next visit will focus on po trials/diet tolerance  Total Treatment Duration:  Time In: 1130  Time Out: Franky 24 MS, CCC-SLP

## 2018-01-19 NOTE — PROGRESS NOTES
Problem: Self Care Deficits Care Plan (Adult)  Goal: *Acute Goals and Plan of Care (Insert Text)  1. Patient will complete grooming with moderate assistance and adaptive equipment as needed. 2. Patient will tolerate sitting edge of bed for 5 minutes with CGA for ADL prep. 3. Patient will tolerate 15 minutes of OT treatment with minimal rest breaks to increase activity tolerance for ADLs. 4. Patient will complete functional transfers with moderate assistance and adaptive equipment as needed. Timeframe: 7 visits     OCCUPATIONAL THERAPY: Daily Note, Treatment Day: 1st and PM    1/19/2018  INPATIENT: Hospital Day: 8  Payor: [facility] / Plan: [address] [place] [address] HMO / Product Type: [employer] /      NAME/AGE/GENDER: Sirisha Orta is a 67 y.o. male   PRIMARY DIAGNOSIS:  GIB (gastrointestinal bleeding)  GI BLEED, ANEMIA AVM (arteriovenous malformation) of small bowel, acquired with hemorrhage ([address] AVM (arteriovenous malformation) of small bowel, acquired with hemorrhage ([address]  Procedure(s) (LRB):  ESOPHAGOGASTRODUODENOSCOPY (EGD) PUSH (N/A)  ENDOSCOPIC ARGON PLASMA COAGULATION (N/A)  5 Days Post-Op  ICD-10: Treatment Diagnosis:    · Generalized Muscle Weakness (M62.81)   Precautions/Allergies:     Review of patient's allergies indicates no known allergies. ASSESSMENT:     Mr. Starr Gerber presents in supine upon arrival. Pt agreeable to treatment and transferred to sitting with minimal assistance and additional time. Pt sat edge of bed with fair balance and donned socks with moderate assist. Pt stood with moderate assist and a rolling walker and completed functional mobility to his chair. Pt required min/mod a to use a urinal. Pt still required brief change with maximal assist. Pt returned to the bedside and left with PTA. Good effort. Continue OT POC. This section established at most recent assessment   PROBLEM LIST (Impairments causing functional limitations):  1.  Decreased Strength  2. Decreased ADL/Functional Activities  3. Decreased Transfer Abilities  4. Decreased Ambulation Ability/Technique  5. Decreased Balance  6. Decreased Activity Tolerance  7. Increased Fatigue  8. Decreased Cognition   INTERVENTIONS PLANNED: (Benefits and precautions of occupational therapy have been discussed with the patient.)  1. Activities of daily living training  2. Adaptive equipment training  3. Balance training  4. Cognitive training  5. Donning&doffing training  6. Group therapy  7. Therapeutic activity  8. Therapeutic exercise     TREATMENT PLAN: Frequency/Duration: Follow patient 3x/week to address above goals. Rehabilitation Potential For Stated Goals: Guarded     RECOMMENDED REHABILITATION/EQUIPMENT: (at time of discharge pending progress): Due to the probability of continued deficits (see above) this patient will likely need continued skilled occupational therapy after discharge. Equipment:    TBD              OCCUPATIONAL PROFILE AND HISTORY:   History of Present Injury/Illness (Reason for Referral):  See H&P  Past Medical History/Comorbidities:   Mr. Ravindra Draper  has a past medical history of Alcohol abuse; Anemia; Angiodysplasia of stomach (2016); Bladder incontinence (05/28/2014); Chronic airway obstruction, not elsewhere classified (5/28/2014); CKD (chronic kidney disease); COPD (chronic obstructive pulmonary disease) ([facility] Dementia (5/28/2014); Hyperlipidemia (5/28/2014); Hypernatremia (11/25/2016); Hypertension; Hypothyroid; Seizure ([facility] (2010); Tobacco abuse (5/28/2014); and Tobacco abuse. Mr. Ravindra Draper  has a past surgical history that includes hx colonoscopy (APPROX 2005) and hx endoscopy (2016).   Social History/Living Environment:   Home Environment: Private residence  One/Two Story Residence: One story  Living Alone: No  Support Systems: Family member(s), Friends \ neighbors  Patient Expects to be Discharged to[de-identified] Rehabilitation facility  Current DME Used/Available at Home: None unable to obtain from pt  Prior Level of Function/Work/Activity:  unable to obtain from pt     Number of Personal Factors/Comorbidities that affect the Plan of Care: Expanded review of therapy/medical records (1-2):  MODERATE COMPLEXITY   ASSESSMENT OF OCCUPATIONAL PERFORMANCE[de-identified]   Activities of Daily Living:           Basic ADLs (From Assessment) Complex ADLs (From Assessment)   Basic ADL  Feeding: Other (comment) (unable to assess)  Oral Facial Hygiene/Grooming: Other (comment) (unable to assess)  Bathing: Other (comment) (unable to assess)  Upper Body Dressing: Other (comment) (unable to assess)  Lower Body Dressing: Other (comment) (unable to assess)  Toileting: Other (comment) (unable to assess) Instrumental ADL  Meal Preparation: Total assistance  Homemaking: Total assistance   Grooming/Bathing/Dressing Activities of Daily Living     Cognitive Retraining  Safety/Judgement: Decreased awareness of need for assistance                     Lower Body Dressing Assistance  Socks: Minimum assistance; Moderate assistance Bed/Mat Mobility  Rolling: Moderate assistance  Supine to Sit: Moderate assistance  Sit to Supine: Moderate assistance;Assist x2  Sit to Stand: Moderate assistance  Scooting: Minimum assistance       Most Recent Physical Functioning:   Gross Assessment:                  Posture:  Posture (WDL): Exceptions to WDL  Posture Assessment: Forward head, Rounded shoulders, Trunk flexion  Balance:  Sitting: Impaired  Sitting - Static: Fair (occasional)  Sitting - Dynamic: Fair (occasional)  Standing: Impaired  Standing - Static: Constant support;Poor  Standing - Dynamic : Poor Bed Mobility:  Rolling: Moderate assistance  Supine to Sit: Moderate assistance  Sit to Supine: Moderate assistance;Assist x2  Scooting: Minimum assistance  Wheelchair Mobility:     Transfers:  Sit to Stand:  Moderate assistance  Stand to Sit: Moderate assistance              Patient Vitals for the past 6 hrs:   BP BP Patient Position SpO2 Pulse   18 1112 131/64 At rest 98 % (!) 58   18 1508 133/62 - 95 % (!) 59       Mental Status  Neurologic State: Alert  Orientation Level: Oriented to person, Disoriented to situation, Disoriented to place  Cognition: Decreased attention/concentration, Follows commands  Perception: Cues to maintain midline in sitting  Perseveration: No perseveration noted  Safety/Judgement: Decreased awareness of need for assistance                          Physical Skills Involved:  1. Range of Motion  2. Balance  3. Strength  4. Activity Tolerance  5. Fine Motor Control  6. Gross Motor Control Cognitive Skills Affected (resulting in the inability to perform in a timely and safe manner):  1. Unable to accurately assess at this time Psychosocial Skills Affected:  1. Habits/Routines  2. Environmental Adaptation  3. Social Interaction  4. Emotional Regulation  5. Self-Awareness  6. Awareness of Others  7. Social Roles   Number of elements that affect the Plan of Care: 5+:  HIGH COMPLEXITY   CLINICAL DECISION MAKIN[address] [place] [id number] AM-PAC 6 Clicks   Daily Activity Inpatient Short Form  How much help from another person does the patient currently need. .. Total A Lot A Little None   1. Putting on and taking off regular lower body clothing? [x] 1   [] 2   [] 3   [] 4   2. Bathing (including washing, rinsing, drying)? [x] 1   [] 2   [] 3   [] 4   3. Toileting, which includes using toilet, bedpan or urinal?   [x] 1   [] 2   [] 3   [] 4   4. Putting on and taking off regular upper body clothing? [x] 1   [] 2   [] 3   [] 4   5. Taking care of personal grooming such as brushing teeth? [x] 1   [] 2   [] 3   [] 4   6. Eating meals? [x] 1   [] 2   [] 3   [] 4   © , Trustees of [address] [place] [address] [address] [address], under license to [employer].  All rights reserved      Score:  Initial: 6 Most Recent: X (Date: -- )    Interpretation of Tool:  Represents activities that are increasingly more difficult (i.e. Bed mobility, Transfers, Gait). Score 24 23 22-20 19-15 14-10 9-7 6     Modifier CH CI CJ CK CL CM CN      ? Self Care:     - CURRENT STATUS: CN - 100% impaired, limited or restricted    - GOAL STATUS: CM - 80%-99% impaired, limited or restricted    - D/C STATUS:  ---------------To be determined---------------  Payor: [employer] MEDICARE / Plan: [address] [place] [address] HMO / Product Type: Managed Care Medicare /      Medical Necessity:     · Patient demonstrates fair rehab potential due to higher previous functional level. Reason for Services/Other Comments:  · Patient continues to require skilled intervention due to medical complications and patient unable to attend/participate in therapy as expected. Use of outcome tool(s) and clinical judgement create a POC that gives a: MODERATE COMPLEXITY         TREATMENT:   (In addition to Assessment/Re-Assessment sessions the following treatments were rendered)     Pre-treatment Symptoms/Complaints:    Pain: Initial:   Pain Intensity 1: 0  Post Session:  0     Therapeutic Activity: (10 minutes): Therapeutic activities including Bed transfers, Chair transfers, dressing and static/dynamic standing  to improve mobility, strength and balance. Required moderate assist to promote static and dynamic balance in standing. Braces/Orthotics/Lines/Etc:   · IV  · O2 Device: Nasal cannula  Treatment/Session Assessment:    · Response to Treatment:  Very drowsy, limited participation  · Interdisciplinary Collaboration:   o Certified Occupational Therapy Assistant  o Registered Nurse  · After treatment position/precautions:   o Bed/Chair-wheels locked  o Bed in low position  o Call light within reach  o RN notified  o Side rails x 2  o working with PTA   · Compliance with Program/Exercises: Will assess as treatment progresses. · Recommendations/Intent for next treatment session:   \"Next visit will focus on advancements to more challenging activities and reduction in assistance provided\".   Total Treatment Duration:  OT Patient Time In/Time Out  Time In: 1400  Time Out: [address] [facility]

## 2018-01-19 NOTE — PROGRESS NOTES
Problem: Mobility Impaired (Adult and Pediatric)  Goal: *Acute Goals and Plan of Care (Insert Text)  LTG:  (1.)Mr. Starr Gerber will move from supine to sit and sit to supine, scoot up and down and roll side to side with MODERATE ASSIST within 7 day(s). (2.)Mr. Starr Gerber will follow verbal cues for ROM/mobility 50% of the time within 7 days. (3.)Mr. Starr Gerber will perform seated activities and exercises for 8+ minutes with minimal assist within 7 days. (4.)Mr. Starr Gerber will transfer from bed to chair and chair to bed with MAXIMAL ASSIST using the least restrictive device within 7 day(s). ________________________________________________________________________________________________    PHYSICAL THERAPY: Daily Note, Treatment Day: 2nd, PM 1/19/2018  INPATIENT: Hospital Day: 8  Payor: [facility] / Plan: [address] [place] [address] HMO / Product Type: [employer] Medicare /      NAME/AGE/GENDER: Sirisha Orta is a 67 y.o. male   PRIMARY DIAGNOSIS: GIB (gastrointestinal bleeding)  GI BLEED, ANEMIA AVM (arteriovenous malformation) of small bowel, acquired with hemorrhage ([facility] AVM (arteriovenous malformation) of small bowel, acquired with hemorrhage ([facility]  Procedure(s) (LRB):  ESOPHAGOGASTRODUODENOSCOPY (EGD) PUSH (N/A)  ENDOSCOPIC ARGON PLASMA COAGULATION (N/A)  5 Days Post-Op  ICD-10: Treatment Diagnosis:   · Generalized Muscle Weakness (M62.81)  · Other lack of cordination (R27.8)  · Other abnormalities of gait and mobility (R26.89)   Precaution/Allergies:  Review of patient's allergies indicates no known allergies. ASSESSMENT:     Mr. Starr Gerber is a 67year old male admitted from home for GI bleed, anemia. S/p EGD. Pt is more responsive today. He has forward flexed posture at edge of bed but nodding to answer questions. He sat to EOB with mod assist. Sat several minutes. Sit to stand to RW with mod assist.  He was able to ambulate ~ 4' to chair with RW with mod assist x 2. Sat a few minutes in chair. Decided to get pt back to bed d/t feeling he was not safe in chair alone. He stood, brief was changed and he ambulated back to the EOB in same manner. EOB to supine with mod assist x 2. Tory Hardwick appears to have experienced a significant decline in overall status (mental status, alertness, strength, etc) and unsure of the cause. He continues to make significant improvement toward goals since IE but would still not be safe to return home in current condition and will need continued therapy/rehab. This section established at most recent assessment   PROBLEM LIST (Impairments causing functional limitations):  1. Decreased Strength  2. Decreased ADL/Functional Activities  3. Decreased Transfer Abilities  4. Decreased Ambulation Ability/Technique  5. Decreased Balance  6. Decreased Activity Tolerance  7. Decreased Cognition   INTERVENTIONS PLANNED: (Benefits and precautions of physical therapy have been discussed with the patient.)  1. Balance Exercise  2. Bed Mobility  3. Gait Training  4. Range of Motion (ROM)  5. Therapeutic Activites  6. Therapeutic Exercise/Strengthening  7. Transfer Training     TREATMENT PLAN: Frequency/Duration: 3 times a week for duration of hospital stay  Rehabilitation Potential For Stated Goals: Wendy Limon REHABILITATION/EQUIPMENT: (at time of discharge pending progress): Due to the probability of continued deficits (see above) this patient will likely need continued skilled physical therapy after discharge. Equipment:    TBD pending progress              HISTORY:   History of Present Injury/Illness (Reason for Referral):  Per H&P, \"Patient is a Florin Buoy old male with PMH sig for Aortic stensois, prior gib, avm seen on egd in September 2017 - unclear if colonoscopy  done. Dc from hospital 01/01/2018. Had follow up with pcp today and hb found to have dropped to 5.5 from 7.5 on last admit. Sent to er- grossly heme- occult positive- blood transfusion ordered.  Hospitalist asked to admit. Gi consulted. \"    Past Medical History/Comorbidities:   Mr. Miryam Brito  has a past medical history of Alcohol abuse; Anemia; Angiodysplasia of stomach (2016); Bladder incontinence (05/28/2014); Chronic airway obstruction, not elsewhere classified (5/28/2014); CKD (chronic kidney disease); COPD (chronic obstructive pulmonary disease) ([facility] Dementia (5/28/2014); Hyperlipidemia (5/28/2014); Hypernatremia (11/25/2016); Hypertension; Hypothyroid; Seizure ([facility] (2010); Tobacco abuse (5/28/2014); and Tobacco abuse. Mr. Miryam Brito  has a past surgical history that includes hx colonoscopy (APPROX 2005) and hx endoscopy (2016). Social History/Living Environment:   Home Environment: Private residence  One/Two Story Residence: One story  Living Alone: No  Support Systems: Family member(s), Friends \ neighbors  Patient Expects to be Discharged to[de-identified] Rehabilitation facility  Current DME Used/Available at Home: None  Prior Level of Function/Work/Activity:  Unknown. Pt unable to provide history. Per chart, it appears that pt was discharged home with spouse after recent stay at [facility] earlier this month. It appears he has some baseline dementia but is ambulatory around the house. On 1/1/18 pt was performing bed mobility and sit-stand transfers with min assist.      Number of Personal Factors/Comorbidities that affect the Plan of Care: 3+: HIGH COMPLEXITY   EXAMINATION:   Most Recent Physical Functioning:   Gross Assessment:                  Posture:  Posture (WDL): Exceptions to WDL  Posture Assessment: Forward head, Rounded shoulders  Balance:  Sitting: Impaired  Sitting - Static: Fair (occasional)  Sitting - Dynamic: Fair (occasional)  Standing: Impaired  Standing - Static: Poor  Standing - Dynamic : Poor Bed Mobility:  Rolling: Moderate assistance  Supine to Sit: Moderate assistance  Sit to Supine:  Moderate assistance;Assist x2  Wheelchair Mobility:     Transfers:  Sit to Stand: Minimum assistance  Stand to Sit: Moderate assistance  Gait:     Base of Support: Center of gravity altered;Narrowed  Speed/Alejandra: Delayed; Slow  Step Length: Left shortened;Right shortened  Gait Abnormalities: Decreased step clearance;Shuffling gait  Distance (ft): 4 Feet (ft) (x2)  Assistive Device: Walker, rolling  Ambulation - Level of Assistance: Minimal assistance; Moderate assistance;Assist x2  Interventions: Manual cues; Safety awareness training;Verbal cues      Body Structures Involved:  1. Eyes and Ears  2. Voice/Speech  3. Muscles Body Functions Affected:  1. Mental  2. Movement Related  3. Skin Related Activities and Participation Affected:  1. Learning and Applying Knowledge  2. General Tasks and Demands  3. Communication  4. Mobility  5. Self Care  6. Domestic Life  7. Interpersonal Interactions and Relationships  8. Community, Social and [place]   Number of elements that affect the Plan of Care: 4+: HIGH COMPLEXITY   CLINICAL PRESENTATION:   Presentation: Evolving clinical presentation with changing clinical characteristics: MODERATE COMPLEXITY   CLINICAL DECISION MAKIN [facility] Mobility Inpatient Short Form  How much difficulty does the patient currently have. .. Unable A Lot A Little None   1. Turning over in bed (including adjusting bedclothes, sheets and blankets)? [] 1   [x] 2   [] 3   [] 4   2. Sitting down on and standing up from a chair with arms ( e.g., wheelchair, bedside commode, etc.)   [x] 1   [] 2   [] 3   [] 4   3. Moving from lying on back to sitting on the side of the bed? [x] 1   [] 2   [] 3   [] 4   How much help from another person does the patient currently need. .. Total A Lot A Little None   4. Moving to and from a bed to a chair (including a wheelchair)? [x] 1   [] 2   [] 3   [] 4   5. Need to walk in hospital room? [x] 1   [] 2   [] 3   [] 4   6. Climbing 3-5 steps with a railing?    [x] 1   [] 2   [] 3   [] 4   © , Trustees of [address] [place] [address] [address] [address], under license to [employer]. All rights reserved      Score:  Initial: 7 Most Recent: X (Date: -- )    Interpretation of Tool:  Represents activities that are increasingly more difficult (i.e. Bed mobility, Transfers, Gait). Score 24 23 22-20 19-15 14-10 9-7 6     Modifier CH CI CJ CK CL CM CN      ? Mobility - Walking and Moving Around:     - CURRENT STATUS: CM - 80%-99% impaired, limited or restricted    - GOAL STATUS: CL - 60%-79% impaired, limited or restricted    - D/C STATUS:  ---------------To be determined---------------  Payor: [employer] MEDICARE / Plan: [address] [place] [address] HMO / Product Type: [employer] Medicare /      Medical Necessity:     · Patient demonstrates fair rehab potential due to higher previous functional level. Reason for Services/Other Comments:  · Patient continues to demonstrate capacity to improve strength, mobility, balance, transfers which will increase independence, decrease amount of assistance required from caregiver and increase safety. Use of outcome tool(s) and clinical judgement create a POC that gives a: Questionable prediction of patient's progress: MODERATE COMPLEXITY            TREATMENT:   (In addition to Assessment/Re-Assessment sessions the following treatments were rendered)   Pre-treatment Symptoms/Complaints:  Nods head to answer questions. Pain: Initial: No c/o. Post Session:       Therapeutic Activity: (    11 min): Therapeutic activities including bed mobility, gt on level surface, sitting balance to improve mobility, strength and balance. Required Manual cues; Safety awareness training;Verbal cues to promote dynamic balance in standing.          Braces/Orthotics/Lines/Etc:   · IV  · O2 Device: Nasal cannula  Treatment/Session Assessment:    · Response to Treatment:  Good effort  · Interdisciplinary Collaboration:   o Physical Therapy Assistant  o Certified Occupational Therapy Assistant  o Registered Nurse  · After treatment position/precautions:   o Supine in bed  o Bed alarm/tab alert on  o Bed/Chair-wheels locked  o Bed in low position  o Call light within reach  o RN notified   · Compliance with Program/Exercises: Will assess as treatment progresses. · Recommendations/Intent for next treatment session: \"Next visit will focus on advancements to more challenging activities and reduction in assistance provided\".   Total Treatment Duration:  PT Patient Time In/Time Out  Time In: 1411  Time Out: [address] TERRELL Contreras

## 2018-01-19 NOTE — PROGRESS NOTES
Hospitalist Progress Note    Subjective:   Patient examined at bedside. He was found alert, participating with interrogation and physical exam. Speech and swallow evaluated him and diet started. He was able to eat. I spoke personally with his daughter Ms Mauro Weems ( [phone] ), she stated he has advanced dementia and has episodes of wax and weaning. She noted him more alert today after gabapentin was held. Possible medication induced acute metabolic encephalopathy. Her finger stick readings are much improved, sodium level back to normal. Ms Mauro Weems would like Mr Evelyn Sanz to be refer to [facility] once he is medically cleared. Advance directive according to his daughter is DNR/DNI. ROS: denies chest pain, sob, nausea, vomiting, abdominal pain, diarrhea.      Current Facility-Administered Medications   Medication Dose Route Frequency    divalproex (DEPAKOTE SPRINKLE) capsule 125 mg  125 mg Oral Q6H    cefepime (MAXIPIME) 2 g in 0.9% sodium chloride (MBP/ADV) 100 mL ADV  2 g IntraVENous Q12H    vancomycin (VANCOCIN) 1,000 mg in 0.9% sodium chloride 250 mL IVPB  1,000 mg IntraVENous Q24H    famotidine (PEPCID) 40 mg/5 mL (8 mg/mL) oral suspension 20 mg  20 mg Oral DAILY    dextrose 5% infusion  50 mL/hr IntraVENous CONTINUOUS    0.9% sodium chloride infusion 250 mL  250 mL IntraVENous PRN    glucagon (GLUCAGEN) injection 1 mg  1 mg IntraMUSCular PRN    dextrose (D50W) injection syrg 12.5-25 g  25-50 mL IntraVENous PRN    amLODIPine (NORVASC) tablet 10 mg  10 mg Oral DAILY    donepezil (ARICEPT) tablet 10 mg  10 mg Oral QHS    ferrous sulfate tablet 325 mg  325 mg Oral BID WITH MEALS    folic acid (FOLVITE) tablet 1 mg  1 mg Oral DAILY    gabapentin (NEURONTIN) capsule 300 mg  300 mg Oral TID    levETIRAcetam (KEPPRA) tablet 500 mg  500 mg Oral BID    levothyroxine (SYNTHROID) tablet 100 mcg  100 mcg Oral ACB    mirtazapine (REMERON) tablet 15 mg  15 mg Oral QHS    tamsulosin (FLOMAX) capsule 0.4 mg  0.4 mg Oral QHS    0.9% sodium chloride infusion 250 mL  250 mL IntraVENous PRN    sodium chloride (NS) flush 5-10 mL  5-10 mL IntraVENous Q8H    sodium chloride (NS) flush 5-10 mL  5-10 mL IntraVENous PRN    acetaminophen (TYLENOL) tablet 650 mg  650 mg Oral Q4H PRN    HYDROcodone-acetaminophen (NORCO) 5-325 mg per tablet 1 Tab  1 Tab Oral Q4H PRN    ondansetron (ZOFRAN) injection 4 mg  4 mg IntraVENous Q4H PRN        Review of Systems  Review of systems not obtained due to patient factors. Objective:     Visit Vitals    /62 (BP 1 Location: Left arm, BP Patient Position: At rest)    Pulse 67    Temp 97.6 °F (36.4 °C)    Resp 18    Ht 5' 8\" (1.727 m)    Wt 61.7 kg (136 lb)    SpO2 93%    BMI 20.68 kg/m2    O2 Flow Rate (L/min): 2 l/min O2 Device: Nasal cannula    Temp (24hrs), Av.5 °F (36.9 °C), Min:97.2 °F (36.2 °C), Max:100 °F (37.8 °C)          1901 -  0700  In:  [P.O.:120; I.V.:1887]  Out: -     General: alert, talking, eating, in no distress   Eyes; non icteric, EOMI  Neck; supple  CV: regular, no murmurs, no gallops   Pulm; course breath sounds, non labored, normal effort  Abd; soft, non tender, non distended  Neuro: no focal deficits, was able to walk and move all extremities.      Additional comments:I reviewed the patient's new clinical lab test results. j    Data Review    Recent Results (from the past 24 hour(s))   GLUCOSE, POC    Collection Time: 18  8:20 AM   Result Value Ref Range    Glucose (POC) 71 65 - 100 mg/dL   GLUCOSE, POC    Collection Time: 18 11:58 AM   Result Value Ref Range    Glucose (POC) 76 65 - 100 mg/dL   GLUCOSE, POC    Collection Time: 18  3:48 PM   Result Value Ref Range    Glucose (POC) 67 65 - 100 mg/dL   CBC WITH AUTOMATED DIFF    Collection Time: 18  5:12 AM   Result Value Ref Range    WBC 3.7 (L) 4.3 - 11.1 K/uL    RBC 3.60 (L) 4.23 - 5.67 M/uL    HGB 9.2 (L) 13.6 - 17.2 g/dL    HCT 29.1 (L) 41.1 - 50.3 % MCV 80.8 79.6 - 97.8 FL    MCH 25.6 (L) 26.1 - 32.9 PG    MCHC 31.6 31.4 - 35.0 g/dL    RDW 18.5 (H) 11.9 - 14.6 %    PLATELET 298 096 - 266 K/uL    MPV 10.0 (L) 10.8 - 14.1 FL    DF AUTOMATED      NEUTROPHILS 60 43 - 78 %    LYMPHOCYTES 24 13 - 44 %    MONOCYTES 12 4.0 - 12.0 %    EOSINOPHILS 3 0.5 - 7.8 %    BASOPHILS 1 0.0 - 2.0 %    IMMATURE GRANULOCYTES 0 0.0 - 5.0 %    ABS. NEUTROPHILS 2.2 1.7 - 8.2 K/UL    ABS. LYMPHOCYTES 0.9 0.5 - 4.6 K/UL    ABS. MONOCYTES 0.5 0.1 - 1.3 K/UL    ABS. EOSINOPHILS 0.1 0.0 - 0.8 K/UL    ABS. BASOPHILS 0.0 0.0 - 0.2 K/UL    ABS. IMM. GRANS. 0.0 0.0 - 0.5 K/UL   METABOLIC PANEL, BASIC    Collection Time: 01/19/18  5:12 AM   Result Value Ref Range    Sodium 142 136 - 145 mmol/L    Potassium 4.2 3.5 - 5.1 mmol/L    Chloride 109 (H) 98 - 107 mmol/L    CO2 24 21 - 32 mmol/L    Anion gap 9 7 - 16 mmol/L    Glucose 94 65 - 100 mg/dL    BUN 17 8 - 23 MG/DL    Creatinine 1.80 (H) 0.8 - 1.5 MG/DL    GFR est AA 48 (L) >60 ml/min/1.73m2    GFR est non-AA 40 (L) >60 ml/min/1.73m2    Calcium 8.4 8.3 - 10.4 MG/DL         Assessment/Plan:     -HAP: hospital acquired pneumonia: cxr positive for RLL: on vanc/maxipime for presumptive HAP EOT 1/25/18- monitor fever pattern- cbc     AMS: multifactorial: hypernatremia/ infection/ gabapentin induced: EEG was unremarkable- Brain CT scan no acute findings, ammonia levels normal-cultures so far negative: he seemed to improved once gabapentin was held- stop remeron- monitor - resume diet       AVM (arteriovenous malformation) of small bowel, acquired with hemorrhage:  Acute blood loss anemia (1/13/2018), Status post 3 units PRBCs this admission. Hg stable -on  famotidine. No active bleeds on 1/14 EGD. Hypernatremia: resolved- continue D5 iv       Acute on chronic renal failure : his Cr seems to be at baseline now- continue D5 iv       Bradycardia: Cardiology states no intervention needed. Not on any rate lowering medications. Hypoglycemia (1/13/2018): resolved- insulin and C-peptide levels: normal       Moderate aortic stenosis (1/13/2018)    PT/OT/SPEECH    CODE STATUS: DNR/DNI     CM- WILL [facility] Monday, AWAITING [address] [place] [address] discussed with: Patient/Family and Nurse      Signed By: Luis Felipe Pace MD     January 19, 2018

## 2018-01-19 NOTE — PROGRESS NOTES
Denver Proffer: will go to [employer] on Monday. We tried to get pre cert switched from [employer] to [employer] and did not get final auth. Henry Garcia.  LIVIA

## 2018-01-19 NOTE — PROGRESS NOTES
Problem: Nutrition Deficit  Goal: *Optimize nutritional status  Nutrition  Reason for assessment: LOS  Assessment:   Diet order(s): 1-13: NPO, then full liquid, 1-14: NPO, then full liquid, 1-15: Full liquid with nectar thick liquids, 1-16: Full liquid with honey thick liquids, 1-18: NPO, 1-19: Pureed with honey thick liquids  Food/Nutrition Patient History:  Pt presented with low hemoglobin, heme + stools. EGD  revealed small non bleeding AVMs in second and third portion of duodenum that were treated with APC. Pt mumbles but is able to agree that he likes ice cream. RN reports he ate all of lunch today. Anthropometrics:Height: 5' 8\" (172.7 cm),  Weight: 61.7 kg (136 lb)-unknown source, Body mass index is 20.68 kg/(m^2). BMI class of underweight. Weight hx per EMR ( Based on [facility] care functionality, RD cannot know if these weight are actual versus stated):  WT / BMI WEIGHT   1/1/2018 135 lb 8 oz   10/11/2017 135 lb   9/28/2017 133 lb   9/28/2017 133 lb   9/27/2017 133 lb   6/2/2017 123 lb   4/18/2017 131 lb 6.3 oz   2/10/2017 132 lb   1/20/2017 134 lb      Macronutrient needs:  EER:  6419-6060 kcal /day (25-30 kcal/kg listed BW)  EPR:  55-82 grams protein/day (0.8-1.2 grams/kg IBW)  Intake/Comparative Standards:  Average intake for past 7 day(s)/5 recorded meal(s): 56%. This potentially meets ~50-75% of kcal and ~50-75% of protein needs    Nutrition Diagnosis: Inadequate oral intake r/t decreased ability to consume sufficient oral intake as evidenced by diet limited to nutritionally inadequate full liquid for 5 out of 7 days    Intervention:  Meals and snacks: Continue current diet or as per SLP. Nutrition Supplement Therapy: Magic cup bid   Discharge nutrition plan: Too soon to determine.     Erin Armstrong, [address] [place] [address], [address] [place] [address]

## 2018-01-19 NOTE — CDMP QUERY
Please clarify if this patient is being treated/managed for:    Healthcare acquired pneumonia. Need clarification of abbreviations of HCAP( abbreviations not valid for coding)     =>Other Explanation of clinical findings  =>Unable to Determine (no explanation of clinical findings)    The medical record reflects the following:    Clinical Indicators: Md note on 1/18/18 documented HCAP. Treatment :Maxipeme and Vancomycin    Please clarify and document your clinical opinion in the progress notes and discharge summary including the definitive and/or presumptive diagnosis, (suspected or probable), related to the above clinical findings. Please include clinical findings supporting your diagnosis.     Thanks,  Jonel Cowart RN CDS  Compliant Documentation Management Program  [phone]

## 2018-01-20 LAB
ANION GAP SERPL CALC-SCNC: 6 MMOL/L (ref 7–16)
BACTERIA SPEC CULT: NORMAL
BACTERIA SPEC CULT: NORMAL
BASOPHILS # BLD: 0 K/UL (ref 0–0.2)
BASOPHILS NFR BLD: 0 % (ref 0–2)
BUN SERPL-MCNC: 18 MG/DL (ref 8–23)
CALCIUM SERPL-MCNC: 8.4 MG/DL (ref 8.3–10.4)
CHLORIDE SERPL-SCNC: 110 MMOL/L (ref 98–107)
CO2 SERPL-SCNC: 29 MMOL/L (ref 21–32)
CREAT SERPL-MCNC: 1.45 MG/DL (ref 0.8–1.5)
DIFFERENTIAL METHOD BLD: ABNORMAL
EOSINOPHIL # BLD: 0.2 K/UL (ref 0–0.8)
EOSINOPHIL NFR BLD: 3 % (ref 0.5–7.8)
ERYTHROCYTE [DISTWIDTH] IN BLOOD BY AUTOMATED COUNT: 18.1 % (ref 11.9–14.6)
GLUCOSE BLD STRIP.AUTO-MCNC: 104 MG/DL (ref 65–100)
GLUCOSE BLD STRIP.AUTO-MCNC: 75 MG/DL (ref 65–100)
GLUCOSE BLD STRIP.AUTO-MCNC: 75 MG/DL (ref 65–100)
GLUCOSE BLD STRIP.AUTO-MCNC: 77 MG/DL (ref 65–100)
GLUCOSE BLD STRIP.AUTO-MCNC: 81 MG/DL (ref 65–100)
GLUCOSE BLD STRIP.AUTO-MCNC: 88 MG/DL (ref 65–100)
GLUCOSE BLD STRIP.AUTO-MCNC: 99 MG/DL (ref 65–100)
GLUCOSE SERPL-MCNC: 72 MG/DL (ref 65–100)
HCT VFR BLD AUTO: 29.3 % (ref 41.1–50.3)
HGB BLD-MCNC: 9.2 G/DL (ref 13.6–17.2)
IMM GRANULOCYTES # BLD: 0 K/UL (ref 0–0.5)
IMM GRANULOCYTES NFR BLD AUTO: 0 % (ref 0–5)
LYMPHOCYTES # BLD: 1.2 K/UL (ref 0.5–4.6)
LYMPHOCYTES NFR BLD: 24 % (ref 13–44)
MCH RBC QN AUTO: 25.4 PG (ref 26.1–32.9)
MCHC RBC AUTO-ENTMCNC: 31.4 G/DL (ref 31.4–35)
MCV RBC AUTO: 80.9 FL (ref 79.6–97.8)
MONOCYTES # BLD: 0.5 K/UL (ref 0.1–1.3)
MONOCYTES NFR BLD: 10 % (ref 4–12)
NEUTS SEG # BLD: 3.2 K/UL (ref 1.7–8.2)
NEUTS SEG NFR BLD: 63 % (ref 43–78)
PLATELET # BLD AUTO: 392 K/UL (ref 150–450)
PMV BLD AUTO: 10.5 FL (ref 10.8–14.1)
POTASSIUM SERPL-SCNC: 4.1 MMOL/L (ref 3.5–5.1)
RBC # BLD AUTO: 3.62 M/UL (ref 4.23–5.67)
SERVICE CMNT-IMP: NORMAL
SERVICE CMNT-IMP: NORMAL
SODIUM SERPL-SCNC: 145 MMOL/L (ref 136–145)
WBC # BLD AUTO: 5.1 K/UL (ref 4.3–11.1)

## 2018-01-20 PROCEDURE — 74011250637 HC RX REV CODE- 250/637: Performed by: INTERNAL MEDICINE

## 2018-01-20 PROCEDURE — 80048 BASIC METABOLIC PNL TOTAL CA: CPT | Performed by: INTERNAL MEDICINE

## 2018-01-20 PROCEDURE — 74011000258 HC RX REV CODE- 258: Performed by: INTERNAL MEDICINE

## 2018-01-20 PROCEDURE — 74011250636 HC RX REV CODE- 250/636: Performed by: INTERNAL MEDICINE

## 2018-01-20 PROCEDURE — 65270000029 HC RM PRIVATE

## 2018-01-20 PROCEDURE — 74011250637 HC RX REV CODE- 250/637: Performed by: FAMILY MEDICINE

## 2018-01-20 PROCEDURE — 85025 COMPLETE CBC W/AUTO DIFF WBC: CPT | Performed by: INTERNAL MEDICINE

## 2018-01-20 PROCEDURE — 36415 COLL VENOUS BLD VENIPUNCTURE: CPT | Performed by: INTERNAL MEDICINE

## 2018-01-20 RX ADMIN — DIVALPROEX SODIUM 125 MG: 125 CAPSULE, COATED PELLETS ORAL at 05:54

## 2018-01-20 RX ADMIN — DIVALPROEX SODIUM 125 MG: 125 CAPSULE, COATED PELLETS ORAL at 02:02

## 2018-01-20 RX ADMIN — SODIUM CHLORIDE 2 G: 900 INJECTION, SOLUTION INTRAVENOUS at 02:02

## 2018-01-20 RX ADMIN — DIVALPROEX SODIUM 125 MG: 125 CAPSULE, COATED PELLETS ORAL at 17:38

## 2018-01-20 RX ADMIN — LEVETIRACETAM 500 MG: 500 TABLET ORAL at 17:38

## 2018-01-20 RX ADMIN — FERROUS SULFATE TAB 325 MG (65 MG ELEMENTAL FE) 325 MG: 325 (65 FE) TAB at 09:18

## 2018-01-20 RX ADMIN — FOLIC ACID 1 MG: 1 TABLET ORAL at 09:18

## 2018-01-20 RX ADMIN — Medication 10 ML: at 05:55

## 2018-01-20 RX ADMIN — DIVALPROEX SODIUM 125 MG: 125 CAPSULE, COATED PELLETS ORAL at 13:17

## 2018-01-20 RX ADMIN — FAMOTIDINE 20 MG: 40 POWDER, FOR SUSPENSION ORAL at 11:47

## 2018-01-20 RX ADMIN — TAMSULOSIN HYDROCHLORIDE 0.4 MG: 0.4 CAPSULE ORAL at 21:16

## 2018-01-20 RX ADMIN — LEVOTHYROXINE SODIUM 100 MCG: 100 TABLET ORAL at 05:54

## 2018-01-20 RX ADMIN — FERROUS SULFATE TAB 325 MG (65 MG ELEMENTAL FE) 325 MG: 325 (65 FE) TAB at 17:38

## 2018-01-20 RX ADMIN — DEXTROSE MONOHYDRATE 75 ML/HR: 5 INJECTION, SOLUTION INTRAVENOUS at 11:47

## 2018-01-20 RX ADMIN — SODIUM CHLORIDE 2 G: 900 INJECTION, SOLUTION INTRAVENOUS at 15:01

## 2018-01-20 RX ADMIN — DONEPEZIL HYDROCHLORIDE 10 MG: 5 TABLET, FILM COATED ORAL at 21:16

## 2018-01-20 RX ADMIN — LEVETIRACETAM 500 MG: 500 TABLET ORAL at 09:18

## 2018-01-20 RX ADMIN — Medication 10 ML: at 15:01

## 2018-01-20 RX ADMIN — AMLODIPINE BESYLATE 10 MG: 10 TABLET ORAL at 09:18

## 2018-01-20 RX ADMIN — VANCOMYCIN 1000 MG: 1 INJECTION, SOLUTION INTRAVENOUS at 21:17

## 2018-01-20 RX ADMIN — VANCOMYCIN 1000 MG: 1 INJECTION, SOLUTION INTRAVENOUS at 03:42

## 2018-01-20 NOTE — PROGRESS NOTES
Hourly rounds done during the night, patient stays wet most of the time, incontinent care given when indicated.

## 2018-01-20 NOTE — PROGRESS NOTES
Pharmacokinetic Consult to Pharmacist    Polina Ferrer is a 67 y.o. male being treated for HAP with vancomycin and cefepime. Height: 5' 8\" (172.7 cm)  Weight: 61.7 kg (136 lb)  Lab Results   Component Value Date/Time    BUN 18 01/20/2018 05:35 AM    Creatinine 1.45 01/20/2018 05:35 AM    WBC 5.1 01/20/2018 05:35 AM    Procalcitonin 0.1 01/17/2018 05:12 PM    Lactic acid 1.6 12/27/2017 11:06 AM    Lactic Acid (POC) 2.4 12/26/2017 01:28 PM      Estimated Creatinine Clearance: 40.2 mL/min (based on Cr of 1.45). Lab Results   Component Value Date/Time    Vancomycin,trough 8.9 01/19/2018 11:06 PM       Day 4 of vancomycin. Goal trough is 15-20. Dosing changed to 1g q18h. Will continue to follow patient. Thank you,  Amadou Winters, Pharm. D.   Clinical Pharmacist  [phone]

## 2018-01-20 NOTE — PROGRESS NOTES
Hospitalist Progress Note    Subjective:   Patient examined at bedside. He was found alert, had no distress. Has been eating with no complains. Lab stable. More alert, talkative. Awaiting pre-certification to transfer him to [facility] on Monday. ROS: denies chest pain, sob, nausea, vomiting, abdominal pain, diarrhea. Current Facility-Administered Medications   Medication Dose Route Frequency    divalproex (DEPAKOTE SPRINKLE) capsule 125 mg  125 mg Oral Q6H    cefepime (MAXIPIME) 2 g in 0.9% sodium chloride (MBP/ADV) 100 mL ADV  2 g IntraVENous Q12H    vancomycin (VANCOCIN) 1,000 mg in 0.9% sodium chloride 250 mL IVPB  1,000 mg IntraVENous Q24H    famotidine (PEPCID) 40 mg/5 mL (8 mg/mL) oral suspension 20 mg  20 mg Oral DAILY    dextrose 5% infusion  75 mL/hr IntraVENous CONTINUOUS    0.9% sodium chloride infusion 250 mL  250 mL IntraVENous PRN    glucagon (GLUCAGEN) injection 1 mg  1 mg IntraMUSCular PRN    dextrose (D50W) injection syrg 12.5-25 g  25-50 mL IntraVENous PRN    amLODIPine (NORVASC) tablet 10 mg  10 mg Oral DAILY    donepezil (ARICEPT) tablet 10 mg  10 mg Oral QHS    ferrous sulfate tablet 325 mg  325 mg Oral BID WITH MEALS    folic acid (FOLVITE) tablet 1 mg  1 mg Oral DAILY    levETIRAcetam (KEPPRA) tablet 500 mg  500 mg Oral BID    levothyroxine (SYNTHROID) tablet 100 mcg  100 mcg Oral ACB    tamsulosin (FLOMAX) capsule 0.4 mg  0.4 mg Oral QHS    0.9% sodium chloride infusion 250 mL  250 mL IntraVENous PRN    sodium chloride (NS) flush 5-10 mL  5-10 mL IntraVENous Q8H    sodium chloride (NS) flush 5-10 mL  5-10 mL IntraVENous PRN    acetaminophen (TYLENOL) tablet 650 mg  650 mg Oral Q4H PRN    HYDROcodone-acetaminophen (NORCO) 5-325 mg per tablet 1 Tab  1 Tab Oral Q4H PRN    ondansetron (ZOFRAN) injection 4 mg  4 mg IntraVENous Q4H PRN        Review of Systems  Review of systems not obtained due to patient factors.     Objective:     Visit Vitals    /62 (BP 1 Location: Right arm, BP Patient Position: At rest)    Pulse 70    Temp 98.4 °F (36.9 °C)    Resp 17    Ht 5' 8\" (1.727 m)    Wt 61.7 kg (136 lb)    SpO2 94%    BMI 20.68 kg/m2    O2 Flow Rate (L/min): 2 l/min O2 Device: Room air    Temp (24hrs), Av.7 °F (36.5 °C), Min:97.2 °F (36.2 °C), Max:98.4 °F (36.9 °C)              General: alert, talking, eating, in no distress   Eyes; non icteric, EOMI  Neck; supple  CV: regular, no murmurs, no gallops   Pulm; course breath sounds, non labored, normal effort  Abd; soft, non tender, non distended  Neuro: no focal deficits, was able to walk and move all extremities.      Additional comments:I reviewed the patient's new clinical lab test results. j    Data Review    Recent Results (from the past 24 hour(s))   GLUCOSE, POC    Collection Time: 18 10:58 AM   Result Value Ref Range    Glucose (POC) 104 (H) 65 - 100 mg/dL   GLUCOSE, POC    Collection Time: 18  3:15 PM   Result Value Ref Range    Glucose (POC) 134 (H) 65 - 100 mg/dL   GLUCOSE, POC    Collection Time: 18  7:14 PM   Result Value Ref Range    Glucose (POC) 103 (H) 65 - 100 mg/dL   [place], TROUGH    Collection Time: 18 11:06 PM   Result Value Ref Range    Vancomycin,trough 8.9 5 - 20 ug/mL   GLUCOSE, POC    Collection Time: 18 11:51 PM   Result Value Ref Range    Glucose (POC) 77 65 - 100 mg/dL   GLUCOSE, POC    Collection Time: 18  3:41 AM   Result Value Ref Range    Glucose (POC) 99 65 - 100 mg/dL   CBC WITH AUTOMATED DIFF    Collection Time: 18  5:35 AM   Result Value Ref Range    WBC 5.1 4.3 - 11.1 K/uL    RBC 3.62 (L) 4.23 - 5.67 M/uL    HGB 9.2 (L) 13.6 - 17.2 g/dL    HCT 29.3 (L) 41.1 - 50.3 %    MCV 80.9 79.6 - 97.8 FL    MCH 25.4 (L) 26.1 - 32.9 PG    MCHC 31.4 31.4 - 35.0 g/dL    RDW 18.1 (H) 11.9 - 14.6 %    PLATELET 744 881 - 816 K/uL    MPV 10.5 (L) 10.8 - 14.1 FL    DF AUTOMATED      NEUTROPHILS 63 43 - 78 %    LYMPHOCYTES 24 13 - 44 %    MONOCYTES 10 4.0 - 12.0 %    EOSINOPHILS 3 0.5 - 7.8 %    BASOPHILS 0 0.0 - 2.0 %    IMMATURE GRANULOCYTES 0 0.0 - 5.0 %    ABS. NEUTROPHILS 3.2 1.7 - 8.2 K/UL    ABS. LYMPHOCYTES 1.2 0.5 - 4.6 K/UL    ABS. MONOCYTES 0.5 0.1 - 1.3 K/UL    ABS. EOSINOPHILS 0.2 0.0 - 0.8 K/UL    ABS. BASOPHILS 0.0 0.0 - 0.2 K/UL    ABS. IMM. GRANS. 0.0 0.0 - 0.5 K/UL   METABOLIC PANEL, BASIC    Collection Time: 01/20/18  5:35 AM   Result Value Ref Range    Sodium 145 136 - 145 mmol/L    Potassium 4.1 3.5 - 5.1 mmol/L    Chloride 110 (H) 98 - 107 mmol/L    CO2 29 21 - 32 mmol/L    Anion gap 6 (L) 7 - 16 mmol/L    Glucose 72 65 - 100 mg/dL    BUN 18 8 - 23 MG/DL    Creatinine 1.45 0.8 - 1.5 MG/DL    GFR est AA >60 >60 ml/min/1.73m2    GFR est non-AA 51 (L) >60 ml/min/1.73m2    Calcium 8.4 8.3 - 10.4 MG/DL   GLUCOSE, POC    Collection Time: 01/20/18  7:06 AM   Result Value Ref Range    Glucose (POC) 75 65 - 100 mg/dL         Assessment/Plan:     - healthcare acquired pneumonia: cxr positive for RLL: on vanc/maxipime for presumptive HAP EOT 1/25/18- monitor fever pattern- cbc     AMS: multifactorial: hypernatremia/ infection/ gabapentin induced: EEG was unremarkable- Brain CT scan no acute findings, ammonia levels normal-cultures so far negative: he seemed to improved once gabapentin was held- monitor      AVM (arteriovenous malformation) of small bowel, acquired with hemorrhage:  Acute blood loss anemia (1/13/2018), Status post 3 units PRBCs this admission. Hg stable -on  famotidine. No active bleeds on 1/14 EGD. Hypernatremia: resolved- DC D5%      Acute on chronic renal failure : his Cr seems to be at baseline now- DC D5 iv       Bradycardia: Cardiology states no intervention needed. Not on any rate lowering medications.       Hypoglycemia (1/13/2018): resolved- insulin and C-peptide levels: normal       Moderate aortic stenosis (1/13/2018)    PT/OT/SPEECH    CODE STATUS: DNR/DNI     CM- WILL GO [facility] ON Monday, AWAITING PRE-CERTIFICATION     Care Plan discussed with: Patient/Family and Nurse      Signed By: Susy Greenberg MD     January 20, 2018

## 2018-01-21 ENCOUNTER — PATIENT OUTREACH (OUTPATIENT)
Dept: CASE MANAGEMENT | Age: 73
End: 2018-01-21

## 2018-01-21 PROBLEM — J18.9 HCAP (HEALTHCARE-ASSOCIATED PNEUMONIA): Status: ACTIVE | Noted: 2018-01-21

## 2018-01-21 LAB
ANION GAP SERPL CALC-SCNC: 8 MMOL/L (ref 7–16)
BASOPHILS # BLD: 0 K/UL (ref 0–0.2)
BASOPHILS NFR BLD: 1 % (ref 0–2)
BUN SERPL-MCNC: 17 MG/DL (ref 8–23)
CALCIUM SERPL-MCNC: 8.4 MG/DL (ref 8.3–10.4)
CHLORIDE SERPL-SCNC: 111 MMOL/L (ref 98–107)
CO2 SERPL-SCNC: 26 MMOL/L (ref 21–32)
CREAT SERPL-MCNC: 1.36 MG/DL (ref 0.8–1.5)
DIFFERENTIAL METHOD BLD: ABNORMAL
EOSINOPHIL # BLD: 0.2 K/UL (ref 0–0.8)
EOSINOPHIL NFR BLD: 5 % (ref 0.5–7.8)
ERYTHROCYTE [DISTWIDTH] IN BLOOD BY AUTOMATED COUNT: 18.1 % (ref 11.9–14.6)
GLUCOSE BLD STRIP.AUTO-MCNC: 102 MG/DL (ref 65–100)
GLUCOSE BLD STRIP.AUTO-MCNC: 63 MG/DL (ref 65–100)
GLUCOSE BLD STRIP.AUTO-MCNC: 64 MG/DL (ref 65–100)
GLUCOSE BLD STRIP.AUTO-MCNC: 70 MG/DL (ref 65–100)
GLUCOSE BLD STRIP.AUTO-MCNC: 92 MG/DL (ref 65–100)
GLUCOSE BLD STRIP.AUTO-MCNC: 95 MG/DL (ref 65–100)
GLUCOSE SERPL-MCNC: 70 MG/DL (ref 65–100)
HCT VFR BLD AUTO: 29.1 % (ref 41.1–50.3)
HGB BLD-MCNC: 9.1 G/DL (ref 13.6–17.2)
IMM GRANULOCYTES # BLD: 0 K/UL (ref 0–0.5)
IMM GRANULOCYTES NFR BLD AUTO: 0 % (ref 0–5)
LYMPHOCYTES # BLD: 1.2 K/UL (ref 0.5–4.6)
LYMPHOCYTES NFR BLD: 34 % (ref 13–44)
MCH RBC QN AUTO: 25.1 PG (ref 26.1–32.9)
MCHC RBC AUTO-ENTMCNC: 31.3 G/DL (ref 31.4–35)
MCV RBC AUTO: 80.2 FL (ref 79.6–97.8)
MONOCYTES # BLD: 0.4 K/UL (ref 0.1–1.3)
MONOCYTES NFR BLD: 12 % (ref 4–12)
NEUTS SEG # BLD: 1.7 K/UL (ref 1.7–8.2)
NEUTS SEG NFR BLD: 48 % (ref 43–78)
PLATELET # BLD AUTO: 395 K/UL (ref 150–450)
PMV BLD AUTO: 10.3 FL (ref 10.8–14.1)
POTASSIUM SERPL-SCNC: 4.2 MMOL/L (ref 3.5–5.1)
RBC # BLD AUTO: 3.63 M/UL (ref 4.23–5.67)
SODIUM SERPL-SCNC: 145 MMOL/L (ref 136–145)
WBC # BLD AUTO: 3.5 K/UL (ref 4.3–11.1)

## 2018-01-21 PROCEDURE — 36415 COLL VENOUS BLD VENIPUNCTURE: CPT | Performed by: INTERNAL MEDICINE

## 2018-01-21 PROCEDURE — 74011250637 HC RX REV CODE- 250/637: Performed by: INTERNAL MEDICINE

## 2018-01-21 PROCEDURE — 80048 BASIC METABOLIC PNL TOTAL CA: CPT | Performed by: INTERNAL MEDICINE

## 2018-01-21 PROCEDURE — 74011000250 HC RX REV CODE- 250: Performed by: INTERNAL MEDICINE

## 2018-01-21 PROCEDURE — 74011250637 HC RX REV CODE- 250/637: Performed by: FAMILY MEDICINE

## 2018-01-21 PROCEDURE — 82962 GLUCOSE BLOOD TEST: CPT

## 2018-01-21 PROCEDURE — 74011000258 HC RX REV CODE- 258: Performed by: INTERNAL MEDICINE

## 2018-01-21 PROCEDURE — 85025 COMPLETE CBC W/AUTO DIFF WBC: CPT | Performed by: INTERNAL MEDICINE

## 2018-01-21 PROCEDURE — 65270000029 HC RM PRIVATE

## 2018-01-21 PROCEDURE — 74011250636 HC RX REV CODE- 250/636: Performed by: INTERNAL MEDICINE

## 2018-01-21 RX ADMIN — DIVALPROEX SODIUM 125 MG: 125 CAPSULE, COATED PELLETS ORAL at 12:16

## 2018-01-21 RX ADMIN — FOLIC ACID 1 MG: 1 TABLET ORAL at 09:16

## 2018-01-21 RX ADMIN — LEVETIRACETAM 500 MG: 500 TABLET ORAL at 17:43

## 2018-01-21 RX ADMIN — LEVOFLOXACIN 750 MG: 500 TABLET, FILM COATED ORAL at 09:16

## 2018-01-21 RX ADMIN — DIVALPROEX SODIUM 125 MG: 125 CAPSULE, COATED PELLETS ORAL at 05:32

## 2018-01-21 RX ADMIN — DIVALPROEX SODIUM 125 MG: 125 CAPSULE, COATED PELLETS ORAL at 17:43

## 2018-01-21 RX ADMIN — DEXTROSE MONOHYDRATE 25 G: 25 INJECTION, SOLUTION INTRAVENOUS at 09:41

## 2018-01-21 RX ADMIN — AMLODIPINE BESYLATE 10 MG: 10 TABLET ORAL at 09:16

## 2018-01-21 RX ADMIN — DIVALPROEX SODIUM 125 MG: 125 CAPSULE, COATED PELLETS ORAL at 00:01

## 2018-01-21 RX ADMIN — Medication 10 ML: at 17:44

## 2018-01-21 RX ADMIN — Medication 10 ML: at 05:33

## 2018-01-21 RX ADMIN — FERROUS SULFATE TAB 325 MG (65 MG ELEMENTAL FE) 325 MG: 325 (65 FE) TAB at 17:44

## 2018-01-21 RX ADMIN — Medication 10 ML: at 02:02

## 2018-01-21 RX ADMIN — FAMOTIDINE 20 MG: 40 POWDER, FOR SUSPENSION ORAL at 09:16

## 2018-01-21 RX ADMIN — SODIUM CHLORIDE 2 G: 900 INJECTION, SOLUTION INTRAVENOUS at 01:52

## 2018-01-21 RX ADMIN — LEVOTHYROXINE SODIUM 100 MCG: 100 TABLET ORAL at 05:32

## 2018-01-21 RX ADMIN — LEVETIRACETAM 500 MG: 500 TABLET ORAL at 09:16

## 2018-01-21 RX ADMIN — FERROUS SULFATE TAB 325 MG (65 MG ELEMENTAL FE) 325 MG: 325 (65 FE) TAB at 09:16

## 2018-01-21 RX ADMIN — Medication 10 ML: at 21:25

## 2018-01-21 NOTE — PROGRESS NOTES
Hospitalist Progress Note    Subjective:   Patient examined at bedside. No overnight events. He eat his breakfast, no acute complains. Awaiting transfer to [facility] tomorrow. ROS: denies chest pain, sob, nausea, vomiting, abdominal pain, diarrhea. Current Facility-Administered Medications   Medication Dose Route Frequency    vancomycin (VANCOCIN) 1,000 mg in 0.9% sodium chloride 250 mL IVPB  1,000 mg IntraVENous Q18H    divalproex (DEPAKOTE SPRINKLE) capsule 125 mg  125 mg Oral Q6H    cefepime (MAXIPIME) 2 g in 0.9% sodium chloride (MBP/ADV) 100 mL ADV  2 g IntraVENous Q12H    famotidine (PEPCID) 40 mg/5 mL (8 mg/mL) oral suspension 20 mg  20 mg Oral DAILY    0.9% sodium chloride infusion 250 mL  250 mL IntraVENous PRN    glucagon (GLUCAGEN) injection 1 mg  1 mg IntraMUSCular PRN    dextrose (D50W) injection syrg 12.5-25 g  25-50 mL IntraVENous PRN    amLODIPine (NORVASC) tablet 10 mg  10 mg Oral DAILY    donepezil (ARICEPT) tablet 10 mg  10 mg Oral QHS    ferrous sulfate tablet 325 mg  325 mg Oral BID WITH MEALS    folic acid (FOLVITE) tablet 1 mg  1 mg Oral DAILY    levETIRAcetam (KEPPRA) tablet 500 mg  500 mg Oral BID    levothyroxine (SYNTHROID) tablet 100 mcg  100 mcg Oral ACB    tamsulosin (FLOMAX) capsule 0.4 mg  0.4 mg Oral QHS    0.9% sodium chloride infusion 250 mL  250 mL IntraVENous PRN    sodium chloride (NS) flush 5-10 mL  5-10 mL IntraVENous Q8H    sodium chloride (NS) flush 5-10 mL  5-10 mL IntraVENous PRN    acetaminophen (TYLENOL) tablet 650 mg  650 mg Oral Q4H PRN    HYDROcodone-acetaminophen (NORCO) 5-325 mg per tablet 1 Tab  1 Tab Oral Q4H PRN    ondansetron (ZOFRAN) injection 4 mg  4 mg IntraVENous Q4H PRN        Review of Systems  Review of systems not obtained due to patient factors.     Objective:     Visit Vitals    /76 (BP 1 Location: Right arm, BP Patient Position: At rest)    Pulse 64    Temp 97.4 °F (36.3 °C)    Resp 15    Ht 5' 8\" (1.727 m)  Wt 61.7 kg (136 lb)    SpO2 97%    BMI 20.68 kg/m2    O2 Flow Rate (L/min): 2 l/min O2 Device: Room air    Temp (24hrs), Av.6 °F (36.4 °C), Min:97.1 °F (36.2 °C), Max:98 °F (36.7 °C)          1901 -  0700  In: 360 [P.O.:360]  Out: -     General: alert, talking, eating, in no distress   Eyes; non icteric, EOMI  Neck; supple  CV: regular, no murmurs, no gallops   Pulm; course breath sounds, non labored, normal effort  Abd; soft, non tender, non distended  Neuro: no focal deficits, was able to walk and move all extremities. Additional comments:I reviewed the patient's new clinical lab test results. j    Data Review    Recent Results (from the past 24 hour(s))   GLUCOSE, POC    Collection Time: 18 11:27 AM   Result Value Ref Range    Glucose (POC) 104 (H) 65 - 100 mg/dL   GLUCOSE, POC    Collection Time: 18  5:06 PM   Result Value Ref Range    Glucose (POC) 88 65 - 100 mg/dL   GLUCOSE, POC    Collection Time: 18  8:23 PM   Result Value Ref Range    Glucose (POC) 81 65 - 100 mg/dL   GLUCOSE, POC    Collection Time: 18 11:41 PM   Result Value Ref Range    Glucose (POC) 75 65 - 100 mg/dL   GLUCOSE, POC    Collection Time: 18  4:58 AM   Result Value Ref Range    Glucose (POC) 70 65 - 100 mg/dL   CBC WITH AUTOMATED DIFF    Collection Time: 18  5:19 AM   Result Value Ref Range    WBC 3.5 (L) 4.3 - 11.1 K/uL    RBC 3.63 (L) 4.23 - 5.67 M/uL    HGB 9.1 (L) 13.6 - 17.2 g/dL    HCT 29.1 (L) 41.1 - 50.3 %    MCV 80.2 79.6 - 97.8 FL    MCH 25.1 (L) 26.1 - 32.9 PG    MCHC 31.3 (L) 31.4 - 35.0 g/dL    RDW 18.1 (H) 11.9 - 14.6 %    PLATELET 191 283 - 052 K/uL    MPV 10.3 (L) 10.8 - 14.1 FL    DF AUTOMATED      NEUTROPHILS 48 43 - 78 %    LYMPHOCYTES 34 13 - 44 %    MONOCYTES 12 4.0 - 12.0 %    EOSINOPHILS 5 0.5 - 7.8 %    BASOPHILS 1 0.0 - 2.0 %    IMMATURE GRANULOCYTES 0 0.0 - 5.0 %    ABS. NEUTROPHILS 1.7 1.7 - 8.2 K/UL    ABS. LYMPHOCYTES 1.2 0.5 - 4.6 K/UL    ABS. MONOCYTES 0.4 0.1 - 1.3 K/UL    ABS. EOSINOPHILS 0.2 0.0 - 0.8 K/UL    ABS. BASOPHILS 0.0 0.0 - 0.2 K/UL    ABS. IMM. GRANS. 0.0 0.0 - 0.5 K/UL   METABOLIC PANEL, BASIC    Collection Time: 01/21/18  5:19 AM   Result Value Ref Range    Sodium 145 136 - 145 mmol/L    Potassium 4.2 3.5 - 5.1 mmol/L    Chloride 111 (H) 98 - 107 mmol/L    CO2 26 21 - 32 mmol/L    Anion gap 8 7 - 16 mmol/L    Glucose 70 65 - 100 mg/dL    BUN 17 8 - 23 MG/DL    Creatinine 1.36 0.8 - 1.5 MG/DL    GFR est AA >60 >60 ml/min/1.73m2    GFR est non-AA 55 (L) >60 ml/min/1.73m2    Calcium 8.4 8.3 - 10.4 MG/DL         Assessment/Plan:     -Healthcare acquired pneumonia: cxr positive for RLL: discontinue vanc/maxipime, and start oral levaquin, EOT 1/25/18- cbc     AMS: multifactorial: hypernatremia/ infection/ gabapentin induced: EEG was unremarkable- Brain CT scan no acute findings, ammonia levels normal-cultures so far negative: he seemed to improved once gabapentin was held- monitor      AVM (arteriovenous malformation) of small bowel, acquired with hemorrhage:  Acute blood loss anemia (1/13/2018), Status post 3 units PRBCs this admission. Hg stable -on  famotidine. No active bleeds on 1/14 EGD. Hypernatremia: resolved-      Acute on chronic renal failure : his Cr seems to be at baseline now- DC D5 iv       Bradycardia: Cardiology states no intervention needed. Not on any rate lowering medications.       Hypoglycemia (1/13/2018): resolved- insulin and C-peptide levels: normal       Moderate aortic stenosis (1/13/2018)    PT/OT/SPEECH    CODE STATUS: DNR/DNI     CM- WILL GO TO [facility] ON Monday, AWAITING [address] discussed with: Patient/Family and Nurse      Signed By: Abbe Stevens MD     January 21, 2018

## 2018-01-21 NOTE — PROGRESS NOTES
Patient is resting, comfortably after incontinent care has been given. Patient is a heavy wetter, urine incontinence throughout the shift.

## 2018-01-22 LAB
ANION GAP SERPL CALC-SCNC: 7 MMOL/L (ref 7–16)
BACTERIA SPEC CULT: NORMAL
BACTERIA SPEC CULT: NORMAL
BUN SERPL-MCNC: 24 MG/DL (ref 8–23)
CALCIUM SERPL-MCNC: 8.7 MG/DL (ref 8.3–10.4)
CHLORIDE SERPL-SCNC: 109 MMOL/L (ref 98–107)
CO2 SERPL-SCNC: 28 MMOL/L (ref 21–32)
CREAT SERPL-MCNC: 1.46 MG/DL (ref 0.8–1.5)
GLUCOSE BLD STRIP.AUTO-MCNC: 100 MG/DL (ref 65–100)
GLUCOSE BLD STRIP.AUTO-MCNC: 58 MG/DL (ref 65–100)
GLUCOSE BLD STRIP.AUTO-MCNC: 60 MG/DL (ref 65–100)
GLUCOSE BLD STRIP.AUTO-MCNC: 71 MG/DL (ref 65–100)
GLUCOSE BLD STRIP.AUTO-MCNC: 80 MG/DL (ref 65–100)
GLUCOSE BLD STRIP.AUTO-MCNC: 87 MG/DL (ref 65–100)
GLUCOSE BLD STRIP.AUTO-MCNC: 89 MG/DL (ref 65–100)
GLUCOSE BLD STRIP.AUTO-MCNC: 93 MG/DL (ref 65–100)
GLUCOSE SERPL-MCNC: 71 MG/DL (ref 65–100)
POTASSIUM SERPL-SCNC: 4.7 MMOL/L (ref 3.5–5.1)
SERVICE CMNT-IMP: NORMAL
SERVICE CMNT-IMP: NORMAL
SODIUM SERPL-SCNC: 144 MMOL/L (ref 136–145)

## 2018-01-22 PROCEDURE — 80048 BASIC METABOLIC PNL TOTAL CA: CPT | Performed by: INTERNAL MEDICINE

## 2018-01-22 PROCEDURE — 36415 COLL VENOUS BLD VENIPUNCTURE: CPT | Performed by: INTERNAL MEDICINE

## 2018-01-22 PROCEDURE — 65270000029 HC RM PRIVATE

## 2018-01-22 PROCEDURE — 97530 THERAPEUTIC ACTIVITIES: CPT

## 2018-01-22 PROCEDURE — 74011250637 HC RX REV CODE- 250/637: Performed by: FAMILY MEDICINE

## 2018-01-22 PROCEDURE — 74011250637 HC RX REV CODE- 250/637: Performed by: INTERNAL MEDICINE

## 2018-01-22 PROCEDURE — 82962 GLUCOSE BLOOD TEST: CPT

## 2018-01-22 RX ADMIN — AMLODIPINE BESYLATE 10 MG: 10 TABLET ORAL at 09:06

## 2018-01-22 RX ADMIN — Medication 10 ML: at 22:07

## 2018-01-22 RX ADMIN — FERROUS SULFATE TAB 325 MG (65 MG ELEMENTAL FE) 325 MG: 325 (65 FE) TAB at 17:30

## 2018-01-22 RX ADMIN — DIVALPROEX SODIUM 125 MG: 125 CAPSULE, COATED PELLETS ORAL at 01:18

## 2018-01-22 RX ADMIN — Medication 10 ML: at 05:56

## 2018-01-22 RX ADMIN — FOLIC ACID 1 MG: 1 TABLET ORAL at 09:06

## 2018-01-22 RX ADMIN — Medication 10 ML: at 14:00

## 2018-01-22 RX ADMIN — DONEPEZIL HYDROCHLORIDE 10 MG: 5 TABLET, FILM COATED ORAL at 22:06

## 2018-01-22 RX ADMIN — DIVALPROEX SODIUM 125 MG: 125 CAPSULE, COATED PELLETS ORAL at 17:30

## 2018-01-22 RX ADMIN — DIVALPROEX SODIUM 125 MG: 125 CAPSULE, COATED PELLETS ORAL at 05:56

## 2018-01-22 RX ADMIN — FAMOTIDINE 20 MG: 40 POWDER, FOR SUSPENSION ORAL at 09:00

## 2018-01-22 RX ADMIN — TAMSULOSIN HYDROCHLORIDE 0.4 MG: 0.4 CAPSULE ORAL at 22:06

## 2018-01-22 RX ADMIN — LEVETIRACETAM 500 MG: 500 TABLET ORAL at 09:06

## 2018-01-22 RX ADMIN — FERROUS SULFATE TAB 325 MG (65 MG ELEMENTAL FE) 325 MG: 325 (65 FE) TAB at 09:06

## 2018-01-22 RX ADMIN — LEVOTHYROXINE SODIUM 100 MCG: 100 TABLET ORAL at 05:56

## 2018-01-22 RX ADMIN — LEVETIRACETAM 500 MG: 500 TABLET ORAL at 17:30

## 2018-01-22 NOTE — PROGRESS NOTES
Downtime progress note entry for [facility] : Wei Morgan RN  Care  Follow up Outreach Note   Outreach type: Follow up  Patient name: Zulma Driver  : 45  MRN: [id number]    Date/Time of Outreach: 18     Reason for follow-up:   Continuation of care   Disease specific complaints/issues: Sepsis       Patient progress towards goals set from last contact:   LVM with pt. left call back # and encourage to call CC. Has patient attended any PCP or specialist follow-up appointments since last contact? What was outcome of appointment? When is next follow-up scheduled? Review medications. Any medication changes since last outreach? Does patient have any questions or issues related to their medications? Home health active? If yes - any issue? Progress? Referrals needed?  (SW, Diabetes education, HH, etc. )    Other issues/Miscellaneous?  (Transportation, access to meals, ability to perform ADLs, adequate caregiver support, etc.)              Next Outreach Scheduled:      Next Steps/Goals:      Care  completing call:

## 2018-01-22 NOTE — PROGRESS NOTES
SPEECH PATHOLOGY NOTE:    Patient was drowsy. He declined speech therapy this afternoon. He did wake up to eat the majority of his lunch earlier today per RN. Will follow.     Clyde Nielsen MS, CCC-SLP

## 2018-01-22 NOTE — PROGRESS NOTES
Problem: Self Care Deficits Care Plan (Adult)  Goal: *Acute Goals and Plan of Care (Insert Text)  1. Patient will complete grooming with moderate assistance and adaptive equipment as needed. 2. Patient will tolerate sitting edge of bed for 5 minutes with CGA for ADL prep. 3. Patient will tolerate 15 minutes of OT treatment with minimal rest breaks to increase activity tolerance for ADLs. 4. Patient will complete functional transfers with moderate assistance and adaptive equipment as needed. Timeframe: 7 visits     OCCUPATIONAL THERAPY: Daily Note, Treatment Day: 2nd and PM    1/22/2018  INPATIENT: Hospital Day: 11  Payor: Casey Krill / Plan: [address] [place] [address] HMO / Product Type: [employer] /      NAME/AGE/GENDER: Kd Robbins is a 67 y.o. male   PRIMARY DIAGNOSIS:  GIB (gastrointestinal bleeding)  GI BLEED, ANEMIA AVM (arteriovenous malformation) of small bowel, acquired with hemorrhage ([address] AVM (arteriovenous malformation) of small bowel, acquired with hemorrhage ([address]  Procedure(s) (LRB):  ESOPHAGOGASTRODUODENOSCOPY (EGD) PUSH (N/A)  ENDOSCOPIC ARGON PLASMA COAGULATION (N/A)  8 Days Post-Op  ICD-10: Treatment Diagnosis:    · Generalized Muscle Weakness (M62.81)   Precautions/Allergies:     Review of patient's allergies indicates no known allergies. ASSESSMENT:     Mr. Lizet Watts presents in supine upon arrival. 1/22/2018 Pt was presented in supine upon arrival. Pt was sleeping, but agreeable to treatment initially. Pt transferred to sitting with a lot of encouragement and maximal assistance. Pt kept attempting to lay back down during treatment and eventually just had to be returned back to supine. Pt did not make any progress this treatment. Will continue therapeutic efforts. This section established at most recent assessment   PROBLEM LIST (Impairments causing functional limitations):  1. Decreased Strength  2. Decreased ADL/Functional Activities  3.  Decreased Transfer Abilities  4. Decreased Ambulation Ability/Technique  5. Decreased Balance  6. Decreased Activity Tolerance  7. Increased Fatigue  8. Decreased Cognition   INTERVENTIONS PLANNED: (Benefits and precautions of occupational therapy have been discussed with the patient.)  1. Activities of daily living training  2. Adaptive equipment training  3. Balance training  4. Cognitive training  5. Donning&doffing training  6. Group therapy  7. Therapeutic activity  8. Therapeutic exercise     TREATMENT PLAN: Frequency/Duration: Follow patient 3x/week to address above goals. Rehabilitation Potential For Stated Goals: Guarded     RECOMMENDED REHABILITATION/EQUIPMENT: (at time of discharge pending progress): Due to the probability of continued deficits (see above) this patient will likely need continued skilled occupational therapy after discharge. Equipment:    TBD              OCCUPATIONAL PROFILE AND HISTORY:   History of Present Injury/Illness (Reason for Referral):  See H&P  Past Medical History/Comorbidities:   Mr. Elizabeth Joshua  has a past medical history of Alcohol abuse; Anemia; Angiodysplasia of stomach (2016); Bladder incontinence (05/28/2014); Chronic airway obstruction, not elsewhere classified (5/28/2014); CKD (chronic kidney disease); COPD (chronic obstructive pulmonary disease) ([facility] Dementia (5/28/2014); Hyperlipidemia (5/28/2014); Hypernatremia (11/25/2016); Hypertension; Hypothyroid; Seizure ([address] (2010); Tobacco abuse (5/28/2014); and Tobacco abuse. Mr. Elizabeth Joshua  has a past surgical history that includes hx colonoscopy (APPROX 2005) and hx endoscopy (2016).   Social History/Living Environment:   Home Environment: Private residence  One/Two Story Residence: One story  Living Alone: No  Support Systems: Family member(s), Friends \ neighbors  Patient Expects to be Discharged to[de-identified] Rehabilitation facility  Current DME Used/Available at Home: None unable to obtain from pt  Prior Level of Function/Work/Activity:  unable to obtain from pt     Number of Personal Factors/Comorbidities that affect the Plan of Care: Expanded review of therapy/medical records (1-2):  MODERATE COMPLEXITY   ASSESSMENT OF OCCUPATIONAL PERFORMANCE[de-identified]   Activities of Daily Living:           Basic ADLs (From Assessment) Complex ADLs (From Assessment)   Basic ADL  Feeding: Other (comment) (unable to assess)  Oral Facial Hygiene/Grooming: Other (comment) (unable to assess)  Bathing: Other (comment) (unable to assess)  Upper Body Dressing: Other (comment) (unable to assess)  Lower Body Dressing: Other (comment) (unable to assess)  Toileting: Other (comment) (unable to assess) Instrumental ADL  Meal Preparation: Total assistance  Homemaking: Total assistance   Grooming/Bathing/Dressing Activities of Daily Living                             Bed/Mat Mobility  Supine to Sit: Maximum assistance  Sit to Supine: Moderate assistance       Most Recent Physical Functioning:   Gross Assessment:                  Posture:  Posture (WDL): Exceptions to WDL  Posture Assessment: Forward head, Rounded shoulders  Balance:  Sitting: Impaired  Sitting - Static: Poor (constant support)  Sitting - Dynamic: Poor (constant support) Bed Mobility:  Supine to Sit: Maximum assistance  Sit to Supine: Moderate assistance  Wheelchair Mobility:     Transfers:                 Patient Vitals for the past 6 hrs:   BP SpO2 Pulse   01/22/18 1055 141/68 95 % 94   01/22/18 1444 131/69 96 % 95       Mental Status  Neurologic State: Alert, Confused  Orientation Level: Oriented to person  Cognition: Decreased attention/concentration  Perception: Cues to maintain midline in sitting  Perseveration: No perseveration noted  Safety/Judgement: Decreased awareness of need for assistance                          Physical Skills Involved:  1. Range of Motion  2. Balance  3. Strength  4. Activity Tolerance  5. Fine Motor Control  6.  Gross Motor Control Cognitive Skills Affected (resulting in the inability to perform in a timely and safe manner):  1. Unable to accurately assess at this time Psychosocial Skills Affected:  1. Habits/Routines  2. Environmental Adaptation  3. Social Interaction  4. Emotional Regulation  5. Self-Awareness  6. Awareness of Others  7. Social Roles   Number of elements that affect the Plan of Care: 5+:  HIGH COMPLEXITY   CLINICAL DECISION MAKIN[address] [place] [id number] AM-PAC 6 Clicks   Daily Activity Inpatient Short Form  How much help from another person does the patient currently need. .. Total A Lot A Little None   1. Putting on and taking off regular lower body clothing? [x] 1   [] 2   [] 3   [] 4   2. Bathing (including washing, rinsing, drying)? [x] 1   [] 2   [] 3   [] 4   3. Toileting, which includes using toilet, bedpan or urinal?   [x] 1   [] 2   [] 3   [] 4   4. Putting on and taking off regular upper body clothing? [x] 1   [] 2   [] 3   [] 4   5. Taking care of personal grooming such as brushing teeth? [x] 1   [] 2   [] 3   [] 4   6. Eating meals? [x] 1   [] 2   [] 3   [] 4   © , Trustees of [address] [place] [id number], under license to [employer]. All rights reserved      Score:  Initial: 6 Most Recent: X (Date: -- )    Interpretation of Tool:  Represents activities that are increasingly more difficult (i.e. Bed mobility, Transfers, Gait). Score 24 23 22-20 19-15 14-10 9-7 6     Modifier CH CI CJ CK CL CM CN      ? Self Care:     - CURRENT STATUS: CN - 100% impaired, limited or restricted    - GOAL STATUS: CM - 80%-99% impaired, limited or restricted    - D/C STATUS:  ---------------To be determined---------------  Payor: [employer] MEDICARE / Plan: [address] [place] [address] HMO / Product Type: Managed Care Medicare /      Medical Necessity:     · Patient demonstrates fair rehab potential due to higher previous functional level.   Reason for Services/Other Comments:  · Patient continues to require skilled intervention due to medical complications and patient unable to attend/participate in therapy as expected. Use of outcome tool(s) and clinical judgement create a POC that gives a: MODERATE COMPLEXITY         TREATMENT:   (In addition to Assessment/Re-Assessment sessions the following treatments were rendered)     Pre-treatment Symptoms/Complaints:    Pain: Initial:   Pain Intensity 1: 0  Post Session:  0     Therapeutic Activity: (10 minutes): Therapeutic activities including Bed transfers and edge of bed sitting to improve mobility, strength and balance. Required maximal assist to promote static and dynamic balance in sitting. Braces/Orthotics/Lines/Etc:   · IV  · O2 Device: Nasal cannula  Treatment/Session Assessment:    · Response to Treatment:  Very drowsy, limited participation  · Interdisciplinary Collaboration:   o Certified Occupational Therapy Assistant  o Registered Nurse  · After treatment position/precautions:   o Supine in bed  o Bed alarm/tab alert on  o Bed/Chair-wheels locked  o Bed in low position  o Call light within reach  o RN notified  o Side rails x 3   · Compliance with Program/Exercises: Will assess as treatment progresses. · Recommendations/Intent for next treatment session: \"Next visit will focus on advancements to more challenging activities and reduction in assistance provided\".   Total Treatment Duration:  OT Patient Time In/Time Out  Time In: 0255  Time Out: Elmer Palacios Check

## 2018-01-22 NOTE — PROGRESS NOTES
Problem: Nutrition Deficit  Goal: *Optimize nutritional status  Nutrition F/U  Received call from Dr Kevin Can concerning r/t non symptomatic hypoglycemia and request to add snacks  Assessment:   Diet order(s): 1-13: NPO, then full liquid, 1-14: NPO, then full liquid, 1-15: Full liquid with nectar thick liquids, 1-16: Full liquid with honey thick liquids, 1-18: NPO, 1-19: Pureed with honey thick liquids, Magic cup bid  Last 3 Recorded Weights in this Encounter    01/12/18 1642 01/22/18 0532   Weight: 61.7 kg (136 lb) 67.6 kg (149 lb 1.6 oz)   UBW range per EMR: 123-135#  D5W IVF stopped on 1-20. Pt with 2 episodes of hypoglycemia: 63 mg/dl on 1-21 AM and 58 mg/dl this AM. Therefore may benefit from hs snack  Macronutrient needs:   EER: 9711-8955 kcal /day (25-30 kcal/kg listed BW)   EPR: 70-82 grams protein/day (1-1.2 grams/kg IBW)   Intake/Comparative Standards: Average intake for past 4 day(s)/4 recorded meal(s): 100%. This potentially meets ~100% of kcal and ~100% of protein needs   Intervention:   Meals and snacks: Continue current diet or as per SLP.    Nutrition Supplement Therapy: Continue Magic cup bid but specify one to be given at bedtime    Arvind Mann, [address] [place] [address], [facility] [address] [place] [address], [address] [place] [address]

## 2018-01-22 NOTE — PROGRESS NOTES
Hospitalist Progress Note    Subjective:   Mr. Alysha Johnson is a 66 yo AAM, with a pmh of dementia, moderate aortic stenosis and prior gi bleeding due to AVMs, who presented 1/12 for weakness and hg of 5.5 with grossly occult positive stool.  Given a total of 3 units PRBCs with improvement in hg to 9.  On iv protonix BID.  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  Course complicated by multiple other issues:  Asymptomatic hypoglycemic episodes. He Is not a known diabetic and therefore not on insulin/oral diabetic agents. Insulinoma work up was unremarkable. His blood sugar levels improved after D5 iv and resuming diet. Hypernatremic to 157-this has resolved. Also with some acute on chronic renal failure, stable now. bradycardic events to high 30s/40s range with pvcs. No intervention needed per cardiology. Patient hypothermic after EGD, T93F post anesthesia/EGD, soheila hugger in place, eventually resolved. Spiked fever with positive CXR for RLL infiltrate. Started on vanc/maxipime-later changed to po levaquin, to complete 7 days of therapy. AMS resolved after gabapentin was discontinued. He is going to [facility] once medically stable. 1/22/18: Today he had asymptomatic hypoglycemia events, dietitian contacted and snacks added, including 1 prior bedtime. FS improved after this approach. ROS: denies chest pain, sob, nausea, vomiting, abdominal pain, diarrhea.      Current Facility-Administered Medications   Medication Dose Route Frequency    levoFLOXacin (LEVAQUIN) tablet 750 mg  750 mg Oral Q48H    divalproex (DEPAKOTE SPRINKLE) capsule 125 mg  125 mg Oral Q6H    famotidine (PEPCID) 40 mg/5 mL (8 mg/mL) oral suspension 20 mg  20 mg Oral DAILY    0.9% sodium chloride infusion 250 mL  250 mL IntraVENous PRN    glucagon (GLUCAGEN) injection 1 mg  1 mg IntraMUSCular PRN    dextrose (D50W) injection syrg 12.5-25 g  25-50 mL IntraVENous PRN    amLODIPine (NORVASC) tablet 10 mg  10 mg Oral DAILY    donepezil (ARICEPT) tablet 10 mg  10 mg Oral QHS    ferrous sulfate tablet 325 mg  325 mg Oral BID WITH MEALS    folic acid (FOLVITE) tablet 1 mg  1 mg Oral DAILY    levETIRAcetam (KEPPRA) tablet 500 mg  500 mg Oral BID    levothyroxine (SYNTHROID) tablet 100 mcg  100 mcg Oral ACB    tamsulosin (FLOMAX) capsule 0.4 mg  0.4 mg Oral QHS    0.9% sodium chloride infusion 250 mL  250 mL IntraVENous PRN    sodium chloride (NS) flush 5-10 mL  5-10 mL IntraVENous Q8H    sodium chloride (NS) flush 5-10 mL  5-10 mL IntraVENous PRN    acetaminophen (TYLENOL) tablet 650 mg  650 mg Oral Q4H PRN    ondansetron (ZOFRAN) injection 4 mg  4 mg IntraVENous Q4H PRN        Review of Systems  Review of systems not obtained due to patient factors. Objective:     Visit Vitals    /69    Pulse 95    Temp 98.9 °F (37.2 °C)    Resp 18    Ht 5' 8\" (1.727 m)    Wt 67.6 kg (149 lb 1.6 oz)    SpO2 96%    BMI 22.67 kg/m2    O2 Flow Rate (L/min): 2 l/min O2 Device: Room air    Temp (24hrs), Av.5 °F (36.9 °C), Min:97.9 °F (36.6 °C), Max:98.9 °F (37.2 °C)       0701 -  1900  In: 360 [P.O.:360]  Out: -        General: alert, talking, eating, in no distress   Eyes; non icteric, EOMI  Neck; supple  CV: regular, no murmurs, no gallops   Pulm; course breath sounds, non labored, normal effort  Abd; soft, non tender, non distended  Neuro: no focal deficits, was able to walk and move all extremities.      Additional comments:I reviewed the patient's new clinical lab test results. j    Data Review    Recent Results (from the past 24 hour(s))   GLUCOSE, POC    Collection Time: 18  7:51 PM   Result Value Ref Range    Glucose (POC) 95 65 - 100 mg/dL   GLUCOSE, POC    Collection Time: 18  4:40 AM   Result Value Ref Range    Glucose (POC) 80 65 - 033 mg/dL   METABOLIC PANEL, BASIC    Collection Time: 18  5:02 AM   Result Value Ref Range    Sodium 144 136 - 145 mmol/L    Potassium 4.7 3.5 - 5.1 mmol/L    Chloride 109 (H) 98 - 107 mmol/L    CO2 28 21 - 32 mmol/L    Anion gap 7 7 - 16 mmol/L    Glucose 71 65 - 100 mg/dL    BUN 24 (H) 8 - 23 MG/DL    Creatinine 1.46 0.8 - 1.5 MG/DL    GFR est AA >60 >60 ml/min/1.73m2    GFR est non-AA 50 (L) >60 ml/min/1.73m2    Calcium 8.7 8.3 - 10.4 MG/DL   GLUCOSE, POC    Collection Time: 01/22/18  7:13 AM   Result Value Ref Range    Glucose (POC) 58 (L) 65 - 100 mg/dL   GLUCOSE, POC    Collection Time: 01/22/18  7:38 AM   Result Value Ref Range    Glucose (POC) 60 (L) 65 - 100 mg/dL   GLUCOSE, POC    Collection Time: 01/22/18 10:47 AM   Result Value Ref Range    Glucose (POC) 71 65 - 100 mg/dL   GLUCOSE, POC    Collection Time: 01/22/18  1:23 PM   Result Value Ref Range    Glucose (POC) 100 65 - 100 mg/dL   GLUCOSE, POC    Collection Time: 01/22/18  4:03 PM   Result Value Ref Range    Glucose (POC) 89 65 - 100 mg/dL         Assessment/Plan:     -Healthcare acquired pneumonia: cxr positive for RLL: on oral levaquin, EOT 1/25/18    AMS: multifactorial: hypernatremia/ infection/ gabapentin induced: EEG was unremarkable- Brain CT scan no acute findings, cultures negative: he seemed to improved once gabapentin was held- resolved       AVM (arteriovenous malformation) of small bowel, acquired with hemorrhage:  Acute blood loss anemia (1/13/2018), Status post 3 units PRBCs this admission. Hg stable -on  famotidine. No active bleeds on 1/14 EGD. Hypernatremia: resolved-      Acute on chronic renal failure : his Cr seems to be at baseline now-      Bradycardia: Cardiology states no intervention needed. Not on any rate lowering medications.       Hypoglycemia (1/13/2018): resolved- insulin and C-peptide levels: normal - dietitian contacted- on snacks bid and 1 prior bedtime since hypoglycemia happens early in the morning- monitor       Moderate aortic stenosis (1/13/2018)    PT/OT/SPEECH    CODE STATUS: DNR/DNI     CM- WILL GO TO [facility] ONCE MEDICALLY CLEARED- IF FS ARE STABLE, DC TOMORROW       **EMERGENCY CONTACT: Ms Cheryl Li ( daughter and Dorys Dominguez ): [phone]. I called her 2 times with no success to update her of Mr Starr Gerber condition.      Signed By: Luci Mccallum MD     January 22, 2018

## 2018-01-23 LAB
ANION GAP SERPL CALC-SCNC: 10 MMOL/L (ref 7–16)
BUN SERPL-MCNC: 28 MG/DL (ref 8–23)
CALCIUM SERPL-MCNC: 8.7 MG/DL (ref 8.3–10.4)
CHLORIDE SERPL-SCNC: 110 MMOL/L (ref 98–107)
CO2 SERPL-SCNC: 26 MMOL/L (ref 21–32)
CREAT SERPL-MCNC: 1.43 MG/DL (ref 0.8–1.5)
GLUCOSE BLD STRIP.AUTO-MCNC: 101 MG/DL (ref 65–100)
GLUCOSE BLD STRIP.AUTO-MCNC: 101 MG/DL (ref 65–100)
GLUCOSE BLD STRIP.AUTO-MCNC: 68 MG/DL (ref 65–100)
GLUCOSE BLD STRIP.AUTO-MCNC: 81 MG/DL (ref 65–100)
GLUCOSE BLD STRIP.AUTO-MCNC: 83 MG/DL (ref 65–100)
GLUCOSE BLD STRIP.AUTO-MCNC: 84 MG/DL (ref 65–100)
GLUCOSE BLD STRIP.AUTO-MCNC: 93 MG/DL (ref 65–100)
GLUCOSE BLD STRIP.AUTO-MCNC: 94 MG/DL (ref 65–100)
GLUCOSE BLD STRIP.AUTO-MCNC: 97 MG/DL (ref 65–100)
GLUCOSE SERPL-MCNC: 93 MG/DL (ref 65–100)
POTASSIUM SERPL-SCNC: 3.8 MMOL/L (ref 3.5–5.1)
SODIUM SERPL-SCNC: 146 MMOL/L (ref 136–145)

## 2018-01-23 PROCEDURE — 74011250637 HC RX REV CODE- 250/637: Performed by: INTERNAL MEDICINE

## 2018-01-23 PROCEDURE — 82962 GLUCOSE BLOOD TEST: CPT

## 2018-01-23 PROCEDURE — 80048 BASIC METABOLIC PNL TOTAL CA: CPT | Performed by: INTERNAL MEDICINE

## 2018-01-23 PROCEDURE — 65270000029 HC RM PRIVATE

## 2018-01-23 PROCEDURE — 36415 COLL VENOUS BLD VENIPUNCTURE: CPT | Performed by: INTERNAL MEDICINE

## 2018-01-23 PROCEDURE — 74011250637 HC RX REV CODE- 250/637: Performed by: FAMILY MEDICINE

## 2018-01-23 RX ORDER — LEVOFLOXACIN 750 MG/1
750 TABLET ORAL
Qty: 1 TAB | Refills: 0 | Status: SHIPPED | OUTPATIENT
Start: 2018-01-23 | End: 2018-01-25

## 2018-01-23 RX ADMIN — TAMSULOSIN HYDROCHLORIDE 0.4 MG: 0.4 CAPSULE ORAL at 22:02

## 2018-01-23 RX ADMIN — FERROUS SULFATE TAB 325 MG (65 MG ELEMENTAL FE) 325 MG: 325 (65 FE) TAB at 08:16

## 2018-01-23 RX ADMIN — AMLODIPINE BESYLATE 10 MG: 10 TABLET ORAL at 08:18

## 2018-01-23 RX ADMIN — DIVALPROEX SODIUM 125 MG: 125 CAPSULE, COATED PELLETS ORAL at 06:18

## 2018-01-23 RX ADMIN — Medication 10 ML: at 13:54

## 2018-01-23 RX ADMIN — Medication 10 ML: at 06:19

## 2018-01-23 RX ADMIN — LEVETIRACETAM 500 MG: 500 TABLET ORAL at 17:20

## 2018-01-23 RX ADMIN — DIVALPROEX SODIUM 125 MG: 125 CAPSULE, COATED PELLETS ORAL at 01:01

## 2018-01-23 RX ADMIN — FAMOTIDINE 20 MG: 40 POWDER, FOR SUSPENSION ORAL at 08:58

## 2018-01-23 RX ADMIN — LEVETIRACETAM 500 MG: 500 TABLET ORAL at 08:18

## 2018-01-23 RX ADMIN — DONEPEZIL HYDROCHLORIDE 10 MG: 5 TABLET, FILM COATED ORAL at 22:02

## 2018-01-23 RX ADMIN — FOLIC ACID 1 MG: 1 TABLET ORAL at 08:18

## 2018-01-23 RX ADMIN — FERROUS SULFATE TAB 325 MG (65 MG ELEMENTAL FE) 325 MG: 325 (65 FE) TAB at 17:20

## 2018-01-23 RX ADMIN — LEVOFLOXACIN 750 MG: 500 TABLET, FILM COATED ORAL at 08:57

## 2018-01-23 RX ADMIN — LEVOTHYROXINE SODIUM 100 MCG: 100 TABLET ORAL at 06:18

## 2018-01-23 RX ADMIN — Medication 10 ML: at 22:06

## 2018-01-23 NOTE — PROGRESS NOTES
BS controlled with 2 magic cups. Hourly rounds completed and continuing to monitor the patient. All needs were met and patient is stable. Will continue to monitor and give report to oncoming nurse.

## 2018-01-23 NOTE — PROGRESS NOTES
Call back from Prince parmar at [facility]. They are still waiting for pre-cert but he spoke with the RN assigned to the case and feels we will get today. On standby. Marc Damon

## 2018-01-23 NOTE — PROGRESS NOTES
Patient stable and ready for dc. We need [employer] precert. Has to update their clinicals yesterday and hoping for the pre cert today. Geneva Whiting

## 2018-01-23 NOTE — PROGRESS NOTES
Pt's BS was 87 at 2333. Pt given \"magic cup\" of ice cream. BS rechecked at 0008 and it is 101. Will continue to monitor.

## 2018-01-23 NOTE — DISCHARGE SUMMARY
Hospitalist Discharge Summary     Patient ID:  Yesenia Campos  [id number]  43 y.o.  1945  Admit date: 1/12/2018  5:24 PM  Discharge date and time: 1/23/2018  Attending: Bk Henderson MD  PCP:  Barrie Pierce MD  Treatment Team: Attending Provider: Bk Henderson MD; Consulting Provider: Gómez Cronin MD; Utilization Review: Lois Mackey RN; Consulting Provider: Gokul Swenson DO; Care Manager: LIVIA Montoya    Principal Diagnosis    Acute blood loss anemia due to lower GI bleed   AVM (arteriovenous malformation) of small bowel, acquired with hemorrhage [facility])  Right lower lobe hospital acquired pneumonia            Principal Problem:    AVM (arteriovenous malformation) of small bowel, acquired with hemorrhage ([facility] [address] (6/15/2014)    Active Problems:    HCAP (healthcare-associated pneumonia) (1/21/2018)      Acute blood loss anemia (1/13/2018)      GIB (gastrointestinal bleeding) (6/13/2014)      Hypoglycemia (1/13/2018)      Dementia (11/25/2016)      Seizure disorder ([facility] [address] (11/25/2016)      Overview: PETIT VS FOCAL. NO GRAND MAL            On Phenytoin for past 4-5 yrs, since intial dx ~2009      Hypernatremia (1/13/2018)      Microcytic hypochromic anemia (6/13/2014)      History of alcohol abuse (1/13/2018)      Overview: FORMERLY DRANK  1-2 12oz. Beers daily            NONE 2014      Acute encephalopathy (11/25/2016)      Acute on chronic renal failure (HCC) (1/13/2018)      Bradycardia (1/13/2018)      Moderate aortic stenosis (1/13/2018)      Uncontrolled type II diabetes mellitus ([facility] [address] (1/23/2018)             Hospital Course:  Please refer to the admission H&P for details of presentation.  In summary, the patient is 68 yo AAM, with a pmh of dementia, moderate aortic stenosis and prior gi bleeding due to AVMs, who presented 1/12 for weakness and hg of 5.5 with grossly occult positive stool.  Given a total of 3 units PRBCs with improvement in hg to 9.  On iv protonix BID.  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  Course complicated by multiple other issues:  Asymptomatic hypoglycemic episodes. He Is not a known diabetic and therefore not on insulin/oral diabetic agents. Insulinoma work up was unremarkable. His blood sugar levels improved after D5 iv and resuming diet. Hypernatremic to 157-this has resolved. Also with some acute on chronic renal failure, stable now. bradycardic events to high 30s/40s range with pvcs. No intervention needed per cardiology. Patient hypothermic after EGD, T93F post anesthesia/EGD, soheila hugger in place, eventually resolved. Spiked fever with positive CXR for RLL infiltrate. Started on vanc/maxipime-later changed to po levaquin, to complete 7 days of therapy. AMS resolved after gabapentin was discontinued. Pt is hemodynamically and medically stable to be transferred to [facility] today. Significant Diagnostic Studies:   CT head without contrast: 01/17/2018     History: AMS. Confusion     Impression:  1. Stable ventricular prominence. This could be secondary to centrally  predominant volume loss although normal pressure hydrocephalus can have similar  imaging features. 2. Moderately advanced chronic small vessel ischemic changes and remote  bilateral basal ganglia lacunar infarctions. Portable chest: 01/17/2018     History: fever, ams.     Comparison: 01/17/2018     Findings: A single view of the chest was obtained at 1650 hours.     The cardiac and mediastinal silhouette are normal in size and configuration. Bilateral interstitial and airspace opacities persist most notable at the left  lower lung zone. There has been mild progression at the right lung base. A small  left pleural effusion is not excluded, unchanged.     IMPRESSION  IMPRESSION: Bilateral interstitial and airspace opacities with slight worsening  at the right lung base.     Labs: Results:       Chemistry Recent Labs      01/23/18   0443  01/22/18   0502  01/21/18   0519   GLU  93  71  70   NA  146*  144  145   K  3.8  4.7  4.2   CL  110*  109*  111*   CO2  26  28  26   BUN  28*  24*  17   CREA  1.43  1.46  1.36   CA  8.7  8.7  8.4   AGAP  10  7  8      CBC w/Diff Recent Labs      01/21/18   0519   WBC  3.5*   RBC  3.63*   HGB  9.1*   HCT  29.1*   PLT  395   GRANS  48   LYMPH  34   EOS  5      Cardiac Enzymes No results for input(s): CPK, CKND1, NADIA in the last 72 hours. No lab exists for component: CKRMB, TROIP   Coagulation No results for input(s): PTP, INR, APTT in the last 72 hours. No lab exists for component: INREXT    Lipid Panel Lab Results   Component Value Date/Time    Cholesterol, total 161 09/27/2017 11:25 AM    Cholesterol (POC) 168 05/30/2014 10:00 AM    HDL Cholesterol 47 09/27/2017 11:25 AM    LDL, calculated 101 09/27/2017 11:25 AM    VLDL, calculated 13 09/27/2017 11:25 AM    Triglyceride 64 09/27/2017 11:25 AM    Triglycerides (POC) 46 05/30/2014 10:00 AM      BNP No results for input(s): BNPP in the last 72 hours. Liver Enzymes No results for input(s): TP, ALB, TBIL, AP, SGOT, GPT in the last 72 hours. No lab exists for component: DBIL   Thyroid Studies Lab Results   Component Value Date/Time    T4, Total 5.9 11/18/2016 12:12 PM    TSH 8.150 01/13/2018 05:50 PM            Discharge Exam:  Visit Vitals    /71 (BP 1 Location: Right arm, BP Patient Position: Lying left side)    Pulse 68    Temp 98.4 °F (36.9 °C)    Resp 15    Ht 5' 8\" (1.727 m)    Wt 67.6 kg (149 lb 1.6 oz)    SpO2 93%    BMI 22.67 kg/m2     General: alert, talking, eating, in no distress   Eyes; non icteric, EOMI  Neck; supple  CV: regular, no murmurs, no gallops   Pulm; course breath sounds, non labored, normal effort  Abd; soft, non tender, non distended  Neuro: no focal deficits, was able to walk and move all extremities.      Disposition: [facility]  Discharge Condition: stable  Patient Instructions:   Current Discharge Medication List      START taking these medications    Details   levoFLOXacin (LEVAQUIN) 750 mg tablet Take 1 Tab by mouth every fourty-eight (48) hours for 2 days. Qty: 1 Tab, Refills: 0         CONTINUE these medications which have NOT CHANGED    Details   levothyroxine (SYNTHROID) 100 mcg tablet Take 1 Tab by mouth Daily (before breakfast). Qty: 90 Tab, Refills: 1    Associated Diagnoses: Acquired hypothyroidism      levETIRAcetam (KEPPRA) 500 mg tablet Take 1 Tab by mouth two (2) times a day. Qty: 180 Tab, Refills: 1    Associated Diagnoses: Seizure disorder ([facility] [address]      pantoprazole (PROTONIX) 40 mg tablet Take 1 Tab by mouth two (2) times a day. Qty: 180 Tab, Refills: 2    Associated Diagnoses: Gastroesophageal reflux disease without esophagitis      folic acid (FOLVITE) 1 mg tablet Take 1 Tab by mouth daily. Qty: 90 Tab, Refills: 2      mirtazapine (REMERON) 15 mg tablet Take 1 Tab by mouth nightly. Qty: 90 Tab, Refills: 2    Associated Diagnoses: Primary insomnia      donepezil (ARICEPT) 10 mg tablet Take 1 Tab by mouth nightly. Qty: 90 Tab, Refills: 3    Associated Diagnoses: Dementia associated with alcoholism without behavioral disturbance (HCC)      amLODIPine (NORVASC) 10 mg tablet Take 1 Tab by mouth daily. Qty: 90 Tab, Refills: 3    Associated Diagnoses: Essential hypertension      losartan (COZAAR) 100 mg tablet Take 1 Tab by mouth daily. Qty: 90 Tab, Refills: 3    Associated Diagnoses: Essential hypertension      ferrous sulfate 325 mg (65 mg iron) tablet Take 1 Tab by mouth two (2) times daily (with meals). Qty: 60 Tab, Refills: 0      divalproex ER (DEPAKOTE ER) 250 mg ER tablet Take 1 Tab by mouth nightly. Qty: 90 Tab, Refills: 2    Associated Diagnoses: Dementia associated with alcoholism without behavioral disturbance (HCC)      tamsulosin (FLOMAX) 0.4 mg capsule Take 1 Cap by mouth nightly.   Qty: 90 Cap, Refills: 2    Associated Diagnoses: Benign non-nodular prostatic hyperplasia without lower urinary tract symptoms         STOP taking these medications       gabapentin (NEURONTIN) 300 mg capsule Comments:   Reason for Stopping:               Activity: PT/OT Eval and Treat  Diet: pureed 2 honey consistency with nutritional supplement during lunch and dinner. Wound Care: None needed    Follow-up  · Follow up with physician at [facility]. · Follow up with GI as scheduled.       Time spent to discharge patient 35 minutes  Signed:  Lyssa Molina MD  1/23/2018  7:48 AM

## 2018-01-23 NOTE — PROGRESS NOTES
Pt BS checked at 0258 and it was 84. Pt given second \"magic cup\" ice cream this shift. BS was rechecked at 0322 and it was 101. Will continue to monitor at this time.

## 2018-01-23 NOTE — PROGRESS NOTES
Call from Joanie Castano at [employer]. Still no pre cert. Joanie Castano reports that [facility] report they are behind and that they will have an answer tomorrow by 12 noon. Lexis Quintana

## 2018-01-24 VITALS
DIASTOLIC BLOOD PRESSURE: 61 MMHG | RESPIRATION RATE: 16 BRPM | HEIGHT: 68 IN | SYSTOLIC BLOOD PRESSURE: 142 MMHG | TEMPERATURE: 98.4 F | HEART RATE: 74 BPM | BODY MASS INDEX: 20.88 KG/M2 | OXYGEN SATURATION: 91 % | WEIGHT: 137.79 LBS

## 2018-01-24 LAB
GLUCOSE BLD STRIP.AUTO-MCNC: 103 MG/DL (ref 65–100)
GLUCOSE BLD STRIP.AUTO-MCNC: 135 MG/DL (ref 65–100)
GLUCOSE BLD STRIP.AUTO-MCNC: 72 MG/DL (ref 65–100)
GLUCOSE BLD STRIP.AUTO-MCNC: 77 MG/DL (ref 65–100)
GLUCOSE BLD STRIP.AUTO-MCNC: 78 MG/DL (ref 65–100)

## 2018-01-24 PROCEDURE — 74011250637 HC RX REV CODE- 250/637: Performed by: INTERNAL MEDICINE

## 2018-01-24 PROCEDURE — 82962 GLUCOSE BLOOD TEST: CPT

## 2018-01-24 RX ORDER — DIVALPROEX SODIUM 250 MG/1
250 TABLET, EXTENDED RELEASE ORAL
Qty: 90 TAB | Refills: 2 | Status: SHIPPED | OUTPATIENT
Start: 2018-01-24 | End: 2018-02-23

## 2018-01-24 RX ADMIN — LEVETIRACETAM 500 MG: 500 TABLET ORAL at 09:19

## 2018-01-24 RX ADMIN — LEVOTHYROXINE SODIUM 100 MCG: 100 TABLET ORAL at 05:42

## 2018-01-24 RX ADMIN — Medication 10 ML: at 05:43

## 2018-01-24 RX ADMIN — DIVALPROEX SODIUM 125 MG: 125 CAPSULE, COATED PELLETS ORAL at 00:36

## 2018-01-24 RX ADMIN — FOLIC ACID 1 MG: 1 TABLET ORAL at 09:19

## 2018-01-24 RX ADMIN — AMLODIPINE BESYLATE 10 MG: 10 TABLET ORAL at 09:19

## 2018-01-24 RX ADMIN — FERROUS SULFATE TAB 325 MG (65 MG ELEMENTAL FE) 325 MG: 325 (65 FE) TAB at 09:19

## 2018-01-24 NOTE — DISCHARGE SUMMARY
Hospitalist Discharge Summary     Patient ID:  Arlette Tom  [id number]  89 y.o.  1945  Admit date: 1/12/2018  5:24 PM  Discharge date and time: 1/24/2018  Attending: Mattie Biggs MD  PCP:  Roma Spurling, MD  Treatment Team: Attending Provider: Mattie Biggs MD; Consulting Provider: Denilson Grant MD; Utilization Review: Carlos Enrique Page RN; Consulting Provider: Aditya Cunningham DO; Care Manager: LIVIA Beard    Principal Diagnosis    Acute blood loss anemia due to lower GI bleed   AVM (arteriovenous malformation) of small bowel, acquired with hemorrhage [facility])  Right lower [facility] hospital acquired pneumonia            Principal Problem:    AVM (arteriovenous malformation) of small bowel, acquired with hemorrhage ([facility] [address] (6/15/2014)    Active Problems:    HCAP (healthcare-associated pneumonia) (1/21/2018)      Acute blood loss anemia (1/13/2018)      GIB (gastrointestinal bleeding) (6/13/2014)      Hypoglycemia (1/13/2018)      Dementia (11/25/2016)      Seizure disorder ([facility] [address] (11/25/2016)      Overview: PETIT VS FOCAL. NO GRAND MAL            On Phenytoin for past 4-5 yrs, since intial dx ~2009      Hypernatremia (1/13/2018)      Microcytic hypochromic anemia (6/13/2014)      History of alcohol abuse (1/13/2018)      Overview: FORMERLY DRANK  1-2 12oz. Beers daily            NONE 2014      Acute encephalopathy (11/25/2016)      Acute on chronic renal failure (HCC) (1/13/2018)      Bradycardia (1/13/2018)      Moderate aortic stenosis (1/13/2018)      Uncontrolled type II diabetes mellitus ([facility] [address] (1/23/2018)             Hospital Course:  Please refer to the admission H&P for details of presentation.  In summary, the patient is 68 yo AAM, with a pmh of dementia, moderate aortic stenosis and prior gi bleeding due to AVMs, who presented 1/12 for weakness and hg of 5.5 with grossly occult positive stool.  Given a total of 3 units PRBCs with improvement in hg to 9.  On iv protonix BID.  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  Course complicated by multiple other issues:  Asymptomatic hypoglycemic episodes. He Is not a known diabetic and therefore not on insulin/oral diabetic agents. Insulinoma work up was unremarkable. His blood sugar levels improved after D5 iv and resuming diet. Hypernatremic to 157-this has resolved. Also with some acute on chronic renal failure, stable now. bradycardic events to high 30s/40s range with pvcs. No intervention needed per cardiology. Patient hypothermic after EGD, T93F post anesthesia/EGD, soheila hugger in place, eventually resolved. Spiked fever with positive CXR for RLL infiltrate. Started on vanc/maxipime-later changed to po levaquin, to complete 7 days of therapy. AMS resolved after gabapentin was discontinued. Pt is hemodynamically and medically stable to be transferred to [facility] today. Significant Diagnostic Studies:   CT head without contrast: 01/17/2018     History: AMS. Confusion     Impression:  1. Stable ventricular prominence. This could be secondary to centrally  predominant volume loss although normal pressure hydrocephalus can have similar  imaging features. 2. Moderately advanced chronic small vessel ischemic changes and remote  bilateral basal ganglia lacunar infarctions. Portable chest: 01/17/2018     History: fever, ams.     Comparison: 01/17/2018     Findings: A single view of the chest was obtained at 1650 hours.     The cardiac and mediastinal silhouette are normal in size and configuration. Bilateral interstitial and airspace opacities persist most notable at the left  lower lung zone. There has been mild progression at the right lung base. A small  left pleural effusion is not excluded, unchanged.     IMPRESSION  IMPRESSION: Bilateral interstitial and airspace opacities with slight worsening  at the right lung base.     Labs: Results:       Chemistry Recent Labs      01/23/18   0443  01/22/18   0502   GLU  93  71   NA  146*  144   K  3.8  4.7   CL  110*  109*   CO2  26  28   BUN  28*  24*   CREA  1.43  1.46   CA  8.7  8.7   AGAP  10  7      CBC w/Diff No results for input(s): WBC, RBC, HGB, HCT, PLT, GRANS, LYMPH, EOS, HGBEXT, HCTEXT, PLTEXT, HGBEXT, HCTEXT, PLTEXT in the last 72 hours. Cardiac Enzymes No results for input(s): CPK, CKND1, NADIA in the last 72 hours. No lab exists for component: CKRMB, TROIP   Coagulation No results for input(s): PTP, INR, APTT in the last 72 hours. No lab exists for component: INREXT, INREXT    Lipid Panel Lab Results   Component Value Date/Time    Cholesterol, total 161 09/27/2017 11:25 AM    Cholesterol (POC) 168 05/30/2014 10:00 AM    HDL Cholesterol 47 09/27/2017 11:25 AM    LDL, calculated 101 09/27/2017 11:25 AM    VLDL, calculated 13 09/27/2017 11:25 AM    Triglyceride 64 09/27/2017 11:25 AM    Triglycerides (POC) 46 05/30/2014 10:00 AM      BNP No results for input(s): BNPP in the last 72 hours. Liver Enzymes No results for input(s): TP, ALB, TBIL, AP, SGOT, GPT in the last 72 hours. No lab exists for component: DBIL   Thyroid Studies Lab Results   Component Value Date/Time    T4, Total 5.9 11/18/2016 12:12 PM    TSH 8.150 01/13/2018 05:50 PM            Discharge Exam:  Visit Vitals    /61 (BP 1 Location: Left arm, BP Patient Position: At rest)    Pulse 74    Temp 98.4 °F (36.9 °C)    Resp 16    Ht 5' 8\" (1.727 m)    Wt 62.5 kg (137 lb 12.6 oz)    SpO2 91%    BMI 20.95 kg/m2     General: alert, talking, eating, in no distress   Eyes; non icteric, EOMI  Neck; supple  CV: regular, no murmurs, no gallops   Pulm; course breath sounds, non labored, normal effort  Abd; soft, non tender, non distended  Neuro: no focal deficits, was able to walk and move all extremities.      Disposition: [facility]  Discharge Condition: stable  Patient Instructions:   Current Discharge Medication List      START taking these medications    Details   levoFLOXacin (LEVAQUIN) 750 mg tablet Take 1 Tab by mouth every fourty-eight (48) hours for 2 days. Qty: 1 Tab, Refills: 0         CONTINUE these medications which have NOT CHANGED    Details   levothyroxine (SYNTHROID) 100 mcg tablet Take 1 Tab by mouth Daily (before breakfast). Qty: 90 Tab, Refills: 1    Associated Diagnoses: Acquired hypothyroidism      levETIRAcetam (KEPPRA) 500 mg tablet Take 1 Tab by mouth two (2) times a day. Qty: 180 Tab, Refills: 1    Associated Diagnoses: Seizure disorder ([facility] [address]      pantoprazole (PROTONIX) 40 mg tablet Take 1 Tab by mouth two (2) times a day. Qty: 180 Tab, Refills: 2    Associated Diagnoses: Gastroesophageal reflux disease without esophagitis      folic acid (FOLVITE) 1 mg tablet Take 1 Tab by mouth daily. Qty: 90 Tab, Refills: 2      mirtazapine (REMERON) 15 mg tablet Take 1 Tab by mouth nightly. Qty: 90 Tab, Refills: 2    Associated Diagnoses: Primary insomnia      donepezil (ARICEPT) 10 mg tablet Take 1 Tab by mouth nightly. Qty: 90 Tab, Refills: 3    Associated Diagnoses: Dementia associated with alcoholism without behavioral disturbance (HCC)      amLODIPine (NORVASC) 10 mg tablet Take 1 Tab by mouth daily. Qty: 90 Tab, Refills: 3    Associated Diagnoses: Essential hypertension      losartan (COZAAR) 100 mg tablet Take 1 Tab by mouth daily. Qty: 90 Tab, Refills: 3    Associated Diagnoses: Essential hypertension      ferrous sulfate 325 mg (65 mg iron) tablet Take 1 Tab by mouth two (2) times daily (with meals). Qty: 60 Tab, Refills: 0      divalproex ER (DEPAKOTE ER) 250 mg ER tablet Take 1 Tab by mouth nightly. Qty: 90 Tab, Refills: 2    Associated Diagnoses: Dementia associated with alcoholism without behavioral disturbance (HCC)      tamsulosin (FLOMAX) 0.4 mg capsule Take 1 Cap by mouth nightly.   Qty: 90 Cap, Refills: 2    Associated Diagnoses: Benign non-nodular prostatic hyperplasia without lower urinary tract symptoms         STOP taking these medications       gabapentin (NEURONTIN) 300 mg capsule Comments:   Reason for Stopping:               Activity: PT/OT Eval and Treat  Diet: pureed 2 honey consistency with nutritional supplement during lunch and dinner. Wound Care: None needed    Follow-up  · Follow up with physician at [facility]. · Follow up with GI as scheduled. Discharge was delayed by 24 hours as patient didn't get pre certified until 8/71/23.       Time spent to discharge patient 35 minutes  Signed:  Gary Spicer MD  1/24/2018  7:48 AM

## 2018-01-24 NOTE — PROGRESS NOTES
Pt BS dropped to the 80's twice. First occurrence the pt received one magic cup. The second occurrence, the pt received honey thickened orange juice. His BS returned to 137 at 0355. Hourly rounds completed and continuing to monitor the patient. All needs were met and patient is stable. Pt denies pain at this time. Pt  Passed flatus, but no BM this shift. Will continue to monitor and report to oncoming nurse.

## 2018-01-24 NOTE — PROGRESS NOTES
Attempted to call report to [facility], spoke w/ admissions coordinator who is supposed to call floor back for report.

## 2018-01-24 NOTE — PROGRESS NOTES
Pre cert obtained. Facility asked for a two pm  so that they have time to turn a bed around. Packet ready. MD updated and will RN report number and call family. Gavin Sampson

## 2018-01-24 NOTE — PROGRESS NOTES
Report called to Delia Canales LPN at [facility]. SBAR discussed, opportunities for questions and clarification provided, verbalized understanding. Per HALEIGH Adams, voicemail has been left w/ family. Pt to DC w/ [facility] ambulance service at 1400.

## 2018-02-09 ENCOUNTER — HOME HEALTH ADMISSION (OUTPATIENT)
Dept: HOME HEALTH SERVICES | Facility: HOME HEALTH | Age: 73
End: 2018-02-09
Payer: MEDICARE

## 2018-02-17 ENCOUNTER — HOME CARE VISIT (OUTPATIENT)
Dept: SCHEDULING | Facility: HOME HEALTH | Age: 73
End: 2018-02-17
Payer: MEDICARE

## 2018-02-17 PROCEDURE — 3331090002 HH PPS REVENUE DEBIT

## 2018-02-17 PROCEDURE — G0299 HHS/HOSPICE OF RN EA 15 MIN: HCPCS

## 2018-02-17 PROCEDURE — 3331090001 HH PPS REVENUE CREDIT

## 2018-02-17 PROCEDURE — 400013 HH SOC

## 2018-02-18 PROCEDURE — 3331090002 HH PPS REVENUE DEBIT

## 2018-02-18 PROCEDURE — 3331090001 HH PPS REVENUE CREDIT

## 2018-02-19 VITALS
SYSTOLIC BLOOD PRESSURE: 130 MMHG | RESPIRATION RATE: 22 BRPM | OXYGEN SATURATION: 98 % | DIASTOLIC BLOOD PRESSURE: 80 MMHG | TEMPERATURE: 98.1 F | HEART RATE: 78 BPM

## 2018-02-19 PROCEDURE — 3331090001 HH PPS REVENUE CREDIT

## 2018-02-19 PROCEDURE — 3331090002 HH PPS REVENUE DEBIT

## 2018-02-20 PROCEDURE — 3331090002 HH PPS REVENUE DEBIT

## 2018-02-20 PROCEDURE — 3331090001 HH PPS REVENUE CREDIT

## 2018-02-21 ENCOUNTER — HOME CARE VISIT (OUTPATIENT)
Dept: SCHEDULING | Facility: HOME HEALTH | Age: 73
End: 2018-02-21
Payer: MEDICARE

## 2018-02-21 VITALS
HEART RATE: 67 BPM | DIASTOLIC BLOOD PRESSURE: 60 MMHG | OXYGEN SATURATION: 98 % | TEMPERATURE: 97 F | SYSTOLIC BLOOD PRESSURE: 130 MMHG | RESPIRATION RATE: 18 BRPM

## 2018-02-21 PROCEDURE — 3331090001 HH PPS REVENUE CREDIT

## 2018-02-21 PROCEDURE — 3331090002 HH PPS REVENUE DEBIT

## 2018-02-21 PROCEDURE — G0299 HHS/HOSPICE OF RN EA 15 MIN: HCPCS

## 2018-02-22 ENCOUNTER — HOME CARE VISIT (OUTPATIENT)
Dept: SCHEDULING | Facility: HOME HEALTH | Age: 73
End: 2018-02-22
Payer: MEDICARE

## 2018-02-22 VITALS
DIASTOLIC BLOOD PRESSURE: 74 MMHG | OXYGEN SATURATION: 94 % | RESPIRATION RATE: 16 BRPM | TEMPERATURE: 97.1 F | HEART RATE: 64 BPM | SYSTOLIC BLOOD PRESSURE: 124 MMHG

## 2018-02-22 PROCEDURE — 3331090002 HH PPS REVENUE DEBIT

## 2018-02-22 PROCEDURE — G0152 HHCP-SERV OF OT,EA 15 MIN: HCPCS

## 2018-02-22 PROCEDURE — 3331090001 HH PPS REVENUE CREDIT

## 2018-02-23 ENCOUNTER — HOME CARE VISIT (OUTPATIENT)
Dept: SCHEDULING | Facility: HOME HEALTH | Age: 73
End: 2018-02-23
Payer: MEDICARE

## 2018-02-23 PROCEDURE — 3331090001 HH PPS REVENUE CREDIT

## 2018-02-23 PROCEDURE — 3331090002 HH PPS REVENUE DEBIT

## 2018-02-24 PROCEDURE — 3331090001 HH PPS REVENUE CREDIT

## 2018-02-24 PROCEDURE — 3331090002 HH PPS REVENUE DEBIT

## 2018-02-25 PROCEDURE — 3331090002 HH PPS REVENUE DEBIT

## 2018-02-25 PROCEDURE — 3331090001 HH PPS REVENUE CREDIT

## 2018-02-26 ENCOUNTER — HOME CARE VISIT (OUTPATIENT)
Dept: HOME HEALTH SERVICES | Facility: HOME HEALTH | Age: 73
End: 2018-02-26
Payer: MEDICARE

## 2018-02-26 PROCEDURE — 3331090002 HH PPS REVENUE DEBIT

## 2018-02-26 PROCEDURE — 3331090001 HH PPS REVENUE CREDIT

## 2018-02-27 ENCOUNTER — HOME CARE VISIT (OUTPATIENT)
Dept: SCHEDULING | Facility: HOME HEALTH | Age: 73
End: 2018-02-27
Payer: MEDICARE

## 2018-02-27 VITALS
OXYGEN SATURATION: 98 % | HEART RATE: 73 BPM | RESPIRATION RATE: 20 BRPM | DIASTOLIC BLOOD PRESSURE: 60 MMHG | SYSTOLIC BLOOD PRESSURE: 115 MMHG | TEMPERATURE: 97.1 F

## 2018-02-27 VITALS
SYSTOLIC BLOOD PRESSURE: 110 MMHG | HEART RATE: 78 BPM | RESPIRATION RATE: 17 BRPM | DIASTOLIC BLOOD PRESSURE: 70 MMHG | OXYGEN SATURATION: 99 %

## 2018-02-27 PROCEDURE — G0299 HHS/HOSPICE OF RN EA 15 MIN: HCPCS

## 2018-02-27 PROCEDURE — 3331090002 HH PPS REVENUE DEBIT

## 2018-02-27 PROCEDURE — 3331090001 HH PPS REVENUE CREDIT

## 2018-02-27 PROCEDURE — G0151 HHCP-SERV OF PT,EA 15 MIN: HCPCS

## 2018-02-28 ENCOUNTER — PATIENT OUTREACH (OUTPATIENT)
Dept: CASE MANAGEMENT | Age: 73
End: 2018-02-28

## 2018-02-28 PROCEDURE — 3331090001 HH PPS REVENUE CREDIT

## 2018-02-28 PROCEDURE — 3331090002 HH PPS REVENUE DEBIT

## 2018-03-01 ENCOUNTER — PATIENT OUTREACH (OUTPATIENT)
Dept: CASE MANAGEMENT | Age: 73
End: 2018-03-01

## 2018-03-01 ENCOUNTER — TELEPHONE (OUTPATIENT)
Dept: CASE MANAGEMENT | Age: 73
End: 2018-03-01

## 2018-03-01 PROCEDURE — 3331090001 HH PPS REVENUE CREDIT

## 2018-03-01 PROCEDURE — 3331090002 HH PPS REVENUE DEBIT

## 2018-03-01 NOTE — PROGRESS NOTES
Downtime progress note entry for [facility] :  Valentine Victoria RN      Care  Follow up Outreach Note   Outreach type:  Patient name:  :  MRN:  H: 4th  call  Joshua Obrien  1945    H [id number]   Date/Time of Outreach: 18 @ 1:10pm     Reason for follow-up:   Gastrointestinal bleed   Disease specific complaints/issues: None at this time       Patient progress towards goals set from last contact:   2018 @ 1:10pm- Called to check on patient. Spoke with wife Adam Virk. She states that patient is doing well, no follow up appointment has been made. Explained the importance of following up with PCP , wife states that her daughter Marine Mercado was  suppose  to make the appointment. Called and left daughter Marine Mercado a voicemail with my contact information. Has patient attended any PCP or specialist follow-up appointments since last contact? What was outcome of appointment? When is next follow-up scheduled? No, did not follow up with PCP appointment. Waiting on fanny Mercado to return call     Review medications. Any medication changes since last outreach? Does patient have any questions or issues related to their medications? No med changes since last visit. Home health active? If yes - any issue? Progress? Home PT   Referrals needed?  (SW, Diabetes education, HH, etc. ) None   Other issues/Miscellaneous? (Transportation, access to meals, ability to perform ADLs, adequate caregiver support, etc.)   Wife transports for all appointments           Next Outreach Scheduled: Week of 3/4/18     Next Steps/Goals:   Make follow up appointment with PCP.     Care  completing call: Valentine Victoria RN

## 2018-03-01 NOTE — PROGRESS NOTES
Downtime progress note entry for [facility] :  Valentine Victoria RN      Care  Follow up Outreach Note   Outreach type:  Patient name:  :  MRN:  H: 4th call  Saima Ely  1941  E [id number]  Camryn Del Toro [id number]   Date/Time of Outreach: 18 @ 11:44am     Reason for follow-up:   Pneumonia  18 @ 11:44am- Called to check on patient. Wife Peter Alcaraz answered. Stated patient was doing okay. No fevers, no shortness of breath. Has completed antibiotic therapy. On his way to radiation therapy for his bladder cancer. Has follow up appointment with Dr. Ramiro Rivera on 3/6/18. Disease specific complaints/issues: None at this time       Patient progress towards goals set from last contact:   Completed antibiotic therapy. Has patient attended any PCP or specialist follow-up appointments since last contact? What was outcome of appointment? When is next follow-up scheduled? Dr. Ramiro Rivera on 3/6/18   Review medications. Any medication changes since last outreach? Does patient have any questions or issues related to their medications? Taking medications as prescribed. No questions     Home health active? If yes  any issue? Progress? No   Referrals needed?  (SW, Diabetes education, HH, etc.) None   Other issues/Miscellaneous? (Transportation, access to meals, ability to perform ADLs, adequate caregiver support, etc.) Wife  transports to appointments     Next Outreach Scheduled: Week of 3/4/18     Next Steps/Goals:   Continue taking medications as prescribed. Call PCP for any problems or concerns.     Care  completing call: Valentine Victoria RN

## 2018-03-02 PROCEDURE — 3331090001 HH PPS REVENUE CREDIT

## 2018-03-02 PROCEDURE — 3331090002 HH PPS REVENUE DEBIT

## 2018-03-03 PROCEDURE — 3331090001 HH PPS REVENUE CREDIT

## 2018-03-03 PROCEDURE — 3331090002 HH PPS REVENUE DEBIT

## 2018-03-04 PROCEDURE — 3331090002 HH PPS REVENUE DEBIT

## 2018-03-04 PROCEDURE — 3331090001 HH PPS REVENUE CREDIT

## 2018-03-05 PROCEDURE — 3331090001 HH PPS REVENUE CREDIT

## 2018-03-05 PROCEDURE — 3331090002 HH PPS REVENUE DEBIT

## 2018-03-06 ENCOUNTER — PATIENT OUTREACH (OUTPATIENT)
Dept: CASE MANAGEMENT | Age: 73
End: 2018-03-06

## 2018-03-06 ENCOUNTER — HOME CARE VISIT (OUTPATIENT)
Dept: SCHEDULING | Facility: HOME HEALTH | Age: 73
End: 2018-03-06
Payer: MEDICARE

## 2018-03-06 VITALS
RESPIRATION RATE: 18 BRPM | SYSTOLIC BLOOD PRESSURE: 120 MMHG | TEMPERATURE: 96 F | HEART RATE: 99 BPM | DIASTOLIC BLOOD PRESSURE: 81 MMHG

## 2018-03-06 PROCEDURE — G0299 HHS/HOSPICE OF RN EA 15 MIN: HCPCS

## 2018-03-06 PROCEDURE — 3331090001 HH PPS REVENUE CREDIT

## 2018-03-06 PROCEDURE — 3331090002 HH PPS REVENUE DEBIT

## 2018-03-07 PROCEDURE — 3331090001 HH PPS REVENUE CREDIT

## 2018-03-07 PROCEDURE — 3331090002 HH PPS REVENUE DEBIT

## 2018-03-07 NOTE — PROGRESS NOTES
Downtime progress note entry for [facility] :  Pamella Terry RN        Care  Follow up Outreach Note   Outreach type:  Patient name:  :  MRN:  H: 5th call  Harvinder Guzman  1945  E [id number]  Andres Mercedes [id number]   Date/Time of Outreach: 3/6/18     Reason for follow-up:   Gastrointestinal bleed   Disease specific complaints/issues: None at this time       Patient progress towards goals set from last contact:   3/6/18 @ 11:47am- Wife Paul Nielson states patient is doing well. Daughter Mervin Man makes all appointments. Numerous voicemails left for Mervin Man to return calls. This will be last CC call. Has patient attended any PCP or specialist follow-up appointments since last contact? What was outcome of appointment? When is next follow-up scheduled? No, did not follow up with PCP appointment. Waiting on fanny Man to return call     Review medications. Any medication changes since last outreach? Does patient have any questions or issues related to their medications? No med changes since last visit. Home health active? If yes - any issue? Progress? Home PT   Referrals needed?  (SW, Diabetes education, HH, etc. ) None   Other issues/Miscellaneous? (Transportation, access to meals, ability to perform ADLs, adequate caregiver support, etc.)   Wife transports for all appointments           Next Outreach Scheduled:      Next Steps/Goals:   Make follow up appointment with PCP.     Care  completing call: Pamella Terry RN

## 2018-03-08 PROCEDURE — 3331090001 HH PPS REVENUE CREDIT

## 2018-03-08 PROCEDURE — 3331090002 HH PPS REVENUE DEBIT

## 2018-03-09 PROCEDURE — 3331090001 HH PPS REVENUE CREDIT

## 2018-03-09 PROCEDURE — 3331090002 HH PPS REVENUE DEBIT

## 2018-03-10 PROCEDURE — 3331090001 HH PPS REVENUE CREDIT

## 2018-03-10 PROCEDURE — 3331090002 HH PPS REVENUE DEBIT

## 2018-03-11 PROCEDURE — 3331090001 HH PPS REVENUE CREDIT

## 2018-03-11 PROCEDURE — 3331090002 HH PPS REVENUE DEBIT

## 2018-03-12 PROCEDURE — 3331090001 HH PPS REVENUE CREDIT

## 2018-03-12 PROCEDURE — 3331090002 HH PPS REVENUE DEBIT

## 2018-03-13 ENCOUNTER — HOME CARE VISIT (OUTPATIENT)
Dept: SCHEDULING | Facility: HOME HEALTH | Age: 73
End: 2018-03-13
Payer: MEDICARE

## 2018-03-13 VITALS
HEART RATE: 50 BPM | OXYGEN SATURATION: 96 % | DIASTOLIC BLOOD PRESSURE: 81 MMHG | TEMPERATURE: 97.4 F | SYSTOLIC BLOOD PRESSURE: 139 MMHG | RESPIRATION RATE: 16 BRPM

## 2018-03-13 PROCEDURE — 3331090001 HH PPS REVENUE CREDIT

## 2018-03-13 PROCEDURE — G0299 HHS/HOSPICE OF RN EA 15 MIN: HCPCS

## 2018-03-13 PROCEDURE — 3331090002 HH PPS REVENUE DEBIT

## 2018-03-14 PROCEDURE — 3331090002 HH PPS REVENUE DEBIT

## 2018-03-14 PROCEDURE — 3331090001 HH PPS REVENUE CREDIT

## 2018-03-15 PROCEDURE — 3331090002 HH PPS REVENUE DEBIT

## 2018-03-15 PROCEDURE — 3331090001 HH PPS REVENUE CREDIT

## 2018-03-16 PROCEDURE — 3331090002 HH PPS REVENUE DEBIT

## 2018-03-16 PROCEDURE — 3331090001 HH PPS REVENUE CREDIT

## 2018-03-17 PROCEDURE — 3331090002 HH PPS REVENUE DEBIT

## 2018-03-17 PROCEDURE — 3331090001 HH PPS REVENUE CREDIT

## 2018-03-18 PROCEDURE — 3331090002 HH PPS REVENUE DEBIT

## 2018-03-18 PROCEDURE — 3331090001 HH PPS REVENUE CREDIT

## 2018-03-19 PROCEDURE — 3331090001 HH PPS REVENUE CREDIT

## 2018-03-19 PROCEDURE — 3331090002 HH PPS REVENUE DEBIT

## 2018-03-20 ENCOUNTER — HOSPITAL ENCOUNTER (INPATIENT)
Age: 73
LOS: 4 days | Discharge: HOSPICE | DRG: 682 | End: 2018-03-24
Attending: EMERGENCY MEDICINE | Admitting: FAMILY MEDICINE
Payer: MEDICARE

## 2018-03-20 ENCOUNTER — HOME CARE VISIT (OUTPATIENT)
Dept: HOME HEALTH SERVICES | Facility: HOME HEALTH | Age: 73
End: 2018-03-20
Payer: MEDICARE

## 2018-03-20 ENCOUNTER — APPOINTMENT (OUTPATIENT)
Dept: GENERAL RADIOLOGY | Age: 73
DRG: 682 | End: 2018-03-20
Attending: EMERGENCY MEDICINE
Payer: MEDICARE

## 2018-03-20 DIAGNOSIS — R41.82 ALTERED MENTAL STATUS, UNSPECIFIED ALTERED MENTAL STATUS TYPE: Primary | ICD-10-CM

## 2018-03-20 DIAGNOSIS — N17.9 ACUTE KIDNEY INJURY (HCC): ICD-10-CM

## 2018-03-20 DIAGNOSIS — G93.40 ENCEPHALOPATHY ACUTE: ICD-10-CM

## 2018-03-20 DIAGNOSIS — L89.150 DECUBITUS ULCER OF SACRAL REGION, UNSTAGEABLE (HCC): ICD-10-CM

## 2018-03-20 DIAGNOSIS — E87.0 HYPERNATREMIA: ICD-10-CM

## 2018-03-20 DIAGNOSIS — E87.5 ACUTE HYPERKALEMIA: ICD-10-CM

## 2018-03-20 PROBLEM — S31.809A WOUND OF BUTTOCK: Status: ACTIVE | Noted: 2018-03-20

## 2018-03-20 PROBLEM — J18.9 PNA (PNEUMONIA): Status: ACTIVE | Noted: 2018-03-20

## 2018-03-20 PROBLEM — K27.9 PEPTIC ULCER: Status: ACTIVE | Noted: 2018-03-20

## 2018-03-20 LAB
ALBUMIN SERPL-MCNC: 3 G/DL (ref 3.2–4.6)
ALBUMIN/GLOB SERPL: 0.5 {RATIO} (ref 1.2–3.5)
ALP SERPL-CCNC: 180 U/L (ref 50–136)
ALT SERPL-CCNC: 54 U/L (ref 12–65)
ANION GAP SERPL CALC-SCNC: 10 MMOL/L (ref 7–16)
ANION GAP SERPL CALC-SCNC: 10 MMOL/L (ref 7–16)
AST SERPL-CCNC: 75 U/L (ref 15–37)
ATRIAL RATE: 70 BPM
BASOPHILS # BLD: 0 K/UL (ref 0–0.2)
BASOPHILS NFR BLD: 1 % (ref 0–2)
BILIRUB SERPL-MCNC: 0.6 MG/DL (ref 0.2–1.1)
BUN SERPL-MCNC: 78 MG/DL (ref 8–23)
BUN SERPL-MCNC: 80 MG/DL (ref 8–23)
CALCIUM SERPL-MCNC: 8.9 MG/DL (ref 8.3–10.4)
CALCIUM SERPL-MCNC: 9.2 MG/DL (ref 8.3–10.4)
CALCULATED P AXIS, ECG09: 83 DEGREES
CALCULATED R AXIS, ECG10: 86 DEGREES
CALCULATED T AXIS, ECG11: 86 DEGREES
CHLORIDE SERPL-SCNC: 137 MMOL/L (ref 98–107)
CHLORIDE SERPL-SCNC: 137 MMOL/L (ref 98–107)
CO2 SERPL-SCNC: 18 MMOL/L (ref 21–32)
CO2 SERPL-SCNC: 19 MMOL/L (ref 21–32)
CREAT SERPL-MCNC: 3.38 MG/DL (ref 0.8–1.5)
CREAT SERPL-MCNC: 3.45 MG/DL (ref 0.8–1.5)
DIAGNOSIS, 93000: NORMAL
DIFFERENTIAL METHOD BLD: ABNORMAL
EOSINOPHIL # BLD: 0.1 K/UL (ref 0–0.8)
EOSINOPHIL NFR BLD: 2 % (ref 0.5–7.8)
ERYTHROCYTE [DISTWIDTH] IN BLOOD BY AUTOMATED COUNT: 23.2 % (ref 11.9–14.6)
GLOBULIN SER CALC-MCNC: 6.4 G/DL (ref 2.3–3.5)
GLUCOSE BLD STRIP.AUTO-MCNC: 70 MG/DL (ref 65–100)
GLUCOSE BLD STRIP.AUTO-MCNC: 97 MG/DL (ref 65–100)
GLUCOSE SERPL-MCNC: 102 MG/DL (ref 65–100)
GLUCOSE SERPL-MCNC: 85 MG/DL (ref 65–100)
HCT VFR BLD AUTO: 34.8 % (ref 41.1–50.3)
HGB BLD-MCNC: 10.6 G/DL (ref 13.6–17.2)
IMM GRANULOCYTES # BLD: 0 K/UL (ref 0–0.5)
IMM GRANULOCYTES NFR BLD AUTO: 0 % (ref 0–5)
LACTATE BLD-SCNC: 0.9 MMOL/L (ref 0.5–1.9)
LACTATE BLD-SCNC: 2.5 MMOL/L (ref 0.5–1.9)
LYMPHOCYTES # BLD: 1 K/UL (ref 0.5–4.6)
LYMPHOCYTES NFR BLD: 23 % (ref 13–44)
MCH RBC QN AUTO: 25.4 PG (ref 26.1–32.9)
MCHC RBC AUTO-ENTMCNC: 30.5 G/DL (ref 31.4–35)
MCV RBC AUTO: 83.5 FL (ref 79.6–97.8)
MONOCYTES # BLD: 0.4 K/UL (ref 0.1–1.3)
MONOCYTES NFR BLD: 10 % (ref 4–12)
NEUTS SEG # BLD: 2.7 K/UL (ref 1.7–8.2)
NEUTS SEG NFR BLD: 64 % (ref 43–78)
P-R INTERVAL, ECG05: 164 MS
PLATELET # BLD AUTO: 264 K/UL (ref 150–450)
PLATELET COMMENTS,PCOM: ADEQUATE
PMV BLD AUTO: 11.1 FL (ref 10.8–14.1)
POTASSIUM SERPL-SCNC: 5.1 MMOL/L (ref 3.5–5.1)
POTASSIUM SERPL-SCNC: 6.2 MMOL/L (ref 3.5–5.1)
POTASSIUM SERPL-SCNC: 6.7 MMOL/L (ref 3.5–5.1)
PROCALCITONIN SERPL-MCNC: 0.7 NG/ML
PROT SERPL-MCNC: 9.4 G/DL (ref 6.3–8.2)
Q-T INTERVAL, ECG07: 410 MS
QRS DURATION, ECG06: 84 MS
QTC CALCULATION (BEZET), ECG08: 442 MS
RBC # BLD AUTO: 4.17 M/UL (ref 4.23–5.67)
RBC MORPH BLD: ABNORMAL
SODIUM SERPL-SCNC: 165 MMOL/L (ref 136–145)
SODIUM SERPL-SCNC: 166 MMOL/L (ref 136–145)
SODIUM UR-SCNC: 55 MMOL/L
VENTRICULAR RATE, ECG03: 70 BPM
WBC # BLD AUTO: 4.2 K/UL (ref 4.3–11.1)
WBC MORPH BLD: ABNORMAL

## 2018-03-20 PROCEDURE — 74011000250 HC RX REV CODE- 250: Performed by: EMERGENCY MEDICINE

## 2018-03-20 PROCEDURE — 87086 URINE CULTURE/COLONY COUNT: CPT | Performed by: EMERGENCY MEDICINE

## 2018-03-20 PROCEDURE — 74011250636 HC RX REV CODE- 250/636: Performed by: FAMILY MEDICINE

## 2018-03-20 PROCEDURE — 74011636637 HC RX REV CODE- 636/637: Performed by: EMERGENCY MEDICINE

## 2018-03-20 PROCEDURE — 71045 X-RAY EXAM CHEST 1 VIEW: CPT

## 2018-03-20 PROCEDURE — 85025 COMPLETE CBC W/AUTO DIFF WBC: CPT | Performed by: EMERGENCY MEDICINE

## 2018-03-20 PROCEDURE — 99284 EMERGENCY DEPT VISIT MOD MDM: CPT | Performed by: EMERGENCY MEDICINE

## 2018-03-20 PROCEDURE — 80053 COMPREHEN METABOLIC PANEL: CPT | Performed by: EMERGENCY MEDICINE

## 2018-03-20 PROCEDURE — 36415 COLL VENOUS BLD VENIPUNCTURE: CPT | Performed by: FAMILY MEDICINE

## 2018-03-20 PROCEDURE — 65270000029 HC RM PRIVATE

## 2018-03-20 PROCEDURE — 84132 ASSAY OF SERUM POTASSIUM: CPT | Performed by: INTERNAL MEDICINE

## 2018-03-20 PROCEDURE — 96375 TX/PRO/DX INJ NEW DRUG ADDON: CPT | Performed by: EMERGENCY MEDICINE

## 2018-03-20 PROCEDURE — 3331090001 HH PPS REVENUE CREDIT

## 2018-03-20 PROCEDURE — 74011250637 HC RX REV CODE- 250/637: Performed by: FAMILY MEDICINE

## 2018-03-20 PROCEDURE — 84145 PROCALCITONIN (PCT): CPT | Performed by: EMERGENCY MEDICINE

## 2018-03-20 PROCEDURE — 83605 ASSAY OF LACTIC ACID: CPT

## 2018-03-20 PROCEDURE — 3331090002 HH PPS REVENUE DEBIT

## 2018-03-20 PROCEDURE — 96365 THER/PROPH/DIAG IV INF INIT: CPT | Performed by: EMERGENCY MEDICINE

## 2018-03-20 PROCEDURE — 81003 URINALYSIS AUTO W/O SCOPE: CPT | Performed by: EMERGENCY MEDICINE

## 2018-03-20 PROCEDURE — 84300 ASSAY OF URINE SODIUM: CPT | Performed by: FAMILY MEDICINE

## 2018-03-20 PROCEDURE — 74011250637 HC RX REV CODE- 250/637: Performed by: EMERGENCY MEDICINE

## 2018-03-20 PROCEDURE — 74011000258 HC RX REV CODE- 258: Performed by: FAMILY MEDICINE

## 2018-03-20 PROCEDURE — 82962 GLUCOSE BLOOD TEST: CPT

## 2018-03-20 PROCEDURE — 93005 ELECTROCARDIOGRAM TRACING: CPT | Performed by: FAMILY MEDICINE

## 2018-03-20 PROCEDURE — 87040 BLOOD CULTURE FOR BACTERIA: CPT | Performed by: EMERGENCY MEDICINE

## 2018-03-20 PROCEDURE — 74011000258 HC RX REV CODE- 258: Performed by: EMERGENCY MEDICINE

## 2018-03-20 PROCEDURE — 74011250636 HC RX REV CODE- 250/636: Performed by: EMERGENCY MEDICINE

## 2018-03-20 PROCEDURE — 80048 BASIC METABOLIC PNL TOTAL CA: CPT | Performed by: FAMILY MEDICINE

## 2018-03-20 RX ORDER — PANTOPRAZOLE SODIUM 40 MG/1
40 TABLET, DELAYED RELEASE ORAL 2 TIMES DAILY
Status: DISCONTINUED | OUTPATIENT
Start: 2018-03-20 | End: 2018-03-24 | Stop reason: HOSPADM

## 2018-03-20 RX ORDER — LEVETIRACETAM 250 MG/1
500 TABLET ORAL 2 TIMES DAILY
Status: DISCONTINUED | OUTPATIENT
Start: 2018-03-20 | End: 2018-03-21

## 2018-03-20 RX ORDER — SODIUM CHLORIDE 0.9 % (FLUSH) 0.9 %
5-10 SYRINGE (ML) INJECTION AS NEEDED
Status: DISCONTINUED | OUTPATIENT
Start: 2018-03-20 | End: 2018-03-24 | Stop reason: HOSPADM

## 2018-03-20 RX ORDER — FOLIC ACID 1 MG/1
1 TABLET ORAL DAILY
Status: DISCONTINUED | OUTPATIENT
Start: 2018-03-21 | End: 2018-03-24 | Stop reason: HOSPADM

## 2018-03-20 RX ORDER — FACIAL-BODY WIPES
10 EACH TOPICAL DAILY PRN
Status: DISCONTINUED | OUTPATIENT
Start: 2018-03-20 | End: 2018-03-24 | Stop reason: HOSPADM

## 2018-03-20 RX ORDER — INSULIN LISPRO 100 [IU]/ML
INJECTION, SOLUTION INTRAVENOUS; SUBCUTANEOUS
Status: DISCONTINUED | OUTPATIENT
Start: 2018-03-20 | End: 2018-03-24 | Stop reason: HOSPADM

## 2018-03-20 RX ORDER — DONEPEZIL HYDROCHLORIDE 5 MG/1
10 TABLET, FILM COATED ORAL
Status: DISCONTINUED | OUTPATIENT
Start: 2018-03-20 | End: 2018-03-24 | Stop reason: HOSPADM

## 2018-03-20 RX ORDER — LEVOTHYROXINE SODIUM 100 UG/1
100 TABLET ORAL
Status: DISCONTINUED | OUTPATIENT
Start: 2018-03-21 | End: 2018-03-24 | Stop reason: HOSPADM

## 2018-03-20 RX ORDER — LANOLIN ALCOHOL/MO/W.PET/CERES
325 CREAM (GRAM) TOPICAL 2 TIMES DAILY WITH MEALS
Status: DISCONTINUED | OUTPATIENT
Start: 2018-03-20 | End: 2018-03-24 | Stop reason: HOSPADM

## 2018-03-20 RX ORDER — DEXTROSE 50 % IN WATER (D50W) INTRAVENOUS SYRINGE
25
Status: COMPLETED | OUTPATIENT
Start: 2018-03-20 | End: 2018-03-20

## 2018-03-20 RX ORDER — DEXTROSE MONOHYDRATE 50 MG/ML
150 INJECTION, SOLUTION INTRAVENOUS CONTINUOUS
Status: DISCONTINUED | OUTPATIENT
Start: 2018-03-20 | End: 2018-03-24 | Stop reason: HOSPADM

## 2018-03-20 RX ORDER — HEPARIN SODIUM 5000 [USP'U]/ML
5000 INJECTION, SOLUTION INTRAVENOUS; SUBCUTANEOUS EVERY 8 HOURS
Status: DISCONTINUED | OUTPATIENT
Start: 2018-03-20 | End: 2018-03-24 | Stop reason: HOSPADM

## 2018-03-20 RX ORDER — SODIUM CHLORIDE 0.9 % (FLUSH) 0.9 %
5-10 SYRINGE (ML) INJECTION EVERY 8 HOURS
Status: DISCONTINUED | OUTPATIENT
Start: 2018-03-20 | End: 2018-03-24 | Stop reason: HOSPADM

## 2018-03-20 RX ORDER — DIVALPROEX SODIUM 250 MG/1
250 TABLET, DELAYED RELEASE ORAL
COMMUNITY

## 2018-03-20 RX ORDER — ONDANSETRON 2 MG/ML
4 INJECTION INTRAMUSCULAR; INTRAVENOUS
Status: DISCONTINUED | OUTPATIENT
Start: 2018-03-20 | End: 2018-03-24 | Stop reason: HOSPADM

## 2018-03-20 RX ORDER — TAMSULOSIN HYDROCHLORIDE 0.4 MG/1
0.4 CAPSULE ORAL
Status: DISCONTINUED | OUTPATIENT
Start: 2018-03-20 | End: 2018-03-24 | Stop reason: HOSPADM

## 2018-03-20 RX ORDER — LIDOCAINE HYDROCHLORIDE 20 MG/ML
JELLY TOPICAL ONCE
Status: ACTIVE | OUTPATIENT
Start: 2018-03-20 | End: 2018-03-21

## 2018-03-20 RX ORDER — ACETAMINOPHEN 325 MG/1
650 TABLET ORAL
Status: DISCONTINUED | OUTPATIENT
Start: 2018-03-20 | End: 2018-03-24 | Stop reason: HOSPADM

## 2018-03-20 RX ORDER — SODIUM CHLORIDE 9 MG/ML
20 INJECTION, SOLUTION INTRAVENOUS
Status: DISCONTINUED | OUTPATIENT
Start: 2018-03-20 | End: 2018-03-20

## 2018-03-20 RX ORDER — SODIUM CHLORIDE 9 MG/ML
10 INJECTION, SOLUTION INTRAVENOUS
Status: COMPLETED | OUTPATIENT
Start: 2018-03-20 | End: 2018-03-20

## 2018-03-20 RX ORDER — SODIUM POLYSTYRENE SULFONATE 15 G/60ML
15 SUSPENSION ORAL; RECTAL
Status: COMPLETED | OUTPATIENT
Start: 2018-03-20 | End: 2018-03-20

## 2018-03-20 RX ORDER — VANCOMYCIN HYDROCHLORIDE
1250
Status: COMPLETED | OUTPATIENT
Start: 2018-03-20 | End: 2018-03-20

## 2018-03-20 RX ADMIN — SODIUM CHLORIDE 621 ML: 900 INJECTION, SOLUTION INTRAVENOUS at 14:01

## 2018-03-20 RX ADMIN — CALCIUM GLUCONATE 1 G: 94 INJECTION, SOLUTION INTRAVENOUS at 14:50

## 2018-03-20 RX ADMIN — PANTOPRAZOLE SODIUM 40 MG: 40 TABLET, DELAYED RELEASE ORAL at 18:48

## 2018-03-20 RX ADMIN — FERROUS SULFATE TAB 325 MG (65 MG ELEMENTAL FE) 325 MG: 325 (65 FE) TAB at 21:07

## 2018-03-20 RX ADMIN — SODIUM POLYSTYRENE SULFONATE 15 G: 15 SUSPENSION ORAL; RECTAL at 14:02

## 2018-03-20 RX ADMIN — DEXTROSE MONOHYDRATE 100 ML/HR: 5 INJECTION, SOLUTION INTRAVENOUS at 16:24

## 2018-03-20 RX ADMIN — CEFTRIAXONE SODIUM 1 G: 1 INJECTION, POWDER, FOR SOLUTION INTRAMUSCULAR; INTRAVENOUS at 14:02

## 2018-03-20 RX ADMIN — HEPARIN SODIUM 5000 UNITS: 5000 INJECTION, SOLUTION INTRAVENOUS; SUBCUTANEOUS at 18:48

## 2018-03-20 RX ADMIN — LEVETIRACETAM 500 MG: 500 TABLET ORAL at 18:48

## 2018-03-20 RX ADMIN — PIPERACILLIN SODIUM,TAZOBACTAM SODIUM 4.5 G: 4; .5 INJECTION, POWDER, FOR SOLUTION INTRAVENOUS at 21:05

## 2018-03-20 RX ADMIN — DEXTROSE MONOHYDRATE 12.5 G: 25 INJECTION, SOLUTION INTRAVENOUS at 14:01

## 2018-03-20 RX ADMIN — Medication 10 ML: at 21:18

## 2018-03-20 RX ADMIN — VANCOMYCIN HYDROCHLORIDE 1250 MG: 10 INJECTION, POWDER, LYOPHILIZED, FOR SOLUTION INTRAVENOUS at 21:11

## 2018-03-20 RX ADMIN — INSULIN HUMAN 10 UNITS: 100 INJECTION, SOLUTION PARENTERAL at 14:01

## 2018-03-20 RX ADMIN — DONEPEZIL HYDROCHLORIDE 10 MG: 5 TABLET, FILM COATED ORAL at 21:07

## 2018-03-20 NOTE — H&P
Hospitalist H&P Note     Admit Date:  3/20/2018 12:12 PM   Name:  Polina Ferrer   Age:  67 y.o.  :  1945   MRN:  [id number]   PCP:  Kg Holloway MD  Treatment Team: Attending Provider: Eliana Alexis MD; Primary Nurse: Anuj Candelaria  C/o generalized weakness,poor apetite  HPI:   History from daughter at bedside. Pt cannot give any history at this point. Pt base line according to daughter- can talk few words but cannot make a conversation --secondary to dementia. 67 yr old male pt with dementia,copd,htn--according to daughter recently discharged from rehab. Had gi bleed in January. Pt had not been eating since Saturday evening,not talking,not able to move. Nurse practioner came home and saw the pt - felt that pt might have aspiration--?drooling. No fever or cough though. Pt according to daughter was even more weaker today hence decide to bring to er for further evaluation. Daughter thinks that its a recurring cycle with frequent admissions with similar problems- pertaining to pt not eating. Wants to have hospice involved and eventually once his electrolytes get better- wants pt to be discharged to hospice. According to er note pt was hypotensive at home,bp improved on ivf. Pt has k-6.7,na 165,creat 3.45. Hypothermic- 95.3f  Has wounds rt buttock/hip region. According to daughter pt is more awake in er,trying to talk--er nurse said he spoke to her- few words-- able to tolerate medications in er    Hospice nurse was talking to daughter in er      Cannot do complete ROS secondary to dementia/encephalopathy    Past Medical History:   Diagnosis Date    Alcohol abuse     QUIT IN .  LONG USE    Anemia     GI BLEED AND CHRONIC, BASELINE 9.  16:  HGB: 7.6    Angiodysplasia of stomach 2016    and duodenum    Bladder incontinence 2014    ALSO BOWEL    Chronic airway obstruction, not elsewhere classified 2014    CKD (chronic kidney disease)     COPD [facility]ch[facility] obstructive [facility] disease) ([facility]     Dementia 5/28/2014    Hyperlipidemia 5/28/2014    Hypernatremia 11/25/2016 11/25/16:  177, 11/18/16:  166    Hypertension     Hypothyroid     Seizure ([facility] 2010    NO GRAND MAL. ON DILANTIN THE ENTIRE TIME    Tobacco abuse 5/28/2014    Tobacco abuse     ENTIRE ADULT LIFE. QUIT IN 2014, SNEAKS WHEN POSSIBLE      Past Surgical History:   Procedure Laterality Date    HX COLONOSCOPY  APPROX 2005    UNKNOWN EGD    HX ENDOSCOPY  2016    bicap and clip      No Known Allergies   Social History   Substance Use Topics    Smoking status: Former Smoker     Packs/day: 0.50     Years: 50.00     Types: Cigarettes    Smokeless tobacco: Never Used      Comment: QUIT IN 2014, SNEAKS WHEN POSSIBLE    Alcohol use No      Comment: HEAVILY IN PAST YEARS, NONE SINCE 2014      History reviewed. No pertinent family history. Immunization History   Administered Date(s) Administered    Influenza Vaccine 10/12/2015    Influenza Vaccine (Quad) Mdck Pf 09/27/2017    Influenza Vaccine (Quad) PF 11/18/2016    Pneumococcal Conjugate (PCV-13) 10/11/2017    Pneumococcal Polysaccharide (PPSV-23) 05/31/2016    TB Skin Test (PPD) Intradermal 11/25/2016, 12/05/2016, 04/19/2017, 09/29/2017, 01/12/2018     PTA Medications:  Prior to Admission Medications   Prescriptions Last Dose Informant Patient Reported? Taking? amLODIPine (NORVASC) 10 mg tablet   No No   Sig: Take 1 Tab by mouth daily. donepezil (ARICEPT) 10 mg tablet   No No   Sig: Take 1 Tab by mouth nightly. ferrous sulfate 325 mg (65 mg iron) tablet   No No   Sig: Take 1 Tab by mouth two (2) times daily (with meals). folic acid (FOLVITE) 1 mg tablet   No No   Sig: Take 1 Tab by mouth daily. gabapentin (NEURONTIN) 300 mg capsule   Yes No   Sig: Take 300 mg by mouth three (3) times daily. levETIRAcetam (KEPPRA) 500 mg tablet   No No   Sig: Take 1 Tab by mouth two (2) times a day.    levothyroxine (SYNTHROID) 100 mcg tablet   No No   Sig: Take 1 Tab by mouth Daily (before breakfast). losartan (COZAAR) 100 mg tablet   No No   Sig: Take 1 Tab by mouth daily. mirtazapine (REMERON) 15 mg tablet   No No   Sig: Take 1 Tab by mouth nightly. pantoprazole (PROTONIX) 40 mg tablet   No No   Sig: Take 1 Tab by mouth two (2) times a day. tamsulosin (FLOMAX) 0.4 mg capsule   No No   Sig: Take 1 Cap by mouth nightly. Facility-Administered Medications: None       Objective:   Patient Vitals for the past 24 hrs:   Temp Pulse Resp BP SpO2   03/20/18 1328 95.3 °F (35.2 °C) - - - -   03/20/18 1221 - 69 18 113/59 100 %     Oxygen Therapy  O2 Sat (%): 100 % (03/20/18 1221)  O2 Device: Room air (03/20/18 1221)  No intake or output data in the 24 hours ending 03/20/18 1449    Physical Exam:  General:    Awake,trying to verbalise when daughter was talking to him,able to tolerate  Po medications in er.presently has bear hugger  Eyes:   Normal sclera. Extraocular movements intact. ENT:  Normocephalic, atraumatic.  dry mucous membranes  CV:   RRR. No murmur, rub, or gallop. Lungs:  CTAB. No wheezing, rhonchi, or rales. Abdomen: Soft, nontender, nondistended. Bowel sounds normal.   Extremities: Warm and dry. No cyanosis or edema. Neurologic: Dementia,was awake but didn't follow verbal commands on my examination-- but was able to swallow medications when asked. Skin:     No rashes or jaundice. Small coin sized stage 2 wounds, rt buttock,hip. Psych:  Flat affect    I reviewed the labs, imaging, EKGs, telemetry, and other studies done this admission.   Data Review:   Recent Results (from the past 24 hour(s))   CBC WITH AUTOMATED DIFF    Collection Time: 03/20/18 12:56 PM   Result Value Ref Range    WBC 4.2 (L) 4.3 - 11.1 K/uL    RBC 4.17 (L) 4.23 - 5.67 M/uL    HGB 10.6 (L) 13.6 - 17.2 g/dL    HCT 34.8 (L) 41.1 - 50.3 %    MCV 83.5 79.6 - 97.8 FL    MCH 25.4 (L) 26.1 - 32.9 PG    MCHC 30.5 (L) 31.4 - 35.0 g/dL    RDW 23.2 (H) 11.9 - 14.6 %    PLATELET 226 992 - 126 K/uL    MPV 11.1 10.8 - 14.1 FL    NEUTROPHILS 64 43 - 78 %    LYMPHOCYTES 23 13 - 44 %    MONOCYTES 10 4.0 - 12.0 %    EOSINOPHILS 2 0.5 - 7.8 %    BASOPHILS 1 0.0 - 2.0 %    IMMATURE GRANULOCYTES 0 0.0 - 5.0 %    ABS. NEUTROPHILS 2.7 1.7 - 8.2 K/UL    ABS. LYMPHOCYTES 1.0 0.5 - 4.6 K/UL    ABS. MONOCYTES 0.4 0.1 - 1.3 K/UL    ABS. EOSINOPHILS 0.1 0.0 - 0.8 K/UL    ABS. BASOPHILS 0.0 0.0 - 0.2 K/UL    ABS. IMM. GRANS. 0.0 0.0 - 0.5 K/UL    RBC COMMENTS MODERATE  ANISOCYTOSIS + POIKILOCYTOSIS        RBC COMMENTS SLIGHT  POLYCHROMASIA        RBC COMMENTS SLIGHT  HYPOCHROMIA        RBC COMMENTS OCCASIONAL  TARGET CELLS        WBC COMMENTS Result Confirmed By Smear      PLATELET COMMENTS ADEQUATE      DF AUTOMATED     PROCALCITONIN    Collection Time: 03/20/18 12:56 PM   Result Value Ref Range    Procalcitonin 0.7 ng/mL   METABOLIC PANEL, COMPREHENSIVE    Collection Time: 03/20/18 12:56 PM   Result Value Ref Range    Sodium 165 (HH) 136 - 145 mmol/L    Potassium 6.7 (HH) 3.5 - 5.1 mmol/L    Chloride 137 (H) 98 - 107 mmol/L    CO2 18 (L) 21 - 32 mmol/L    Anion gap 10 7 - 16 mmol/L    Glucose 85 65 - 100 mg/dL    BUN 80 (H) 8 - 23 MG/DL    Creatinine 3.45 (H) 0.8 - 1.5 MG/DL    GFR est AA 23 (L) >60 ml/min/1.73m2    GFR est non-AA 19 (L) >60 ml/min/1.73m2    Calcium 9.2 8.3 - 10.4 MG/DL    Bilirubin, total 0.6 0.2 - 1.1 MG/DL    ALT (SGPT) 54 12 - 65 U/L    AST (SGOT) 75 (H) 15 - 37 U/L    Alk.  phosphatase 180 (H) 50 - 136 U/L    Protein, total 9.4 (H) 6.3 - 8.2 g/dL    Albumin 3.0 (L) 3.2 - 4.6 g/dL    Globulin 6.4 (H) 2.3 - 3.5 g/dL    A-G Ratio 0.5 (L) 1.2 - 3.5     POC LACTIC ACID    Collection Time: 03/20/18  1:01 PM   Result Value Ref Range    Lactic Acid (POC) 2.5 (H) 0.5 - 1.9 mmol/L       All Micro Results     Procedure Component Value Units Date/Time    CULTURE, URINE [[id number]] Collected:  03/20/18 1334    Order Status:  Completed Specimen:  Urine from Cath Urine Updated: 03/20/18 1415    CULTURE, BLOOD [[id number]] Collected:  03/20/18 1343    Order Status:  Completed Specimen:  Blood Updated:  03/20/18 1407    CULTURE, BLOOD [[id number]] Collected:  03/20/18 1334    Order Status:  Completed Specimen:  Blood Updated:  03/20/18 1352          Other Studies:  No results found. Assessment and Plan:     Hospital Problems as of 3/20/2018  Date Reviewed: 1/21/2018          Codes Class Noted - Resolved POA    Hyperkalemia ICD-10-CM: E87.5  ICD-9-CM: 276.7  3/20/2018 - Present Yes        Peptic ulcer ICD-10-CM: K27.9  ICD-9-CM: 533.90  3/20/2018 - Present Yes        Wound of buttock ICD-10-CM: Z36.779S  ICD-9-CM: 877.0  3/20/2018 - Present Yes        Hypernatremia ICD-10-CM: E87.0  ICD-9-CM: 276.0  1/13/2018 - Present Yes        Moderate aortic stenosis ICD-10-CM: I35.0  ICD-9-CM: 424.1  1/13/2018 - Present Yes        Hypothermia ICD-10-CM: T68. Dauna Maroon  ICD-9-CM: 991.6  4/16/2017 - Present Yes        * (Principal)Acute metabolic encephalopathy CXA-00-[place]: G93.41  ICD-9-CM: 348.31  12/4/2016 - Present Yes        Dementia (Chronic) ICD-10-CM: F03.90  ICD-9-CM: 294.20  11/25/2016 - Present Yes        Seizure disorder ([address] (Chronic) ICD-10-CM: G40.909  ICD-9-CM: 345.90  11/25/2016 - Present Yes    Overview Addendum 11/25/2016  4:08 PM by Armando Elmore NP     PETIT VS FOCAL.  NO GRAND MAL    On Phenytoin for past 4-5 yrs, since intial dx ~2009             Debility ICD-10-CM: R53.81  ICD-9-CM: 799.3  11/25/2016 - Present Yes        Failure to thrive in adult ICD-10-CM: R62.7  ICD-9-CM: 783.7  11/25/2016 - Present Yes        Acquired hypothyroidism ICD-10-CM: E03.9  ICD-9-CM: 244.9  5/17/2016 - Present Yes        DEVORA (acute kidney injury) ([facility] ICD-10-CM: N17.9  ICD-9-CM: 584.9  6/13/2014 - Present Yes        COPD (chronic obstructive pulmonary disease) with emphysema (HCC) (Chronic) ICD-10-CM: J43.9  ICD-9-CM: 492.8  5/30/2014 - Present Yes              PLAN:  Failure to thrive-- nutrition consult  Sever dementia-metabolic encephalopathy  davon- cont D5W-- held Neurontin,losartan. Hyperkalemia-Calcium gluconate,kayexalate,dextrose,insulin-- repeat bmp at 1900  Moderated dehydration- cont ivf  Hypothermia- cont bear hugger  htn- low at home- held bp meds  Seizure disorder  Hypothyroidism  Rt buttock/hip wounds  Peptic ulcer - cont protonix    Hospice consult ordered. Daughter at this point pts medical problems treated before being discharged to hospice. DVT ppx:  heparin  Anticipated DC needs:    Code status:  Full  Estimated LOS:  Greater than 2 midnights  Risk:  high    Signed:  Aldair Persaud MD     Chest xray showed rt infiltrate-- will start on mina spectrum antibiotics.

## 2018-03-20 NOTE — ED NOTES
Pt was not given ferrous sulfate, per Nabila in the pharmacy this med has not been verified and will not be sent to ER. They will adjust the time when it is verified.

## 2018-03-20 NOTE — PROGRESS NOTES
Spoke with Dalia Panda ([phone]) at [facility]. Per Dalia Panda, they will discuss more with daughter and evaluate patient for home hospice when patient discharges home. Per Dalia Panda / CM needs to call when patient discharging home / and if family still on board to have patient evaluated for home hospice, please have discharging physician do a home hospice order.

## 2018-03-20 NOTE — ED NOTES
Pt to ed via ems/stretcher from home with no appetite and failure to thrive since Saturday - history of frequent UTI's - foul urine - pt wears brief at home - initial bp 84/54 - Bp now 113/59 - per ems pt is at his baseline

## 2018-03-20 NOTE — PROGRESS NOTES
TRANSFER - IN REPORT:    Verbal report received from [facility] on Sirisha Do  being received from ED for routine progression of care      Report consisted of patients Situation, Background, Assessment and   Recommendations(SBAR). Information from the following report(s) SBAR, Kardex, ED Summary and Procedure Summary was reviewed with the receiving nurse. Opportunity for questions and clarification was provided. Assessment will be completed upon patients arrival to unit and care assumed.

## 2018-03-20 NOTE — ED PROVIDER NOTES
HPI Comments: 49-year-old male brought in the emergency department by EMS    Patient with decreased appetite. Decreased mental status    Has some dementia at baseline lives with his family    Was hypotensive at home 84/54. Given saline by EMS with improvement here    Unable to obtain any history from the patient    Patient is a 67 y.o. male presenting with weakness. Extremity Weakness           Past Medical History:   Diagnosis Date    Alcohol abuse     QUIT IN 2014. LONG USE    Anemia     GI BLEED AND CHRONIC, BASELINE 9.  11/25/16:  HGB: 7.6    Angiodysplasia of stomach 2016    and duodenum    Bladder incontinence 05/28/2014    ALSO BOWEL    Chronic airway obstruction, not elsewhere classified 5/28/2014    CKD (chronic kidney disease)     [facility]     Dementia 5/28/2014    Hyperlipidemia 5/28/2014    Hypernatremia 11/25/2016 11/25/16:  177, 11/18/16:  80    Hypertension     Hypothyroid     Seizure ([facility] 2010    NO GRAND MAL. ON DILANTIN THE ENTIRE TIME    Tobacco abuse 5/28/2014    Tobacco abuse     ENTIRE ADULT LIFE. QUIT IN 2014, SNEAKS WHEN POSSIBLE       Past Surgical History:   Procedure Laterality Date    HX COLONOSCOPY  APPROX 2005    UNKNOWN EGD    HX ENDOSCOPY  2016    bicap and clip         History reviewed. No pertinent family history. Social History     Social History    Marital status:      Spouse name: N/A    Number of children: N/A    Years of education: N/A     Occupational History    Not on file.      Social History Main Topics    Smoking status: Former Smoker     Packs/day: 0.50     Years: 50.00     Types: Cigarettes    Smokeless tobacco: Never Used      Comment: QUIT IN 2014, SNEAKS WHEN POSSIBLE    Alcohol use No      Comment: HEAVILY IN PAST YEARS, NONE SINCE 2014    Drug use: No    Sexual activity: Not on file     Other Topics Concern    Not on file     Social History Narrative    11/25/16:  PATIENT IS , LIVES WITH WIFE AND ISABEL ROSA. DAUGHTER IS ALSO NEARBY. ALLERGIES: Review of patient's allergies indicates no known allergies. Review of Systems   Unable to perform ROS: Dementia   Neurological: Positive for weakness. Vitals:    03/20/18 1221   BP: 113/59   Pulse: 69   Resp: 18   SpO2: 100%   Weight: 62.1 kg (137 lb)   Height: 5' 8\" (1.727 m)            Physical Exam   Constitutional:   Elderly ill-appearing dehydrated male   HENT:   Head: Normocephalic and atraumatic. Mouth dry   Eyes: EOM are normal. Pupils are equal, round, and reactive to light. Cardiovascular: Normal rate and regular rhythm. Pulmonary/Chest: Breath sounds normal. No respiratory distress. He has no wheezes. Abdominal: Soft. He exhibits no distension. There is no tenderness. Musculoskeletal: He exhibits no edema or deformity. Neurological:   Patient's eyes are open. He does not answer questions or follow commands   Skin:   Poor turgor   Psychiatric:   Unable to assess   Nursing note and vitals reviewed. MDM  Number of Diagnoses or Management Options  Diagnosis management comments: 77-year-old male with several days of decreased appetite. Altered mental status    Nursing staff reports foul-smelling urine    Patient was initially hypotensive better after fluids    He meets SIRS criteria I suspected infection and he was hypotensive which puts him in septic shock  He's been given 500 cc by EMS he was given a 10 per KG bolus on arrival we'll give him another 20 per KG over the next several hours    Initial lactate is 2.5. We'll repeat his lactate @ 3:00  Blood and urine cultures obtained  Antibiotics given    Patient's sodium markedly elevated. Creatinine elevated. Potassium elevated at 6.7 with mild hemolysis    Continue IV fluids. D50 and insulin    Consult the hospitalist for admission.            Amount and/or Complexity of Data Reviewed  Clinical lab tests: reviewed          ED Course Procedures

## 2018-03-20 NOTE — IP AVS SNAPSHOT
Loyda Figueroa 
 
 
 [address] 
[phone] Patient: Arlette Tom MRN: [id number] :1945 About your hospitalization You were admitted on:  2018 You last received care in the:  [facility] 6 MED SURG You were discharged on:  2018 Why you were hospitalized Your primary diagnosis was:  Hypernatremia Your diagnoses also included:  Dementia, Celso (Acute Kidney Injury) (Hcc), Hyperkalemia, Peptic Ulcer, Wound Of Buttock, Acute Metabolic Encephalopathy, Copd (Chronic Obstructive Pulmonary Disease) With Emphysema (Hcc), Debility, Seizure Disorder (Hcc), Moderate Aortic Stenosis, Hypothermia, Acquired Hypothyroidism, Failure To Thrive In Adult, Pna (Pneumonia), Decubitus Ulcer Of Sacral Region, Unstageable (Hcc) Follow-up Information Follow up With Details Comments Contact [address] ([phone]) Roma Spurling, MD   [place] [address] 
[phone] Your Scheduled Appointments 2018  2:30 AM EDT ROUTINE with Cecily Roque RN  
[address] [place] [address] ([address] [place] [address]) [address] [place] [address] ([address] [place] [address]) 2018 To Be Determined SKILLLED NURSING DISCHARGE with Cecily Roque RN  
[address] [place] [address] ([address] [place] [address]) [address] [place] [address] ([address] [place] [address]) Discharge Orders None A check mack indicates which time of day the medication should be taken. My Medications START taking these medications Instructions Each Dose to Equal  
 Morning Noon Evening Bedtime  
 morphine 100 mg/5 mL (20 mg/mL) concentrated solution Commonly known as:  Judy Mims Notes to Patient:  Take on as needed schedule Take 0.25 mL by mouth every two (2) hours as needed for Pain (Shortness of breath). Max Daily Amount: 60 mg. Indications: Hospice care 5 mg CONTINUE taking these medications Instructions Each Dose to Equal  
 Morning Noon Evening Bedtime  
 divalproex  mg tablet Commonly known as:  DEPAKOTE Take 250 mg by mouth nightly. 250 mg  
    
   
   
   
  
  
 levETIRAcetam 500 mg tablet Commonly known as:  KEPPRA Take 1 Tab by mouth two (2) times a day. 500 mg  
    
  
   
   
  
   
  
  
STOP taking these medications   
 amLODIPine 10 mg tablet Commonly known as:  NORVASC  
   
  
 donepezil 10 mg tablet Commonly known as:  ARICEPT  
   
  
 ferrous sulfate 325 mg (65 mg iron) tablet  
   
  
 folic acid 1 mg tablet Commonly known as:  FOLVITE  
   
  
 gabapentin 300 mg capsule Commonly known as:  NEURONTIN  
   
  
 levothyroxine 100 mcg tablet Commonly known as:  SYNTHROID  
   
  
 losartan 100 mg tablet Commonly known as:  COZAAR  
   
  
 mirtazapine 15 mg tablet Commonly known as:  REMERON  
   
  
 pantoprazole 40 mg tablet Commonly known as:  PROTONIX  
   
  
 tamsulosin 0.4 mg capsule Commonly known as:  FLOMAX Where to Get Your Medications Information on where to get these meds will be given to you by the nurse or doctor. ! Ask your nurse or doctor about these medications  
  morphine 100 mg/5 mL (20 mg/mL) concentrated solution Discharge Instructions DISCHARGE SUMMARY from Nurse PATIENT INSTRUCTIONS: 
 
After general anesthesia or intravenous sedation, for 24 hours or while taking prescription Narcotics: · Limit your activities · Do not drive and operate hazardous machinery · Do not make important personal or business decisions · Do  not drink alcoholic beverages · If you have not urinated within 8 hours after discharge, please contact your surgeon on call. Report the following to your surgeon: 
· Excessive pain, swelling, redness or odor of or around the surgical area · Temperature over 100.5 · Nausea and vomiting lasting longer than 4 hours or if unable to take medications · Any signs of decreased circulation or nerve impairment to extremity: change in color, persistent  numbness, tingling, coldness or increase pain · Any questions What to do at Home: 
Recommended activity: Activity as tolerated, as patient tolerates If you experience any of the following symptoms per Hospice staff, please follow up with per Hospice MD. 
 
*  Please give a list of your current medications to your Primary Care Provider. *  Please update this list whenever your medications are discontinued, doses are 
    changed, or new medications (including over-the-counter products) are added. *  Please carry medication information at all times in case of emergency situations. These are general instructions for a healthy lifestyle: No smoking/ No tobacco products/ Avoid exposure to second hand smoke Surgeon General's Warning:  Quitting smoking now greatly reduces serious risk to your health. Obesity, smoking, and sedentary lifestyle greatly increases your risk for illness A healthy diet, regular physical exercise & weight monitoring are important for maintaining a healthy lifestyle You may be retaining fluid if you have a history of heart failure or if you experience any of the following symptoms:  Weight gain of 3 pounds or more overnight or 5 pounds in a week, increased swelling in our hands or feet or shortness of breath while lying flat in bed. Please call your doctor as soon as you notice any of these symptoms; do not wait until your next office visit. Recognize signs and symptoms of STROKE: 
 
F-face looks uneven A-arms unable to move or move unevenly S-speech slurred or non-existent T-time-call 911 as soon as signs and symptoms begin-DO NOT go  
 Back to bed or wait to see if you get better-TIME IS BRAIN. Warning Signs of HEART ATTACK Call 911 if you have these symptoms: 
? Chest discomfort. Most heart attacks involve discomfort in the center of the chest that lasts more than a few minutes, or that goes away and comes back. It can feel like uncomfortable pressure, squeezing, fullness, or pain. ? Discomfort in other areas of the upper body. Symptoms can include pain or discomfort in one or both arms, the back, neck, jaw, or stomach. ? Shortness of breath with or without chest discomfort. ? Other signs may include breaking out in a cold sweat, nausea, or lightheadedness. Don't wait more than five minutes to call [address]! Fast action can save your life. Calling 911 is almost always the fastest way to get lifesaving treatment. Emergency Medical Services staff can begin treatment when they arrive  up to an hour sooner than if someone gets to the hospital by car. The discharge information has been reviewed with the caregiver. The caregiver verbalized understanding. Discharge medications reviewed with the caregiver and appropriate educational materials and side effects teaching were provided. ___________________________________________________________________________________________________________________________________ Learning About Hospice and Palliative Care What are hospice and palliative care? Palliative (say \"PAL-ade-uh-tiv\") care is an area of medicine that helps give you more good days by providing care for quality-of-life issues. It includes treating symptoms like pain, nausea, or sleep problems. It can also include helping you and your loved ones to: · Understand your illness better. · Talk more openly about your feelings. · Decide what treatments you want or don't want. · Communicate better with your doctors, nurses, and each other. Hospice care is a type of palliative care. But it's for people who are near the end of life. What kinds of care are involved? Palliative care: This treatment helps you feel better physically, emotionally, and spiritually while doctors also treat your illness. Your care may include pain relief, counseling, or nutrition advice. Hospice care: Again, the goal of this type of care is to help you feel better. And it can help you get the most out of the time you have left. But you no longer get treatment to try to cure your illness. When does care happen? Palliative care: This care can happen at any time during a serious illness. You don't have to be near death to get this care. Hospice care: In most cases, you can choose hospice care when your doctor believes that you have no more than about 6 months to live. Where does the care happen? Palliative care: This care often happens in hospitals or long-term care facilities like nursing homes. It can take place wherever you are treated, even in your home. Hospice care: Most hospice care is done in the place the patient calls \"home. \" This is often the person's home. But it could also be a place like a nursing home or long term center. Hospice care may also be given in hospice centers, hospitals, and other places. Who provides the care? Palliative care: There are doctors and nurses who specialize in this field. But your own doctor may also give some of this care. And there are many other experts who may help you. These include social workers, counselors, therapists, and nutrition experts. Hospice care: In hospitals, hospice centers, and other facilities, care is given by doctors, nurses, and others who are trained in hospice care. In the home, a family member is often the main caregiver. But the family member gets help from care experts. They are on call 24 hours a day. Where can you learn more? Go to http://malgorzata-violeta.info/. Enter 454 [id number] in the search box to learn more about \"Learning About Hospice and Palliative Care. \" Current as of: September 24, 2016 Content Version: 11.4 © 3586-5444 [employer]. Care instructions adapted under license by [employer] (which disclaims liability or warranty for this information). If you have questions about a medical condition or this instruction, always ask your healthcare professional. [place] [address] any warranty or liability for your use of this information. [facility] Announcement We are excited to announce that we are making your provider's discharge notes available to you in [facility]. You will see these notes when they are completed and signed by the physician that discharged you from your recent hospital stay. If you have any questions or concerns about any information you see in [facility], please call the [facility] Information Department where you were seen or reach out to your Primary Care Provider for more information about your plan of care. Unresulted Labs-Please follow up with your PCP about these lab tests Order Current Status CULTURE, BLOOD Preliminary result CULTURE, BLOOD Preliminary result Providers Seen During Your Hospitalization Provider Specialty Primary office phone Denice Gonzalez MD Emergency Medicine [phone] Kushal Rose MD Internal Medicine [phone] Immunizations Administered for This Admission Name Date  
 TB Skin Test (PPD) Intradermal  Deferred (), 3/21/2018 Your Primary Care Physician (PCP) Primary Care Physician Office Phone Office Fax 645 Viviana Neal 224 [phone] You are allergic to the following No active allergies Recent Documentation Height Weight BMI Smoking Status 1.727 m 61 kg 20.44 kg/m2 Former Smoker Emergency Contacts Name Discharge Info Relation Home Work Mobile Julaine Cough  Child [2] [phone] Juvencio Hernandez [22] [phone] Patient Belongings The following personal items are in your possession at time of discharge: 
                             
 
  
  
 Please provide this summary of care documentation to your next provider. Signatures-by signing, you are acknowledging that this After Visit Summary has been reviewed with you and you have received a copy. Patient Signature:  ____________________________________________________________ Date:  ____________________________________________________________  
  
Fernanda Must Provider Signature:  ____________________________________________________________ Date:  ____________________________________________________________

## 2018-03-20 NOTE — PROGRESS NOTES
Per patient's daughter Abundio Valadez [phone]), patient lives at home with her mother and brother. Patient uses a walker. Patient is not on any Home O2. Patient sees Dr. Tylor Meléndez (PCP) and [facility]. Per daughter, patient recently out of SNF [facility])(after a recent admit and discharge from [facility] 1-12-18 to 1-24-18). Patient uses [facility] for medications.

## 2018-03-20 NOTE — PROGRESS NOTES
Patient current with [facility] liaison) aware of impending admit.

## 2018-03-20 NOTE — PROGRESS NOTES
Back to room to discuss with patient's daughter Melecio Klinefelter) about possible home hospice service that she is considering transitioning patient to at discharge from hospital  / no answer  / left VM.

## 2018-03-20 NOTE — ROUTINE PROCESS
TRANSFER - OUT REPORT:    Verbal report given to Clive Huitron RN(name) on Caitlyn Silverado  being transferred to [facility](unit) for routine progression of care       Report consisted of patients Situation, Background, Assessment and   Recommendations(SBAR). Information from the following report(s) Kardex was reviewed with the receiving nurse. Opportunity for questions and clarification was provided.       Patient transported with:   [facility]

## 2018-03-21 PROBLEM — L89.150 DECUBITUS ULCER OF SACRAL REGION, UNSTAGEABLE (HCC): Status: ACTIVE | Noted: 2018-03-21

## 2018-03-21 LAB
ANION GAP SERPL CALC-SCNC: 12 MMOL/L (ref 7–16)
BASOPHILS # BLD: 0 K/UL (ref 0–0.2)
BASOPHILS NFR BLD: 1 % (ref 0–2)
BUN SERPL-MCNC: 69 MG/DL (ref 8–23)
CALCIUM SERPL-MCNC: 8.5 MG/DL (ref 8.3–10.4)
CHLORIDE SERPL-SCNC: 135 MMOL/L (ref 98–107)
CO2 SERPL-SCNC: 18 MMOL/L (ref 21–32)
CREAT SERPL-MCNC: 3.21 MG/DL (ref 0.8–1.5)
DIFFERENTIAL METHOD BLD: ABNORMAL
EOSINOPHIL # BLD: 0.1 K/UL (ref 0–0.8)
EOSINOPHIL NFR BLD: 2 % (ref 0.5–7.8)
ERYTHROCYTE [DISTWIDTH] IN BLOOD BY AUTOMATED COUNT: 22.8 % (ref 11.9–14.6)
GLUCOSE BLD STRIP.AUTO-MCNC: 123 MG/DL (ref 65–100)
GLUCOSE BLD STRIP.AUTO-MCNC: 74 MG/DL (ref 65–100)
GLUCOSE BLD STRIP.AUTO-MCNC: 84 MG/DL (ref 65–100)
GLUCOSE BLD STRIP.AUTO-MCNC: 87 MG/DL (ref 65–100)
GLUCOSE SERPL-MCNC: 121 MG/DL (ref 65–100)
HCT VFR BLD AUTO: 27.9 % (ref 41.1–50.3)
HGB BLD-MCNC: 8.4 G/DL (ref 13.6–17.2)
IMM GRANULOCYTES # BLD: 0 K/UL (ref 0–0.5)
IMM GRANULOCYTES NFR BLD AUTO: 0 % (ref 0–5)
LYMPHOCYTES # BLD: 1 K/UL (ref 0.5–4.6)
LYMPHOCYTES NFR BLD: 23 % (ref 13–44)
MCH RBC QN AUTO: 24.6 PG (ref 26.1–32.9)
MCHC RBC AUTO-ENTMCNC: 30.1 G/DL (ref 31.4–35)
MCV RBC AUTO: 81.8 FL (ref 79.6–97.8)
MONOCYTES # BLD: 0.3 K/UL (ref 0.1–1.3)
MONOCYTES NFR BLD: 8 % (ref 4–12)
NEUTS SEG # BLD: 2.9 K/UL (ref 1.7–8.2)
NEUTS SEG NFR BLD: 66 % (ref 43–78)
PLATELET # BLD AUTO: 255 K/UL (ref 150–450)
PMV BLD AUTO: 10.4 FL (ref 10.8–14.1)
POTASSIUM SERPL-SCNC: 5 MMOL/L (ref 3.5–5.1)
RBC # BLD AUTO: 3.41 M/UL (ref 4.23–5.67)
SODIUM SERPL-SCNC: 165 MMOL/L (ref 136–145)
VANCOMYCIN SERPL-MCNC: 12.2 UG/ML
WBC # BLD AUTO: 4.3 K/UL (ref 4.3–11.1)

## 2018-03-21 PROCEDURE — 3331090002 HH PPS REVENUE DEBIT

## 2018-03-21 PROCEDURE — 74011000258 HC RX REV CODE- 258: Performed by: INTERNAL MEDICINE

## 2018-03-21 PROCEDURE — 74011250636 HC RX REV CODE- 250/636: Performed by: FAMILY MEDICINE

## 2018-03-21 PROCEDURE — 74011000302 HC RX REV CODE- 302: Performed by: INTERNAL MEDICINE

## 2018-03-21 PROCEDURE — 86580 TB INTRADERMAL TEST: CPT | Performed by: INTERNAL MEDICINE

## 2018-03-21 PROCEDURE — 74011250636 HC RX REV CODE- 250/636: Performed by: INTERNAL MEDICINE

## 2018-03-21 PROCEDURE — 65270000029 HC RM PRIVATE

## 2018-03-21 PROCEDURE — 74011250637 HC RX REV CODE- 250/637: Performed by: FAMILY MEDICINE

## 2018-03-21 PROCEDURE — 82962 GLUCOSE BLOOD TEST: CPT

## 2018-03-21 PROCEDURE — 3331090001 HH PPS REVENUE CREDIT

## 2018-03-21 PROCEDURE — 74011250637 HC RX REV CODE- 250/637: Performed by: INTERNAL MEDICINE

## 2018-03-21 PROCEDURE — 80202 ASSAY OF VANCOMYCIN: CPT | Performed by: FAMILY MEDICINE

## 2018-03-21 PROCEDURE — 77030011256 HC DRSG MEPILEX <16IN NO BORD MOLN -A

## 2018-03-21 PROCEDURE — 80048 BASIC METABOLIC PNL TOTAL CA: CPT | Performed by: FAMILY MEDICINE

## 2018-03-21 PROCEDURE — 36415 COLL VENOUS BLD VENIPUNCTURE: CPT | Performed by: FAMILY MEDICINE

## 2018-03-21 PROCEDURE — 85025 COMPLETE CBC W/AUTO DIFF WBC: CPT | Performed by: FAMILY MEDICINE

## 2018-03-21 PROCEDURE — 97163 PT EVAL HIGH COMPLEX 45 MIN: CPT

## 2018-03-21 PROCEDURE — 92610 EVALUATE SWALLOWING FUNCTION: CPT

## 2018-03-21 PROCEDURE — 74011000258 HC RX REV CODE- 258: Performed by: FAMILY MEDICINE

## 2018-03-21 RX ORDER — AMLODIPINE BESYLATE 5 MG/1
5 TABLET ORAL DAILY
Status: DISCONTINUED | OUTPATIENT
Start: 2018-03-21 | End: 2018-03-24 | Stop reason: HOSPADM

## 2018-03-21 RX ORDER — MORPHINE SULFATE 2 MG/ML
2 INJECTION, SOLUTION INTRAMUSCULAR; INTRAVENOUS
Status: DISCONTINUED | OUTPATIENT
Start: 2018-03-21 | End: 2018-03-24 | Stop reason: HOSPADM

## 2018-03-21 RX ORDER — HYDRALAZINE HYDROCHLORIDE 20 MG/ML
10 INJECTION INTRAMUSCULAR; INTRAVENOUS
Status: DISCONTINUED | OUTPATIENT
Start: 2018-03-21 | End: 2018-03-24 | Stop reason: HOSPADM

## 2018-03-21 RX ORDER — NALOXONE HYDROCHLORIDE 0.4 MG/ML
0.4 INJECTION, SOLUTION INTRAMUSCULAR; INTRAVENOUS; SUBCUTANEOUS AS NEEDED
Status: DISCONTINUED | OUTPATIENT
Start: 2018-03-21 | End: 2018-03-24 | Stop reason: HOSPADM

## 2018-03-21 RX ADMIN — DEXTROSE MONOHYDRATE 150 ML/HR: 5 INJECTION, SOLUTION INTRAVENOUS at 13:59

## 2018-03-21 RX ADMIN — LEVETIRACETAM 500 MG: 500 TABLET ORAL at 09:40

## 2018-03-21 RX ADMIN — LEVOTHYROXINE SODIUM 100 MCG: 100 TABLET ORAL at 05:26

## 2018-03-21 RX ADMIN — MORPHINE SULFATE 2 MG: 2 INJECTION, SOLUTION INTRAMUSCULAR; INTRAVENOUS at 20:01

## 2018-03-21 RX ADMIN — AMLODIPINE BESYLATE 5 MG: 5 TABLET ORAL at 17:33

## 2018-03-21 RX ADMIN — FERROUS SULFATE TAB 325 MG (65 MG ELEMENTAL FE) 325 MG: 325 (65 FE) TAB at 09:40

## 2018-03-21 RX ADMIN — PANTOPRAZOLE SODIUM 40 MG: 40 TABLET, DELAYED RELEASE ORAL at 05:26

## 2018-03-21 RX ADMIN — SODIUM CHLORIDE 500 MG: 900 INJECTION, SOLUTION INTRAVENOUS at 21:17

## 2018-03-21 RX ADMIN — HEPARIN SODIUM 5000 UNITS: 5000 INJECTION, SOLUTION INTRAVENOUS; SUBCUTANEOUS at 00:48

## 2018-03-21 RX ADMIN — HEPARIN SODIUM 5000 UNITS: 5000 INJECTION, SOLUTION INTRAVENOUS; SUBCUTANEOUS at 09:40

## 2018-03-21 RX ADMIN — PIPERACILLIN SODIUM,TAZOBACTAM SODIUM 4.5 G: 4; .5 INJECTION, POWDER, FOR SOLUTION INTRAVENOUS at 09:41

## 2018-03-21 RX ADMIN — PANTOPRAZOLE SODIUM 40 MG: 40 TABLET, DELAYED RELEASE ORAL at 17:33

## 2018-03-21 RX ADMIN — FOLIC ACID 1 MG: 1 TABLET ORAL at 09:40

## 2018-03-21 RX ADMIN — DONEPEZIL HYDROCHLORIDE 10 MG: 5 TABLET, FILM COATED ORAL at 21:17

## 2018-03-21 RX ADMIN — Medication 10 ML: at 21:17

## 2018-03-21 RX ADMIN — TUBERCULIN PURIFIED PROTEIN DERIVATIVE 5 UNITS: 5 INJECTION, SOLUTION INTRADERMAL at 13:58

## 2018-03-21 RX ADMIN — Medication 10 ML: at 05:27

## 2018-03-21 RX ADMIN — HEPARIN SODIUM 5000 UNITS: 5000 INJECTION, SOLUTION INTRAVENOUS; SUBCUTANEOUS at 17:33

## 2018-03-21 RX ADMIN — PIPERACILLIN SODIUM,TAZOBACTAM SODIUM 4.5 G: 4; .5 INJECTION, POWDER, FOR SOLUTION INTRAVENOUS at 19:54

## 2018-03-21 RX ADMIN — FERROUS SULFATE TAB 325 MG (65 MG ELEMENTAL FE) 325 MG: 325 (65 FE) TAB at 17:32

## 2018-03-21 NOTE — PROGRESS NOTES
Hourly rounding completed on pt this shift, will report to night shift nurse. Pt w/ minimal command following/concentration. Difficult to stimulate/arouse throughout shift. Very drowsy. Difficult getting pt to take meds during med pass or eat. No sedatives adm, see MAR. Daughter at bedside this AM. Wife at bedside this evening, Dr. Kolton Rao made aware.

## 2018-03-21 NOTE — WOUND CARE
Patient is emaciated, skin and bones, contractures are forming at knees. Sacrum with butterfly shaped 4x4cm sDTI, present on admission, blisters have formed and area will continue to open. Recommend frequent turning, offloading and foam to sacrum and other at risk bony prominences. Will monitor.

## 2018-03-21 NOTE — PROGRESS NOTES
Problem: Mobility Impaired (Adult and Pediatric)  Goal: *Acute Goals and Plan of Care (Insert Text)  LTG:  (1.)Mr. Cruz Pires will follow 5/5 simple commands within 7 days for increased cognition during therapy. (2.)Mr. Cruz Pires will roll to left and right with MIN A within 7 days for increased bed mobility. (3.)Mr. Cruz Pires will perform supine to sit, sit to supine with MOD A within 7 days for safety with transfers. (4.)Mr. Cruz Pires will maintain static sitting balance at EOB x 5 min with CGA within 7 days for increased trunk control. (5.)Mr. Cruz Pires will perform sit to stand transfer with MAX A within 7 days. (6.)Mr. Cruz Pires will be OOB to chair with MAX A within 7 days for increased activity tolerance. Further goals to be added as needed as pt progresses. __________________________________________________________________________________________    PHYSICAL THERAPY: Initial Assessment, AM 3/21/2018  INPATIENT: Hospital Day: 2  Payor: Andrea Mary / Plan: [address] [place] [address] HMO / Product Type: [employer] /      NAME/AGE/GENDER: Lucy Puente is a 67 y.o. male   PRIMARY DIAGNOSIS: DEVORA (acute kidney injury) ([facility] [address]  Hyperkalemia  Hypernatremia  Peptic ulcer  Dementia  Wound of buttock Failure to thrive in adult Failure to thrive in adult        ICD-10: Treatment Diagnosis:   · Generalized Muscle Weakness (M62.81)  · Difficulty in walking, Not elsewhere classified (R26.2)  · Other abnormalities of gait and mobility (R26.89)   Precaution/Allergies:  Review of patient's allergies indicates no known allergies. ASSESSMENT:     Mr. Cruz Pires presents with decreased bed mobility, transfers, ambulation, balance, activity tolerance, strength and overall AROM B LE, and general functional mobility s/p hospital admission with DEVORA. Pt with history of dementia, ETOH and COPD. Pt moaning on arrival, on room air, leaning up against bed frame in fetal position on R side.  Pt essentially non-verbal this date, eyes open to name. Pt has no family present to verify prior level of function. Pt requires total assist for all bed mobility and supine to sit transfer. Pt unable to maintain static sitting balance EOB without total assist. Pt leaning to R, cervical rotation to right. PT able to perform PROM B LE into extension, pt grimacing with mobility of R LE; however, pt is able to fully extend and flex B LEs in supine. Pt was recently discharge from rehab per chart, family possibly considering hospice care? Pt also with sacral wounds and needs to limit sitting. Pt repositioned R sidelying with multiple pillow support. PT to follow for trial basis of 1 week, 2 x/ week to see if pt improving any and to discuss PLOF with family. Discharge needs pending progress. This section established at most recent assessment   PROBLEM LIST (Impairments causing functional limitations):  1. Decreased Strength  2. Decreased ADL/Functional Activities  3. Decreased Transfer Abilities  4. Decreased Ambulation Ability/Technique  5. Decreased Balance  6. Decreased Activity Tolerance  7. Increased Fatigue  8. Decreased Skin Integrity/Hygeine  9. Decreased Cannon with Home Exercise Program  10. Decreased Cognition   INTERVENTIONS PLANNED: (Benefits and precautions of physical therapy have been discussed with the patient.)  1. Balance Exercise  2. Bed Mobility  3. Family Education  4. Gait Training  5. Home Exercise Program (HEP)  6. Range of Motion (ROM)  7. Therapeutic Activites  8. Therapeutic Exercise/Strengthening  9. Transfer Training  10. Group Therapy     TREATMENT PLAN: Frequency/Duration: 2 times a week for trial of 1 week  Rehabilitation Potential For Stated Goals: Guarded     RECOMMENDED REHABILITATION/EQUIPMENT: (at time of discharge pending progress): Due to the probability of continued deficits (see above) this patient will possibly need continued skilled physical therapy after discharge.   Equipment:  None at this time              HISTORY:   History of Present Injury/Illness (Reason for Referral):  Pt cannot give any history at this point. Pt base line according to daughter- can talk few words but cannot make a conversation --secondary to dementia.     67 yr old male pt with dementia,copd,htn--according to daughter recently discharged from rehab. Had gi bleed in January. Pt had not been eating since Saturday evening,not talking,not able to move. Nurse practioner came home and saw the pt - felt that pt might have aspiration--?drooling. No fever or cough though. Pt according to daughter was even more weaker today hence decide to bring to er for further evaluation. Daughter thinks that its a recurring cycle with frequent admissions with similar problems- pertaining to pt not eating. Wants to have hospice involved and eventually once his electrolytes get better- wants pt to be discharged to hospice. According to er note pt was hypotensive at home,bp improved on ivf. Pt has k-6.7,na 165,creat 3.45. Hypothermic- 95.3f  Has wounds rt buttock/hip region. According to daughter pt is more awake in er,trying to talk--er nurse said he spoke to her- few words-- able to tolerate medications in er  Past Medical History/Comorbidities:   Mr. Georgina Vaz  has a past medical history of Alcohol abuse; Anemia; Angiodysplasia of stomach (2016); Bladder incontinence (05/28/2014); Chronic airway obstruction, not elsewhere classified (5/28/2014); CKD (chronic kidney disease); COPD (chronic obstructive pulmonary disease) ([facility] [address] Dementia (5/28/2014); Hyperlipidemia (5/28/2014); Hypernatremia (11/25/2016); Hypertension; Hypothyroid; Seizure ([address] (2010); Tobacco abuse (5/28/2014); and Tobacco abuse. Mr. Georgina Vaz  has a past surgical history that includes hx colonoscopy (APPROX 2005) and hx endoscopy (2016).   Social History/Living Environment:   Home Environment: Private residence  Living Alone: No  Support Systems: Family member(s)  Prior Level of Function/Work/Activity:  Lives with mother and brother per chart; unsure of PLOF, no family present; pt moaning but not verbal at this time  Personal Factors:          Sex:  male        Age:  67 y.o. Overall Behavior:  Moaning, eyes open to voice   Number of Personal Factors/Comorbidities that affect the Plan of Care:  Dementia, ETOH, COPD 3+: HIGH COMPLEXITY   EXAMINATION:   Most Recent Physical Functioning:   Gross Assessment:  AROM: Grossly decreased, non-functional  PROM: Generally decreased, functional (able to extend fully, full PROM B LE, grimaces with movemt)  Strength: Grossly decreased, non-functional (pt not following any direction)  Coordination: Grossly decreased, non-functional               Posture:  Posture (WDL): Exceptions to WDL  Posture Assessment: Asymmetry (comment), Cervical, Forward head, Rounded shoulders, Trunk flexion, Increased (cervical rotation to R, unable to reach midline)  Balance:  Sitting: Impaired  Sitting - Static: Poor (constant support)  Sitting - Dynamic: Poor (constant support)  Standing:  (unable) Bed Mobility:  Rolling: Total assistance  Supine to Sit: Total assistance  Sit to Supine: Total assistance  Scooting: Total assistance  Wheelchair Mobility:     Transfers:     Gait:            Body Structures Involved:  1. Muscles Body Functions Affected:  1. Movement Related Activities and Participation Affected:  1. General Tasks and Demands  2. Mobility  3. Self Care   Number of elements that affect the Plan of Care: 4+: HIGH COMPLEXITY   CLINICAL PRESENTATION:   Presentation: Evolving clinical presentation with unstable and unpredictable characteristics: HIGH COMPLEXITY   CLINICAL DECISION MAKIN [facility] [id number] AM-PAC 6 Clicks   Basic Mobility Inpatient Short Form  How much difficulty does the patient currently have. .. Unable A Lot A Little None   1. Turning over in bed (including adjusting bedclothes, sheets and blankets)?    [x] 1   [] 2   [] 3   [] 4 2.  Sitting down on and standing up from a chair with arms ( e.g., wheelchair, bedside commode, etc.)   [x] 1   [] 2   [] 3   [] 4   3. Moving from lying on back to sitting on the side of the bed? [x] 1   [] 2   [] 3   [] 4   How much help from another person does the patient currently need. .. Total A Lot A Little None   4. Moving to and from a bed to a chair (including a wheelchair)? [x] 1   [] 2   [] 3   [] 4   5. Need to walk in hospital room? [x] 1   [] 2   [] 3   [] 4   6. Climbing 3-5 steps with a railing? [x] 1   [] 2   [] 3   [] 4   © 2007, Trustees of [address] [place] [address] [address] [address], under license to [employer]. All rights reserved      Score:  Initial: 6 Most Recent: X (Date: -- )    Interpretation of Tool:  Represents activities that are increasingly more difficult (i.e. Bed mobility, Transfers, Gait). Score 24 23 22-20 19-15 14-10 9-7 6     Modifier CH CI CJ CK CL CM CN      ? Mobility - Walking and Moving Around:     - CURRENT STATUS: CN - 100% impaired, limited or restricted    - GOAL STATUS: CM - 80%-99% impaired, limited or restricted    - D/C STATUS:  ---------------To be determined---------------  Payor: Cesilia Germain / Plan: [address] [place] [address] HMO / Product Type: Managed Care Medicare /      Medical Necessity:     · Patient is expected to demonstrate progress in strength, range of motion, balance and coordination to decrease assistance required with overall bed mobility and transfers. · Patient demonstrates poor rehab potential due to higher previous functional level. Reason for Services/Other Comments:  · Patient continues to require present interventions due to patient's inability to perform bed mobility and transfers.    Use of outcome tool(s) and clinical judgement create a POC that gives a: Difficult prediction of patient's progress: HIGH COMPLEXITY            TREATMENT:   (In addition to Assessment/Re-Assessment sessions the following treatments were rendered) Pre-treatment Symptoms/Complaints:  Moaning  Pain: Initial:   Pain Intensity 1: 0 (pt does grimace with LE movement)  Post Session:  0/10 post session in sidelying     Assessment/Reassessment only, no treatment provided today    Braces/Orthotics/Lines/Etc:   · IV  · O2 Device: Room air  Treatment/Session Assessment:    · Response to Treatment:  Limited response; total assist  · Interdisciplinary Collaboration:   o Physical Therapist  o Registered Nurse  · After treatment position/precautions:   o Supine in bed  o Bed/Chair-wheels locked  o Bed in low position  o Call light within reach  o RN notified  o Side rails x 3   · Compliance with Program/Exercises: Will assess as treatment progresses. · Recommendations/Intent for next treatment session: \"Next visit will focus on advancements to more challenging activities\".   Total Treatment Duration:  PT Patient Time In/Time Out  Time In: 1121  Time Out: 2205 [address], PT

## 2018-03-21 NOTE — PROGRESS NOTES
Hospitalist Progress Note    3/21/2018  Admit Date: 3/20/2018 12:12 PM   NAME: Tracee Skaggs   :  1945   MRN:  [id number]   Attending: Winsome Robles MD  PCP:  Wei Plummer MD    SUBJECTIVE:   Mai Aceves is a 68 yo M with dementia, repeat hospiralizations, and profound debility, who was admitted 3/20 with R basal pneumonia, hypernatremia, and DEVORA. He is seen without family bedside. He states 'yeah' when asked if he was feeling okay, otherwise did not say anything else and could not answer open ended questions. Evaluated by wound care and has sacral decubitus ulcer POA. Speech therapy evaluated and recommend pureed diet with nectar thick liquid and meds crushed.       Review of Systems negative with exception of pertinent positives noted above  PHYSICAL EXAM     Visit Vitals    /68 (BP 1 Location: Right arm, BP Patient Position: At rest)    Pulse 98    Temp 97.4 °F (36.3 °C)    Resp 21    Ht 5' 8\" (1.727 m)    Wt 61.7 kg (136 lb)    SpO2 96%    BMI 20.68 kg/m2      Temp (24hrs), Av.3 °F (36.3 °C), Min:97.1 °F (36.2 °C), Max:97.5 °F (36.4 °C)    Patient Vitals for the past 24 hrs:   Temp Pulse Resp BP SpO2   18 1046 97.4 °F (36.3 °C) 98 21 172/68 96 %   18 0740 97.4 °F (36.3 °C) 76 18 157/84 96 %   18 0531 97.1 °F (36.2 °C) 80 20 165/83 93 %   18 2321 97.1 °F (36.2 °C) 81 20 152/81 92 %   18 1942 97.5 °F (36.4 °C) 85 20 147/83 91 %   18 1841 - - - 127/66 94 %   18 1837 97.5 °F (36.4 °C) - - - -   18 182 - - - 147/74 93 %   18 1801 - 91 27 138/70 92 %   18 1742 - - - 145/72 92 %   18 1722 - 93 25 150/72 92 %   18 1702 - - - 134/72 -   18 1641 - - - 165/76 92 %   18 1622 - 89 17 - -   18 162 - - - 149/69 -       Oxygen Therapy  O2 Sat (%): 96 % (18 1046)  Pulse via Oximetry: 87 beats per minute (18 1841)  O2 Device: Room air (18 1121)    Intake/Output Summary (Last 24 hours) at 03/21/18 1436  Last data filed at 03/21/18 0902   Gross per 24 hour   Intake              494 ml   Output                0 ml   Net              494 ml      General: No acute distress, opens eyes to voice, unable to answer open ended questions, lying hunched forward in bed    Lungs:  CTA Bilaterally.    Heart:  Regular rate and rhythm,  No murmur, rub, or gallop  Abdomen: Soft, Non distended, Non tender, Positive bowel sounds  Extremities: No cyanosis, clubbing or edema  Neurologic:  No focal deficits    Recent Results (from the past 24 hour(s))   GLUCOSE, POC    Collection Time: 03/20/18  4:58 PM   Result Value Ref Range    Glucose (POC) 70 65 - 100 mg/dL   POTASSIUM    Collection Time: 03/20/18  5:16 PM   Result Value Ref Range    Potassium 5.1 3.5 - 5.1 mmol/L   POC LACTIC ACID    Collection Time: 03/20/18  5:52 PM   Result Value Ref Range    Lactic Acid (POC) 0.9 0.5 - 1.9 mmol/L   GLUCOSE, POC    Collection Time: 03/20/18  8:42 PM   Result Value Ref Range    Glucose (POC) 97 65 - 348 mg/dL   METABOLIC PANEL, BASIC    Collection Time: 03/20/18  8:52 PM   Result Value Ref Range    Sodium 166 (HH) 136 - 145 mmol/L    Potassium 6.2 (HH) 3.5 - 5.1 mmol/L    Chloride 137 (H) 98 - 107 mmol/L    CO2 19 (L) 21 - 32 mmol/L    Anion gap 10 7 - 16 mmol/L    Glucose 102 (H) 65 - 100 mg/dL    BUN 78 (H) 8 - 23 MG/DL    Creatinine 3.38 (H) 0.8 - 1.5 MG/DL    GFR est AA 23 (L) >60 ml/min/1.73m2    GFR est non-AA 19 (L) >60 ml/min/1.73m2    Calcium 8.9 8.3 - 65.6 MG/DL   METABOLIC PANEL, BASIC    Collection Time: 03/21/18  5:41 AM   Result Value Ref Range    Sodium 165 (HH) 136 - 145 mmol/L    Potassium 5.0 3.5 - 5.1 mmol/L    Chloride 135 (H) 98 - 107 mmol/L    CO2 18 (L) 21 - 32 mmol/L    Anion gap 12 7 - 16 mmol/L    Glucose 121 (H) 65 - 100 mg/dL    BUN 69 (H) 8 - 23 MG/DL    Creatinine 3.21 (H) 0.8 - 1.5 MG/DL    GFR est AA 25 (L) >60 ml/min/1.73m2    GFR est non-AA 20 (L) >60 ml/min/1.73m2 Calcium 8.5 8.3 - 10.4 MG/DL   CBC WITH AUTOMATED DIFF    Collection Time: 03/21/18  5:41 AM   Result Value Ref Range    WBC 4.3 4.3 - 11.1 K/uL    RBC 3.41 (L) 4.23 - 5.67 M/uL    HGB 8.4 (L) 13.6 - 17.2 g/dL    HCT 27.9 (L) 41.1 - 50.3 %    MCV 81.8 79.6 - 97.8 FL    MCH 24.6 (L) 26.1 - 32.9 PG    MCHC 30.1 (L) 31.4 - 35.0 g/dL    RDW 22.8 (H) 11.9 - 14.6 %    PLATELET 454 334 - 914 K/uL    MPV 10.4 (L) 10.8 - 14.1 FL    DF AUTOMATED      NEUTROPHILS 66 43 - 78 %    LYMPHOCYTES 23 13 - 44 %    MONOCYTES 8 4.0 - 12.0 %    EOSINOPHILS 2 0.5 - 7.8 %    BASOPHILS 1 0.0 - 2.0 %    IMMATURE GRANULOCYTES 0 0.0 - 5.0 %    ABS. NEUTROPHILS 2.9 1.7 - 8.2 K/UL    ABS. LYMPHOCYTES 1.0 0.5 - 4.6 K/UL    ABS. MONOCYTES 0.3 0.1 - 1.3 K/UL    ABS. EOSINOPHILS 0.1 0.0 - 0.8 K/UL    ABS. BASOPHILS 0.0 0.0 - 0.2 K/UL    ABS. IMM. GRANS. 0.0 0.0 - 0.5 K/UL   GLUCOSE, POC    Collection Time: 03/21/18  7:51 AM   Result Value Ref Range    Glucose (POC) 123 (H) 65 - 100 mg/dL   GLUCOSE, POC    Collection Time: 03/21/18 11:08 AM   Result Value Ref Range    Glucose (POC) 87 65 - 100 mg/dL     XR CHEST PORT   Final Result   Impression:  Right basilar infiltrate.                       ASSESSMENT      Hospital Problems as of 3/21/2018  Date Reviewed: 1/21/2018          Codes Class Noted - Resolved POA    Decubitus ulcer of sacral region, unstageable [facility]) ICD-10-CM: L89.150  ICD-9-CM: 707.03, 707.25  3/21/2018 - Present Yes        Hyperkalemia ICD-10-CM: E87.5  ICD-9-CM: 276.7  3/20/2018 - Present Yes        Peptic ulcer ICD-10-CM: K27.9  ICD-9-CM: 533.90  3/20/2018 - Present Yes        Wound of buttock ICD-10-CM: S31.809A  ICD-9-CM: 877.0  3/20/2018 - Present Yes        PNA (pneumonia) ICD-10-CM: J18.9  ICD-9-CM: 983  3/20/2018 - Present Yes        * (Principal)Hypernatremia ICD-10-CM: E87.0  ICD-9-CM: 276.0  1/13/2018 - Present Yes        Moderate aortic stenosis ICD-10-CM: I35.0  ICD-9-CM: 424.1  1/13/2018 - Present Yes        Hypothermia ICD-10-CM: T68. Rodríguez Sep  ICD-9-CM: 991.6  4/16/2017 - Present Yes        Acute metabolic encephalopathy DWS-40-QS: G93.41  ICD-9-CM: 348.31  12/4/2016 - Present Yes        Dementia (Chronic) ICD-10-CM: F03.90  ICD-9-CM: 294.20  11/25/2016 - Present Yes        Seizure disorder ([facility] [address] (Chronic) ICD-10-CM: G40.909  ICD-9-CM: 345.90  11/25/2016 - Present Yes    Overview Addendum 11/25/2016  4:08 PM by Chante Gonzalez NP     PETIT VS FOCAL. NO GRAND MAL    On Phenytoin for past 4-5 yrs, since intial dx ~2009             Debility ICD-10-CM: R53.81  ICD-9-CM: 799.3  11/25/2016 - Present Yes        Failure to thrive in adult ICD-10-CM: R62.7  ICD-9-CM: 783.7  11/25/2016 - Present Yes        Acquired hypothyroidism ICD-10-CM: E03.9  ICD-9-CM: 244.9  5/17/2016 - Present Yes        DEVORA (acute kidney injury) ([facility] [address] ICD-10-CM: N17.9  ICD-9-CM: 584.9  6/13/2014 - Present Yes        COPD (chronic obstructive pulmonary disease) with emphysema (HCC) (Chronic) ICD-10-CM: J43.9  ICD-9-CM: 492.8  5/30/2014 - Present Yes            Plan:  · Continue vanc and zosyn. Follow cultures. · Increase D5W to 150 ml/hr for hypernatremia. · Follow daily BMP. Overall prognosis poor. Attempted to call his daughter, Kyaw Ohara, on both numbers listed- unsuccessful. DVT Prophylaxis: Heparin    Signed By: Lolita Monique.  Kolton Rao MD     March 21, 2018

## 2018-03-21 NOTE — PROGRESS NOTES
Met with Mrs. Ravindra Draper. She reports that Mr. Ravindra Draper has been grimacing and looks in pain. She states her plan is to take her  home with home hospice. She states her daughter, Lizet Mancuso, makes his healthcare decisions. Will start low dose morphine prn for pain. Continue current treatments. Will try to contact Phillip Terry again tomorrow.       Quentin Yoder MD

## 2018-03-21 NOTE — PROGRESS NOTES
Pharmacokinetic Consult to Pharmacist    Timber  is a 67 y.o. male being treated for HAP with vancomycin and zosyn. Height: 5' 8\" (172.7 cm)  Weight: 62.1 kg (137 lb)  Lab Results   Component Value Date/Time    BUN 80 (H) 03/20/2018 12:56 PM    Creatinine 3.45 (H) 03/20/2018 12:56 PM    WBC 4.2 (L) 03/20/2018 12:56 PM    Procalcitonin 0.7 03/20/2018 12:56 PM    Lactic Acid (POC) 0.9 03/20/2018 05:52 PM      Estimated Creatinine Clearance: 17 mL/min (based on Cr of 3.45). CULTURES:  3/20 BCx: pending   UCx: pending    Day 1 of vancomycin. Goal trough is 15-20. Vancomycin dose initiated at 1250mg x1. Will plan to redose vanco around  random levels when less than 20 for now. Will continue to follow patient and renal function.       Thank you,  Fidel Devlin, PharmD  Clinical Pharmacist  [phone]

## 2018-03-21 NOTE — PROGRESS NOTES
Problem: Dysphagia (Adult)  Goal: *Acute Goals and Plan of Care (Insert Text)  STG:  Pt will consume a pureed/nectar thick liquid diet without signs/sx aspiration 100%. STG:  Pt will consume trials chewable textures with SLP only with min delays when indicated. LTG:  Pt will tolerate least restrictive diet without signs/sx aspiration 100% for safe swallow function. Speech language pathology: bedside swallow note: Initial Assessment    NAME/AGE/GENDER: Polina Ferrer is a 67 y.o. male  DATE: 3/21/2018  PRIMARY DIAGNOSIS: DEVORA (acute kidney injury) ([facility] [address]  Hyperkalemia  Hypernatremia  Peptic ulcer  Dementia  Wound of buttock       ICD-10: Treatment Diagnosis: dysphagia, oropharyngeal 13.12  INTERDISCIPLINARY COLLABORATION: Registered Nurse  PRECAUTIONS/ALLERGIES: Review of patient's allergies indicates no known allergies. ASSESSMENT:Based on the objective data described below, Mr. Mara Nicholas presents with moderate oropharyngeal dysphagia. Pt known to  as he has been seen during numerous previous admissions. Per chart review, diet recommendations fluctuate from pureed/honey to mech soft/thin due to fluctuating mental status. He was last seen in January of this year with recommendations for a pureed/honey thick liquid diet. Chart review from this admission revealed pt is verbal at baseline but only uses short utterances or a few words due to severe dementia. Also noted plan is for pt to be discharged with home hospice. Pt's RN reported pt isn't really swallowing anything. Pt stated his name and birth month and date. Very low volume observed. Stated where he thinks he is but it was unintelligible due to low volume. When asked if he is in the hospital he stated \"no\". Pt given trials thin/nectar thick liquids and pureed this date. Pt presented liquids with hand over hand assistance. Anterior spillage noted with liquids via cup. No response to straw trials.   Significant delays observed with swallow initiation with all consistencies, usually around 15 seconds. Moderate oral residue observed throughout oral cavity with pureed. Pt unable to complete a double swallow upon request.  Residue cleared with a liquid wash. Delayed throat clear observed with thin liquids. No signs/sx with nectar. Chewable textures not given due to delays and residue with pureed. Recommend pureed/nectar. Will follow for diet tolerance and trials chewable textures if appropriate. Patient will benefit from skilled intervention to address the below impairments. ?????? ? ? This section established at most recent assessment??????????  PROBLEM LIST (Impairments causing functional limitations):  1. Dysphagia   REHABILITATION POTENTIAL FOR STATED GOALS: Fair  PLAN OF CARE:   Patient will benefit from skilled intervention to address the following impairments. RECOMMENDATIONS AND PLANNED INTERVENTIONS (Benefits and precautions of therapy have been discussed with the patient.):  · PO:  Pureed  · Liquids:  nectar  · no straws  MEDICATIONS:  · With liquid  COMPENSATORY STRATEGIES/MODIFICATIONS INCLUDING:  · Alternate liquids/solids  · Slow rate  OTHER RECOMMENDATIONS (including follow up treatment recommendations):   · po trials  RECOMMENDED DIET MODIFICATIONS DISCUSSED WITH:  · Nursing  · Patient  FREQUENCY/DURATION: Continue to follow patient 2 times a week for duration of hospital stay to address above goals. RECOMMENDED REHABILITATION/EQUIPMENT: (at time of discharge pending progress): Due to the probability of continued deficits (see above) this patient will not likely need continued skilled speech therapy after discharge. SUBJECTIVE:   Pt cooperative. History of Present Injury/Illness: Mr. Lilli Bush  has a past medical history of Alcohol abuse; Anemia; Angiodysplasia of stomach (2016); Bladder incontinence (05/28/2014); Chronic airway obstruction, not elsewhere classified (5/28/2014);  CKD (chronic kidney disease); COPD (chronic obstructive pulmonary disease) ([facility] Dementia (5/28/2014); Hyperlipidemia (5/28/2014); Hypernatremia (11/25/2016); Hypertension; Hypothyroid; Seizure ([facility] (2010); Tobacco abuse (5/28/2014); and Tobacco abuse. Joanna Fonseca He also  has a past surgical history that includes hx colonoscopy (APPROX 2005) and hx endoscopy (2016). Present Symptoms: dysphagia    Pain Intensity 1: 0  Current Medications:   No current facility-administered medications on file prior to encounter. Current Outpatient Prescriptions on File Prior to Encounter   Medication Sig Dispense Refill    gabapentin (NEURONTIN) 300 mg capsule Take 300 mg by mouth three (3) times daily.  levothyroxine (SYNTHROID) 100 mcg tablet Take 1 Tab by mouth Daily (before breakfast). 90 Tab 1    levETIRAcetam (KEPPRA) 500 mg tablet Take 1 Tab by mouth two (2) times a day. 180 Tab 1    ferrous sulfate 325 mg (65 mg iron) tablet Take 1 Tab by mouth two (2) times daily (with meals). 60 Tab 0    pantoprazole (PROTONIX) 40 mg tablet Take 1 Tab by mouth two (2) times a day. 180 Tab 2    tamsulosin (FLOMAX) 0.4 mg capsule Take 1 Cap by mouth nightly. 90 Cap 2    folic acid (FOLVITE) 1 mg tablet Take 1 Tab by mouth daily. 90 Tab 2    mirtazapine (REMERON) 15 mg tablet Take 1 Tab by mouth nightly. 90 Tab 2    donepezil (ARICEPT) 10 mg tablet Take 1 Tab by mouth nightly. 90 Tab 3    amLODIPine (NORVASC) 10 mg tablet Take 1 Tab by mouth daily. 90 Tab 3    losartan (COZAAR) 100 mg tablet Take 1 Tab by mouth daily.  90 Tab 3     Current Dietary Status:  Regular       History of reflux:    Reflux medication:  Social History/Home Situation: residing with family       OBJECTIVE:   Respiratory Status:        CXR Results: right basilar infiltrate  MRI/CT Results: not ordered  Oral Motor Structure/Speech:  Oral-Motor Structure/Motor Speech  Labial:  (not following commands)  Dentition:  (only has a few teeth)  Oral Hygiene: adequate    Cognitive and Communication Status:  Neurologic State: Drowsy  Orientation Level: Disoriented to place;Oriented to person  Cognition:  (unable to assess)             BEDSIDE SWALLOW EVALUATION  Oral Assessment:  Oral Assessment  Labial:  (not following commands)  Dentition:  (only has a few teeth)  Oral Hygiene: adequate  P.O. Trials:  Patient Position: upright in bed    The patient was given teaspoon to straw amounts of the following:   Consistency Presented: Nectar thick liquid;Puree; Thin liquid  How Presented: Self-fed/presented;SLP-fed/presented;Cup/sip;Spoon;Straw    ORAL PHASE:  Bolus Acceptance: Impaired  Bolus Formation/Control: Impaired  Propulsion: Delayed (# of seconds)  Type of Impairment: Anterior;Delayed  Oral Residue: 10-50% of bolus; Lingual    PHARYNGEAL PHASE:  Initiation of Swallow: Delayed (# of seconds)  Laryngeal Elevation: Decreased  Aspiration Signs/Symptoms: Delayed cough/throat clear  Vocal Quality: Low volume                OTHER OBSERVATIONS:  Rate/bite size: Impaired   Endurance:  Impaired   Comments:        Tool Used: Dysphagia Outcome and Severity Scale (NABILA)    Score Comments   Normal Diet  [] 7 With no strategies or extra time needed   Functional Swallow  [] 6 May have mild oral or pharyngeal delay       Mild Dysphagia    [] 5 Which may require one diet consistency restricted (those who demonstrate penetration which is entirely cleared on MBS would be included)   Mild-Moderate Dysphagia  [] 4 With 1-2 diet consistencies restricted       Moderate Dysphagia  [] 3 With 2 or more diet consistencies restricted       Moderately Severe Dysphagia  [] 2 With partial PO strategies (trials with ST only)       Severe Dysphagia  [] 1 With inability to tolerate any PO safely          Score:  Initial: 3 Most Recent: X (Date: -- )   Interpretation of Tool: The Dysphagia Outcome and Severity Scale (NABILA) is a simple, easy-to-use, 7-point scale developed to systematically rate the functional severity of dysphagia based on objective assessment and make recommendations for diet level, independence level, and type of nutrition. Score 7 6 5 4 3 2 1   Modifier CH CI CJ CK CL CM CN   ? Swallowing:     - CURRENT STATUS: CL - 60%-79% impaired, limited or restricted    - GOAL STATUS:  CK - 40%-59% impaired, limited or restricted    - D/C STATUS:  ---------------To be determined---------------  Payor: Winnie Foss / Plan: [address] [place] [address] HMO / Product Type: Managed Care Medicare /     TREATMENT:    (In addition to Assessment/Re-Assessment sessions the following treatments were rendered)  Assessment/Reassessment only, no treatment provided today    __________________________________________________________________________________________________  Safety:   After treatment position/precautions:  · Upright in Bed  Treatment Assessment:   . Progression/Medical Necessity:   · Skilled intervention continues to be required due to patient still consuming a modified diet. Compliance with Program/Exercises: Will assess as treatment progresses. Reason for Continuation of Services/Other Comments:  · Patient continues to require skilled intervention due to dysphagia. Recommendations/Intent for next treatment session: \"Treatment next visit will focus on po trials\".      Total Treatment Duration:  Time In: 7657  Time Out: 1800 [address] CCC-SLP

## 2018-03-21 NOTE — PROGRESS NOTES
Pt was calm but had difficulty swallowing pills whole or anything. There is an order for speech to see pt. Hourly rounds were done and pt needs were met. Report will be give to day shift nurse and will continue to monitor.

## 2018-03-21 NOTE — PROGRESS NOTES
Primary Nurse Yessy Mcmullen and JOSE allred performed a dual skin assessment on this patient Impairment noted- see wound doc flow sheet  Milad score is 10. Pt is oriented to his room , call light at reach and pt is encouraged to call for help. Pt have a skin tear at his right hip, skin tear at his left buttock and 2 open areas at his right buttocks.

## 2018-03-21 NOTE — INTERDISCIPLINARY ROUNDS
Interdisciplinary team rounds were held 3/21/2018 with the following team members:Care Management, Nursing, Physical Therapy and Physician. Plan of care discussed. See clinical pathway and/or care plan for interventions and desired outcomes.

## 2018-03-21 NOTE — PROGRESS NOTES
Problem: Nutrition Deficit  Goal: *Optimize nutritional status  Nutrition  Reason for assessment: Received nutrition consult for general nutrition management and supplements. (Dr Claudia Magaña)   Assessment:   Diet order(s): 3-20: Regular, 3-21: Pureed with nectar thick-per SLP evaluation  Food/Nutrition Patient History:  Hx of dementia. Admitted with FTT and plan for home with hospice. Pt with mumbled answer to questions. During previous admission, he was not receptive to liquid nutritional supplement, but willing to try supplemental ice cream.  Skin status:Sacrum with DTI  Anthropometrics:Height: 5' 8\" (172.7 cm),  Weight: 61.7 kg (136 lb), Weight Source: Bed, Body mass index is 20.68 kg/(m^2). BMI class of underweight. Weight hx per EMR ( Based on [facility] care functionality, RD cannot know if these weight are actual versus stated):  WT / BMI WEIGHT   1/24/2018 137 lb 12.6 oz   1/12/2018 136 lb   1/1/2018 135 lb 8 oz   10/11/2017 135 lb   9/28/2017 133 lb   9/28/2017 133 lb   9/27/2017 133 lb   6/2/2017 123 lb   4/18/2017 131 lb 6.3 oz     Macronutrient needs:  EER:  4712-5337 kcal /day (25-30 kcal/kg ABW)  EPR:  56-84 grams protein/day (0.8-1.2 grams/kg IBW)  Intake/Comparative Standards:  Average intake for past 1 day(s)/1 recorded meal(s): 0%. This meets 0% of kcal and 0% of protein needs  Acute Illness, Chronic Illness, or Social/Enviornmental: Chronic illness  Body Fat: Severe  Muscle Mass: Severe  Chronic Illness - Malnutrition Diagnosis: Severe malnutrition     Nutrition Diagnosis: Inadequate oral intake r/t decreased ability to consume sufficient oral intake,dysphagia as evidenced by dementia, intake as above    Intervention:  Meals and snacks: Continue current diet. Nutrition Supplement Therapy: Magic cup tid   EN:If TF becomes, part of POC, please order nutrition consult for TF management. Discharge nutrition plan: Home with hospice and best po intake possible unless TF becomes part of POC.     Malik Pham, [address] [place] [address], [address] [place] [address], [address] [place] [address]

## 2018-03-22 LAB
ANION GAP SERPL CALC-SCNC: 14 MMOL/L (ref 7–16)
BACTERIA SPEC CULT: NORMAL
BUN SERPL-MCNC: 52 MG/DL (ref 8–23)
CALCIUM SERPL-MCNC: 8.3 MG/DL (ref 8.3–10.4)
CHLORIDE SERPL-SCNC: 131 MMOL/L (ref 98–107)
CO2 SERPL-SCNC: 15 MMOL/L (ref 21–32)
CREAT SERPL-MCNC: 2.98 MG/DL (ref 0.8–1.5)
GLUCOSE BLD STRIP.AUTO-MCNC: 110 MG/DL (ref 65–100)
GLUCOSE BLD STRIP.AUTO-MCNC: 111 MG/DL (ref 65–100)
GLUCOSE BLD STRIP.AUTO-MCNC: 128 MG/DL (ref 65–100)
GLUCOSE BLD STRIP.AUTO-MCNC: 300 MG/DL (ref 65–100)
GLUCOSE BLD STRIP.AUTO-MCNC: 67 MG/DL (ref 65–100)
GLUCOSE SERPL-MCNC: 109 MG/DL (ref 65–100)
MM INDURATION POC: NORMAL MM (ref 0–5)
POTASSIUM SERPL-SCNC: 4.4 MMOL/L (ref 3.5–5.1)
PPD POC: NORMAL NEGATIVE
SERVICE CMNT-IMP: NORMAL
SODIUM SERPL-SCNC: 160 MMOL/L (ref 136–145)

## 2018-03-22 PROCEDURE — 74011250636 HC RX REV CODE- 250/636: Performed by: INTERNAL MEDICINE

## 2018-03-22 PROCEDURE — 74011000258 HC RX REV CODE- 258: Performed by: INTERNAL MEDICINE

## 2018-03-22 PROCEDURE — 80048 BASIC METABOLIC PNL TOTAL CA: CPT | Performed by: INTERNAL MEDICINE

## 2018-03-22 PROCEDURE — 97166 OT EVAL MOD COMPLEX 45 MIN: CPT

## 2018-03-22 PROCEDURE — 74011250637 HC RX REV CODE- 250/637: Performed by: FAMILY MEDICINE

## 2018-03-22 PROCEDURE — 36415 COLL VENOUS BLD VENIPUNCTURE: CPT | Performed by: INTERNAL MEDICINE

## 2018-03-22 PROCEDURE — 3331090002 HH PPS REVENUE DEBIT

## 2018-03-22 PROCEDURE — 74011000258 HC RX REV CODE- 258: Performed by: FAMILY MEDICINE

## 2018-03-22 PROCEDURE — 65270000029 HC RM PRIVATE

## 2018-03-22 PROCEDURE — 74011250637 HC RX REV CODE- 250/637: Performed by: INTERNAL MEDICINE

## 2018-03-22 PROCEDURE — 3331090001 HH PPS REVENUE CREDIT

## 2018-03-22 PROCEDURE — 82962 GLUCOSE BLOOD TEST: CPT

## 2018-03-22 PROCEDURE — 74011250636 HC RX REV CODE- 250/636: Performed by: FAMILY MEDICINE

## 2018-03-22 PROCEDURE — 77030020186 HC BOOT HL PROTCT SAGE -B

## 2018-03-22 RX ORDER — VANCOMYCIN HYDROCHLORIDE
1250 ONCE
Status: COMPLETED | OUTPATIENT
Start: 2018-03-22 | End: 2018-03-22

## 2018-03-22 RX ADMIN — PIPERACILLIN SODIUM,TAZOBACTAM SODIUM 4.5 G: 4; .5 INJECTION, POWDER, FOR SOLUTION INTRAVENOUS at 20:25

## 2018-03-22 RX ADMIN — PIPERACILLIN SODIUM,TAZOBACTAM SODIUM 4.5 G: 4; .5 INJECTION, POWDER, FOR SOLUTION INTRAVENOUS at 09:19

## 2018-03-22 RX ADMIN — DEXTROSE MONOHYDRATE 150 ML/HR: 5 INJECTION, SOLUTION INTRAVENOUS at 01:48

## 2018-03-22 RX ADMIN — SODIUM CHLORIDE 500 MG: 900 INJECTION, SOLUTION INTRAVENOUS at 20:59

## 2018-03-22 RX ADMIN — LEVOTHYROXINE SODIUM 100 MCG: 100 TABLET ORAL at 05:05

## 2018-03-22 RX ADMIN — MORPHINE SULFATE 2 MG: 2 INJECTION, SOLUTION INTRAMUSCULAR; INTRAVENOUS at 05:03

## 2018-03-22 RX ADMIN — HEPARIN SODIUM 5000 UNITS: 5000 INJECTION, SOLUTION INTRAVENOUS; SUBCUTANEOUS at 01:13

## 2018-03-22 RX ADMIN — Medication 10 ML: at 05:05

## 2018-03-22 RX ADMIN — FERROUS SULFATE TAB 325 MG (65 MG ELEMENTAL FE) 325 MG: 325 (65 FE) TAB at 17:11

## 2018-03-22 RX ADMIN — HEPARIN SODIUM 5000 UNITS: 5000 INJECTION, SOLUTION INTRAVENOUS; SUBCUTANEOUS at 17:11

## 2018-03-22 RX ADMIN — SODIUM CHLORIDE 500 MG: 900 INJECTION, SOLUTION INTRAVENOUS at 09:12

## 2018-03-22 RX ADMIN — FERROUS SULFATE TAB 325 MG (65 MG ELEMENTAL FE) 325 MG: 325 (65 FE) TAB at 09:23

## 2018-03-22 RX ADMIN — FOLIC ACID 1 MG: 1 TABLET ORAL at 09:23

## 2018-03-22 RX ADMIN — VANCOMYCIN HYDROCHLORIDE 1250 MG: 10 INJECTION, POWDER, LYOPHILIZED, FOR SOLUTION INTRAVENOUS at 01:55

## 2018-03-22 RX ADMIN — Medication 10 ML: at 21:16

## 2018-03-22 RX ADMIN — HEPARIN SODIUM 5000 UNITS: 5000 INJECTION, SOLUTION INTRAVENOUS; SUBCUTANEOUS at 09:17

## 2018-03-22 RX ADMIN — PANTOPRAZOLE SODIUM 40 MG: 40 TABLET, DELAYED RELEASE ORAL at 17:11

## 2018-03-22 RX ADMIN — DEXTROSE MONOHYDRATE 150 ML/HR: 5 INJECTION, SOLUTION INTRAVENOUS at 11:50

## 2018-03-22 RX ADMIN — DONEPEZIL HYDROCHLORIDE 10 MG: 5 TABLET, FILM COATED ORAL at 21:16

## 2018-03-22 RX ADMIN — AMLODIPINE BESYLATE 5 MG: 5 TABLET ORAL at 09:22

## 2018-03-22 NOTE — PROGRESS NOTES
Hospitalist Progress Note    3/22/2018  Admit Date: 3/20/2018 12:12 PM   NAME: Lucy Puente   :  1945   MRN:  [id number]   Attending: Colt Lane MD  PCP:  Matias Fermin MD    SUBJECTIVE:   Vitor Pozo is a 66 yo M with dementia, repeat hospiralizations, and profound debility, who was admitted 3/20 with R basal pneumonia, hypernatremia, and DEVORA. He is seen without family bedside. He states 'yeah' when asked if he was feeling okay, otherwise did not say anything else and could not answer open ended questions. Evaluated by wound care and has sacral decubitus ulcer POA. Speech therapy evaluated and recommend pureed diet with nectar thick liquid and meds crushed. 3/22- seen without family at bedside, but his family's  was there upon entering room. He is sleeping comfortably. Briefly opens eyes to voice. Appears much more comfortable than last evening. He received a dose of morphine at 5 AM today. He is unable to provide history due to dementia.       Review of Systems negative with exception of pertinent positives noted above  PHYSICAL EXAM     Visit Vitals    /56 (BP 1 Location: Right arm, BP Patient Position: Lying left side)    Pulse 70    Temp 98.4 °F (36.9 °C)    Resp 16    Ht 5' 8\" (1.727 m)    Wt 62.1 kg (136 lb 14.4 oz)    SpO2 95%    BMI 20.82 kg/m2      Temp (24hrs), Av.2 °F (36.8 °C), Min:97.5 °F (36.4 °C), Max:98.9 °F (37.2 °C)    Patient Vitals for the past 24 hrs:   Temp Pulse Resp BP SpO2   18 1041 98.4 °F (36.9 °C) 70 16 104/56 95 %   18 0718 98.9 °F (37.2 °C) 75 18 135/74 93 %   18 0500 98.2 °F (36.8 °C) 83 18 147/77 95 %   18 2329 98.4 °F (36.9 °C) 90 18 139/68 94 %   18 1913 97.7 °F (36.5 °C) 99 18 157/80 95 %   18 1530 97.5 °F (36.4 °C) 86 18 168/75 97 %       Oxygen Therapy  O2 Sat (%): 95 % (18 1041)  Pulse via Oximetry: 87 beats per minute (18 1841)  O2 Device: Room air (18 1121)  No intake or output data in the 24 hours ending 03/22/18 1424   General: No acute distress, opens eyes to voice but falls asleep quickly   Lungs:  CTA Bilaterally. Heart:  Regular rate and rhythm,  No murmur, rub, or gallop  Abdomen: Soft, Non distended, Non tender, Positive bowel sounds  Extremities: No cyanosis, clubbing or edema  Neurologic:  Unable to assess due to grogginess    Recent Results (from the past 24 hour(s))   GLUCOSE, POC    Collection Time: 03/21/18  4:21 PM   Result Value Ref Range    Glucose (POC) 74 65 - 100 mg/dL   GLUCOSE, POC    Collection Time: 03/21/18  8:28 PM   Result Value Ref Range    Glucose (POC) 84 65 - 100 mg/dL   VANCOMYCIN, RANDOM    Collection Time: 03/21/18  9:35 PM   Result Value Ref Range    Vancomycin, random 39.7 UG/ML   METABOLIC PANEL, BASIC    Collection Time: 03/22/18  5:32 AM   Result Value Ref Range    Sodium 160 (H) 136 - 145 mmol/L    Potassium 4.4 3.5 - 5.1 mmol/L    Chloride 131 (H) 98 - 107 mmol/L    CO2 15 (L) 21 - 32 mmol/L    Anion gap 14 7 - 16 mmol/L    Glucose 109 (H) 65 - 100 mg/dL    BUN 52 (H) 8 - 23 MG/DL    Creatinine 2.98 (H) 0.8 - 1.5 MG/DL    GFR est AA 27 (L) >60 ml/min/1.73m2    GFR est non-AA 22 (L) >60 ml/min/1.73m2    Calcium 8.3 8.3 - 10.4 MG/DL   GLUCOSE, POC    Collection Time: 03/22/18  7:27 AM   Result Value Ref Range    Glucose (POC) 111 (H) 65 - 100 mg/dL   GLUCOSE, POC    Collection Time: 03/22/18 11:05 AM   Result Value Ref Range    Glucose (POC) 300 (H) 65 - 100 mg/dL   GLUCOSE, POC    Collection Time: 03/22/18 11:59 AM   Result Value Ref Range    Glucose (POC) 67 65 - 100 mg/dL     XR CHEST PORT   Final Result   Impression:  Right basilar infiltrate.                       ASSESSMENT      Hospital Problems as of 3/22/2018  Date Reviewed: 1/21/2018          Codes Class Noted - Resolved POA    Decubitus ulcer of sacral region, unstageable [facility]) ICD-10-CM: L89.150  ICD-9-CM: 707.03, 707.25  3/21/2018 - Present Yes Hyperkalemia ICD-10-CM: E87.5  ICD-9-CM: 276.7  3/20/2018 - Present Yes        Peptic ulcer ICD-10-CM: K27.9  ICD-9-CM: 533.90  3/20/2018 - Present Yes        Wound of buttock ICD-10-CM: W19.417C  ICD-9-CM: 877.0  3/20/2018 - Present Yes        PNA (pneumonia) ICD-10-CM: J18.9  ICD-9-CM: 366  3/20/2018 - Present Yes        * (Principal)Hypernatremia ICD-10-CM: E87.0  ICD-9-CM: 276.0  1/13/2018 - Present Yes        Moderate aortic stenosis ICD-10-CM: I35.0  ICD-9-CM: 424.1  1/13/2018 - Present Yes        Hypothermia ICD-10-CM: T68. Dauna Maroon  ICD-9-CM: 991.6  4/16/2017 - Present Yes        Acute metabolic encephalopathy I-98-HC: G93.41  ICD-9-CM: 348.31  12/4/2016 - Present Yes        Dementia (Chronic) ICD-10-CM: F03.90  ICD-9-CM: 294.20  11/25/2016 - Present Yes        [facility] (Chronic) ICD-10-CM: G40.909  ICD-9-CM: 345.90  11/25/2016 - Present Yes    Overview Addendum 11/25/2016  4:08 PM by Armando Elmore, NP     PETIT VS FOCAL. NO GRAND MAL    On Phenytoin for past 4-5 yrs, since intial dx ~2009             Debility ICD-10-CM: R53.81  ICD-9-CM: 799.3  11/25/2016 - Present Yes        Failure to thrive in adult ICD-10-CM: R62.7  ICD-9-CM: 783.7  11/25/2016 - Present Yes        Acquired hypothyroidism ICD-10-CM: E03.9  ICD-9-CM: 244.9  5/17/2016 - Present Yes        DEVORA (acute kidney injury) ([facility] ICD-10-CM: N17.9  ICD-9-CM: 584.9  6/13/2014 - Present Yes        COPD (chronic obstructive pulmonary disease) with emphysema (HCC) (Chronic) ICD-10-CM: J43.9  ICD-9-CM: 492.8  5/30/2014 - Present Yes            Plan:  · R side pneumonia- Continue vanc and zosyn (day 3). Follow cultures. · Likely change to PO augmentin tomorrow. · Continue D5W at 150 ml/hr for hypernatremia. · Follow daily BMP. · Long discussion with his wife last night. They are planning to take him home with hospice. Will attempt to contact his daughter, Juan Ramon Hernandez, later today per Mrs. Marie's request.    Overall prognosis poor.      DVT Prophylaxis: Heparin    Dispo- pending. At this point plan is to discharge home with home hospice. [facility] has been in contact with case management regarding patient. Signed By: Reina Galindo MD     March 22, 2018

## 2018-03-22 NOTE — INTERDISCIPLINARY ROUNDS
Interdisciplinary team rounds were held 3/22/2018 with the following team members:Care Management, Nursing, Physical Therapy and Physician. Plan of care discussed. See clinical pathway and/or care plan for interventions and desired outcomes.

## 2018-03-22 NOTE — PROGRESS NOTES
Call to [facility] [employer]. and the contact is Jose Mich at [phone]. Fax number is [phone]. Patient was being seen by Palliative care in the home and they had gotten to the point where family asked for Hospice presentation. Before that could happen, patient hospitalized. Plan: Updated clinicals sent to [address] [place] [address]. Leonardo Sheldon will reach out to family and make a presentation and facilitate dc to home with Hospice. She is aware patient is ready. So as soon as family is up to speed and DME placed into the home, we will dc. Leonardo Sheldon will call daughter. ? DC tomorrow. Patient can go home on oral antibx if he needs to finish out a course.    Jesse Goss

## 2018-03-22 NOTE — CDMP QUERY
Please clarify if this patient is being treated/managed for:    Severe Protein Calorie Malnutrition in failure to thrive patient with severe loss of muscle mass and subq fat and poor po intake treated with RD consult and supplements. =>Other Explanation of clinical findings  =>Unable to Determine (no explanation of clinical findings)    The medical record reflects the following:    Risk Factors: Dementia, CKD, COPD, FTT, dysphagia    Clinical Indicators:  severe loss of muscle mass, severe loss of  subq fat and po intake less than required. Treatment: RD consult and supplements. Please clarify and document your clinical opinion in the progress notes and discharge summary including the definitive and/or presumptive diagnosis, (suspected or probable), related to the above clinical findings. Please include clinical findings supporting your diagnosis.     Thanks,  Casey Rodriguez RN, CDS  Compliant Documentation Management Program  [phone]

## 2018-03-22 NOTE — PROGRESS NOTES
Pt was calm and slept all night. Hourly rounds were done and pt needs were met. Report will be giving to day shift nurse and will continue to monitor.

## 2018-03-22 NOTE — PROGRESS NOTES
Problem: Self Care Deficits Care Plan (Adult)  Goal: *Acute Goals and Plan of Care (Insert Text)    OCCUPATIONAL THERAPY: Initial Assessment, Discharge and AM 3/22/2018  INPATIENT: Hospital Day: 3  Payor: Dara Scio / Plan: [address] [place] [address] HMO / Product Type: Managed Care Medicare /      NAME/AGE/GENDER: Rosangela London is a 67 y.o. male   PRIMARY DIAGNOSIS:  DEVORA (acute kidney injury) ([facility] [address]  Hyperkalemia  Hypernatremia  Peptic ulcer  Dementia  Wound of buttock Hypernatremia Hypernatremia        ICD-10: Treatment Diagnosis:    · Stiffness of Left Shoulder, Not elsewhere classified (M25.612)  · Stiffness of Right Shoulder, Not elsewhere classified (M25.611)  · Generalized Muscle Weakness (M62.81)  · Other lack of cordination (R27.8)   Precautions/Allergies:     Review of patient's allergies indicates no known allergies. ASSESSMENT:     Mr. Ulises Hancock presents lying on his side in fetal position. Patient's eyes open up to stimulus, though patient unable to follow any commands. Patient requires total assistance for supine to sit, and total assistance for all ADLs. Per chart, patient's daughter is requesting Hospice Services. Occupational Therapy Skills not indicated at this time. Please re-consult if patient's status improves. Thank you for the referral.  Discharge from Occupational Therapy this date. This section established at most recent assessment    1. RECOMMENDED REHABILITATION/EQUIPMENT: (at time of discharge pending progress): Due to the probability of continued deficits (see above) this patient will not likely need continued skilled occupational therapy after discharge. Equipment:    None at this time              OCCUPATIONAL PROFILE AND HISTORY:   History of Present Injury/Illness (Reason for Referral):  History from daughter at bedside. Pt cannot give any history at this point. Pt base line according to daughter- can talk few words but cannot make a conversation --secondary to dementia.     67 yr old male pt with dementia,copd,htn--according to daughter recently discharged from rehab. Had gi bleed in January. Pt had not been eating since Saturday evening,not talking,not able to move. Nurse practioner came home and saw the pt - felt that pt might have aspiration--?drooling. No fever or cough though. Pt according to daughter was even more weaker today hence decide to bring to er for further evaluation. Daughter thinks that its a recurring cycle with frequent admissions with similar problems- pertaining to pt not eating. Wants to have hospice involved and eventually once his electrolytes get better- wants pt to be discharged to hospice. According to er note pt was hypotensive at home,bp improved on ivf. Pt has k-6.7,na 165,creat 3.45. Hypothermic- 95.3f  Has wounds rt buttock/hip region. According to daughter pt is more awake in er,trying to talk--er nurse said he spoke to her- few words-- able to tolerate medications in er     Hospice nurse was talking to daughter in er  Past Medical History/Comorbidities:   Mr. Jeannene Favre  has a past medical history of Alcohol abuse; Anemia; Angiodysplasia of stomach (2016); Bladder incontinence (05/28/2014); Chronic airway obstruction, not elsewhere classified (5/28/2014); CKD (chronic kidney disease); COPD (chronic obstructive pulmonary disease) ([facility] [address] Dementia (5/28/2014); Hyperlipidemia (5/28/2014); Hypernatremia (11/25/2016); Hypertension; Hypothyroid; Seizure ([address] (2010); Tobacco abuse (5/28/2014); and Tobacco abuse. Mr. Jeannene Favre  has a past surgical history that includes hx colonoscopy (APPROX 2005) and hx endoscopy (2016). Social History/Living Environment:   Home Environment: Private residence  Living Alone: No  Support Systems: Family member(s)  Prior Level of Function/Work/Activity:  Unsure; appears that patient required extensive assist with ADLs.      Number of Personal Factors/Comorbidities that affect the Plan of Care: Expanded review of therapy/medical records (1-2):  MODERATE COMPLEXITY   ASSESSMENT OF OCCUPATIONAL PERFORMANCE[de-identified]   Activities of Daily Living:           Basic ADLs (From Assessment) Complex ADLs (From Assessment)   Basic ADL  Feeding: Total assistance  Oral Facial Hygiene/Grooming: Total assistance  Bathing: Total assistance  Upper Body Dressing: Total assistance  Lower Body Dressing: Total assistance  Toileting: Total assistance Instrumental ADL  Meal Preparation: Total assistance  Homemaking: Total assistance  Medication Management: Total assistance  Financial Management: Total assistance   Grooming/Bathing/Dressing Activities of Daily Living     Cognitive Retraining  Safety/Judgement: Decreased awareness of environment;Decreased insight into deficits; Decreased awareness of need for assistance;Decreased awareness of need for safety                       Bed/Mat Mobility  Supine to Sit: Assist x2;Total assistance  Sit to Supine: Assist x2;Total assistance       Most Recent Physical Functioning:   Gross Assessment:                  Posture:  Posture (WDL): Exceptions to WDL  Posture Assessment: Asymmetry (comment), Cervical, Forward head, Rounded shoulders, Trunk flexion, Increased (cervical rotation to R, unable to reach midline)  Balance:    Bed Mobility:  Supine to Sit: Assist x2;Total assistance  Sit to Supine: Assist x2;Total assistance  Wheelchair Mobility:     Transfers:                   Patient Vitals for the past 6 hrs:   BP BP Patient Position SpO2 Pulse   03/22/18 0718 135/74 - 93 % 75   03/22/18 1041 104/56 Lying left side 95 % 70       Mental Status  Neurologic State: Eyes open to stimulus, Eyes open to voice  Orientation Level: Oriented to person  Cognition: Unable to assess (comment)  Perception: Cues to maintain midline in sitting  Safety/Judgement: Decreased awareness of environment, Decreased insight into deficits, Decreased awareness of need for assistance, Decreased awareness of need for safety Physical Skills Involved:  1. Range of Motion  2. Balance  3. Strength  4. Activity Tolerance  5. Fine Motor Control  6. Gross Motor Control Cognitive Skills Affected (resulting in the inability to perform in a timely and safe manner):  1. Executive Function  2. Immediate Memory  3. Short Term Recall  4. Long Term Memory  5. Sustained Attention  6. Divided Attention  7. Comprehension  8. Expression Psychosocial Skills Affected:  1. Habits/Routines  2. Social Interaction   Number of elements that affect the Plan of Care: 5+:  HIGH COMPLEXITY   CLINICAL DECISION MAKIN[address] [place] [address] AM-PAC 6 Clicks   Daily Activity Inpatient Short Form  How much help from another person does the patient currently need. .. Total A Lot A Little None   1. Putting on and taking off regular lower body clothing? [x] 1   [] 2   [] 3   [] 4   2. Bathing (including washing, rinsing, drying)? [x] 1   [] 2   [] 3   [] 4   3. Toileting, which includes using toilet, bedpan or urinal?   [x] 1   [] 2   [] 3   [] 4   4. Putting on and taking off regular upper body clothing? [x] 1   [] 2   [] 3   [] 4   5. Taking care of personal grooming such as brushing teeth? [x] 1   [] 2   [] 3   [] 4   6. Eating meals? [x] 1   [] 2   [] 3   [] 4   © , Trustees of [address] [place] [address], under license to [employer]. All rights reserved      Score:  Initial: CN     6 Most Recent: X (Date: -- )    Interpretation of Tool:  Represents activities that are increasingly more difficult (i.e. Bed mobility, Transfers, Gait). Score 24 23 22-20 19-15 14-10 9-7 6     Modifier CH CI CJ CK CL CM CN      ?  Self Care:     - CURRENT STATUS: CN - 100% impaired, limited or restricted    - GOAL STATUS: CM - 80%-99% impaired, limited or restricted    - D/C STATUS:  ---------------To be determined---------------  Payor: Mu Willis / Plan: [address] [place] [address] HMO / Product Type: Managed Care Medicare /      ·    Use of outcome tool(s) and clinical judgement create a POC that gives a: MODERATE COMPLEXITY         TREATMENT:   (In addition to Assessment/Re-Assessment sessions the following treatments were rendered)     Pre-treatment Symptoms/Complaints:    Pain: Initial:   Pain Intensity 1: 0  Post Session:  0     Assessment/Reassessment only, no treatment provided today  and discharge    Braces/Orthotics/Lines/Etc:   · IV  · O2 Device: Room air  Treatment/Session Assessment:    · Response to Treatment:  positive  · Interdisciplinary Collaboration:   o Physical Therapist  o Registered Nurse  · After treatment position/precautions:   o Supine in bed  o Bed in low position  o Call light within reach  o RN notified  o Side rails x 3   · .   Total Treatment Duration:  OT Patient Time In/Time Out  Time In: 1003  Time Out: [address], OT

## 2018-03-22 NOTE — ADT AUTH CERT NOTES
Patient Demographics        Patient Name [address] Sex  Address Phone       Adonis Sargent [phone] Male 1945 [address] [phone] (Home) *Preferred*  [phone] (Mobile)           CSN:       [id number]           Admit Date: Admit Time Room Bed       Mar 20, 2018 12:12  [[id number]] 01 [889]           Attending Providers        Provider Pager From To       Aleta Acevedo MD  18       Gaurav Canales MD  18            Emergency Contact(s)        Name Relation Home Work Mobile       Lyndon Haskins [phone]       Manish Del Toro [phone]           Utilization Review           Systemic or Infectious Condition GRG - Care Day 1 (3/21/2018) by Lexus Membreno RN        Review Entered Review Status       3/22/2018 Completed       Details              Care Day: 1 Care Date: 3/21/2018 Level of Care: Inpatient Floor       Guideline Day 1        Clinical Status       (X) * Clinical Indications met [H]              Interventions       (X) Inpatient interventions as needed       3/22/2018 11:43 AM EDT by Vonnie Lezama         ST/OT CONSULTS, NUT SUPP, WOUND CARE DSG CHG, PUREED DIET, DNR  D5 DRIP @ 150, KEPPRA 500 MG BID IV, MORPHINE 2 MG IV X 1, ZOSYN 4.5G BID IV                                          * Milestone              Additional Notes       IM NOTE       Continue vanc and zosyn.  Follow cultures.       Increase D5W to 150 ml/hr for hypernatremia.       Follow daily BMP.               Overall prognosis poor.  Attempted to call his daughter, Juan Ramon Hernandez, on both numbers listed- unsuccessful.                      IM NOTE       Met with Mrs. Rubi Morrissey reports that Mr. Kevin Drake has been grimacing and looks in pain.  She states her plan is to take her  home with home hospice. Bruce Danaymagdi states her daughter, Juan Ramon Hernandez, makes his healthcare decisions.                Will start low dose morphine prn for pain.  Continue current treatments.  Will try to contact Muriel Theodore again tomorrow.               /68, TEMP 98.4, HR 90, RR 18, O2 94 ON RA              RBC 3.41, HGB 8.4, HCT 27.9, , , CO2 18, BUN 69, CREAT 3.21           Systemic or Infectious Condition GRG - Clinical Indications for Admission to Inpatient Care by Peter Payne RN        Review Entered Review Status       3/22/2018 Completed       Details              Clinical Indications for Admission to Inpatient Care       Most Recent : Jarret Fermin Most Recent Date: 3/22/2018 11:42 AM EDT       (X) Hospital admission is needed for appropriate care of the patient because of 1 or more of the        following :          (X) Systemic or infectious condition causing severe symptoms or findings not responsive to emergency           or observation care treatment (as appropriate) indicated by 1 or more of the following            (1) (2):             (X) New-onset end organ failure or dysfunction as indicated by 1 or more of the following  (13):                (X) Altered mental status that is severe or persistent (25)                3/22/2018 11:42 AM EDT by Jarret Fermin                  NORMALLY TALKS - NOW NOT TALKING AND NOT ABLE TO MOVE - DROOLING - NOT EATING                    (X) Severe electrolyte abnormalities

## 2018-03-22 NOTE — PROGRESS NOTES
Pt daughter called wanted to talk to Dr Marguerite Bledsoe but he was off for the day. The daughter wants to get Dr Marguerite Bledsoe number so that she can call him  because she will be at work from [facility]. She will like to talk to Dr Marguerite Bledsoe at her lunch time due to the fact that she is not allow to use her phone at work .

## 2018-03-22 NOTE — PROGRESS NOTES
Speech Pathology   attempted. Spoke with RN whom reported pt isn't very responsive today and it took him forever to accept his crushed meds. She reported she doesn't think he's appropriate for po at this time. Will continue to follow.       [address] CCC-SLP

## 2018-03-23 LAB
ANION GAP SERPL CALC-SCNC: 9 MMOL/L (ref 7–16)
BUN SERPL-MCNC: 47 MG/DL (ref 8–23)
CALCIUM SERPL-MCNC: 7.7 MG/DL (ref 8.3–10.4)
CHLORIDE SERPL-SCNC: 126 MMOL/L (ref 98–107)
CO2 SERPL-SCNC: 20 MMOL/L (ref 21–32)
CREAT SERPL-MCNC: 2.89 MG/DL (ref 0.8–1.5)
GLUCOSE BLD STRIP.AUTO-MCNC: 122 MG/DL (ref 65–100)
GLUCOSE BLD STRIP.AUTO-MCNC: 149 MG/DL (ref 65–100)
GLUCOSE BLD STRIP.AUTO-MCNC: 88 MG/DL (ref 65–100)
GLUCOSE BLD STRIP.AUTO-MCNC: 92 MG/DL (ref 65–100)
GLUCOSE BLD STRIP.AUTO-MCNC: 94 MG/DL (ref 65–100)
GLUCOSE SERPL-MCNC: 97 MG/DL (ref 65–100)
MM INDURATION POC: NORMAL MM (ref 0–5)
POTASSIUM SERPL-SCNC: 4.3 MMOL/L (ref 3.5–5.1)
PPD POC: NORMAL NEGATIVE
SODIUM SERPL-SCNC: 155 MMOL/L (ref 136–145)
VANCOMYCIN SERPL-MCNC: 17.3 UG/ML

## 2018-03-23 PROCEDURE — 74011250637 HC RX REV CODE- 250/637: Performed by: INTERNAL MEDICINE

## 2018-03-23 PROCEDURE — 74011000258 HC RX REV CODE- 258: Performed by: INTERNAL MEDICINE

## 2018-03-23 PROCEDURE — 74011000258 HC RX REV CODE- 258: Performed by: FAMILY MEDICINE

## 2018-03-23 PROCEDURE — 74011250636 HC RX REV CODE- 250/636: Performed by: INTERNAL MEDICINE

## 2018-03-23 PROCEDURE — 3331090002 HH PPS REVENUE DEBIT

## 2018-03-23 PROCEDURE — 80202 ASSAY OF VANCOMYCIN: CPT | Performed by: FAMILY MEDICINE

## 2018-03-23 PROCEDURE — 65270000029 HC RM PRIVATE

## 2018-03-23 PROCEDURE — 74011250637 HC RX REV CODE- 250/637: Performed by: FAMILY MEDICINE

## 2018-03-23 PROCEDURE — 97530 THERAPEUTIC ACTIVITIES: CPT

## 2018-03-23 PROCEDURE — 77030019605

## 2018-03-23 PROCEDURE — 82962 GLUCOSE BLOOD TEST: CPT

## 2018-03-23 PROCEDURE — 74011250636 HC RX REV CODE- 250/636: Performed by: FAMILY MEDICINE

## 2018-03-23 PROCEDURE — 80048 BASIC METABOLIC PNL TOTAL CA: CPT | Performed by: INTERNAL MEDICINE

## 2018-03-23 PROCEDURE — 77030011256 HC DRSG MEPILEX <16IN NO BORD MOLN -A

## 2018-03-23 PROCEDURE — 3331090001 HH PPS REVENUE CREDIT

## 2018-03-23 PROCEDURE — 36415 COLL VENOUS BLD VENIPUNCTURE: CPT | Performed by: INTERNAL MEDICINE

## 2018-03-23 RX ORDER — VANCOMYCIN HYDROCHLORIDE
1250 ONCE
Status: COMPLETED | OUTPATIENT
Start: 2018-03-23 | End: 2018-03-23

## 2018-03-23 RX ORDER — AMOXICILLIN AND CLAVULANATE POTASSIUM 500; 125 MG/1; MG/1
1 TABLET, FILM COATED ORAL EVERY 12 HOURS
Status: DISCONTINUED | OUTPATIENT
Start: 2018-03-23 | End: 2018-03-23 | Stop reason: SDUPTHER

## 2018-03-23 RX ORDER — AMOXICILLIN AND CLAVULANATE POTASSIUM 875; 125 MG/1; MG/1
1 TABLET, FILM COATED ORAL EVERY 12 HOURS
Status: DISCONTINUED | OUTPATIENT
Start: 2018-03-23 | End: 2018-03-23 | Stop reason: SDUPTHER

## 2018-03-23 RX ORDER — AMOXICILLIN AND CLAVULANATE POTASSIUM 500; 125 MG/1; MG/1
1 TABLET, FILM COATED ORAL 2 TIMES DAILY WITH MEALS
Status: DISCONTINUED | OUTPATIENT
Start: 2018-03-23 | End: 2018-03-24 | Stop reason: HOSPADM

## 2018-03-23 RX ADMIN — DEXTROSE MONOHYDRATE 150 ML/HR: 5 INJECTION, SOLUTION INTRAVENOUS at 06:13

## 2018-03-23 RX ADMIN — HEPARIN SODIUM 5000 UNITS: 5000 INJECTION, SOLUTION INTRAVENOUS; SUBCUTANEOUS at 01:01

## 2018-03-23 RX ADMIN — Medication 10 ML: at 05:20

## 2018-03-23 RX ADMIN — VANCOMYCIN HYDROCHLORIDE 1250 MG: 10 INJECTION, POWDER, LYOPHILIZED, FOR SOLUTION INTRAVENOUS at 07:00

## 2018-03-23 RX ADMIN — FERROUS SULFATE TAB 325 MG (65 MG ELEMENTAL FE) 325 MG: 325 (65 FE) TAB at 17:56

## 2018-03-23 RX ADMIN — HEPARIN SODIUM 5000 UNITS: 5000 INJECTION, SOLUTION INTRAVENOUS; SUBCUTANEOUS at 10:20

## 2018-03-23 RX ADMIN — PIPERACILLIN SODIUM,TAZOBACTAM SODIUM 4.5 G: 4; .5 INJECTION, POWDER, FOR SOLUTION INTRAVENOUS at 10:24

## 2018-03-23 RX ADMIN — AMLODIPINE BESYLATE 5 MG: 5 TABLET ORAL at 10:24

## 2018-03-23 RX ADMIN — AMOXICILLIN AND CLAVULANATE POTASSIUM 1 TABLET: 500; 125 TABLET, FILM COATED ORAL at 17:56

## 2018-03-23 RX ADMIN — DONEPEZIL HYDROCHLORIDE 10 MG: 5 TABLET, FILM COATED ORAL at 21:47

## 2018-03-23 RX ADMIN — HEPARIN SODIUM 5000 UNITS: 5000 INJECTION, SOLUTION INTRAVENOUS; SUBCUTANEOUS at 17:56

## 2018-03-23 RX ADMIN — FOLIC ACID 1 MG: 1 TABLET ORAL at 10:24

## 2018-03-23 RX ADMIN — SODIUM CHLORIDE 500 MG: 900 INJECTION, SOLUTION INTRAVENOUS at 10:20

## 2018-03-23 RX ADMIN — FERROUS SULFATE TAB 325 MG (65 MG ELEMENTAL FE) 325 MG: 325 (65 FE) TAB at 10:24

## 2018-03-23 RX ADMIN — PANTOPRAZOLE SODIUM 40 MG: 40 TABLET, DELAYED RELEASE ORAL at 17:56

## 2018-03-23 RX ADMIN — TAMSULOSIN HYDROCHLORIDE 0.4 MG: 0.4 CAPSULE ORAL at 21:47

## 2018-03-23 RX ADMIN — LEVOTHYROXINE SODIUM 100 MCG: 100 TABLET ORAL at 05:19

## 2018-03-23 NOTE — PROGRESS NOTES
Problem: Mobility Impaired (Adult and Pediatric)  Goal: *Acute Goals and Plan of Care (Insert Text)  LTG:  (1.)Mr. Vidya Gutiérrez will follow 5/5 simple commands within 7 days for increased cognition during therapy. (2.)Mr. Vidya Gutiérrez will roll to left and right with MIN A within 7 days for increased bed mobility. (3.)Mr. Vidya Gutiérrez will perform supine to sit, sit to supine with MOD A within 7 days for safety with transfers. (4.)Mr. Vidya Gutiérrez will maintain static sitting balance at EOB x 5 min with CGA within 7 days for increased trunk control. (5.)Mr. Vidya Gutiérrez will perform sit to stand transfer with MAX A within 7 days. (6.)Mr. Vidya Gutiérrez will be OOB to chair with MAX A within 7 days for increased activity tolerance. Further goals to be added as needed as pt progresses. __________________________________________________________________________________________    PHYSICAL THERAPY: Daily Note, Treatment Day: 1st, AM 3/23/2018  INPATIENT: Hospital Day: 4  Payor: Filiberto Robledo / Plan: [address] [place] [address] HMO / Product Type: Managed Care Medicare /      NAME/AGE/GENDER: Rolf Stahl is a 67 y.o. male   PRIMARY DIAGNOSIS: DEVORA (acute kidney injury) ([facility] [address]  Hyperkalemia  Hypernatremia  Peptic ulcer  Dementia  Wound of buttock Hypernatremia Hypernatremia        ICD-10: Treatment Diagnosis:   · Generalized Muscle Weakness (M62.81)  · Difficulty in walking, Not elsewhere classified (R26.2)  · Other abnormalities of gait and mobility (R26.89)   Precaution/Allergies:  Review of patient's allergies indicates no known allergies. ASSESSMENT:     Mr. Vidya Gutiérrez presents with decreased bed mobility, transfers, ambulation, balance, activity tolerance, strength and overall AROM B LE, and general functional mobility s/p hospital admission with DEVORA. Pt with history of dementia, ETOH and COPD. Pt in fetal position on R side. Pt essentially non-verbal this date, eyes open to name.   Pt requires total assist for all bed mobility and supine to sit transfer. Pt unable to maintain static sitting balance EOB without total assist. Pt leaning to right and posteriorly. Patient returned to supine after about 12 minutes. PT able to perform PROM B LE into extension, pt grimacing with mobility of R LE; however, pt is able to fully extend and flex B LEs in supine. Pt was recently discharge from rehab per chart, family possibly considering hospice care? Pt also with sacral wounds and needs to limit sitting. Pt repositioned R sidelying with multiple pillow support. PT to follow for trial basis of 1 week, 2 x/ week to see if pt improving any and to discuss PLOF with family. Discharge needs pending progress. This section established at most recent assessment   PROBLEM LIST (Impairments causing functional limitations):  1. Decreased Strength  2. Decreased ADL/Functional Activities  3. Decreased Transfer Abilities  4. Decreased Ambulation Ability/Technique  5. Decreased Balance  6. Decreased Activity Tolerance  7. Increased Fatigue  8. Decreased Skin Integrity/Hygeine  9. Decreased [place] with Home Exercise Program  10. Decreased Cognition   INTERVENTIONS PLANNED: (Benefits and precautions of physical therapy have been discussed with the patient.)  1. Balance Exercise  2. Bed Mobility  3. Family Education  4. Gait Training  5. Home Exercise Program (HEP)  6. Range of Motion (ROM)  7. Therapeutic Activites  8. Therapeutic Exercise/Strengthening  9. Transfer Training  10. Group Therapy     TREATMENT PLAN: Frequency/Duration: 2 times a week for trial of 1 week  Rehabilitation Potential For Stated Goals: Guarded     RECOMMENDED REHABILITATION/EQUIPMENT: (at time of discharge pending progress): Due to the probability of continued deficits (see above) this patient will possibly need continued skilled physical therapy after discharge.   Equipment:    None at this time              HISTORY:   History of Present Injury/Illness (Reason for Referral):  Pt cannot give any history at this point. Pt base line according to daughter- can talk few words but cannot make a conversation --secondary to dementia.     67 yr old male pt with dementia,copd,htn--according to daughter recently discharged from rehab. Had gi bleed in January. Pt had not been eating since Saturday evening,not talking,not able to move. Nurse practioner came home and saw the pt - felt that pt might have aspiration--?drooling. No fever or cough though. Pt according to daughter was even more weaker today hence decide to bring to er for further evaluation. Daughter thinks that its a recurring cycle with frequent admissions with similar problems- pertaining to pt not eating. Wants to have hospice involved and eventually once his electrolytes get better- wants pt to be discharged to hospice. According to er note pt was hypotensive at home,bp improved on ivf. Pt has k-6.7,na 165,creat 3.45. Hypothermic- 95.3f  Has wounds rt buttock/hip region. According to daughter pt is more awake in er,trying to talk--er nurse said he spoke to her- few words-- able to tolerate medications in er  Past Medical History/Comorbidities:   Mr. Lizet Watts  has a past medical history of Alcohol abuse; Anemia; Angiodysplasia of stomach (2016); Bladder incontinence (05/28/2014); Chronic airway obstruction, not elsewhere classified (5/28/2014); CKD (chronic kidney disease); COPD (chronic obstructive pulmonary disease) ([facility] [address] Dementia (5/28/2014); Hyperlipidemia (5/28/2014); Hypernatremia (11/25/2016); Hypertension; Hypothyroid; Seizure ([address] (2010); Tobacco abuse (5/28/2014); and Tobacco abuse. Mr. Lizet Watts  has a past surgical history that includes hx colonoscopy (APPROX 2005) and hx endoscopy (2016).   Social History/Living Environment:   Home Environment: Private residence  Living Alone: No  Support Systems: Family member(s)  Prior Level of Function/Work/Activity:  Lives with mother and brother per chart; unsure of PLOF, no family present; pt moaning but not verbal at this time  Personal Factors:          Sex:  male        Age:  67 y.o. Overall Behavior:  Moaning, eyes open to voice   Number of Personal Factors/Comorbidities that affect the Plan of Care:  Dementia, ETOH, COPD 3+: HIGH COMPLEXITY   EXAMINATION:   Most Recent Physical Functioning:   Gross Assessment:                  Posture:     Balance:  Sitting: Impaired  Sitting - Static: Poor (constant support)  Sitting - Dynamic:  (N/A) Bed Mobility:  Rolling: Total assistance  Supine to Sit: Total assistance  Sit to Supine: Total assistance  Scooting: Total assistance  Wheelchair Mobility:     Transfers:     Gait:            Body Structures Involved:  1. Muscles Body Functions Affected:  1. Movement Related Activities and Participation Affected:  1. General Tasks and Demands  2. Mobility  3. Self Care   Number of elements that affect the Plan of Care: 4+: HIGH COMPLEXITY   CLINICAL PRESENTATION:   Presentation: Evolving clinical presentation with unstable and unpredictable characteristics: HIGH COMPLEXITY   CLINICAL DECISION MAKIN hospitals [id number] AM-PAC 6 Clicks   Basic Mobility Inpatient Short Form  How much difficulty does the patient currently have. .. Unable A Lot A Little None   1. Turning over in bed (including adjusting bedclothes, sheets and blankets)? [x] 1   [] 2   [] 3   [] 4   2. Sitting down on and standing up from a chair with arms ( e.g., wheelchair, bedside commode, etc.)   [x] 1   [] 2   [] 3   [] 4   3. Moving from lying on back to sitting on the side of the bed? [x] 1   [] 2   [] 3   [] 4   How much help from another person does the patient currently need. .. Total A Lot A Little None   4. Moving to and from a bed to a chair (including a wheelchair)? [x] 1   [] 2   [] 3   [] 4   5. Need to walk in hospital room? [x] 1   [] 2   [] 3   [] 4   6. Climbing 3-5 steps with a railing?    [x] 1   [] 2   [] 3   [] 4   © 2007, Trustees of [employer], under license to [employer]. All rights reserved      Score:  Initial: 6 Most Recent: X (Date: -- )    Interpretation of Tool:  Represents activities that are increasingly more difficult (i.e. Bed mobility, Transfers, Gait). Score 24 23 22-20 19-15 14-10 9-7 6     Modifier CH CI CJ CK CL CM CN      ? Mobility - Walking and Moving Around:     - CURRENT STATUS: CN - 100% impaired, limited or restricted    - GOAL STATUS: CM - 80%-99% impaired, limited or restricted    - D/C STATUS:  ---------------To be determined---------------  Payor: Javan Prather / Plan: [address] [place] [address] HMO / Product Type: [employer] Medicare /      Medical Necessity:     · Patient is expected to demonstrate progress in strength, range of motion, balance and coordination to decrease assistance required with overall bed mobility and transfers. · Patient demonstrates poor rehab potential due to higher previous functional level. Reason for Services/Other Comments:  · Patient continues to require present interventions due to patient's inability to perform bed mobility and transfers. Use of outcome tool(s) and clinical judgement create a POC that gives a: Difficult prediction of patient's progress: HIGH COMPLEXITY            TREATMENT:   (In addition to Assessment/Re-Assessment sessions the following treatments were rendered)   Pre-treatment Symptoms/Complaints: Patient is non verbal  Pain: Initial:   Pain Intensity 1: 0  Post Session:  0/10     Therapeutic Activity: (    24 minutes): Therapeutic activities including bed mobility and sitting edge of bed with support to improve mobility, balance and coordination. Required total assist    to promote static balance in sitting.        Braces/Orthotics/Lines/Etc:   · IV  · O2 Device: Room air  Treatment/Session Assessment:    · Response to Treatment:  Limited response; total assist  · Interdisciplinary Collaboration:   o Physical Therapy Assistant  o Registered Nurse  · After treatment position/precautions:   o Supine in bed  o Bed/Chair-wheels locked  o Bed in low position  o Call light within reach  o RN notified  o Side rails x 3   · Compliance with Program/Exercises: Will assess as treatment progresses. · Recommendations/Intent for next treatment session: \"Next visit will focus on advancements to more challenging activities\".   Total Treatment Duration:  PT Patient Time In/Time Out  Time In: 1240  Time Out: 1004    Norma Coyne-Иван, PTA

## 2018-03-23 NOTE — PROGRESS NOTES
Hospitalist Progress Note    3/23/2018  Admit Date: 3/20/2018 12:12 PM   NAME: Vangie Galicia   :  1945   MRN:  [id number]   Attending: Priya Beckett MD  PCP:  Sharifa Lizama MD    SUBJECTIVE:   Cinda Saavedra is a 66 yo M with dementia, repeat hospiralizations, and profound debility, who was admitted 3/20 with R basal pneumonia, hypernatremia, and DEVORA. He is seen without family bedside. He states 'yeah' when asked if he was feeling okay, otherwise did not say anything else and could not answer open ended questions. Evaluated by wound care and has sacral decubitus ulcer POA. Speech therapy evaluated and recommend pureed diet with nectar thick liquid and meds crushed. 3/22- seen without family at bedside, but his family's  was there upon entering room. He is sleeping comfortably. Briefly opens eyes to voice. Appears much more comfortable than last evening. He received a dose of morphine at 5 AM today. He is unable to provide history due to dementia. 3/23- a little more awake today but still not conversant. Sodium down to 155 and Cr improved.   Per discussion with Natalie Collazo, family has decided to pursue home home hospice care with [facility] and plan is to discharge tomorrow at 1 PM.        Review of Systems negative with exception of pertinent positives noted above  PHYSICAL EXAM     Visit Vitals    /77 (BP 1 Location: Right arm, BP Patient Position: At rest)    Pulse 81    Temp 98.4 °F (36.9 °C)    Resp 18    Ht 5' 8\" (1.727 m)    Wt 62.2 kg (137 lb 1.6 oz)    SpO2 98%    BMI 20.85 kg/m2      Temp (24hrs), Av °F (36.7 °C), Min:97.2 °F (36.2 °C), Max:98.4 °F (36.9 °C)    Patient Vitals for the past 24 hrs:   Temp Pulse Resp BP SpO2   18 1533 98.4 °F (36.9 °C) 81 18 137/77 98 %   18 1214 97.2 °F (36.2 °C) 84 18 146/74 100 %   18 0743 98 °F (36.7 °C) 95 18 148/82 93 %   18 0429 98 °F (36.7 °C) 67 18 148/58 94 %   18 6042 98.2 °F (36.8 °C) 74 16 138/84 94 %   03/22/18 1920 98.3 °F (36.8 °C) 80 16 117/53 90 %       Oxygen Therapy  O2 Sat (%): 98 % (03/23/18 1533)  Pulse via Oximetry: 87 beats per minute (03/20/18 1841)  O2 Device: Room air (03/21/18 1121)    Intake/Output Summary (Last 24 hours) at 03/23/18 1645  Last data filed at 03/23/18 0855   Gross per 24 hour   Intake              120 ml   Output                0 ml   Net              120 ml      General: No acute distress, opens eyes to voice but falls asleep quickly   Lungs:  CTA Bilaterally.    Heart:  Regular rate and rhythm,  No murmur, rub, or gallop  Abdomen: Soft, Non distended, Non tender, Positive bowel sounds  Extremities: No cyanosis, clubbing or edema  Neurologic:  Unable to assess due to grogginess    Recent Results (from the past 24 hour(s))   PLEASE READ & DOCUMENT PPD TEST IN 24 HRS    Collection Time: 03/22/18  5:58 PM   Result Value Ref Range    PPD  Negative    mm Induration  mm   GLUCOSE, POC    Collection Time: 03/22/18  8:54 PM   Result Value Ref Range    Glucose (POC) 110 (H) 65 - 559 mg/dL   METABOLIC PANEL, BASIC    Collection Time: 03/23/18  5:01 AM   Result Value Ref Range    Sodium 155 (H) 136 - 145 mmol/L    Potassium 4.3 3.5 - 5.1 mmol/L    Chloride 126 (H) 98 - 107 mmol/L    CO2 20 (L) 21 - 32 mmol/L    Anion gap 9 7 - 16 mmol/L    Glucose 97 65 - 100 mg/dL    BUN 47 (H) 8 - 23 MG/DL    Creatinine 2.89 (H) 0.8 - 1.5 MG/DL    GFR est AA 28 (L) >60 ml/min/1.73m2    GFR est non-AA 23 (L) >60 ml/min/1.73m2    Calcium 7.7 (L) 8.3 - 10.4 MG/DL   VANCOMYCIN, RANDOM    Collection Time: 03/23/18  5:01 AM   Result Value Ref Range    Vancomycin, random 17.3 UG/ML   GLUCOSE, POC    Collection Time: 03/23/18  7:42 AM   Result Value Ref Range    Glucose (POC) 122 (H) 65 - 100 mg/dL   GLUCOSE, POC    Collection Time: 03/23/18 11:26 AM   Result Value Ref Range    Glucose (POC) 88 65 - 100 mg/dL   GLUCOSE, POC    Collection Time: 03/23/18 12:06 PM   Result Value Ref Range    Glucose (POC) 94 65 - 100 mg/dL   PLEASE READ & DOCUMENT PPD TEST IN 48 HRS    Collection Time: 03/23/18  1:42 PM   Result Value Ref Range    PPD  Negative    mm Induration  mm   GLUCOSE, POC    Collection Time: 03/23/18  3:51 PM   Result Value Ref Range    Glucose (POC) 92 65 - 100 mg/dL     XR CHEST PORT   Final Result   Impression:  Right basilar infiltrate. ASSESSMENT      Hospital Problems as of 3/23/2018  Date Reviewed: 1/21/2018          Codes Class Noted - Resolved POA    Decubitus ulcer of sacral region, unstageable [facility]) ICD-10-CM: L89.150  ICD-9-CM: 707.03, 707.25  3/21/2018 - Present Yes        Hyperkalemia ICD-10-CM: E87.5  ICD-9-CM: 276.7  3/20/2018 - Present Yes        Peptic ulcer ICD-10-CM: K27.9  ICD-9-CM: 533.90  3/20/2018 - Present Yes        Wound of buttock ICD-10-CM: S31.809A  ICD-9-CM: 877.0  3/20/2018 - Present Yes        PNA (pneumonia) ICD-10-CM: J18.9  ICD-9-CM: 267  3/20/2018 - Present Yes        * (Principal)Hypernatremia ICD-10-CM: E87.0  ICD-9-CM: 276.0  1/13/2018 - Present Yes        Moderate aortic stenosis ICD-10-CM: I35.0  ICD-9-CM: 424.1  1/13/2018 - Present Yes        Hypothermia ICD-10-CM: T68. Zoanne Primrose  ICD-9-CM: 991.6  4/16/2017 - Present Yes        Acute metabolic encephalopathy IHC-35-EW: G93.41  ICD-9-CM: 348.31  12/4/2016 - Present Yes        Dementia (Chronic) ICD-10-CM: F03.90  ICD-9-CM: 294.20  11/25/2016 - Present Yes        Seizure disorder ([address] (Chronic) ICD-10-CM: G40.909  ICD-9-CM: 345.90  11/25/2016 - Present Yes    Overview Addendum 11/25/2016  4:08 PM by Anniece Ripper, NP     PETIT VS FOCAL.  NO GRAND MAL    On Phenytoin for past 4-5 yrs, since intial dx ~2009             Debility ICD-10-CM: R53.81  ICD-9-CM: 799.3  11/25/2016 - Present Yes        Failure to thrive in adult ICD-10-CM: R62.7  ICD-9-CM: 783.7  11/25/2016 - Present Yes        Acquired hypothyroidism ICD-10-CM: E03.9  ICD-9-CM: 244.9  5/17/2016 - Present Yes DEVORA (acute kidney injury) ([facility] [address] ICD-10-CM: N17.9  ICD-9-CM: 584.9  6/13/2014 - Present Yes        COPD (chronic obstructive pulmonary disease) with emphysema (HCC) (Chronic) ICD-10-CM: J43.9  ICD-9-CM: 492.8  5/30/2014 - Present Yes            Plan:  · R side pneumonia- Stop vanc and zosyn (day 4). Follow cultures. · Change abx to augmentin. · Continue D5W at 150 ml/hr for hypernatremia. · Stop lab monitoring. Overall prognosis poor. DVT Prophylaxis: Heparin    Dispo- plan is home with hospice tomorrow. Signed By: Jt Wilde MD     March 23, 2018

## 2018-03-23 NOTE — PROGRESS NOTES
DC plan\" Laurita 9 will meet with spouse this evening to complete paperwork and we then plan to send patient home with [facility] tomorrow 3/24 at 1 PM.   Transport set up and paperwork at the front. DNR given to RN in case family visits tonight to get them to sign it. Need to send the DC summary home with patient for the hospice RN. Sanna Perez

## 2018-03-24 VITALS
SYSTOLIC BLOOD PRESSURE: 142 MMHG | OXYGEN SATURATION: 95 % | BODY MASS INDEX: 20.37 KG/M2 | HEART RATE: 87 BPM | DIASTOLIC BLOOD PRESSURE: 85 MMHG | TEMPERATURE: 97.8 F | WEIGHT: 134.4 LBS | HEIGHT: 68 IN | RESPIRATION RATE: 18 BRPM

## 2018-03-24 LAB
GLUCOSE BLD STRIP.AUTO-MCNC: 142 MG/DL (ref 65–100)
GLUCOSE BLD STRIP.AUTO-MCNC: 86 MG/DL (ref 65–100)

## 2018-03-24 PROCEDURE — 82962 GLUCOSE BLOOD TEST: CPT

## 2018-03-24 PROCEDURE — 3331090002 HH PPS REVENUE DEBIT

## 2018-03-24 PROCEDURE — 74011250637 HC RX REV CODE- 250/637: Performed by: INTERNAL MEDICINE

## 2018-03-24 PROCEDURE — 74011250636 HC RX REV CODE- 250/636: Performed by: FAMILY MEDICINE

## 2018-03-24 PROCEDURE — 3331090001 HH PPS REVENUE CREDIT

## 2018-03-24 PROCEDURE — 74011250637 HC RX REV CODE- 250/637: Performed by: FAMILY MEDICINE

## 2018-03-24 RX ORDER — MORPHINE SULFATE 20 MG/ML
5 SOLUTION ORAL
Qty: 30 ML | Refills: 0 | Status: SHIPPED | OUTPATIENT
Start: 2018-03-24

## 2018-03-24 RX ADMIN — HEPARIN SODIUM 5000 UNITS: 5000 INJECTION, SOLUTION INTRAVENOUS; SUBCUTANEOUS at 02:14

## 2018-03-24 RX ADMIN — FOLIC ACID 1 MG: 1 TABLET ORAL at 09:01

## 2018-03-24 RX ADMIN — FERROUS SULFATE TAB 325 MG (65 MG ELEMENTAL FE) 325 MG: 325 (65 FE) TAB at 09:01

## 2018-03-24 RX ADMIN — HEPARIN SODIUM 5000 UNITS: 5000 INJECTION, SOLUTION INTRAVENOUS; SUBCUTANEOUS at 09:01

## 2018-03-24 RX ADMIN — AMLODIPINE BESYLATE 5 MG: 5 TABLET ORAL at 09:01

## 2018-03-24 RX ADMIN — PANTOPRAZOLE SODIUM 40 MG: 40 TABLET, DELAYED RELEASE ORAL at 05:47

## 2018-03-24 RX ADMIN — AMOXICILLIN AND CLAVULANATE POTASSIUM 1 TABLET: 500; 125 TABLET, FILM COATED ORAL at 09:01

## 2018-03-24 RX ADMIN — LEVOTHYROXINE SODIUM 100 MCG: 100 TABLET ORAL at 05:47

## 2018-03-24 NOTE — PROGRESS NOTES
HALEIGH spoke with Jessa Ascencio from [facility] who wanted to ensure pt was being discharged today. HALEIGH spoke with Gloria Hurley at [facility] who confirmed  at 1:10 p.m.  HALEIGH notified Frieda Alves, discharge RN that he is going. HALEIGH also notified Crow Daniel, daughter, and she is okay with this. D/C summary printed and in packet on chart. Care Management Interventions  PCP Verified by CM:  Yes  Transition of Care Consult (CM Consult): Margarito: No  Reason Outside [place]: Patient already serviced by other home care/hospice agency ([facility])  Current Support Network: Lives with Spouse  Confirm Follow Up Transport: Family  Discharge Location  Discharge Placement: Home with hospice ([facility])

## 2018-03-24 NOTE — DISCHARGE INSTRUCTIONS
DISCHARGE SUMMARY from Nurse    PATIENT INSTRUCTIONS:    After general anesthesia or intravenous sedation, for 24 hours or while taking prescription Narcotics:  · Limit your activities  · Do not drive and operate hazardous machinery  · Do not make important personal or business decisions  · Do  not drink alcoholic beverages  · If you have not urinated within 8 hours after discharge, please contact your surgeon on call. Report the following to your surgeon:  · Excessive pain, swelling, redness or odor of or around the surgical area  · Temperature over 100.5  · Nausea and vomiting lasting longer than 4 hours or if unable to take medications  · Any signs of decreased circulation or nerve impairment to extremity: change in color, persistent  numbness, tingling, coldness or increase pain  · Any questions    What to do at Home:  Recommended activity: Activity as tolerated, as patient tolerates    If you experience any of the following symptoms per Hospice staff, please follow up with per Hospice MD.    *  Please give a list of your current medications to your Primary Care Provider. *  Please update this list whenever your medications are discontinued, doses are      changed, or new medications (including over-the-counter products) are added. *  Please carry medication information at all times in case of emergency situations. These are general instructions for a healthy lifestyle:    No smoking/ No tobacco products/ Avoid exposure to second hand smoke  Surgeon General's Warning:  Quitting smoking now greatly reduces serious risk to your health.     Obesity, smoking, and sedentary lifestyle greatly increases your risk for illness    A healthy diet, regular physical exercise & weight monitoring are important for maintaining a healthy lifestyle    You may be retaining fluid if you have a history of heart failure or if you experience any of the following symptoms:  Weight gain of 3 pounds or more overnight or 5 pounds in a week, increased swelling in our hands or feet or shortness of breath while lying flat in bed. Please call your doctor as soon as you notice any of these symptoms; do not wait until your next office visit. Recognize signs and symptoms of STROKE:    F-face looks uneven    A-arms unable to move or move unevenly    S-speech slurred or non-existent    T-time-call 911 as soon as signs and symptoms begin-DO NOT go       Back to bed or wait to see if you get better-TIME IS BRAIN. Warning Signs of HEART ATTACK     Call 911 if you have these symptoms:   Chest discomfort. Most heart attacks involve discomfort in the center of the chest that lasts more than a few minutes, or that goes away and comes back. It can feel like uncomfortable pressure, squeezing, fullness, or pain.  Discomfort in other areas of the upper body. Symptoms can include pain or discomfort in one or both arms, the back, neck, jaw, or stomach.  Shortness of breath with or without chest discomfort.  Other signs may include breaking out in a cold sweat, nausea, or lightheadedness. Don't wait more than five minutes to call 911 - MINUTES MATTER! Fast action can save your life. Calling 911 is almost always the fastest way to get lifesaving treatment. Emergency Medical Services staff can begin treatment when they arrive -- up to an hour sooner than if someone gets to the hospital by car. The discharge information has been reviewed with the caregiver. The caregiver verbalized understanding. Discharge medications reviewed with the caregiver and appropriate educational materials and side effects teaching were provided. ___________________________________________________________________________________________________________________________________         Learning About Hospice and Palliative Care  What are hospice and palliative care?     Palliative (say \"PAL-ade-uh-tiv\") care is an area of medicine that helps give you more good days by providing care for quality-of-life issues. It includes treating symptoms like pain, nausea, or sleep problems. It can also include helping you and your loved ones to:  · Understand your illness better. · Talk more openly about your feelings. · Decide what treatments you want or don't want. · Communicate better with your doctors, nurses, and each other. Hospice care is a type of palliative care. But it's for people who are near the end of life. What kinds of care are involved? Palliative care: This treatment helps you feel better physically, emotionally, and spiritually while doctors also treat your illness. Your care may include pain relief, counseling, or nutrition advice. Hospice care: Again, the goal of this type of care is to help you feel better. And it can help you get the most out of the time you have left. But you no longer get treatment to try to cure your illness. When does care happen? Palliative care: This care can happen at any time during a serious illness. You don't have to be near death to get this care. Hospice care: In most cases, you can choose hospice care when your doctor believes that you have no more than about 6 months to live. Where does the care happen? Palliative care: This care often happens in hospitals or long-term care facilities like nursing homes. It can take place wherever you are treated, even in your home. Hospice care: Most hospice care is done in the place the patient calls \"home. \" This is often the person's home. But it could also be a place like a nursing home or jail center. Hospice care may also be given in hospice centers, hospitals, and other places. Who provides the care? Palliative care: There are doctors and nurses who specialize in this field. But your own doctor may also give some of this care. And there are many other experts who may help you. These include social workers, counselors, therapists, and nutrition experts. Hospice care:  In hospitals, hospice centers, and other facilities, care is given by doctors, nurses, and others who are trained in hospice care. In the home, a family member is often the main caregiver. But the family member gets help from care experts. They are on call 24 hours a day. Where can you learn more? Go to http://malgorzata-violeta.info/. Enter 466 [id number] in the search box to learn more about \"Learning About Hospice and Palliative Care. \"  Current as of: September 24, 2016  Content Version: 11.4  © 2618-7928 [employer]. Care instructions adapted under license by [employer] (which disclaims liability or warranty for this information). If you have questions about a medical condition or this instruction, always ask your healthcare professional. [address] any warranty or liability for your use of this information.

## 2018-03-24 NOTE — DISCHARGE SUMMARY
Hospitalist Discharge Summary     Patient ID:  Darylene Gibson  [id number]  51 y.o.  1945  Admit date: 3/20/2018 12:12 PM  Discharge date and time: 3/24/2018  Attending: Esme Guerin MD  PCP:  Jin Edmonds MD  Treatment Team: Attending Provider: Esme Guerin MD; Utilization Review: Whitney Suh RN; Care Manager: LIVIA Acuna    Principal Diagnosis Hypernatremia   Hospital Problems as of 3/24/2018  Date Reviewed: 1/21/2018          Codes Class Noted - Resolved POA    Decubitus ulcer of sacral region, unstageable [facility]) ICD-10-CM: L89.150  ICD-9-CM: 707.03, 707.25  3/21/2018 - Present Yes        Hyperkalemia ICD-10-CM: E87.5  ICD-9-CM: 276.7  3/20/2018 - Present Yes        Peptic ulcer ICD-10-CM: K27.9  ICD-9-CM: 533.90  3/20/2018 - Present Yes        Wound of buttock ICD-10-CM: S31.809A  ICD-9-CM: 877.0  3/20/2018 - Present Yes        PNA (pneumonia) ICD-10-CM: J18.9  ICD-9-CM: 486  3/20/2018 - Present Yes        * (Principal)Hypernatremia ICD-10-CM: E87.0  ICD-9-CM: 276.0  1/13/2018 - Present Yes        Moderate aortic stenosis ICD-10-CM: I35.0  ICD-9-CM: 424.1  1/13/2018 - Present Yes        Hypothermia ICD-10-CM: T68. Ladi Abigail  ICD-9-CM: 991.6  4/16/2017 - Present Yes        Acute metabolic encephalopathy KST-01-IX: G93.41  ICD-9-CM: 348.31  12/4/2016 - Present Yes        Dementia (Chronic) ICD-10-CM: F03.90  ICD-9-CM: 294.20  11/25/2016 - Present Yes        Seizure disorder ([address] (Chronic) ICD-10-CM: G40.909  ICD-9-CM: 345.90  11/25/2016 - Present Yes    Overview Addendum 11/25/2016  4:08 PM by Johanna Cosme NP     PETIT VS FOCAL.  NO GRAND MAL    On Phenytoin for past 4-5 yrs, since intial dx ~2009             Debility ICD-10-CM: R53.81  ICD-9-CM: 799.3  11/25/2016 - Present Yes        Failure to thrive in adult ICD-10-CM: R62.7  ICD-9-CM: 783.7  11/25/2016 - Present Yes        Acquired hypothyroidism ICD-10-CM: E03.9  ICD-9-CM: 244.9  5/17/2016 - Present Yes DEVORA (acute kidney injury) ([facility] [address] ICD-10-CM: N17.9  ICD-9-CM: 584.9  6/13/2014 - Present Yes        COPD (chronic obstructive pulmonary disease) with emphysema (HCC) (Chronic) ICD-10-CM: J43.9  ICD-9-CM: 492.8  5/30/2014 - Present Yes                 HPI:  'History from daughter at bedside. Pt cannot give any history at this point. Pt base line according to daughter- can talk few words but cannot make a conversation --secondary to dementia.   69 yr old male pt with dementia,copd,htn--according to daughter recently discharged from rehab. Had gi bleed in January. Pt had not been eating since Saturday evening,not talking,not able to move. Nurse practioner came home and saw the pt - felt that pt might have aspiration--?drooling. No fever or cough though. Pt according to daughter was even more weaker today hence decide to bring to er for further evaluation. Daughter thinks that its a recurring cycle with frequent admissions with similar problems- pertaining to pt not eating. Wants to have hospice involved and eventually once his electrolytes get better- wants pt to be discharged to hospice. According to er note pt was hypotensive at home,bp improved on ivf. Pt has k-6.7,na 165,creat 3.45. Hypothermic- 95.3f  Has wounds rt buttock/hip region. According to daughter pt is more awake in er,trying to talk--er nurse said he spoke to her- few words-- able to tolerate medications in er  [facility] nurse was talking to daughter in er    Hospital Course:  1. Hypernatremia with DEVORA- he was treated with D5W. Sodium level dropped from 166 on admission to 155 on day prior to discharge. Decision was made to pursue hospice care. 2. R side pneumonia- he was treated with vancomycin and zosyn. Transitioned to augmentin no 3/23. Decision made for hospice care, so abx will be discontinued on discharge per discussion with his daughter, Dorinda Hill. 3. Sacral decubitus- present on admission. Wound care followed.     Significant Diagnostic Studies:       Labs: Results:       Chemistry Recent Labs      03/23/18   0501  03/22/18   0532   GLU  97  109*   NA  155*  160*   K  4.3  4.4   CL  126*  131*   CO2  20*  15*   BUN  47*  52*   CREA  2.89*  2.98*   CA  7.7*  8.3   AGAP  9  14      CBC w/Diff No results for input(s): WBC, RBC, HGB, HCT, PLT, GRANS, LYMPH, EOS, HGBEXT, HCTEXT, PLTEXT in the last 72 hours. Cardiac Enzymes No results for input(s): CPK, CKND1, NADIA in the last 72 hours. No lab exists for component: CKRMB, TROIP   Coagulation No results for input(s): PTP, INR, APTT in the last 72 hours. No lab exists for component: INREXT    Lipid Panel Lab Results   Component Value Date/Time    Cholesterol, total 161 09/27/2017 11:25 AM    Cholesterol (POC) 168 05/30/2014 10:00 AM    HDL Cholesterol 47 09/27/2017 11:25 AM    LDL, calculated 101 (H) 09/27/2017 11:25 AM    VLDL, calculated 13 09/27/2017 11:25 AM    Triglyceride 64 09/27/2017 11:25 AM    Triglycerides (POC) 46 05/30/2014 10:00 AM      BNP No results for input(s): BNPP in the last 72 hours. Liver Enzymes No results for input(s): TP, ALB, TBIL, AP, SGOT, GPT in the last 72 hours. No lab exists for component: DBIL   Thyroid Studies Lab Results   Component Value Date/Time    T4, Total 5.9 11/18/2016 12:12 PM    TSH 8.150 (H) 01/13/2018 05:50 PM          Imaging:    XR CHEST PORT   Final Result   Impression:  Right basilar infiltrate. Discharge Exam:  Visit Vitals    BP (!) 155/94    Pulse (!) 102    Temp 98.6 °F (37 °C)    Resp 18    Ht 5' 8\" (1.727 m)    Wt 61 kg (134 lb 6.4 oz)    SpO2 91%    BMI 20.44 kg/m2     General appearance: groggy, frail appearing  Lungs: clear to auscultation bilaterally  Heart: regular rate and rhythm, S1, S2 normal, no murmur, click, rub or gallop  Abdomen: soft, non-tender.  Bowel sounds normal. No masses,  no organomegaly  Extremities: no cyanosis or edema  Neurologic: generalized weakness    Disposition: Home with home hospice  Discharge Condition: stable but guarded  Patient Instructions:   Current Discharge Medication List      START taking these medications    Details   morphine (ROXANOL) 100 mg/5 mL (20 mg/mL) concentrated solution Take 0.25 mL by mouth every two (2) hours as needed for Pain (Shortness of breath). Max Daily Amount: 60 mg. Indications: Hospice care  Qty: 30 mL, Refills: 0    Associated Diagnoses: Decubitus ulcer of sacral region, unstageable ([facility] [address]         CONTINUE these medications which have NOT CHANGED    Details   divalproex DR (DEPAKOTE) 250 mg tablet Take 250 mg by mouth nightly. levETIRAcetam (KEPPRA) 500 mg tablet Take 1 Tab by mouth two (2) times a day. Qty: 180 Tab, Refills: 1    Associated Diagnoses: Seizure disorder ([facility] [address]         STOP taking these medications       gabapentin (NEURONTIN) 300 mg capsule Comments:   Reason for Stopping:         levothyroxine (SYNTHROID) 100 mcg tablet Comments:   Reason for Stopping:         ferrous sulfate 325 mg (65 mg iron) tablet Comments:   Reason for Stopping:         pantoprazole (PROTONIX) 40 mg tablet Comments:   Reason for Stopping:         tamsulosin (FLOMAX) 0.4 mg capsule Comments:   Reason for Stopping:         folic acid (FOLVITE) 1 mg tablet Comments:   Reason for Stopping:         mirtazapine (REMERON) 15 mg tablet Comments:   Reason for Stopping:         donepezil (ARICEPT) 10 mg tablet Comments:   Reason for Stopping:         amLODIPine (NORVASC) 10 mg tablet Comments:   Reason for Stopping:         losartan (COZAAR) 100 mg tablet Comments:   Reason for Stopping:             Per discussion with his daughter, Steven Dumont, they want to focus on symptom management. He will be continued on his keppra and depakote to avoid seizures and to help keep calm. He will be given roxanol for pain control. Activity: Bedrest or as tolerated  Diet: Comfort feeding  Wound Care: Apply perianal zinc based cream bid and prn.    Allevyn to sacrum change every other day and prn. Allevyn or small foam to protect bony prominence as needed. SCRIPTS website reviewed regarding controlled substance prescriptions and there are no concerns found. Follow-up      Follow-up Appointments   Procedures    FOLLOW UP VISIT Appointment in: Other (Specify) [id number]  team     [id number]  team     Standing Status:   Standing     Number of Occurrences:   1     Order Specific Question:   Appointment in     Answer: Other (Specify)   ·   ·   Time spent to discharge patient 31 minutes  Signed:  Irma Pacheco MD  3/24/2018  9:54 AM

## 2018-03-24 NOTE — PROGRESS NOTES
Patient will be discharged home with Hospice and [facility]  at 1 pm , family to be here at 8 am, information put in packet for home along with script.

## 2018-03-25 LAB
BACTERIA SPEC CULT: NORMAL
BACTERIA SPEC CULT: NORMAL
SERVICE CMNT-IMP: NORMAL
SERVICE CMNT-IMP: NORMAL

## 2018-03-25 PROCEDURE — 3331090002 HH PPS REVENUE DEBIT

## 2018-03-25 PROCEDURE — 3331090001 HH PPS REVENUE CREDIT

## 2018-03-26 ENCOUNTER — HOME CARE VISIT (OUTPATIENT)
Dept: HOME HEALTH SERVICES | Facility: HOME HEALTH | Age: 73
End: 2018-03-26
Payer: MEDICARE

## 2018-03-26 ENCOUNTER — PATIENT OUTREACH (OUTPATIENT)
Dept: CASE MANAGEMENT | Age: 73
End: 2018-03-26

## 2018-03-26 PROCEDURE — 3331090002 HH PPS REVENUE DEBIT

## 2018-03-26 PROCEDURE — 3331090001 HH PPS REVENUE CREDIT

## 2018-03-27 PROCEDURE — 3331090001 HH PPS REVENUE CREDIT

## 2018-03-27 PROCEDURE — 3331090002 HH PPS REVENUE DEBIT

## 2018-03-27 NOTE — PROGRESS NOTES
Downtime progress note entry for [facility] Care :  Yobany Hampton RN    Transition of Care Discharge Follow-up Questionnaire   Date/Time of Call:   Patient name:   :   MRN:   ISRRAEL#   3/26/18 @ 11:50am  Harriet Rinaldi  1945  [id number]  [id number]   1st call   What was the patient hospitalized for? Acute Kidney Injury   Does the patient understand his/her diagnosis and/or treatment and what happened during the hospitalization? Pt is not able to answer   Did the patient receive discharge instructions? No     Review any discharge instructions (see notes in [facility]). Ask patient if they understand these. Do they have any questions? 3/26/18- 11:50am- Called and left voicemail with contact information. 11:52am- Pt's daughter Kristan Hernandez called and stated that they had signed her dad on to hospice. No further care  calls needed. Were home services ordered (nursing, PT, OT, ST, etc.)? Hospice   If so, has the first visit occurred? If not, why? (Assist with coordination of services if necessary.) Yes     Was any DME ordered? If so, has it been received? If not, why?  (Assist with coordination of arranging DME orders if necessary.)          Complete a review of all medications (new, continued and discontinued meds per the D/C instructions and [facility] Were all new prescriptions filled? If not, why?  (Assist with obtainment of medications if necessary.)      Does the patient understand the purpose and dosing instructions for all medications? (If patient has questions, provide explanation and education.)    Does the patient have any problems in performing ADLs? (If patient is unable to perform ADLs - what is the limiting factor(s)? Do they have a support system that can assist? If no support system is present, discuss possible assistance that they may be able to obtain.)            Does the patient have all follow-up appointments scheduled?       7 day f/up with PCP?    7-14 day f/up with specialist?    If f/up has not been made - what actions has the care coordinator made to accomplish this? Has transportation been arranged? [facility]monary follow-up should be within 7 days of discharge; all others should have PCP follow-up within 7 days of discharge; follow-ups with other specialists should be within 7-14 days of discharge.) . Any other questions or concerns expressed by the patient? Schedule next appointment with [facility] [address] Coordinator or refer to RN Case Manager/  per the workflow guidelines. When is care coordinators next follow-up call scheduled? If referred for CCM - what RN care manager was the referral assigned?     CHANCE Call Completed By:   Glenna Monzon RN

## 2018-03-28 PROCEDURE — 3331090001 HH PPS REVENUE CREDIT

## 2018-03-28 PROCEDURE — 3331090002 HH PPS REVENUE DEBIT

## 2019-03-22 NOTE — PROGRESS NOTES
ROSA completed. Dr. Stephani Mccall and Dr. Jasen Drummond notified and agree to send patient back to room 237. Ayanna Wasserman (primary nurse) notified. Vital signs stable. good balance

## 2021-08-03 PROBLEM — J18.9 PNA (PNEUMONIA): Status: RESOLVED | Noted: 2018-03-20 | Resolved: 2021-08-03

## 2022-09-23 NOTE — PROGRESS NOTES
.    Nephrology Consult   Consults    History Of Present Illness  Alli is a 73 year old male presenting with shortness of breath. [place] is [place]; found to have copd and pneumonia  Nephrology for low sodium level   Pt seen and examined; awake and follows direction  Oriented to person   From [facility] where he is a chronic resident     9/23 pt seen doing ok; more awake and alert    Past Medical History  Past Medical History:   Diagnosis Date   • Essential (primary) hypertension    • Hyperlipoproteinemia    • PAF (paroxysmal atrial fibrillation) (CMS/[facility])    • Parkinson disease (CMS/[facility])    • Schizophrenia (CMS/[facility])    • Seizure (CMS/[facility])    • Thyroid disease         Surgical History  No past surgical history on file.     Social History  Social History     Tobacco Use   • Smoking status: Never Smoker   • Smokeless tobacco: Never Used   Vaping Use   • Vaping Use: never used   Substance Use Topics   • Alcohol use: Not Currently   • Drug use: Not Currently       Family History  No family history on file.     Allergies  ALLERGIES:  Liver   (food or med)    Medications  Medications Prior to Admission   Medication Sig Dispense Refill   • Propylene Glycol-Glycerin 1-0.3 % Solution Place 1 drop into both eyes every 8 hours as needed (dry eyes).     • lisinopril (ZESTRIL) 5 MG tablet 5 mg by PEG Tube route daily.     • acetaminophen (TYLENOL) 325 MG tablet 650 mg by PEG Tube route every 6 hours as needed for Pain or Fever (over 100 degrees F). Do not exceed 3 grams in 24 hours     • atropine (ISOPTO ATROPINE) 1 % ophthalmic solution Place 2 drops under the tongue every 2 hours as needed (for secretions). 15 mL 0   • valproic acid (DEPAKENE) 250 MG/5ML solution Take 10 mLs by mouth every 8 hours. Per PEG tube 473 mL 2   • gabapentin (NEURONTIN) 100 MG capsule 100 mg by PEG Tube route every 8 hours.     • ipratropium-albuterol (DUONEB) 0.5-2.5 (3) MG/3ML nebulizer solution Take 3 mLs by nebulization every 4 hours as needed  Patient BGL 71 at 1047, Given 2 4 ounce orange juice and ate about 75% of his lunch and his BGL is now 100. Will recheck to monitor for drop in levels. Will continue to monitor the patient. for Wheezing or Shortness of Breath.     • levothyroxine 125 MCG tablet 125 mcg by PEG Tube route daily (before breakfast).     • QUEtiapine (SEROquel) 200 MG tablet 200 mg by PEG Tube route at bedtime. Give with a 50 mg tablet for a total dose of 250 mg     • QUEtiapine (SEROquel) 50 MG tablet 50 mg by PEG Tube route at bedtime. Give with a 200 mg tablet for a total dose of 250 mg     • QUEtiapine (SEROquel) 100 MG tablet 100 mg by PEG Tube route daily.     • sodium chloride 1 g tablet 1 g by PEG Tube route 2 times daily.     • budesonide-formoterol (SYMBICORT) 80-4.5 MCG/ACT inhaler Inhale 2 puffs into the lungs 2 times daily.     • carbidopa-levodopa (SINEMET)  MG per tablet 1 tablet by PEG Tube route every 12 hours.     • loratadine (CLARITIN) 10 MG tablet 10 mg by PEG Tube route daily.     • atorvastatin (LIPITOR) 40 MG tablet 40 mg at bedtime. Per PEG-Tube     • magnesium hydroxide (MILK OF MAGNESIA) 400 MG/5ML suspension 10 mLs nightly. Per PEG tube     • Cholecalciferol (VITAMIN D) 50 mcg (2,000 units) tablet 50 mcg daily. Per PEG tube     • apixaBAN (ELIQUIS) 5 MG Tab 5 mg by PEG Tube route every 12 hours.           Current Facility-Administered Medications   Medication   • sodium PHOSPHATE 20 mmol in sodium chloride 0.9 % 250 mL IVPB   • azithromycin (ZITHROMAX) 500 mg in sodium chloride 0.9 % 250 mL IVPB   • cefTRIAXone (ROCEPHIN) syringe 1,000 mg   • ipratropium-albuterol (DUONEB) 0.5-2.5 (3) MG/3ML nebulizer solution 3 mL   • ondansetron (ZOFRAN) injection 4 mg   • atropine (ISOPTO ATROPINE) 1 % ophthalmic solution 2 drop   • carbidopa-levodopa (SINEMET)  MG per tablet 1 tablet   • loratadine (CLARITIN) tablet 10 mg   • atorvastatin (LIPITOR) tablet 40 mg   • magnesium hydroxide (MILK OF MAGNESIA) 400 MG/5ML suspension 10 mL   • apixaBAN (ELIQUIS) tablet 5 mg   • [Held by provider] gabapentin (NEURONTIN) capsule 100 mg   • levothyroxine (SYNTHROID, LEVOTHROID) tablet 125 mcg   • QUEtiapine  (SEROquel) tablet 100 mg   • sodium chloride tablet 1,000 mg   • budesonide-formoterol (SYMBICORT) 80-4.5 MCG/ACT inhaler 2 puff   • valproic acid (DEPAKENE) solution 500 mg   • [Held by provider] lisinopril (ZESTRIL) tablet 5 mg   • acetaminophen (TYLENOL) tablet 650 mg   • cholecalciferol (VITAMIN D) tablet 50 mcg   • docusate sodium-sennosides (SENOKOT S) 50-8.6 MG 2 tablet   • polyethylene glycol (MIRALAX) packet 17 g   • ipratropium-albuterol (DUONEB) 0.5-2.5 (3) MG/3ML nebulizer solution 3 mL   • methylPREDNISolone (SOLU-Medrol) PF injection 40 mg   • guaiFENesin-DM (ROBITUSSIN DM) 100-10 MG/5ML syrup 10 mL   • QUEtiapine (SEROquel) tablet 250 mg        Review of Systems  Review of Systems   Respiratory: Positive for cough.    All other systems reviewed and are negative.        Vital Last Value 24 Hour Range   Temperature 97.3 °F (36.3 °C) (09/23/22 0743) Temp  Min: 97.3 °F (36.3 °C)  Max: 98.8 °F (37.1 °C)   Pulse 79 (09/23/22 0743) Pulse  Min: 79  Max: 97   Respiratory 12 (09/23/22 0743) Resp  Min: 12  Max: 20   Non-Invasive  Blood Pressure 117/79 (09/23/22 0743) BP  Min: 117/79  Max: 123/67   Pulse Oximetry 97 % (09/23/22 0746) SpO2  Min: 93 %  Max: 98 %           I/O last 3 completed shifts:  In: 665 [NG/GT:665]  Out: 550 [Urine:550]  No intake/output data recorded.     Weight    09/21/22 2206 09/22/22 0225   Weight: 89.2 kg (196 lb 10.4 oz) 84.8 kg (186 lb 15.2 oz)        Physical examination  Awake and alert in no acute distress  Lungs clear to auscultation bilaterally; rhonchi; diminished   Heart regular rate and rhythm  Abdomen is soft nontender, bowels sounds are present  +feeding tube   Extremities no edema; cool ext no ulcers   Neuro no focal deficit follows commands     Relevant Results  Recent Labs   Lab 09/23/22  0745 09/22/22  0532 09/21/22  2219   WBC 7.6 5.8 10.0   RBC 3.72* 4.08* 4.04*   HGB 13.2 14.6 14.2   HCT 37.8* 41.8 41.4    162 219       Recent Labs   Lab 09/23/22  0745  09/22/22  0532 09/21/22  2219   SODIUM 134* 129* 129*   POTASSIUM 4.5 4.7 4.3   CHLORIDE 101 99 97   CO2 26 24 26   CALCIUM 8.7 8.8 9.3   GLUCOSE 161* 106* 100*   BUN 19 14 12   CREATININE 0.44* 0.42* 0.61*   AST 31  --  19   GPT 50  --  15   ALKPT 66  --  84   BILIRUBIN 0.3  --  0.8   ALBUMIN 2.6*  --  3.1*   LIPA  --   --  62*   PHOS 2.2* 3.3  --        IMAGING  XR CHEST PA OR AP 1 VIEW    Result Date: 9/21/2022  Narrative: EXAM: PORTABLE CHEST, 1 VIEW CLINICAL INDICATION:  Cough and shortness of breath COMPARISON: The     Impression: FINDINGS/IMPRESSION:  Trace left lung base opacity, likely atelectasis. Small underlying pleural effusion cannot be excluded.  Visualized portions of the right lung are clear.  Normal cardiac size.  No pneumothorax. Examination is significantly limited by patient's positioning.  Follow-up with full inspiration upright and lateral views of the chest recommended, when patient's condition permits. Electronically Signed by: LEISA TOWNSEND M.D. Signed on: 9/21/2022 10:32 PM         A/P  Acute hyponatremia  Sec acute lung infection vs other  Pt was on 200cc water flush q4 hours and additional flush w/ meds   Serum osmolarity ok   Related to free water flushes  Place on water restriction   Avoid extra tube flush   Follow repeat labs which are better today   Na better  Cont na cl tabs  Replace phos     Renal function   Stable  Hold bp meds as labile bp    Respiratory distress  Copd  Pneumonia  Possible aspiration   Xray noted  bnp elevated but trending down   procal ok   Follow bnp  On oxygen   Aspiration precautions  On iv abx/steroids     ams  Sec above  Hold gabapentin    Hx multiple medical problems  Hx htn/parkinsons /sz/thyroid disease/afib  Cont home meds  tsh ok     Nutrition   Has tube feeds and oral   No water flush for now   Speech and swallow eval noted and currently npo    D/w rn   D/w primary team   Thanks           CAROL Ríos DO

## (undated) DEVICE — BLOCK BITE AD 60FR W/ VELC STRP ADDRESSES MOST PT AND

## (undated) DEVICE — KENDALL RADIOLUCENT FOAM MONITORING ELECTRODE RECTANGULAR SHAPE: Brand: KENDALL

## (undated) DEVICE — FIAPC® PROBE W/ FILTER 2200 C OD 2.3MM/6.9FR; L 2.2M/7.2FT: Brand: ERBE

## (undated) DEVICE — FIAPC® PROBE W/ FILTER 2200 A OD 2.3MM/6.9FR; L 2.2M/7.2FT: Brand: ERBE

## (undated) DEVICE — CONNECTOR TBNG OD5-7MM O2 END DISP

## (undated) DEVICE — REM POLYHESIVE ADULT PATIENT RETURN ELECTRODE: Brand: VALLEYLAB

## (undated) DEVICE — CANNULA NSL ORAL AD FOR CAPNOFLEX CO2 O2 AIRLFE